# Patient Record
Sex: FEMALE | Race: WHITE | Employment: UNEMPLOYED | URBAN - METROPOLITAN AREA
[De-identification: names, ages, dates, MRNs, and addresses within clinical notes are randomized per-mention and may not be internally consistent; named-entity substitution may affect disease eponyms.]

---

## 2017-04-10 PROBLEM — M17.12 ARTHRITIS OF KNEE, LEFT: Status: ACTIVE | Noted: 2017-04-10

## 2017-06-06 PROBLEM — G89.4 CHRONIC PAIN SYNDROME: Status: ACTIVE | Noted: 2017-06-06

## 2018-01-18 PROBLEM — M47.812 CERVICAL SPONDYLOSIS WITHOUT MYELOPATHY: Status: ACTIVE | Noted: 2018-01-18

## 2018-06-08 ENCOUNTER — HOSPITAL ENCOUNTER (OUTPATIENT)
Dept: MRI IMAGING | Age: 58
Discharge: HOME | End: 2018-06-10
Payer: COMMERCIAL

## 2018-06-08 DIAGNOSIS — M54.17 LUMBOSACRAL RADICULOPATHY: ICD-10-CM

## 2018-06-08 DIAGNOSIS — M48.062 LUMBAR STENOSIS WITH NEUROGENIC CLAUDICATION: ICD-10-CM

## 2018-06-08 PROCEDURE — 72158 MRI LUMBAR SPINE W/O & W/DYE: CPT

## 2018-06-08 PROCEDURE — A9579 GAD-BASE MR CONTRAST NOS,1ML: HCPCS | Performed by: RADIOLOGY

## 2018-06-08 PROCEDURE — 6360000004 HC RX CONTRAST MEDICATION: Performed by: RADIOLOGY

## 2018-06-08 RX ORDER — SODIUM CHLORIDE 0.9 % (FLUSH) 0.9 %
10 SYRINGE (ML) INJECTION 2 TIMES DAILY
Status: DISCONTINUED | OUTPATIENT
Start: 2018-06-08 | End: 2018-06-11 | Stop reason: HOSPADM

## 2018-06-08 RX ADMIN — GADOTERIDOL 15 ML: 279.3 INJECTION, SOLUTION INTRAVENOUS at 10:10

## 2018-09-04 PROBLEM — M96.1 POST LAMINECTOMY SYNDROME: Status: ACTIVE | Noted: 2018-09-04

## 2018-09-05 ENCOUNTER — HOSPITAL ENCOUNTER (OUTPATIENT)
Dept: SLEEP CENTER | Age: 58
Discharge: HOME | End: 2018-09-07
Payer: COMMERCIAL

## 2018-09-05 PROCEDURE — 95810 POLYSOM 6/> YRS 4/> PARAM: CPT

## 2018-09-06 ENCOUNTER — HOSPITAL ENCOUNTER (OUTPATIENT)
Dept: NEUROLOGY | Age: 58
Discharge: HOME | End: 2018-09-06
Payer: COMMERCIAL

## 2018-09-06 PROCEDURE — 95816 EEG AWAKE AND DROWSY: CPT

## 2018-10-18 ENCOUNTER — OFFICE VISIT (OUTPATIENT)
Dept: PULMONOLOGY | Age: 58
End: 2018-10-18
Payer: COMMERCIAL

## 2018-10-18 VITALS
TEMPERATURE: 98 F | HEART RATE: 76 BPM | OXYGEN SATURATION: 96 % | WEIGHT: 202 LBS | SYSTOLIC BLOOD PRESSURE: 118 MMHG | RESPIRATION RATE: 16 BRPM | DIASTOLIC BLOOD PRESSURE: 70 MMHG | HEIGHT: 65 IN | BODY MASS INDEX: 33.66 KG/M2

## 2018-10-18 DIAGNOSIS — G47.30 SLEEP APNEA, UNSPECIFIED TYPE: Primary | ICD-10-CM

## 2018-10-18 DIAGNOSIS — E66.9 OBESITY (BMI 30-39.9): ICD-10-CM

## 2018-10-18 PROCEDURE — 99203 OFFICE O/P NEW LOW 30 MIN: CPT | Performed by: INTERNAL MEDICINE

## 2018-10-18 ASSESSMENT — ENCOUNTER SYMPTOMS
WHEEZING: 0
SINUS PRESSURE: 0
COUGH: 0
EYE DISCHARGE: 0
SORE THROAT: 0
VOICE CHANGE: 0
ABDOMINAL PAIN: 0
NAUSEA: 0
DIARRHEA: 0
TROUBLE SWALLOWING: 0
SHORTNESS OF BREATH: 0
EYE ITCHING: 0
CHEST TIGHTNESS: 0
RHINORRHEA: 0
VOMITING: 0

## 2019-10-24 RX ORDER — SUMATRIPTAN 100 MG/1
100 TABLET, FILM COATED ORAL
Qty: 27 TABLET | Refills: 0 | Status: SHIPPED | OUTPATIENT
Start: 2019-10-24 | End: 2022-09-27

## 2020-04-01 ENCOUNTER — VIRTUAL VISIT (OUTPATIENT)
Dept: NEUROLOGY | Age: 60
End: 2020-04-01

## 2020-04-01 PROBLEM — M50.10 CERVICAL DISC DISORDER WITH RADICULOPATHY, UNSPECIFIED CERVICAL REGION: Status: ACTIVE | Noted: 2017-12-30

## 2020-04-01 PROBLEM — G43.719 INTRACTABLE CHRONIC MIGRAINE WITHOUT AURA AND WITHOUT STATUS MIGRAINOSUS: Status: ACTIVE | Noted: 2020-04-01

## 2020-04-01 PROBLEM — F39 MOOD DISORDER (HCC): Status: ACTIVE | Noted: 2018-07-03

## 2020-04-01 PROBLEM — F51.4 SLEEP TERROR DISORDER: Status: ACTIVE | Noted: 2020-04-01

## 2020-04-01 PROBLEM — S46.911A STRAIN OF UNSPECIFIED MUSCLE, FASCIA AND TENDON AT SHOULDER AND UPPER ARM LEVEL, RIGHT ARM, INITIAL ENCOUNTER: Status: ACTIVE | Noted: 2017-12-30

## 2020-04-01 PROBLEM — G43.001 MIGRAINE WITHOUT AURA AND WITH STATUS MIGRAINOSUS, NOT INTRACTABLE: Status: ACTIVE | Noted: 2020-04-01

## 2020-04-01 PROBLEM — K21.9 GERD (GASTROESOPHAGEAL REFLUX DISEASE): Status: ACTIVE | Noted: 2020-04-01

## 2020-04-01 PROBLEM — R44.3 HALLUCINATIONS, UNSPECIFIED: Status: ACTIVE | Noted: 2018-07-03

## 2020-04-01 PROCEDURE — 99214 OFFICE O/P EST MOD 30 MIN: CPT | Performed by: PSYCHIATRY & NEUROLOGY

## 2020-04-01 RX ORDER — ESCITALOPRAM OXALATE 20 MG/1
20 TABLET ORAL DAILY
COMMUNITY
Start: 2020-03-09 | End: 2022-10-06

## 2020-04-01 RX ORDER — DIVALPROEX SODIUM 500 MG/1
500 TABLET, DELAYED RELEASE ORAL DAILY
COMMUNITY
End: 2020-04-01 | Stop reason: SDUPTHER

## 2020-04-01 RX ORDER — TRAZODONE HYDROCHLORIDE 100 MG/1
300 TABLET ORAL NIGHTLY
COMMUNITY
Start: 2020-03-09

## 2020-04-01 RX ORDER — PANTOPRAZOLE SODIUM 40 MG/1
TABLET, DELAYED RELEASE ORAL 2 TIMES DAILY
COMMUNITY
Start: 2017-08-07

## 2020-04-01 RX ORDER — DIVALPROEX SODIUM 250 MG/1
250 TABLET, DELAYED RELEASE ORAL DAILY
Qty: 90 TABLET | Refills: 2 | Status: CANCELLED | OUTPATIENT
Start: 2020-04-01

## 2020-04-01 RX ORDER — DIVALPROEX SODIUM 250 MG/1
TABLET, DELAYED RELEASE ORAL
COMMUNITY
End: 2021-06-23

## 2020-04-01 RX ORDER — ERENUMAB-AOOE 140 MG/ML
140 INJECTION, SOLUTION SUBCUTANEOUS
Qty: 1 ML | Refills: 6 | Status: SHIPPED | OUTPATIENT
Start: 2020-04-01 | End: 2020-04-02 | Stop reason: SDUPTHER

## 2020-04-01 RX ORDER — TRIAMTERENE AND HYDROCHLOROTHIAZIDE 37.5; 25 MG/1; MG/1
1 CAPSULE ORAL EVERY MORNING
COMMUNITY
End: 2022-10-06

## 2020-04-01 RX ORDER — DIVALPROEX SODIUM 500 MG/1
1000 TABLET, DELAYED RELEASE ORAL NIGHTLY
Qty: 60 TABLET | Refills: 3 | Status: SHIPPED | OUTPATIENT
Start: 2020-04-01 | End: 2020-04-29 | Stop reason: SDUPTHER

## 2020-04-01 RX ORDER — LOPERAMIDE HYDROCHLORIDE 2 MG/1
TABLET, FILM COATED ORAL
Status: ON HOLD | COMMUNITY
Start: 2020-03-31 | End: 2021-11-12

## 2020-04-01 RX ORDER — CLONAZEPAM 1 MG/1
TABLET ORAL
COMMUNITY
Start: 2020-03-11 | End: 2022-05-23

## 2020-04-01 RX ORDER — ALBUTEROL SULFATE 90 UG/1
AEROSOL, METERED RESPIRATORY (INHALATION)
Status: ON HOLD | COMMUNITY
Start: 2020-03-31 | End: 2021-11-12

## 2020-04-01 RX ORDER — CARVEDILOL 12.5 MG/1
TABLET ORAL
Status: ON HOLD | COMMUNITY
Start: 2020-01-14 | End: 2021-11-12

## 2020-04-01 RX ORDER — ARIPIPRAZOLE 5 MG/1
5 TABLET ORAL DAILY
Status: ON HOLD | COMMUNITY
Start: 2020-02-10 | End: 2021-11-12

## 2020-04-01 RX ORDER — SUMATRIPTAN 100 MG/1
100 TABLET, FILM COATED ORAL
COMMUNITY
End: 2020-11-10

## 2020-04-01 ASSESSMENT — ENCOUNTER SYMPTOMS
CHOKING: 0
PHOTOPHOBIA: 0
VOMITING: 0
SHORTNESS OF BREATH: 0
NAUSEA: 0
TROUBLE SWALLOWING: 0
BACK PAIN: 0

## 2020-04-01 NOTE — PROGRESS NOTES
she is on 750 mg of Depakote we have not seen her since June 2019 and she is not any laboratory tests and she has not any side effects denies any tremors weight gain or encephalopathy. She is now having some minor relapses again and we have therefore recommended that we increase her Depakote 2000 mg at night. Her main concerns are migraine headache she is 8 migraine headache days a month and she just not respond to Depakote or Imitrex. She reports she may have tried Topamax in the past.    I recommended that we consider her for Aimovig CGRP blocker as this would be the best option for her in terms of prevention. Recommended that she obtain a liver function test and a blood count as she has been on Depakote for a few years. We will see her as we open our offices. Plan:      Orders Placed This Encounter   Procedures    CBC Auto Differential     Standing Status:   Future     Standing Expiration Date:   4/1/2021    Hepatic Function Panel     Standing Status:   Future     Standing Expiration Date:   4/1/2021     Orders Placed This Encounter   Medications    divalproex (DEPAKOTE) 500 MG DR tablet     Sig: Take 2 tablets by mouth nightly     Dispense:  60 tablet     Refill:  3    Erenumab-aooe (AIMOVIG) 140 MG/ML SOAJ     Sig: Inject 140 mg into the skin every 30 days     Dispense:  1 mL     Refill:  6       No follow-ups on file.       Monserrat Singleton MD

## 2020-04-02 RX ORDER — ERENUMAB-AOOE 140 MG/ML
140 INJECTION, SOLUTION SUBCUTANEOUS
Qty: 1 PEN | Refills: 11 | Status: SHIPPED | OUTPATIENT
Start: 2020-04-02 | End: 2020-05-02

## 2020-04-29 RX ORDER — DIVALPROEX SODIUM 500 MG/1
1000 TABLET, DELAYED RELEASE ORAL NIGHTLY
Qty: 180 TABLET | Refills: 3 | Status: SHIPPED | OUTPATIENT
Start: 2020-04-29 | End: 2021-06-23

## 2020-08-17 PROBLEM — R11.2 NAUSEA, VOMITING AND DIARRHEA: Status: ACTIVE | Noted: 2020-08-17

## 2020-08-17 PROBLEM — R31.9 HEMATURIA: Status: ACTIVE | Noted: 2020-08-17

## 2020-08-17 PROBLEM — Z96.659 HISTORY OF TOTAL KNEE ARTHROPLASTY: Status: ACTIVE | Noted: 2020-08-17

## 2020-08-17 PROBLEM — R55 SYNCOPE: Status: ACTIVE | Noted: 2020-08-17

## 2020-08-17 PROBLEM — G47.20 DISRUPTIONS OF 24 HOUR SLEEP-WAKE CYCLE: Status: ACTIVE | Noted: 2020-08-17

## 2020-08-17 PROBLEM — I10 BENIGN ESSENTIAL HYPERTENSION: Status: ACTIVE | Noted: 2020-08-17

## 2020-08-17 PROBLEM — R19.7 NAUSEA, VOMITING AND DIARRHEA: Status: ACTIVE | Noted: 2020-08-17

## 2020-08-17 PROBLEM — R50.9 FEVER: Status: ACTIVE | Noted: 2020-08-17

## 2020-08-17 PROBLEM — L98.9 SKIN LESION: Status: ACTIVE | Noted: 2020-08-17

## 2020-08-17 PROBLEM — Z87.898 HISTORY OF DISEASE: Status: ACTIVE | Noted: 2020-08-17

## 2020-11-10 RX ORDER — SUMATRIPTAN 100 MG/1
TABLET, FILM COATED ORAL
Qty: 27 TABLET | Refills: 8 | Status: ON HOLD | OUTPATIENT
Start: 2020-11-10 | End: 2021-11-12

## 2020-12-16 PROBLEM — I63.9 CEREBROVASCULAR ACCIDENT (HCC): Status: ACTIVE | Noted: 2020-12-16

## 2020-12-16 PROBLEM — D64.9 ANEMIA: Status: ACTIVE | Noted: 2020-12-16

## 2020-12-16 PROBLEM — Z86.73 HISTORY OF CEREBROVASCULAR ACCIDENT: Status: ACTIVE | Noted: 2020-12-16

## 2020-12-16 PROBLEM — G81.94 LEFT HEMIPARESIS (HCC): Status: ACTIVE | Noted: 2020-12-16

## 2020-12-16 PROBLEM — R79.89 ABNORMAL LIVER FUNCTION TESTS: Status: ACTIVE | Noted: 2020-12-16

## 2020-12-16 PROBLEM — R20.0 NUMBNESS OF UPPER LIMB: Status: ACTIVE | Noted: 2020-12-16

## 2020-12-16 PROBLEM — G43.019 REFRACTORY MIGRAINE WITHOUT AURA: Status: ACTIVE | Noted: 2020-12-16

## 2020-12-16 PROBLEM — B37.2 CANDIDIASIS OF SKIN: Status: ACTIVE | Noted: 2020-12-16

## 2021-03-25 RX ORDER — ERENUMAB-AOOE 140 MG/ML
140 INJECTION, SOLUTION SUBCUTANEOUS
Qty: 1 PEN | Refills: 5 | Status: SHIPPED | OUTPATIENT
Start: 2021-03-25

## 2021-03-25 RX ORDER — ERENUMAB-AOOE 140 MG/ML
INJECTION, SOLUTION SUBCUTANEOUS
COMMUNITY
Start: 2021-03-01 | End: 2021-03-25 | Stop reason: SDUPTHER

## 2021-03-25 NOTE — TELEPHONE ENCOUNTER
Pharmacy is requesting medication refill.  Please approve or deny this request.    Rx requested:  Requested Prescriptions     Pending Prescriptions Disp Refills    AIMOVIG 140 MG/ML SOAJ 1 pen 5     Sig: Inject 140 mg as directed every 30 days         Last Office Visit:   4/1/2020      Next Visit Date:  Future Appointments   Date Time Provider Ranjana Sims   3/29/2021 10:30 AM Rosalba Zamora APRN - CNP [facility]

## 2021-06-23 ENCOUNTER — OFFICE VISIT (OUTPATIENT)
Dept: PAIN MANAGEMENT | Age: 61
End: 2021-06-23
Payer: COMMERCIAL

## 2021-06-23 VITALS
DIASTOLIC BLOOD PRESSURE: 64 MMHG | HEART RATE: 84 BPM | HEIGHT: 65 IN | BODY MASS INDEX: 33.66 KG/M2 | WEIGHT: 202 LBS | TEMPERATURE: 97.3 F | SYSTOLIC BLOOD PRESSURE: 134 MMHG

## 2021-06-23 DIAGNOSIS — M47.817 LUMBOSACRAL SPONDYLOSIS WITHOUT MYELOPATHY: ICD-10-CM

## 2021-06-23 DIAGNOSIS — M96.1 POST LAMINECTOMY SYNDROME: ICD-10-CM

## 2021-06-23 DIAGNOSIS — M47.812 CERVICAL SPONDYLOSIS WITHOUT MYELOPATHY: ICD-10-CM

## 2021-06-23 DIAGNOSIS — G89.4 CHRONIC PAIN SYNDROME: ICD-10-CM

## 2021-06-23 DIAGNOSIS — M25.511 PAIN IN JOINT OF RIGHT SHOULDER: ICD-10-CM

## 2021-06-23 DIAGNOSIS — M48.062 SPINAL STENOSIS OF LUMBAR REGION WITH NEUROGENIC CLAUDICATION: ICD-10-CM

## 2021-06-23 PROBLEM — E78.5 HYPERLIPIDEMIA, UNSPECIFIED: Status: ACTIVE | Noted: 2020-12-17

## 2021-06-23 PROBLEM — I69.354 HEMIPLGA FOLLOWING CEREBRAL INFRC AFFECTING LEFT NONDOM SIDE (HCC): Status: ACTIVE | Noted: 2021-02-12

## 2021-06-23 PROBLEM — F32.9 MAJOR DEPRESSIVE DISORDER, SINGLE EPISODE, UNSPECIFIED: Status: ACTIVE | Noted: 2018-07-03

## 2021-06-23 PROBLEM — G43.909 MIGRAINE, UNSPECIFIED, NOT INTRACTABLE, WITHOUT STATUS MIGRAINOSUS: Status: ACTIVE | Noted: 2020-07-14

## 2021-06-23 PROBLEM — M47.816 SPONDYLOSIS WITHOUT MYELOPATHY OR RADICULOPATHY, LUMBAR REGION: Status: ACTIVE | Noted: 2020-10-08

## 2021-06-23 PROBLEM — Z90.49 ACQUIRED ABSENCE OF OTHER SPECIFIED PARTS OF DIGESTIVE TRACT: Status: ACTIVE | Noted: 2020-07-14

## 2021-06-23 PROBLEM — G56.02 CARPAL TUNNEL SYNDROME, LEFT UPPER LIMB: Status: ACTIVE | Noted: 2020-10-21

## 2021-06-23 PROBLEM — R00.1 BRADYCARDIA, UNSPECIFIED: Status: ACTIVE | Noted: 2020-07-14

## 2021-06-23 PROBLEM — I24.9 ACUTE ISCHEMIC HEART DISEASE, UNSPECIFIED (HCC): Status: ACTIVE | Noted: 2021-02-10

## 2021-06-23 PROBLEM — I73.9 PERIPHERAL VASCULAR DISEASE, UNSPECIFIED (HCC): Status: ACTIVE | Noted: 2020-12-17

## 2021-06-23 PROBLEM — D50.0 IRON DEFICIENCY ANEMIA SECONDARY TO BLOOD LOSS (CHRONIC): Status: ACTIVE | Noted: 2021-02-12

## 2021-06-23 PROBLEM — I16.0 HYPERTENSIVE URGENCY: Status: ACTIVE | Noted: 2021-02-12

## 2021-06-23 PROCEDURE — 99213 OFFICE O/P EST LOW 20 MIN: CPT | Performed by: NURSE PRACTITIONER

## 2021-06-23 RX ORDER — AMLODIPINE BESYLATE 10 MG/1
TABLET ORAL
COMMUNITY
Start: 2021-03-01 | End: 2022-10-06

## 2021-06-23 RX ORDER — ASPIRIN 81 MG/1
TABLET, COATED ORAL
COMMUNITY
Start: 2021-04-23

## 2021-06-23 RX ORDER — RISPERIDONE 0.5 MG/1
TABLET, FILM COATED ORAL
Status: ON HOLD | COMMUNITY
Start: 2021-04-29 | End: 2021-11-12

## 2021-06-23 RX ORDER — SUCRALFATE ORAL 1 G/10ML
SUSPENSION ORAL
COMMUNITY
Start: 2021-06-17 | End: 2022-09-27

## 2021-06-23 RX ORDER — OXYCODONE HYDROCHLORIDE AND ACETAMINOPHEN 5; 325 MG/1; MG/1
1 TABLET ORAL
Qty: 100 TABLET | Refills: 0 | Status: SHIPPED | OUTPATIENT
Start: 2021-06-30 | End: 2021-07-29 | Stop reason: SDUPTHER

## 2021-06-23 ASSESSMENT — ENCOUNTER SYMPTOMS
GASTROINTESTINAL NEGATIVE: 1
EYES NEGATIVE: 1
BACK PAIN: 1
SHORTNESS OF BREATH: 0
TROUBLE SWALLOWING: 0
DIARRHEA: 0
CONSTIPATION: 0
COUGH: 0
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
## 2021-06-24 ENCOUNTER — TELEPHONE (OUTPATIENT)
Dept: PAIN MANAGEMENT | Age: 61
End: 2021-06-24

## 2021-07-27 ENCOUNTER — TELEPHONE (OUTPATIENT)
Dept: PAIN MANAGEMENT | Age: 61
End: 2021-07-27

## 2021-07-29 ENCOUNTER — PROCEDURE VISIT (OUTPATIENT)
Dept: PAIN MANAGEMENT | Age: 61
End: 2021-07-29
Payer: COMMERCIAL

## 2021-07-29 DIAGNOSIS — M47.817 LUMBOSACRAL SPONDYLOSIS WITHOUT MYELOPATHY: Primary | ICD-10-CM

## 2021-07-29 DIAGNOSIS — M96.1 POST LAMINECTOMY SYNDROME: ICD-10-CM

## 2021-07-29 DIAGNOSIS — G89.4 CHRONIC PAIN SYNDROME: ICD-10-CM

## 2021-07-29 DIAGNOSIS — M48.062 SPINAL STENOSIS OF LUMBAR REGION WITH NEUROGENIC CLAUDICATION: ICD-10-CM

## 2021-07-29 DIAGNOSIS — M47.812 CERVICAL SPONDYLOSIS WITHOUT MYELOPATHY: ICD-10-CM

## 2021-07-29 DIAGNOSIS — M25.511 PAIN IN JOINT OF RIGHT SHOULDER: ICD-10-CM

## 2021-07-29 DIAGNOSIS — M51.36 DDD (DEGENERATIVE DISC DISEASE), LUMBAR: ICD-10-CM

## 2021-07-29 PROCEDURE — 64494 INJ PARAVERT F JNT L/S 2 LEV: CPT | Performed by: ANESTHESIOLOGY

## 2021-07-29 PROCEDURE — 64493 INJ PARAVERT F JNT L/S 1 LEV: CPT | Performed by: ANESTHESIOLOGY

## 2021-07-29 RX ORDER — OXYCODONE HYDROCHLORIDE AND ACETAMINOPHEN 5; 325 MG/1; MG/1
1 TABLET ORAL
Qty: 100 TABLET | Refills: 0 | Status: SHIPPED | OUTPATIENT
Start: 2021-07-30 | End: 2021-08-27 | Stop reason: SDUPTHER

## 2021-07-29 NOTE — PROGRESS NOTES
[facility]) Physicians  Neurosurgery and Pain [facility]  [address] [facility] [address]: [phone]  F: [phone]             Provider: Maynor Julien MD          Patient Name: Xochitl Dixon : 1960        Date: 2021         PROCEDURE: Bilateral L2, L3, L5 medial branch blocks, diagnostic. Description of Procedure: The procedure and potential complications were explained to the patient and a voluntary informed, signed consent was obtained. The patient was placed prone on the x-ray table, under fluoroscopic guidance a 25-gauge, 3-1/2 inch long curved spinal needle was inserted aiming at the medial branches located at the junction of the superior articular process and transverse process of respective vertebrae except at the L5 level instead of the transverse process the sacral ala was used. After negative aspiration 0.8 mL of 1% Xylocaine was injected at each level. Fifteen minutes later the patient's pain level was assessed. There was marked pain relief of 80%. Summary and Recommendations: The patient will be back for MBB.

## 2021-07-30 ENCOUNTER — TELEPHONE (OUTPATIENT)
Dept: PAIN MANAGEMENT | Age: 61
End: 2021-07-30

## 2021-07-30 NOTE — TELEPHONE ENCOUNTER
ORDER PLACED:    Date: 7/29/21  Description: Meghann GONZALEZ  Order Number: [id number]  Ordering Provider: Bassem Mcdonald  Performing Provider: Bassem Mcdonald  CPT Codes: 52138,89778  ICD10 Codes: T79.173

## 2021-07-30 NOTE — TELEPHONE ENCOUNTER
HEMANTH L2,3,5 MBB    NO AUTH REQUIRED    OK to schedule procedure approved as above. Please note sides/levels approved and date range.    (If applicable, sides/levels approved may differ from those ordered)    TO BE SCHEDULED WITH

## 2021-08-02 ENCOUNTER — TELEPHONE (OUTPATIENT)
Dept: PAIN MANAGEMENT | Age: 61
End: 2021-08-02

## 2021-08-02 NOTE — TELEPHONE ENCOUNTER
BENEFITS:    Insurance: [employer]IVIAN   Phone: [phone]  Contact Name: Jeffrey Garcia  Effective Date: 4/1/2020     Plan year: MERISSA  Deductible: $400      Deductible Met: $400  Allowed/benefits paid at: 90% AFTER DED  OOP: $800, $800 MET  Freq Limits: NONE  Prior Auth Requirement: NONE    Notes:     Call Reference #: [id number]    Time of call: 205PM

## 2021-08-04 ENCOUNTER — PROCEDURE VISIT (OUTPATIENT)
Dept: PAIN MANAGEMENT | Age: 61
End: 2021-08-04
Payer: COMMERCIAL

## 2021-08-04 DIAGNOSIS — M47.817 LUMBOSACRAL SPONDYLOSIS WITHOUT MYELOPATHY: Primary | ICD-10-CM

## 2021-08-04 DIAGNOSIS — M51.36 DDD (DEGENERATIVE DISC DISEASE), LUMBAR: ICD-10-CM

## 2021-08-04 DIAGNOSIS — M96.1 POST LAMINECTOMY SYNDROME: ICD-10-CM

## 2021-08-04 PROCEDURE — 64494 INJ PARAVERT F JNT L/S 2 LEV: CPT | Performed by: ANESTHESIOLOGY

## 2021-08-04 PROCEDURE — 64493 INJ PARAVERT F JNT L/S 1 LEV: CPT | Performed by: ANESTHESIOLOGY

## 2021-08-04 RX ORDER — LIDOCAINE HYDROCHLORIDE 10 MG/ML
5 INJECTION, SOLUTION EPIDURAL; INFILTRATION; INTRACAUDAL; PERINEURAL ONCE
Status: COMPLETED | OUTPATIENT
Start: 2021-08-04 | End: 2021-08-04

## 2021-08-04 RX ADMIN — LIDOCAINE HYDROCHLORIDE 5 ML: 10 INJECTION, SOLUTION EPIDURAL; INFILTRATION; INTRACAUDAL; PERINEURAL at 09:42

## 2021-08-04 NOTE — PROGRESS NOTES
[facility]) Physicians  Neurosurgery and Pain [address], [address]: [phone]  F: [phone]             Provider: Sammy Israel MD          Patient Name: Ronnie Caceres : 1960        Date: 2021         PROCEDURE:                        [facility]) Physicians  Neurosurgery and Pain Management [address] [place] [address]: [phone]  F: [phone]             Provider: Sammy Israel MD          Patient Name: Ronnie Caceres : 1960        Date: 2021         PROCEDURE: Bilateral L2, L3, L5 medial branch blocks, diagnostic. Description of Procedure: The procedure and potential complications were explained to the patient and a voluntary informed, signed consent was obtained. The patient was placed prone on the x-ray table, under fluoroscopic guidance a 25-gauge, 3-1/2 inch long curved spinal needle was inserted aiming at the medial branches located at the junction of the superior articular process and transverse process of respective vertebrae except at the L5 level instead of the transverse process the sacral ala was used. After negative aspiration 0.8 mL of 1% Xylocaine was injected at each level. Fifteen minutes later the patient's pain level was assessed. There was marked pain relief of 80%. Summary and Recommendations: The patient will be back for RFA.

## 2021-08-05 ENCOUNTER — TELEPHONE (OUTPATIENT)
Dept: PAIN MANAGEMENT | Age: 61
End: 2021-08-05

## 2021-08-05 NOTE — TELEPHONE ENCOUNTER
BENEFITS:    Insurance: [employer] COLES  Phone: [phone]  Contact Name: Abdirahman Perez Date: 04/01/2020     Plan year: MERISSA  Deductible: $400      Deductible Met: $400  Allowed/benefits paid at: 90% AFTER DED  OOP: $800, IS MET  Freq Limits: NONE  Prior Auth Requirement: [place] FAX: [phone]    Notes: PHONE: [phone]    Call Reference #: [id number]    Time of call: 930AM

## 2021-08-05 NOTE — TELEPHONE ENCOUNTER
ORDER PLACED:    Date: 08/04/21  Description: Melissa Zaragoza RFA  Order Number: [id number]  Ordering Provider: [facility]  Performing Provider: [facility]  CPT Codes: 68881, 92553  ICD10 Codes: C44.141

## 2021-08-12 ENCOUNTER — OFFICE VISIT (OUTPATIENT)
Dept: PAIN MANAGEMENT | Age: 61
End: 2021-08-12
Payer: COMMERCIAL

## 2021-08-12 DIAGNOSIS — M51.36 DDD (DEGENERATIVE DISC DISEASE), LUMBAR: ICD-10-CM

## 2021-08-12 DIAGNOSIS — M96.1 POST LAMINECTOMY SYNDROME: ICD-10-CM

## 2021-08-12 DIAGNOSIS — M47.817 LUMBOSACRAL SPONDYLOSIS WITHOUT MYELOPATHY: Primary | ICD-10-CM

## 2021-08-12 PROCEDURE — 64636 DESTROY L/S FACET JNT ADDL: CPT | Performed by: ANESTHESIOLOGY

## 2021-08-12 PROCEDURE — 64635 DESTROY LUMB/SAC FACET JNT: CPT | Performed by: ANESTHESIOLOGY

## 2021-08-12 RX ORDER — LIDOCAINE HYDROCHLORIDE 10 MG/ML
15 INJECTION, SOLUTION EPIDURAL; INFILTRATION; INTRACAUDAL; PERINEURAL ONCE
Status: COMPLETED | OUTPATIENT
Start: 2021-08-12 | End: 2021-08-12

## 2021-08-12 RX ORDER — BETAMETHASONE SODIUM PHOSPHATE AND BETAMETHASONE ACETATE 3; 3 MG/ML; MG/ML
6 INJECTION, SUSPENSION INTRA-ARTICULAR; INTRALESIONAL; INTRAMUSCULAR; SOFT TISSUE ONCE
Status: COMPLETED | OUTPATIENT
Start: 2021-08-12 | End: 2021-08-12

## 2021-08-12 RX ADMIN — Medication 2 MEQ: at 08:56

## 2021-08-12 RX ADMIN — BETAMETHASONE SODIUM PHOSPHATE AND BETAMETHASONE ACETATE 6 MG: 3; 3 INJECTION, SUSPENSION INTRA-ARTICULAR; INTRALESIONAL; INTRAMUSCULAR; SOFT TISSUE at 08:55

## 2021-08-12 RX ADMIN — LIDOCAINE HYDROCHLORIDE 15 ML: 10 INJECTION, SOLUTION EPIDURAL; INFILTRATION; INTRACAUDAL; PERINEURAL at 08:55

## 2021-08-12 NOTE — PROGRESS NOTES
[facility]) Physicians  Neurosurgery and Pain [facility]  [address] [facility], [place] [address]: [phone]  F: [phone]             Provider: Terry Dickson MD          Patient Name: Carolina London : 1960        Date: 2021         PROCEDURE:  Bilateral  L2, L3, L5 medial branch ablation by radiofrequency. Description of Procedure: The procedure and potential complications were explained to the patient and a voluntary informed, signed consent was obtained. The patient was placed prone on the x-ray table and the mid back was prepped with Betadine solution. Under fluoroscopic guidance the skin was infiltrated with 1% Xylocaine a 20-gauge, 3.5 inch long curved cannula with a 10 mm active curved tip was inserted to place the cannula tip on the junction of the superior articular process and transverse process where the medial branches are located. After sensory and motor tests were done 2 mL of 1% Xylocaine was injected. Ablation was carried out at 80 degrees Celsius for 60 seconds and it was repeated one more time by repositioning the cannula tip.  1 mg of Celestone was injected at each level before the removal of the cannula. The patient tolerated the procedure very well. Summary and Recommendations: The patient will follow up in the office.

## 2021-08-27 ENCOUNTER — OFFICE VISIT (OUTPATIENT)
Dept: PAIN MANAGEMENT | Age: 61
End: 2021-08-27
Payer: COMMERCIAL

## 2021-08-27 ENCOUNTER — TELEPHONE (OUTPATIENT)
Dept: PAIN MANAGEMENT | Age: 61
End: 2021-08-27

## 2021-08-27 VITALS
TEMPERATURE: 96.1 F | HEIGHT: 65 IN | DIASTOLIC BLOOD PRESSURE: 107 MMHG | BODY MASS INDEX: 34.99 KG/M2 | HEART RATE: 80 BPM | SYSTOLIC BLOOD PRESSURE: 172 MMHG | WEIGHT: 210 LBS

## 2021-08-27 DIAGNOSIS — M48.062 SPINAL STENOSIS OF LUMBAR REGION WITH NEUROGENIC CLAUDICATION: ICD-10-CM

## 2021-08-27 DIAGNOSIS — M25.511 PAIN IN JOINT OF RIGHT SHOULDER: ICD-10-CM

## 2021-08-27 DIAGNOSIS — M96.1 POST LAMINECTOMY SYNDROME: ICD-10-CM

## 2021-08-27 DIAGNOSIS — M47.812 CERVICAL SPONDYLOSIS WITHOUT MYELOPATHY: ICD-10-CM

## 2021-08-27 DIAGNOSIS — M47.817 LUMBOSACRAL SPONDYLOSIS WITHOUT MYELOPATHY: ICD-10-CM

## 2021-08-27 DIAGNOSIS — G89.4 CHRONIC PAIN SYNDROME: ICD-10-CM

## 2021-08-27 DIAGNOSIS — M51.36 DDD (DEGENERATIVE DISC DISEASE), LUMBAR: ICD-10-CM

## 2021-08-27 PROCEDURE — 99213 OFFICE O/P EST LOW 20 MIN: CPT | Performed by: ANESTHESIOLOGY

## 2021-08-27 RX ORDER — OXYCODONE HYDROCHLORIDE AND ACETAMINOPHEN 5; 325 MG/1; MG/1
1 TABLET ORAL 3 TIMES DAILY PRN
Qty: 90 TABLET | Refills: 0 | Status: SHIPPED | OUTPATIENT
Start: 2021-08-29 | End: 2021-09-24 | Stop reason: SDUPTHER

## 2021-08-27 ASSESSMENT — ENCOUNTER SYMPTOMS
DIARRHEA: 0
CONSTIPATION: 0
BACK PAIN: 1
SHORTNESS OF BREATH: 0
NAUSEA: 0

## 2021-08-27 NOTE — TELEPHONE ENCOUNTER
I called and left msg for pt to advise per Dr. Barry Sultana ordered Right Shoulder Injection under US. I was calling to get pt scheduled.

## 2021-08-27 NOTE — PROGRESS NOTES
Patient:  Lesa Russell  YOB: 1960  Date: 8/27/2021      Subjective:     Lesa Russell is a 64 y.o. female who presents today with:    Chief Complaint   Patient presents with    Back Pain    Shoulder Pain       HPI: The patient presents to the clinic with a report she has been feeling much better with greater than 50% of the pain relief since she underwent lower lumbar facet denervation by radiofrequency on 8/12. She now wants to get some relief of pain from the right shoulder. It has been quite sometime since she got the last injection by Dr. Farooq Landry. On examination there is limited range with the tenderness in the subacromial compartment. Allergies:  Nsaids and Ibuprofen  Past Medical History:   Diagnosis Date    Anemia     Chicken pox     Chronic back pain     H/O bladder infections     Headache(784.0)     Hypertension     Measles     Mumps     Stroke ([address] 07/2020     Past Surgical History:   Procedure Laterality Date    APPENDECTOMY      GALLBLADDER SURGERY      HYSTERECTOMY      KNEE SURGERY Right     ROTATOR CUFF REPAIR      TONSILLECTOMY       Social History     Socioeconomic History    Marital status:      Spouse name: Not on file    Number of children: Not on file    Years of education: Not on file    Highest education level: Not on file   Occupational History    Not on file   Tobacco Use    Smoking status: Never Smoker    Smokeless tobacco: Never Used   Substance and Sexual Activity    Alcohol use: Yes    Drug use: No    Sexual activity: Not on file   Other Topics Concern    Not on file   Social History Narrative    Not on file     Social Determinants of Health     Financial Resource Strain:     Difficulty of Paying Living Expenses:    Food Insecurity:     Worried About Running Out of Food in the Last Year:     [address] in the Last Year:    Transportation Needs:     Lack of Transportation (Medical):      Lack of Transportation (Non-Medical):    Physical Activity:     Days of Exercise per Week:     Minutes of Exercise per Session:    Stress:     Feeling of Stress :    Social Connections:     Frequency of Communication with Friends and Family:     Frequency of Social Gatherings with Friends and Family:     Attends Confucianism Services:     Active Member of Clubs or Organizations:     Attends Club or Organization Meetings:     Marital Status:    Intimate Partner Violence:     Fear of Current or Ex-Partner:     Emotionally Abused:     Physically Abused:     Sexually Abused:      Family History   Problem Relation Age of Onset    Stroke Mother     Cancer Father        Current Outpatient Medications on File Prior to Visit   Medication Sig Dispense Refill    amLODIPine (NORVASC) 10 MG tablet Take by mouth      ASPIRIN LOW DOSE 81 MG EC tablet TAKE 1 TABLET BY MOUTH ONCE DAILY      risperiDONE (RISPERDAL) 0.5 MG tablet       sucralfate (CARAFATE) 1 GM/10ML suspension Take by mouth      gabapentin (NEURONTIN) 300 MG capsule Take 1 capsule by mouth 4 times daily for 90 days.  360 capsule 3    AIMOVIG 140 MG/ML SOAJ Inject 140 mg as directed every 30 days 1 pen 5    vilazodone HCl (VILAZODONE HCL) 10 MG TABS Take 10 mg by mouth daily (Patient not taking: Reported on 6/23/2021)      atorvastatin (LIPITOR) 10 MG tablet Take 10 mg by mouth daily      Handicap Placard MISC by Does not apply route For 5 years 1 each 0    SUMAtriptan (IMITREX) 100 MG tablet TAKE 1 TABLET ONCE AS NEEDED FOR MIGRAINE (Patient not taking: Reported on 6/23/2021) 27 tablet 8    naloxone 4 MG/0.1ML LIQD nasal spray 1 spray by Nasal route as needed for Opioid Reversal (Patient not taking: Reported on 6/23/2021) 1 each 0    albuterol sulfate  (90 Base) MCG/ACT inhaler       ARIPiprazole (ABILIFY) 5 MG tablet Take 5 mg by mouth daily (Patient not taking: Reported on 6/23/2021)      carvedilol (COREG) 12.5 MG tablet Take by mouth      clonazePAM (KLONOPIN) 1 MG tablet TAKE 1 TABLET BY MOUTH DAILY AS NEEDED FOR ANXIETY FOR 30 DAYS      escitalopram (LEXAPRO) 20 MG tablet       SM ANTI-DIARRHEAL 2 MG tablet  (Patient not taking: Reported on 6/23/2021)      pantoprazole (PROTONIX) 40 MG tablet Take by mouth      traZODone (DESYREL) 100 MG tablet       triamterene-hydroCHLOROthiazide (DYAZIDE) 37.5-25 MG per capsule Take 1 capsule by mouth every morning (Patient not taking: Reported on 6/23/2021)      SUMAtriptan (IMITREX) 100 MG tablet Take 1 tablet by mouth once as needed for Migraine 27 tablet 0    meloxicam (MOBIC) 15 MG tablet TAKE 1 TABLET DAILY AS NEEDED FOR PAIN (Patient not taking: Reported on 6/23/2021) 30 tablet 3     Current Facility-Administered Medications on File Prior to Visit   Medication Dose Route Frequency Provider Last Rate Last Admin    betamethasone acetate-betamethasone sodium phosphate (CELESTONE) injection 3 mg  3 mg Intra-articular Once Morris MD Guillermo           She has not tried physical therapy. Recent Procedures:  Lumbar RFA on8/12 provided Greater than 50 % pain relief for Present days    Review of Systems   Constitutional: Negative for fever. HENT: Negative for hearing loss. Respiratory: Negative for shortness of breath. Gastrointestinal: Negative for constipation, diarrhea and nausea. Genitourinary: Negative for difficulty urinating. Musculoskeletal: Positive for back pain. Negative for neck pain. Skin: Negative for rash. Neurological: Negative for headaches. Hematological: Does not bruise/bleed easily. Psychiatric/Behavioral: Negative for sleep disturbance.        Objective:     Vitals:  BP (!) 172/107   Pulse 80   Temp 96.1 °F (35.6 °C)   Ht 5' 5\" (1.651 m)   Wt 210 lb (95.3 kg)   LMP  (LMP Unknown)   BMI 34.95 kg/m² Pain Score:   7    PHYSICAL EXAM:    Patient alert and oriented times three, recent and remote memory intact, mood and affect normal, judgement and insight normal. Strength functional for ambulation. Balance and coordinational functional. Visualized skin intact. No visualized cyanosis, pulses intact. Cranial nerves 2-12 grossly intact. No obvious upper motor neuron signs seen. Sensation intact to light touch. Improve the range of motion of lumbar spine less tenderness. Radiculopathy:  Positive    Studies Review:  Reviewed None. Assessment:           Diagnosis Orders   1. Lumbosacral spondylosis without myelopathy  oxyCODONE-acetaminophen (PERCOCET) 5-325 MG per tablet   2. DDD (degenerative disc disease), lumbar  oxyCODONE-acetaminophen (PERCOCET) 5-325 MG per tablet   3. Spinal stenosis of lumbar region with neurogenic claudication  oxyCODONE-acetaminophen (PERCOCET) 5-325 MG per tablet   4. Cervical spondylosis without myelopathy  oxyCODONE-acetaminophen (PERCOCET) 5-325 MG per tablet   5. Pain in joint of right shoulder  oxyCODONE-acetaminophen (PERCOCET) 5-325 MG per tablet   6. Post laminectomy syndrome  oxyCODONE-acetaminophen (PERCOCET) 5-325 MG per tablet   7. Chronic pain syndrome  oxyCODONE-acetaminophen (PERCOCET) 5-325 MG per tablet         Plan:     No orders of the defined types were placed in this encounter. Orders Placed This Encounter   Medications    oxyCODONE-acetaminophen (PERCOCET) 5-325 MG per tablet     Sig: Take 1 tablet by mouth 3 times daily as needed for Pain for up to 30 days. #90 tabs for 30 days. Will continue to taper down. Take lowest dose possible to manage pain     Dispense:  90 tablet     Refill:  0     Reduce doses taken as pain becomes manageable       We will schedule for the right shoulder injection under ultrasound guidance. She is given 90 tablets of Percocet 5/325 after much discussion to taper down on narcotics. She was taking 100 tablets last month. With improve the functionality pain level we are going to taper down her pain meds. OARRS reviewed and is consistent with our record.   The patient reports that the pain meds improve the quality of ADL. Follow up:  Return in about 4 weeks (around 9/24/2021) for follow up.     Shannon Soto MD

## 2021-09-01 ENCOUNTER — OFFICE VISIT (OUTPATIENT)
Dept: PAIN MANAGEMENT | Age: 61
End: 2021-09-01
Payer: COMMERCIAL

## 2021-09-01 DIAGNOSIS — M25.511 PAIN IN JOINT OF RIGHT SHOULDER: Primary | ICD-10-CM

## 2021-09-01 PROCEDURE — 20611 DRAIN/INJ JOINT/BURSA W/US: CPT | Performed by: ANESTHESIOLOGY

## 2021-09-01 RX ORDER — BETAMETHASONE SODIUM PHOSPHATE AND BETAMETHASONE ACETATE 3; 3 MG/ML; MG/ML
5 INJECTION, SUSPENSION INTRA-ARTICULAR; INTRALESIONAL; INTRAMUSCULAR; SOFT TISSUE ONCE
Status: COMPLETED | OUTPATIENT
Start: 2021-09-01 | End: 2021-09-01

## 2021-09-01 RX ORDER — LIDOCAINE HYDROCHLORIDE 10 MG/ML
2 INJECTION, SOLUTION EPIDURAL; INFILTRATION; INTRACAUDAL; PERINEURAL ONCE
Status: COMPLETED | OUTPATIENT
Start: 2021-09-01 | End: 2021-09-01

## 2021-09-01 RX ADMIN — BETAMETHASONE SODIUM PHOSPHATE AND BETAMETHASONE ACETATE 5 MG: 3; 3 INJECTION, SUSPENSION INTRA-ARTICULAR; INTRALESIONAL; INTRAMUSCULAR; SOFT TISSUE at 08:10

## 2021-09-01 RX ADMIN — LIDOCAINE HYDROCHLORIDE 2 ML: 10 INJECTION, SOLUTION EPIDURAL; INFILTRATION; INTRACAUDAL; PERINEURAL at 08:11

## 2021-09-01 NOTE — PROGRESS NOTES
[employer].  Spine Surgery  [facility]           Provider: Deep Elder MD          Patient Name: Reyes Scudder : 1960        Date: 2021         PROCEDURE:  Right shoulder injection under ultrasound guidance. Description of Procedure: Risks, benefits and alternatives were described to the patient. After written and verbal consent were obtained, the patient was placed in the seated position with the arm in the modified Crass position. The skin was then prepped and cleansed with Betadine x3 in a sterile fashion. The ultrasound probe was then used to locate the region of the subacromial bursa. The proposed injection site in the lateral shoulder was cleansed and prepped again with an alcohol swab x3. Then a 27-gauge 1-1/2 inch needle was inserted and directed towards the region of the subacromial bursa. After negative aspiration for blood or fluid, a mixture of 1 mL of Celestone (6 mg) and 2 mL of 1% lidocaine was injected without resistance or difficulty. The needle was then removed. Deep Elder MD                     [address], [facility] [address]  Phone [phone]/[email or web address]

## 2021-09-14 ENCOUNTER — TELEPHONE (OUTPATIENT)
Dept: PAIN MANAGEMENT | Age: 61
End: 2021-09-14

## 2021-09-14 NOTE — TELEPHONE ENCOUNTER
Patient had a follow up apt scheduled with Dr. Monique Bocanegra on Tuesday, 09/28/21 that was canceled. Dr. Monique Bocanegra will be out of the office. I left a voicemail asking patient to return our call to reschedule.

## 2021-09-24 ENCOUNTER — OFFICE VISIT (OUTPATIENT)
Dept: PAIN MANAGEMENT | Age: 61
End: 2021-09-24
Payer: COMMERCIAL

## 2021-09-24 VITALS
DIASTOLIC BLOOD PRESSURE: 72 MMHG | WEIGHT: 210 LBS | TEMPERATURE: 97.1 F | BODY MASS INDEX: 34.99 KG/M2 | HEIGHT: 65 IN | SYSTOLIC BLOOD PRESSURE: 124 MMHG

## 2021-09-24 DIAGNOSIS — M48.062 SPINAL STENOSIS OF LUMBAR REGION WITH NEUROGENIC CLAUDICATION: ICD-10-CM

## 2021-09-24 DIAGNOSIS — M47.812 CERVICAL SPONDYLOSIS WITHOUT MYELOPATHY: ICD-10-CM

## 2021-09-24 DIAGNOSIS — G89.4 CHRONIC PAIN SYNDROME: ICD-10-CM

## 2021-09-24 DIAGNOSIS — M54.16 LUMBAR RADICULOPATHY: ICD-10-CM

## 2021-09-24 DIAGNOSIS — M47.817 LUMBOSACRAL SPONDYLOSIS WITHOUT MYELOPATHY: Primary | ICD-10-CM

## 2021-09-24 DIAGNOSIS — M25.511 PAIN IN JOINT OF RIGHT SHOULDER: ICD-10-CM

## 2021-09-24 DIAGNOSIS — M51.36 DDD (DEGENERATIVE DISC DISEASE), LUMBAR: ICD-10-CM

## 2021-09-24 DIAGNOSIS — M96.1 POST LAMINECTOMY SYNDROME: ICD-10-CM

## 2021-09-24 PROCEDURE — 99213 OFFICE O/P EST LOW 20 MIN: CPT | Performed by: ANESTHESIOLOGY

## 2021-09-24 RX ORDER — OXYCODONE HYDROCHLORIDE AND ACETAMINOPHEN 5; 325 MG/1; MG/1
1 TABLET ORAL 3 TIMES DAILY PRN
Qty: 75 TABLET | Refills: 0 | Status: SHIPPED | OUTPATIENT
Start: 2021-09-26 | End: 2021-10-26 | Stop reason: SDUPTHER

## 2021-09-24 ASSESSMENT — ENCOUNTER SYMPTOMS
CONSTIPATION: 0
SHORTNESS OF BREATH: 0
DIARRHEA: 0
BACK PAIN: 1
NAUSEA: 0

## 2021-09-24 NOTE — PROGRESS NOTES
Patient:  Caty Min  YOB: 1960  Date: 9/24/2021      Subjective:     Caty Min is a 64 y.o. female who presents today with:    Chief Complaint   Patient presents with    Back Pain       HPI: The patient presents to the clinic for a follow-up with a chief complaint of chronic low back pain. She reports that right shoulder feels great after she had an injection 3 weeks ago under ultrasound guidance. The patient has been on pain meds for a long time for postlaminectomy syndrome at that she had a fusion. We have been able to gradually taper down the Percocet. She will be given 75 tablets for the next 30 days. OARRS reviewed and is consistent with our record. The patient had lower lumbar facet denervation by radiofrequency in August and she is feeling better with her low back pain. Allergies:  Nsaids and Ibuprofen  Past Medical History:   Diagnosis Date    Anemia     Chicken pox     Chronic back pain     H/O bladder infections     Headache(784.0)     Hypertension     Measles     Mumps     Stroke ([address] 07/2020     Past Surgical History:   Procedure Laterality Date    APPENDECTOMY      GALLBLADDER SURGERY      HYSTERECTOMY      KNEE SURGERY Right     ROTATOR CUFF REPAIR      TONSILLECTOMY       Social History     Socioeconomic History    Marital status:      Spouse name: Not on file    Number of children: Not on file    Years of education: Not on file    Highest education level: Not on file   Occupational History    Not on file   Tobacco Use    Smoking status: Never Smoker    Smokeless tobacco: Never Used   Substance and Sexual Activity    Alcohol use:  Yes    Drug use: No    Sexual activity: Not on file   Other Topics Concern    Not on file   Social History Narrative    Not on file     Social Determinants of Health     Financial Resource Strain:     Difficulty of Paying Living Expenses:    Food Insecurity:     Worried About Running Out of Food in the Last Year:    [address] in the Last Year:    Transportation Needs:     Lack of Transportation (Medical):  Lack of Transportation (Non-Medical):    Physical Activity:     Days of Exercise per Week:     Minutes of Exercise per Session:    Stress:     Feeling of Stress :    Social Connections:     Frequency of Communication with Friends and Family:     Frequency of Social Gatherings with Friends and Family:     Attends Tenriism Services:     Active Member of Clubs or Organizations:     Attends Club or Organization Meetings:     Marital Status:    Intimate Partner Violence:     Fear of Current or Ex-Partner:     Emotionally Abused:     Physically Abused:     Sexually Abused:      Family History   Problem Relation Age of Onset    Stroke Mother     Cancer Father        Current Outpatient Medications on File Prior to Visit   Medication Sig Dispense Refill    amLODIPine (NORVASC) 10 MG tablet Take by mouth      ASPIRIN LOW DOSE 81 MG EC tablet TAKE 1 TABLET BY MOUTH ONCE DAILY      risperiDONE (RISPERDAL) 0.5 MG tablet       sucralfate (CARAFATE) 1 GM/10ML suspension Take by mouth      gabapentin (NEURONTIN) 300 MG capsule Take 1 capsule by mouth 4 times daily for 90 days.  360 capsule 3    AIMOVIG 140 MG/ML SOAJ Inject 140 mg as directed every 30 days 1 pen 5    vilazodone HCl (VILAZODONE HCL) 10 MG TABS Take 10 mg by mouth daily (Patient not taking: Reported on 6/23/2021)      atorvastatin (LIPITOR) 10 MG tablet Take 10 mg by mouth daily      Handicap Placard MISC by Does not apply route For 5 years 1 each 0    SUMAtriptan (IMITREX) 100 MG tablet TAKE 1 TABLET ONCE AS NEEDED FOR MIGRAINE (Patient not taking: Reported on 6/23/2021) 27 tablet 8    naloxone 4 MG/0.1ML LIQD nasal spray 1 spray by Nasal route as needed for Opioid Reversal (Patient not taking: Reported on 6/23/2021) 1 each 0    albuterol sulfate  (90 Base) MCG/ACT inhaler       ARIPiprazole (ABILIFY) 5 MG 9    PHYSICAL EXAM:    Patient alert and oriented times three, recent and remote memory intact, mood and affect normal, judgement and insight normal. Strength functional for ambulation. Balance and coordinational functional. Visualized skin intact. No visualized cyanosis, pulses intact. Cranial nerves 2-12 grossly intact. No obvious upper motor neuron signs seen. Sensation intact to light touch. Pain and functionality of the right shoulder and low back pain    Radiculopathy:  Positive    Studies Review:  Reviewed None. Assessment:     Periodic Controlled Substance Monitoring: Obtaining appropriate analgesic effect of treatment., No signs of potential drug abuse or diversion identified. Becky Tolbert MD)     Diagnosis Orders   1. Lumbosacral spondylosis without myelopathy  oxyCODONE-acetaminophen (PERCOCET) 5-325 MG per tablet   2. DDD (degenerative disc disease), lumbar  oxyCODONE-acetaminophen (PERCOCET) 5-325 MG per tablet   3. Spinal stenosis of lumbar region with neurogenic claudication  oxyCODONE-acetaminophen (PERCOCET) 5-325 MG per tablet   4. Post laminectomy syndrome  oxyCODONE-acetaminophen (PERCOCET) 5-325 MG per tablet   5. Lumbar radiculopathy  oxyCODONE-acetaminophen (PERCOCET) 5-325 MG per tablet   6. Cervical spondylosis without myelopathy  oxyCODONE-acetaminophen (PERCOCET) 5-325 MG per tablet   7. Pain in joint of right shoulder  oxyCODONE-acetaminophen (PERCOCET) 5-325 MG per tablet   8. Chronic pain syndrome  oxyCODONE-acetaminophen (PERCOCET) 5-325 MG per tablet         Plan:     No orders of the defined types were placed in this encounter. Orders Placed This Encounter   Medications    oxyCODONE-acetaminophen (PERCOCET) 5-325 MG per tablet     Sig: Take 1 tablet by mouth 3 times daily as needed for Pain for up to 30 days. #75 tabs for 30 days. Will continue to taper down.  Take lowest dose possible to manage pain     Dispense:  75 tablet     Refill:  0     Reduce doses taken

## 2021-10-26 ENCOUNTER — OFFICE VISIT (OUTPATIENT)
Dept: PAIN MANAGEMENT | Age: 61
End: 2021-10-26
Payer: COMMERCIAL

## 2021-10-26 VITALS
BODY MASS INDEX: 34.99 KG/M2 | HEIGHT: 65 IN | DIASTOLIC BLOOD PRESSURE: 64 MMHG | WEIGHT: 210 LBS | TEMPERATURE: 95.7 F | SYSTOLIC BLOOD PRESSURE: 126 MMHG

## 2021-10-26 DIAGNOSIS — M47.817 LUMBOSACRAL SPONDYLOSIS WITHOUT MYELOPATHY: ICD-10-CM

## 2021-10-26 DIAGNOSIS — M96.1 POST LAMINECTOMY SYNDROME: ICD-10-CM

## 2021-10-26 DIAGNOSIS — M25.511 PAIN IN JOINT OF RIGHT SHOULDER: ICD-10-CM

## 2021-10-26 DIAGNOSIS — M54.16 LUMBAR RADICULOPATHY: ICD-10-CM

## 2021-10-26 DIAGNOSIS — M48.062 SPINAL STENOSIS OF LUMBAR REGION WITH NEUROGENIC CLAUDICATION: ICD-10-CM

## 2021-10-26 DIAGNOSIS — M51.36 DDD (DEGENERATIVE DISC DISEASE), LUMBAR: ICD-10-CM

## 2021-10-26 DIAGNOSIS — G89.4 CHRONIC PAIN SYNDROME: ICD-10-CM

## 2021-10-26 DIAGNOSIS — M47.812 CERVICAL SPONDYLOSIS WITHOUT MYELOPATHY: ICD-10-CM

## 2021-10-26 PROCEDURE — 99214 OFFICE O/P EST MOD 30 MIN: CPT | Performed by: NURSE PRACTITIONER

## 2021-10-26 RX ORDER — OXYCODONE HYDROCHLORIDE AND ACETAMINOPHEN 5; 325 MG/1; MG/1
1 TABLET ORAL
Qty: 75 TABLET | Refills: 0 | Status: SHIPPED | OUTPATIENT
Start: 2021-10-26 | End: 2021-11-24 | Stop reason: SDUPTHER

## 2021-10-26 ASSESSMENT — ENCOUNTER SYMPTOMS
ABDOMINAL PAIN: 0
SHORTNESS OF BREATH: 0
SORE THROAT: 0
BACK PAIN: 1

## 2021-10-26 NOTE — PROGRESS NOTES
Hanh Marin  (1960)    10/26/2021    Subjective:     Hanh Marin is 64 y.o. female who complains today of:    Chief Complaint   Patient presents with    Back Pain     lumbar    Neck Pain     cervical         Allergies:  Nsaids and Ibuprofen    Past Medical History:   Diagnosis Date    Anemia     Chicken pox     Chronic back pain     H/O bladder infections     Headache(784.0)     Hypertension     Measles     Mumps     Stroke ([address] 07/2020     Past Surgical History:   Procedure Laterality Date    APPENDECTOMY      GALLBLADDER SURGERY      HYSTERECTOMY      KNEE SURGERY Right     ROTATOR CUFF REPAIR      TONSILLECTOMY       Family History   Problem Relation Age of Onset    Stroke Mother     Cancer Father      Social History     Socioeconomic History    Marital status:      Spouse name: Not on file    Number of children: Not on file    Years of education: Not on file    Highest education level: Not on file   Occupational History    Not on file   Tobacco Use    Smoking status: Never Smoker    Smokeless tobacco: Never Used   Substance and Sexual Activity    Alcohol use: Yes    Drug use: No    Sexual activity: Not on file   Other Topics Concern    Not on file   Social History Narrative    Not on file     Social Determinants of Health     Financial Resource Strain:     Difficulty of Paying Living Expenses:    Food Insecurity:     Worried About Running Out of Food in the Last Year:     [address] in the Last Year:    Transportation Needs:     Lack of Transportation (Medical):      Lack of Transportation (Non-Medical):    Physical Activity:     Days of Exercise per Week:     Minutes of Exercise per Session:    Stress:     Feeling of Stress :    Social Connections:     Frequency of Communication with Friends and Family:     Frequency of Social Gatherings with Friends and Family:     Attends Luther[employer] Services:     Active Member of Clubs or Organizations:     Attends Club or Organization Meetings:     Marital Status:    Intimate Partner Violence:     Fear of Current or Ex-Partner:     Emotionally Abused:     Physically Abused:     Sexually Abused:        Current Outpatient Medications on File Prior to Visit   Medication Sig Dispense Refill    amLODIPine (NORVASC) 10 MG tablet Take by mouth      ASPIRIN LOW DOSE 81 MG EC tablet TAKE 1 TABLET BY MOUTH ONCE DAILY      risperiDONE (RISPERDAL) 0.5 MG tablet       sucralfate (CARAFATE) 1 GM/10ML suspension Take by mouth      gabapentin (NEURONTIN) 300 MG capsule Take 1 capsule by mouth 4 times daily for 90 days.  360 capsule 3    AIMOVIG 140 MG/ML SOAJ Inject 140 mg as directed every 30 days 1 pen 5    vilazodone HCl (VILAZODONE HCL) 10 MG TABS Take 10 mg by mouth daily (Patient not taking: Reported on 6/23/2021)      atorvastatin (LIPITOR) 10 MG tablet Take 10 mg by mouth daily      Handicap Placard MISC by Does not apply route For 5 years 1 each 0    SUMAtriptan (IMITREX) 100 MG tablet TAKE 1 TABLET ONCE AS NEEDED FOR MIGRAINE (Patient not taking: Reported on 6/23/2021) 27 tablet 8    naloxone 4 MG/0.1ML LIQD nasal spray 1 spray by Nasal route as needed for Opioid Reversal (Patient not taking: Reported on 6/23/2021) 1 each 0    albuterol sulfate  (90 Base) MCG/ACT inhaler       ARIPiprazole (ABILIFY) 5 MG tablet Take 5 mg by mouth daily (Patient not taking: Reported on 6/23/2021)      carvedilol (COREG) 12.5 MG tablet Take by mouth      clonazePAM (KLONOPIN) 1 MG tablet TAKE 1 TABLET BY MOUTH DAILY AS NEEDED FOR ANXIETY FOR 30 DAYS      escitalopram (LEXAPRO) 20 MG tablet       SM ANTI-DIARRHEAL 2 MG tablet  (Patient not taking: Reported on 6/23/2021)      pantoprazole (PROTONIX) 40 MG tablet Take by mouth      traZODone (DESYREL) 100 MG tablet       triamterene-hydroCHLOROthiazide (DYAZIDE) 37.5-25 MG per capsule Take 1 capsule by mouth every morning (Patient not taking: Positive for back pain. Neurological: Negative for speech difficulty and headaches. Hematological: Negative for adenopathy. Psychiatric/Behavioral: Negative for agitation. All other systems reviewed and are negative. Objective:     Vitals:  /64   Temp 95.7 °F (35.4 °C) (Infrared)   Ht 5' 5\" (1.651 m)   Wt 210 lb (95.3 kg)   LMP  (LMP Unknown)   BMI 34.95 kg/m² Pain Score:   8      Physical Exam  Vitals and nursing note reviewed. Pt is alert and oriented x 3. Recent and remote memory is intact. Mood, judgement and affect are normal.  No signs of distress or SOB noted. Visualized skin intact. Sensation intact to light touch. Decreased ROM with flexion, extension and rotation of cervical spine. Muscle tightness noted. Positive bilateral cervical facet joint provacative with palpation. Negative Spurling's maneuver. Negative Lorrie's sign. Strong grasp B/L. Strength is functional in UE bilaterally. Pulses are intact. Decreased ROM with flexion and extension of low back. Tender with palpation to bilateral lumbar spine with positive provacative maneuvers noted. Negative SLR. Strength, balance, and coordination are diminished but functional for ambulation. Gait antalgic. Assessment:      Diagnosis Orders   1. Lumbosacral spondylosis without myelopathy  oxyCODONE-acetaminophen (PERCOCET) 5-325 MG per tablet   2. DDD (degenerative disc disease), lumbar  oxyCODONE-acetaminophen (PERCOCET) 5-325 MG per tablet   3. Spinal stenosis of lumbar region with neurogenic claudication  oxyCODONE-acetaminophen (PERCOCET) 5-325 MG per tablet   4. Post laminectomy syndrome  oxyCODONE-acetaminophen (PERCOCET) 5-325 MG per tablet   5. Lumbar radiculopathy  oxyCODONE-acetaminophen (PERCOCET) 5-325 MG per tablet   6.  Cervical spondylosis without myelopathy  [facility] DSTR NROLYTC AGNT PARVERTEB FCT SNGL CRVCL/THORA    [facility] D[facility] NROLYTC AGNT PARVERTEB FCT ADDL CRVCL/THORA    [facility] D[facility] NROLYTC

## 2021-10-27 ENCOUNTER — TELEPHONE (OUTPATIENT)
Dept: PAIN MANAGEMENT | Age: 61
End: 2021-10-27

## 2021-10-27 NOTE — TELEPHONE ENCOUNTER
ORDER PLACED:    Date: 10/26/21  Description: BILAT C3,4,5,6 RFA   Order Number: [id number]  Ordering Provider: JUVE  Performing Provider: [facility]  CPT Codes: 48866, 14774, 29375  ICD10 Codes: H72.209

## 2021-11-01 NOTE — TELEPHONE ENCOUNTER
RECEIVED FAX PARTIAL APPROVAL FROM [employer] FOR 2 LEVELS INSTEAD OF 3 LEVELS. STAFF MESSAGE SENT TO DR. GARCIA TO SEE HOW HE WOULD LIKE TO PROCEED.

## 2021-11-02 NOTE — TELEPHONE ENCOUNTER
PER GLEN AT [facility] [phone] THE DATE RANGE FOR REF # [id number] IS 10/29/21-11/26/21.     CALL REF # [id number]

## 2021-11-05 ENCOUNTER — TELEPHONE (OUTPATIENT)
Dept: PAIN MANAGEMENT | Age: 61
End: 2021-11-05

## 2021-11-05 ENCOUNTER — OFFICE VISIT (OUTPATIENT)
Dept: FAMILY MEDICINE CLINIC | Age: 61
End: 2021-11-05
Payer: COMMERCIAL

## 2021-11-05 VITALS
TEMPERATURE: 97.4 F | WEIGHT: 218 LBS | DIASTOLIC BLOOD PRESSURE: 80 MMHG | OXYGEN SATURATION: 95 % | HEIGHT: 65 IN | RESPIRATION RATE: 12 BRPM | BODY MASS INDEX: 36.32 KG/M2 | SYSTOLIC BLOOD PRESSURE: 124 MMHG | HEART RATE: 78 BPM

## 2021-11-05 DIAGNOSIS — Z01.818 PREOP EXAMINATION: Primary | ICD-10-CM

## 2021-11-05 DIAGNOSIS — Z01.818 PREOP EXAMINATION: ICD-10-CM

## 2021-11-05 LAB
BASOPHILS ABSOLUTE: 0.1 K/UL (ref 0–0.2)
BASOPHILS RELATIVE PERCENT: 0.9 %
EOSINOPHILS ABSOLUTE: 0.1 K/UL (ref 0–0.7)
EOSINOPHILS RELATIVE PERCENT: 1.8 %
HCT VFR BLD CALC: 41.8 % (ref 37–47)
HEMOGLOBIN: 13.9 G/DL (ref 12–16)
LYMPHOCYTES ABSOLUTE: 1.6 K/UL (ref 1–4.8)
LYMPHOCYTES RELATIVE PERCENT: 26.6 %
MCH RBC QN AUTO: 31.7 PG (ref 27–31.3)
MCHC RBC AUTO-ENTMCNC: 33.3 % (ref 33–37)
MCV RBC AUTO: 95.2 FL (ref 82–100)
MONOCYTES ABSOLUTE: 0.4 K/UL (ref 0.2–0.8)
MONOCYTES RELATIVE PERCENT: 6.3 %
NEUTROPHILS ABSOLUTE: 4 K/UL (ref 1.4–6.5)
NEUTROPHILS RELATIVE PERCENT: 64.4 %
PDW BLD-RTO: 14.1 % (ref 11.5–14.5)
PLATELET # BLD: 255 K/UL (ref 130–400)
RBC # BLD: 4.39 M/UL (ref 4.2–5.4)
WBC # BLD: 6.2 K/UL (ref 4.8–10.8)

## 2021-11-05 PROCEDURE — 99213 OFFICE O/P EST LOW 20 MIN: CPT | Performed by: NURSE PRACTITIONER

## 2021-11-05 PROCEDURE — 93000 ELECTROCARDIOGRAM COMPLETE: CPT | Performed by: NURSE PRACTITIONER

## 2021-11-05 SDOH — ECONOMIC STABILITY: FOOD INSECURITY: WITHIN THE PAST 12 MONTHS, THE FOOD YOU BOUGHT JUST DIDN'T LAST AND YOU DIDN'T HAVE MONEY TO GET MORE.: NEVER TRUE

## 2021-11-05 SDOH — ECONOMIC STABILITY: TRANSPORTATION INSECURITY
IN THE PAST 12 MONTHS, HAS THE LACK OF TRANSPORTATION KEPT YOU FROM MEDICAL APPOINTMENTS OR FROM GETTING MEDICATIONS?: NO

## 2021-11-05 SDOH — ECONOMIC STABILITY: TRANSPORTATION INSECURITY
IN THE PAST 12 MONTHS, HAS LACK OF TRANSPORTATION KEPT YOU FROM MEETINGS, WORK, OR FROM GETTING THINGS NEEDED FOR DAILY LIVING?: NO

## 2021-11-05 SDOH — ECONOMIC STABILITY: FOOD INSECURITY: WITHIN THE PAST 12 MONTHS, YOU WORRIED THAT YOUR FOOD WOULD RUN OUT BEFORE YOU GOT MONEY TO BUY MORE.: NEVER TRUE

## 2021-11-05 ASSESSMENT — SOCIAL DETERMINANTS OF HEALTH (SDOH): HOW HARD IS IT FOR YOU TO PAY FOR THE VERY BASICS LIKE FOOD, HOUSING, MEDICAL CARE, AND HEATING?: NOT HARD AT ALL

## 2021-11-05 ASSESSMENT — PATIENT HEALTH QUESTIONNAIRE - PHQ9
1. LITTLE INTEREST OR PLEASURE IN DOING THINGS: NOT AT ALL
2. FEELING DOWN, DEPRESSED OR HOPELESS: NOT AT ALL
SUM OF ALL RESPONSES TO PHQ9 QUESTIONS 1 & 2: 0
DEPRESSION UNABLE TO ASSESS: YES

## 2021-11-05 ASSESSMENT — LIFESTYLE VARIABLES
HOW MANY STANDARD DRINKS CONTAINING ALCOHOL DO YOU HAVE ON A TYPICAL DAY: 1 OR 2
HOW OFTEN DO YOU HAVE A DRINK CONTAINING ALCOHOL: MONTHLY OR LESS

## 2021-11-05 NOTE — PROGRESS NOTES
Preoperative Consultation      Gabriella Manrique  YOB: 1960    Date of Service:  11/5/2021    Vitals:    11/05/21 1023   BP: 124/80   Site: Right Upper Arm   Position: Sitting   Cuff Size: Large Adult   Pulse: 78   Resp: 12   Temp: 97.4 °F (36.3 °C)   TempSrc: Temporal   SpO2: 95%   Weight: 218 lb (98.9 kg)   Height: 5' 5\" (1.651 m)      Wt Readings from Last 2 Encounters:   11/05/21 218 lb (98.9 kg)   10/26/21 210 lb (95.3 kg)     BP Readings from Last 3 Encounters:   11/05/21 124/80   10/26/21 126/64   09/24/21 124/72        Chief Complaint   Patient presents with    Pre-op Exam     pt presents today for a pre op exam today . no questions or concerns . Allergies   Allergen Reactions    Nsaids      Other reaction(s): bleed, Free Text    Ibuprofen Nausea Only     No outpatient medications have been marked as taking for the 11/5/21 encounter (Office Visit) with SARAH Plummer CNP. This patient presents to the office today for a preoperative consultation at the request of surgeon, Dr. Devin Willard, who plans on performing Excision of soft tissue mass of the right ankle on November 11 at [facility] The current problem began 4 months ago, and symptoms have been worsening with time. Conservative therapy: N/A. Patient denies any recent illness. Denies any fever, chills, muscle aches. Denies any dental issues. Denies any urinary issues. She is not a smoker. Says she has not taken any blood thinner or NSAID. Says she takes ASA daily, last dose is today. She is aware to stop the medication 7 days prior to surgery. She is not smoker. Says she does not drink.        Planned anesthesia: General   Known anesthesia problems: None   Bleeding risk: No recent or remote history of abnormal bleeding  Personal or FH of DVT/PE: No  Personal HX, Sister with HX DVT  Patient objection to receiving blood products: No    Patient Active Problem List   Diagnosis    Spondylosis of lumbosacral region without myelopathy or radiculopathy    SI (sacroiliac) joint dysfunction    Arthropathy of shoulder region    Spinal stenosis of lumbar region with neurogenic claudication    Acquired spondylolisthesis of lumbosacral region    Arthritis of knee, left    Chronic pain syndrome    Cervical spondylosis without myelopathy    Post laminectomy syndrome    Cervical disc disorder with radiculopathy, unspecified cervical region    Cervical disc disorder with radiculopathy, unspecified cervical region    Hallucinations, unspecified    Mood disorder ([address]    Strain of unspecified muscle, fascia and tendon at shoulder and upper arm level, right arm, initial encounter    Cystocele    Frequency    GERD (gastroesophageal reflux disease)    Incomplete bladder emptying    Ovarian cyst    Recurrent UTI    Urge incontinence of urine    Urinary incontinence    Sleep terror disorder    Migraine without aura and with status migrainosus, not intractable    Intractable chronic migraine without aura and without status migrainosus    History of disease    Benign essential hypertension    Disruptions of 24 hour sleep-wake cycle    Fever    Hematuria    History of total knee arthroplasty    Nausea, vomiting and diarrhea    Lesion of skin of face    Syncope    Abnormal liver function tests    Anemia    Candidiasis of skin    Cerebrovascular accident ([address]    History of cerebrovascular accident    Left hemiparesis ([address]    Numbness of upper limb    Refractory migraine without aura    Acquired absence of other specified parts of digestive tract    Acute ischemic heart disease, unspecified (HCC)    Bradycardia, unspecified    Carpal tunnel syndrome, left upper limb    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 (HCC)    Hyperlipidemia, unspecified    Hypertensive urgency    Iron deficiency anemia secondary to blood loss (chronic)    Major depressive disorder, single episode, unspecified    Migraine, unspecified, not intractable, without status migrainosus    Peripheral vascular disease, unspecified ([facility]    Spondylosis without myelopathy or radiculopathy, lumbar region       Past Medical History:   Diagnosis Date    Anemia     Chicken pox     Chronic back pain     H/O bladder infections     Headache(784.0)     Hypertension     Measles     Mumps     Stroke ([facility] 07/2020     Past Surgical History:   Procedure Laterality Date    APPENDECTOMY      GALLBLADDER SURGERY      HYSTERECTOMY      KNEE SURGERY Right     ROTATOR CUFF REPAIR      TONSILLECTOMY       Family History   Problem Relation Age of Onset    Stroke Mother     Cancer Father      Social History     Socioeconomic History    Marital status:      Spouse name: Not on file    Number of children: Not on file    Years of education: Not on file    Highest education level: Not on file   Occupational History    Not on file   Tobacco Use    Smoking status: Never Smoker    Smokeless tobacco: Never Used   Substance and Sexual Activity    Alcohol use: Yes    Drug use: No    Sexual activity: Not on file   Other Topics Concern    Not on file   Social History Narrative    Not on file     Social Determinants of Health     Financial Resource Strain:     Difficulty of Paying Living Expenses:    Food Insecurity:     Worried About Running Out of Food in the Last Year:     [address] in the Last Year:    Transportation Needs:     Lack of Transportation (Medical):      Lack of Transportation (Non-Medical):    Physical Activity:     Days of Exercise per Week:     Minutes of Exercise per Session:    Stress:     Feeling of Stress :    Social Connections:     Frequency of Communication with Friends and Family:     Frequency of Social Gatherings with Friends and Family:     Attends Restoration Services:     Active Member of Clubs or Organizations:     Attends Club or Organization Meetings:     Marital Status:    Intimate Partner Violence:     Fear of Current or Ex-Partner:     Emotionally Abused:     Physically Abused:     Sexually Abused:        Review of Systems  A comprehensive review of systems was negative except for what was noted in the HPI. Physical Exam   Constitutional: She is oriented to person, place, and time. She appears well-developed and well-nourished. No distress. HENT:   Head: Normocephalic and atraumatic. Mouth/Throat: Uvula is midline, oropharynx is clear and moist and mucous membranes are normal.   Eyes: Conjunctivae and EOM are normal. Pupils are equal, round, and reactive to light. Neck: Trachea normal and normal range of motion. Neck supple. No JVD present. Carotid bruit is not present. No mass and no thyromegaly present. Cardiovascular: Normal rate, regular rhythm, normal heart sounds and intact distal pulses. Exam reveals no gallop and no friction rub. No murmur heard. Pulmonary/Chest: Effort normal and breath sounds normal. No respiratory distress. She has no wheezes. She has no rales. Abdominal: Soft. Normal aorta and bowel sounds are normal. She exhibits no distension and no mass. There is no hepatosplenomegaly. No tenderness. Musculoskeletal: She exhibits no edema and no tenderness. Neurological: She is alert and oriented to person, place, and time. She has normal strength. No cranial nerve deficit or sensory deficit. Coordination and gait normal.   Skin: Skin is warm and dry. No rash noted. No erythema. Psychiatric: She has a normal mood and affect. Her behavior is normal.     EKG Interpretation:  normal EKG, normal sinus rhythm, unchanged from previous tracings. - nonspecific T wave abnormality. Lab Review CBC, Covid, and EKG testing. Assessment:       64 y.o. patient with planned surgery as above.     Known risk factors for perioperative complications: Hypertension  Current medications which may produce withdrawal symptoms if withheld perioperatively: none, patient is currently taking Norco and Klonopin- both can cause withdraw but she denies this. Plan:     1. Preoperative workup as follows: ECG, hemoglobin, hematocrit  2. Change in medication regimen before surgery: Take Coreg on morning of surgery with sip of water, and hold all other medications until after surgery  3. Prophylaxis for cardiac events with perioperative beta-blockers: Currently taking  carvedilol  ACC/AHA indications for pre-operative beta-blocker use:    · Vascular surgery with history of postitive stress test  · Intermediate or high risk surgery with history of CAD   · Intermediate or high risk surgery with multiple clinical predictors of CAD- 2 of the following: history of compensated or prior heart failure, history of cerebrovascular disease, DM, or renal insufficiency    Routine administration of higher-dose, long-acting metoprolol in beta-blockernaïve patients on the day of surgery, and in the absence of dose titration is associated with an overall increase in mortality. Beta-blockers should be started days to weeks prior to surgery and titrated to pulse < 70.  4. Deep vein thrombosis prophylaxis: regimen to be chosen by surgical team  5.  No contraindications to planned surgery

## 2021-11-05 NOTE — PATIENT INSTRUCTIONS
Patient Education        Learning About How to Prepare for Surgery  How can you prepare before surgery? You can do some things that will help you safely prepare for surgery. · Understand exactly what surgery is planned. You should know the risks, benefits, and other options. · Tell your doctors ALL the medicines, vitamins, supplements, and herbal remedies you take. Some of these can increase the risk of bleeding. Or they may interact with anesthesia. · Follow your doctor's instructions about which medicines to take or stop before your surgery. ? You may need to stop taking some medicines a week or more before surgery. ? If you take aspirin or some other blood thinner, be sure to talk to your doctor. · Follow any other instructions your doctor gave you. · If you have an advance directive, let your doctor know, and bring a copy to the hospital.   It may include a living will and a durable power of  for health care. It lets your doctor and loved ones know your health care wishes. If you don't have one, you may want to prepare one. How can you prepare on the day of surgery? Here are some tips about what to do at home before you leave for your surgery. · If your doctor told you to take your medicines on the day of surgery, take them with only a sip of water. · Follow the instructions about when to stop eating and drinking. If you don't, your surgery may be canceled. · Follow your doctor's instructions about when to bathe or shower before your surgery. · Do not shave the surgical site yourself. · Take off all jewelry and piercings. · Take out contact lenses, if you wear them. · Have a picture ID ready to take with you. Your ID will be checked before your surgery. · Know when to call your doctor. Call your doctor if you:  ? Become ill before surgery. ? Need to reschedule. ? Have changed your mind about having the surgery. What happens before surgery?   Here are some things you can expect to happen before your surgery. · Your picture ID will be checked. · The area of your body that needs surgery is often marked to make sure there are no errors. · You will be kept comfortable and safe by your anesthesia provider. The anesthesia may make you sleep. Or it may just numb the area being worked on. What happens when you are ready to go home? Be sure you have someone drive you home. Anesthesia and pain medicine make it unsafe for you to drive. You will get instructions about recovering from your surgery. This is called a discharge plan. It will cover things like diet, wound care, follow-up care, driving, and getting back to your normal routine. Follow-up care is a key part of your treatment and safety. Be sure to make and go to all appointments, and call your doctor if you are having problems. It's also a good idea to know your test results and keep a list of the medicines you take. Where can you learn more? Go to [email or web address]. org and sign in to your [employer] account. Enter Q270 in the [employer] box to learn more about \"Learning About How to Prepare for Surgery. \"     If you do not have an account, please click on the \"Sign Up Now\" link. Current as of: February 11, 2021               Content Version: 13.0  © 4444-9645 Healthwise, Incorporated. Care instructions adapted under license by [facility] ([facility]). If you have questions about a medical condition or this instruction, always ask your healthcare professional. [place] [address] any warranty or liability for your use of this information.

## 2021-11-05 NOTE — TELEPHONE ENCOUNTER
BENEFITS: ODILON C3,4,5 RFA    Insurance: [id number] [facility]  Phone: [phone]  Contact Name: Iesha Shipley  Effective Date: 4.1.2020     Plan year: YES-CALENDAR  Deductible: N/A      Deductible Met: N/A  Allowed/benefits paid at: 90%  OOP: 800 MET $800.00  Freq Limits: 67667 & 64634-BASED ON MEDICAL NECESSITY  Prior Auth Requirement: YES THROUGH [place]--AUTH COMPLETED BY TIEN    Notes: NO PRE-EX CLAUSE    Call Reference #: [id number]    Time of call: 8:50AM

## 2021-11-07 LAB
SARS-COV-2: NOT DETECTED
SOURCE: NORMAL

## 2021-11-10 ENCOUNTER — OFFICE VISIT (OUTPATIENT)
Dept: PAIN MANAGEMENT | Age: 61
End: 2021-11-10
Payer: COMMERCIAL

## 2021-11-10 DIAGNOSIS — M47.812 CERVICAL SPONDYLOSIS WITHOUT MYELOPATHY: Primary | ICD-10-CM

## 2021-11-10 PROCEDURE — 64633 DESTROY CERV/THOR FACET JNT: CPT | Performed by: ANESTHESIOLOGY

## 2021-11-10 PROCEDURE — 64634 DESTROY C/TH FACET JNT ADDL: CPT | Performed by: ANESTHESIOLOGY

## 2021-11-10 RX ORDER — LIDOCAINE HYDROCHLORIDE 10 MG/ML
15 INJECTION, SOLUTION EPIDURAL; INFILTRATION; INTRACAUDAL; PERINEURAL ONCE
Status: COMPLETED | OUTPATIENT
Start: 2021-11-10 | End: 2021-11-10

## 2021-11-10 RX ORDER — BETAMETHASONE SODIUM PHOSPHATE AND BETAMETHASONE ACETATE 3; 3 MG/ML; MG/ML
4 INJECTION, SUSPENSION INTRA-ARTICULAR; INTRALESIONAL; INTRAMUSCULAR; SOFT TISSUE ONCE
Status: COMPLETED | OUTPATIENT
Start: 2021-11-10 | End: 2021-11-10

## 2021-11-10 RX ADMIN — BETAMETHASONE SODIUM PHOSPHATE AND BETAMETHASONE ACETATE 4 MG: 3; 3 INJECTION, SUSPENSION INTRA-ARTICULAR; INTRALESIONAL; INTRAMUSCULAR; SOFT TISSUE at 10:26

## 2021-11-10 RX ADMIN — LIDOCAINE HYDROCHLORIDE 15 ML: 10 INJECTION, SOLUTION EPIDURAL; INFILTRATION; INTRACAUDAL; PERINEURAL at 10:28

## 2021-11-11 ENCOUNTER — ANESTHESIA EVENT (OUTPATIENT)
Dept: OPERATING ROOM | Age: 61
End: 2021-11-11
Payer: COMMERCIAL

## 2021-11-11 ENCOUNTER — TELEPHONE (OUTPATIENT)
Dept: PAIN MANAGEMENT | Age: 61
End: 2021-11-11

## 2021-11-11 NOTE — TELEPHONE ENCOUNTER
ORDER PLACED:    Date: 11/10/21  Description: LEFT C3,4,5 RFA  Order Number: [id number]  Ordering Provider: Nicole Bashir  Performing Provider: Jonathan Canas  CPT Codes: 13575,95204  ICD10 Codes: C78.034

## 2021-11-11 NOTE — ANESTHESIA PRE PROCEDURE
Department of Anesthesiology  Preprocedure Note       Name:  Ladarius Pollard   Age:  64 y.o.  :  1960                                          MRN:  [id number]         Date:  2021      Surgeon: Gabi Hooks):  Leti Humphreys MD    Procedure: Procedure(s):  SOFT TISSUE MASS EXCISION RIGHT ANKLE ([facility] )    Medications prior to admission:   Prior to Admission medications    Medication Sig Start Date End Date Taking? Authorizing Provider   oxyCODONE-acetaminophen (PERCOCET) 5-325 MG per tablet Take 1 tablet by mouth every 8-12 hours as needed for Pain for up to 30 days. #75 tabs for 30 days. Will continue to taper down. Take lowest dose possible to manage pain 10/26/21 11/25/21 Yes SARAH Guzman - CNP   amLODIPine (NORVASC) 10 MG tablet Take by mouth 3/1/21  Yes Historical Provider, MD   sucralfate (CARAFATE) 1 GM/10ML suspension Take by mouth 21  Yes Historical Provider, MD   gabapentin (NEURONTIN) 300 MG capsule Take 1 capsule by mouth 4 times daily for 90 days.  3/29/21 11/12/21 Yes Anuj Reyes MD   vilazodone HCl (VILAZODONE HCL) 10 MG TABS Take 10 mg by mouth daily    Yes Historical Provider, MD   atorvastatin (LIPITOR) 10 MG tablet Take 10 mg by mouth daily   Yes Historical Provider, MD   clonazePAM (KLONOPIN) 1 MG tablet TAKE 1 TABLET BY MOUTH DAILY AS NEEDED FOR ANXIETY FOR 30 DAYS 3/11/20  Yes Historical Provider, MD   escitalopram (LEXAPRO) 20 MG tablet  3/9/20  Yes Historical Provider, MD   pantoprazole (PROTONIX) 40 MG tablet Take by mouth 17  Yes Historical Provider, MD   traZODone (DESYREL) 100 MG tablet  3/9/20  Yes Historical Provider, MD   triamterene-hydroCHLOROthiazide (DYAZIDE) 37.5-25 MG per capsule Take 1 capsule by mouth every morning    Yes Historical Provider, MD   ASPIRIN LOW DOSE 81 MG EC tablet TAKE 1 TABLET BY MOUTH ONCE DAILY 21   Historical Provider, MD   AIMOVIG 140 MG/ML SOAJ Inject 140 mg as directed every 30 days 3/25/21   Brian R Gauri Pham MD   [facility]ic[facility] by Does not apply route For 5 years 12/4/20   Phoebe Alexandre MD   naloxone 4 MG/0.1ML LIQD nasal spray 1 spray by Nasal route as needed for Opioid Reversal  Patient not taking: Reported on 6/23/2021 10/3/20   Inna Chen MD   SUMAtriptan (IMITREX) 100 MG tablet Take 1 tablet by mouth once as needed for Migraine 10/24/19 10/24/19  Rodriguez Aiken MD       Current medications:    Current Facility-Administered Medications   Medication Dose Route Frequency Provider Last Rate Last Admin    lactated ringers infusion   IntraVENous Continuous Marlon Angel  mL/hr at 11/12/21 7568 New Bag at 11/12/21 8916       Allergies:     Allergies   Allergen Reactions    Nsaids      Other reaction(s): bleed, Free Text    Ibuprofen Nausea Only       Problem List:    Patient Active Problem List   Diagnosis Code    Spondylosis of lumbosacral region without myelopathy or radiculopathy M47.817    SI (sacroiliac) joint dysfunction M53.3    Arthropathy of shoulder region M19.019    Spinal stenosis of lumbar region with neurogenic claudication M48.062    Acquired spondylolisthesis of lumbosacral region M43.17    Arthritis of knee, left M17.12    Chronic pain syndrome G89.4    Cervical spondylosis without myelopathy M47.812    Post laminectomy syndrome M96.1    Cervical disc disorder with radiculopathy, unspecified cervical region M50.10    Cervical disc disorder with radiculopathy, unspecified cervical region M50.10    Hallucinations, unspecified R44.3    Mood disorder (HCC) F39    Strain of unspecified muscle, fascia and tendon at shoulder and upper arm level, right arm, initial encounter S46.911A    Cystocele [id number]    Frequency [id number]    GERD (gastroesophageal reflux disease) K21.9    Incomplete bladder emptying R33.9    Ovarian cyst N83.209    Recurrent UTI N39.0    Urge incontinence of urine N39.41    Urinary incontinence R32    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    Migraine without aura and with status migrainosus, not intractable G43.001    Intractable chronic migraine without aura and without status migrainosus G43.719    History of disease Z87.898    Benign essential hypertension I10    Disruptions of 24 hour sleep-wake cycle G47.20    Fever R50.9    Hematuria R31.9    History of total knee arthroplasty Z96.659    Nausea, vomiting and diarrhea R11.2, R19.7    Lesion of skin of face L98.9    Syncope R55    Abnormal liver function tests R94.5    Anemia D64.9    Candidiasis of skin B37.2    Cerebrovascular accident ([address] I63.9    History of cerebrovascular accident Z86.73    Left hemiparesis ([facility] G81.94    Numbness of upper limb R20.0    Refractory migraine without aura G43.019    Acquired absence of other specified parts of digestive tract Z90.49    Acute ischemic heart disease, unspecified ([facility]) I24.9    Bradycardia, unspecified R00.1    Carpal tunnel syndrome, left upper limb G56.02    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 ([facility]) I69.354    Hyperlipidemia, unspecified E78.5    Hypertensive urgency I16.0    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    Major depressive disorder, single episode, unspecified F32.9    Migraine, unspecified, not intractable, without status migrainosus G43.909    Peripheral vascular disease, unspecified ([facility] I73.9    Spondylosis without myelopathy or radiculopathy, lumbar region M47.816       Past Medical History:        Diagnosis Date    Anemia     Chicken pox     Chronic back pain     H/O bladder infections     Headache(784.0)     Hypertension     Measles     Mumps     Stroke ([address] 07/2020       Past Surgical History:        Procedure Laterality Date    APPENDECTOMY      BACK SURGERY  2014    lumbar back fusion    GALLBLADDER SURGERY      HYSTERECTOMY      JOINT REPLACEMENT Right 2020    JOINT REPLACEMENT Left 2018    KNEE SURGERY Right     ROTATOR CUFF REPAIR  TONSILLECTOMY         Social History:    Social History     Tobacco Use    Smoking status: Never Smoker    Smokeless tobacco: Never Used   Substance Use Topics    Alcohol use: Yes                                Counseling given: Not Answered      Vital Signs (Current):   Vitals:    11/12/21 0600   BP: (!) 147/79   Pulse: 58   Resp: 14   Temp: 98 °F (36.7 °C)   TempSrc: Temporal   SpO2: 98%   Weight: 218 lb (98.9 kg)   Height: 5' 5\" (1.651 m)                                              BP Readings from Last 3 Encounters:   11/12/21 (!) 147/79   11/05/21 124/80   10/26/21 126/64       NPO Status: Time of last liquid consumption: 1700                        Time of last solid consumption: 1700                        Date of last liquid consumption: 11/11/21                        Date of last solid food consumption: 11/11/21    BMI:   Wt Readings from Last 3 Encounters:   11/12/21 218 lb (98.9 kg)   11/05/21 218 lb (98.9 kg)   10/26/21 210 lb (95.3 kg)     Body mass index is 36.28 kg/m². CBC:   Lab Results   Component Value Date    WBC 6.2 11/05/2021    RBC 4.39 11/05/2021    HGB 13.9 11/05/2021    HCT 41.8 11/05/2021    MCV 95.2 11/05/2021    RDW 14.1 11/05/2021     11/05/2021       CMP:   Lab Results   Component Value Date     12/17/2020    K 3.6 12/17/2020     12/17/2020    CO2 27 07/01/2013    BUN 17 07/01/2013    CREATININE 0.71 12/17/2020    GFRAA >60 12/17/2020    LABGLOM >60 12/17/2020    GLUCOSE 116 12/17/2020    PROT 6.2 12/09/2020    CALCIUM 8.9 12/17/2020    BILITOT 0.4 12/09/2020    ALKPHOS 50 12/09/2020    AST 15 12/09/2020    ALT 16 12/09/2020       POC Tests: No results for input(s): POCGLU, POCNA, POCK, POCCL, POCBUN, POCHEMO, POCHCT in the last 72 hours.     Coags:   Lab Results   Component Value Date    PROTIME 12.3 12/09/2020    INR 1.1 12/09/2020    APTT 33 12/09/2020       HCG (If Applicable): No results found for: PREGTESTUR, PREGSERUM, HCG, HCGQUANT     ABGs: No results found for: PHART, PO2ART, SCO5NFP, JLI2OWO, BEART, O2SJJRNF     Type & Screen (If Applicable):  No results found for: LABABO, LABRH    Drug/Infectious Status (If Applicable):  No results found for: HIV, HEPCAB    COVID-19 Screening (If Applicable):   Lab Results   Component Value Date    COVID19 Not Detected 11/05/2021    COVID19 NOT DETECTED 12/14/2020           Anesthesia Evaluation  Patient summary reviewed and Nursing notes reviewed no history of anesthetic complications:   Airway: Mallampati: II  TM distance: >3 FB   Neck ROM: full  Mouth opening: > = 3 FB Dental: normal exam         Pulmonary:Negative Pulmonary ROS and normal exam                               Cardiovascular:  Exercise tolerance: good (>4 METS),   (+) hypertension:, hyperlipidemia      ECG reviewed               Beta Blocker:  Dose within 24 Hrs      ROS comment: Sinus  Rhythm   -  Nonspecific T-abnormality. Neuro/Psych:   (+) CVA:, neuromuscular disease:, headaches:, psychiatric history:            GI/Hepatic/Renal:   (+) GERD:,           Endo/Other: Negative Endo/Other ROS             Pt had PAT visit. Abdominal:             Vascular: negative vascular ROS. Other Findings:             Anesthesia Plan      general     ASA 3     (LMA)  Induction: intravenous. MIPS: Postoperative opioids intended and Prophylactic antiemetics administered. Anesthetic plan and risks discussed with patient. Plan discussed with CRNA.     Attending anesthesiologist reviewed and agrees with Iron Marie DO   11/12/2021

## 2021-11-12 ENCOUNTER — HOSPITAL ENCOUNTER (OUTPATIENT)
Age: 61
Setting detail: OUTPATIENT SURGERY
Discharge: HOME | End: 2021-11-12
Attending: ORTHOPAEDIC SURGERY | Admitting: ORTHOPAEDIC SURGERY
Payer: COMMERCIAL

## 2021-11-12 ENCOUNTER — APPOINTMENT (OUTPATIENT)
Dept: GENERAL RADIOLOGY | Age: 61
End: 2021-11-12
Attending: ORTHOPAEDIC SURGERY
Payer: COMMERCIAL

## 2021-11-12 ENCOUNTER — ANESTHESIA (OUTPATIENT)
Dept: OPERATING ROOM | Age: 61
End: 2021-11-12
Payer: COMMERCIAL

## 2021-11-12 VITALS
HEART RATE: 70 BPM | OXYGEN SATURATION: 96 % | SYSTOLIC BLOOD PRESSURE: 161 MMHG | HEIGHT: 65 IN | DIASTOLIC BLOOD PRESSURE: 70 MMHG | WEIGHT: 218 LBS | TEMPERATURE: 97.2 F | BODY MASS INDEX: 36.32 KG/M2 | RESPIRATION RATE: 16 BRPM

## 2021-11-12 VITALS — SYSTOLIC BLOOD PRESSURE: 102 MMHG | OXYGEN SATURATION: 95 % | DIASTOLIC BLOOD PRESSURE: 62 MMHG

## 2021-11-12 LAB
ANION GAP SERPL CALCULATED.3IONS-SCNC: 11 MEQ/L (ref 9–15)
BUN BLDV-MCNC: 15 MG/DL (ref 8–23)
CALCIUM SERPL-MCNC: 9.5 MG/DL (ref 8.5–9.9)
CHLORIDE BLD-SCNC: 103 MEQ/L (ref 95–107)
CO2: 26 MEQ/L (ref 20–31)
CREAT SERPL-MCNC: 0.74 MG/DL (ref 0.5–0.9)
GFR AFRICAN AMERICAN: >60
GFR NON-AFRICAN AMERICAN: >60
GLUCOSE BLD-MCNC: 100 MG/DL (ref 70–99)
POTASSIUM SERPL-SCNC: 3.8 MEQ/L (ref 3.4–4.9)
SODIUM BLD-SCNC: 140 MEQ/L (ref 135–144)

## 2021-11-12 PROCEDURE — 7100000001 HC PACU RECOVERY - ADDTL 15 MIN: Performed by: ORTHOPAEDIC SURGERY

## 2021-11-12 PROCEDURE — 6370000000 HC RX 637 (ALT 250 FOR IP): Performed by: STUDENT IN AN ORGANIZED HEALTH CARE EDUCATION/TRAINING PROGRAM

## 2021-11-12 PROCEDURE — 2580000003 HC RX 258: Performed by: ORTHOPAEDIC SURGERY

## 2021-11-12 PROCEDURE — 7100000010 HC PHASE II RECOVERY - FIRST 15 MIN: Performed by: ORTHOPAEDIC SURGERY

## 2021-11-12 PROCEDURE — 3700000001 HC ADD 15 MINUTES (ANESTHESIA): Performed by: ORTHOPAEDIC SURGERY

## 2021-11-12 PROCEDURE — 6360000002 HC RX W HCPCS: Performed by: STUDENT IN AN ORGANIZED HEALTH CARE EDUCATION/TRAINING PROGRAM

## 2021-11-12 PROCEDURE — 2500000003 HC RX 250 WO HCPCS: Performed by: ORTHOPAEDIC SURGERY

## 2021-11-12 PROCEDURE — 7100000011 HC PHASE II RECOVERY - ADDTL 15 MIN: Performed by: ORTHOPAEDIC SURGERY

## 2021-11-12 PROCEDURE — 80048 BASIC METABOLIC PNL TOTAL CA: CPT

## 2021-11-12 PROCEDURE — 2709999900 HC NON-CHARGEABLE SUPPLY: Performed by: ORTHOPAEDIC SURGERY

## 2021-11-12 PROCEDURE — 88304 TISSUE EXAM BY PATHOLOGIST: CPT

## 2021-11-12 PROCEDURE — 2580000003 HC RX 258: Performed by: STUDENT IN AN ORGANIZED HEALTH CARE EDUCATION/TRAINING PROGRAM

## 2021-11-12 PROCEDURE — 71045 X-RAY EXAM CHEST 1 VIEW: CPT

## 2021-11-12 PROCEDURE — 3700000000 HC ANESTHESIA ATTENDED CARE: Performed by: ORTHOPAEDIC SURGERY

## 2021-11-12 PROCEDURE — 3600000013 HC SURGERY LEVEL 3 ADDTL 15MIN: Performed by: ORTHOPAEDIC SURGERY

## 2021-11-12 PROCEDURE — 7100000000 HC PACU RECOVERY - FIRST 15 MIN: Performed by: ORTHOPAEDIC SURGERY

## 2021-11-12 PROCEDURE — 6360000002 HC RX W HCPCS: Performed by: ORTHOPAEDIC SURGERY

## 2021-11-12 PROCEDURE — 3600000003 HC SURGERY LEVEL 3 BASE: Performed by: ORTHOPAEDIC SURGERY

## 2021-11-12 RX ORDER — FENTANYL CITRATE 50 UG/ML
INJECTION, SOLUTION INTRAMUSCULAR; INTRAVENOUS PRN
Status: DISCONTINUED | OUTPATIENT
Start: 2021-11-12 | End: 2021-11-12 | Stop reason: SDUPTHER

## 2021-11-12 RX ORDER — MEPERIDINE HYDROCHLORIDE 25 MG/ML
12.5 INJECTION INTRAMUSCULAR; INTRAVENOUS; SUBCUTANEOUS EVERY 5 MIN PRN
Status: DISCONTINUED | OUTPATIENT
Start: 2021-11-12 | End: 2021-11-12 | Stop reason: HOSPADM

## 2021-11-12 RX ORDER — LIDOCAINE HYDROCHLORIDE 10 MG/ML
2 INJECTION, SOLUTION EPIDURAL; INFILTRATION; INTRACAUDAL; PERINEURAL ONCE
Status: COMPLETED | OUTPATIENT
Start: 2021-11-12 | End: 2021-11-12

## 2021-11-12 RX ORDER — ONDANSETRON 2 MG/ML
4 INJECTION INTRAMUSCULAR; INTRAVENOUS EVERY 6 HOURS PRN
Status: CANCELLED | OUTPATIENT
Start: 2021-11-12

## 2021-11-12 RX ORDER — MAGNESIUM HYDROXIDE 1200 MG/15ML
LIQUID ORAL CONTINUOUS PRN
Status: COMPLETED | OUTPATIENT
Start: 2021-11-12 | End: 2021-11-12

## 2021-11-12 RX ORDER — ONDANSETRON 2 MG/ML
INJECTION INTRAMUSCULAR; INTRAVENOUS PRN
Status: DISCONTINUED | OUTPATIENT
Start: 2021-11-12 | End: 2021-11-12 | Stop reason: SDUPTHER

## 2021-11-12 RX ORDER — MIDAZOLAM HYDROCHLORIDE 1 MG/ML
INJECTION INTRAMUSCULAR; INTRAVENOUS PRN
Status: DISCONTINUED | OUTPATIENT
Start: 2021-11-12 | End: 2021-11-12 | Stop reason: SDUPTHER

## 2021-11-12 RX ORDER — HYDROCODONE BITARTRATE AND ACETAMINOPHEN 5; 325 MG/1; MG/1
1 TABLET ORAL PRN
Status: COMPLETED | OUTPATIENT
Start: 2021-11-12 | End: 2021-11-12

## 2021-11-12 RX ORDER — SODIUM CHLORIDE, SODIUM LACTATE, POTASSIUM CHLORIDE, CALCIUM CHLORIDE 600; 310; 30; 20 MG/100ML; MG/100ML; MG/100ML; MG/100ML
INJECTION, SOLUTION INTRAVENOUS CONTINUOUS PRN
Status: DISCONTINUED | OUTPATIENT
Start: 2021-11-12 | End: 2021-11-12 | Stop reason: SDUPTHER

## 2021-11-12 RX ORDER — SODIUM CHLORIDE 9 MG/ML
25 INJECTION, SOLUTION INTRAVENOUS PRN
Status: DISCONTINUED | OUTPATIENT
Start: 2021-11-12 | End: 2021-11-12 | Stop reason: HOSPADM

## 2021-11-12 RX ORDER — METOCLOPRAMIDE HYDROCHLORIDE 5 MG/ML
10 INJECTION INTRAMUSCULAR; INTRAVENOUS
Status: DISCONTINUED | OUTPATIENT
Start: 2021-11-12 | End: 2021-11-12 | Stop reason: HOSPADM

## 2021-11-12 RX ORDER — ONDANSETRON 2 MG/ML
4 INJECTION INTRAMUSCULAR; INTRAVENOUS
Status: DISCONTINUED | OUTPATIENT
Start: 2021-11-12 | End: 2021-11-12 | Stop reason: HOSPADM

## 2021-11-12 RX ORDER — FENTANYL CITRATE 50 UG/ML
50 INJECTION, SOLUTION INTRAMUSCULAR; INTRAVENOUS EVERY 10 MIN PRN
Status: DISCONTINUED | OUTPATIENT
Start: 2021-11-12 | End: 2021-11-12 | Stop reason: HOSPADM

## 2021-11-12 RX ORDER — PROPOFOL 10 MG/ML
INJECTION, EMULSION INTRAVENOUS PRN
Status: DISCONTINUED | OUTPATIENT
Start: 2021-11-12 | End: 2021-11-12 | Stop reason: SDUPTHER

## 2021-11-12 RX ORDER — SODIUM CHLORIDE 0.9 % (FLUSH) 0.9 %
10 SYRINGE (ML) INJECTION EVERY 12 HOURS SCHEDULED
Status: CANCELLED | OUTPATIENT
Start: 2021-11-12

## 2021-11-12 RX ORDER — SODIUM CHLORIDE 9 MG/ML
25 INJECTION, SOLUTION INTRAVENOUS PRN
Status: CANCELLED | OUTPATIENT
Start: 2021-11-12

## 2021-11-12 RX ORDER — SODIUM CHLORIDE, SODIUM LACTATE, POTASSIUM CHLORIDE, CALCIUM CHLORIDE 600; 310; 30; 20 MG/100ML; MG/100ML; MG/100ML; MG/100ML
INJECTION, SOLUTION INTRAVENOUS CONTINUOUS
Status: DISCONTINUED | OUTPATIENT
Start: 2021-11-12 | End: 2021-11-12 | Stop reason: HOSPADM

## 2021-11-12 RX ORDER — HYDROCODONE BITARTRATE AND ACETAMINOPHEN 5; 325 MG/1; MG/1
2 TABLET ORAL PRN
Status: COMPLETED | OUTPATIENT
Start: 2021-11-12 | End: 2021-11-12

## 2021-11-12 RX ORDER — OXYCODONE HYDROCHLORIDE 5 MG/1
5 TABLET ORAL EVERY 4 HOURS PRN
Status: CANCELLED | OUTPATIENT
Start: 2021-11-12

## 2021-11-12 RX ORDER — DEXAMETHASONE SODIUM PHOSPHATE 10 MG/ML
INJECTION INTRAMUSCULAR; INTRAVENOUS PRN
Status: DISCONTINUED | OUTPATIENT
Start: 2021-11-12 | End: 2021-11-12 | Stop reason: SDUPTHER

## 2021-11-12 RX ORDER — LIDOCAINE HYDROCHLORIDE AND EPINEPHRINE 10; 10 MG/ML; UG/ML
INJECTION, SOLUTION INFILTRATION; PERINEURAL PRN
Status: DISCONTINUED | OUTPATIENT
Start: 2021-11-12 | End: 2021-11-12 | Stop reason: ALTCHOICE

## 2021-11-12 RX ORDER — LIDOCAINE HYDROCHLORIDE 10 MG/ML
1 INJECTION, SOLUTION EPIDURAL; INFILTRATION; INTRACAUDAL; PERINEURAL
Status: DISCONTINUED | OUTPATIENT
Start: 2021-11-12 | End: 2021-11-12 | Stop reason: HOSPADM

## 2021-11-12 RX ORDER — ONDANSETRON 4 MG/1
4 TABLET, ORALLY DISINTEGRATING ORAL EVERY 8 HOURS PRN
Status: CANCELLED | OUTPATIENT
Start: 2021-11-12

## 2021-11-12 RX ORDER — BUPIVACAINE HYDROCHLORIDE 5 MG/ML
INJECTION, SOLUTION EPIDURAL; INTRACAUDAL PRN
Status: DISCONTINUED | OUTPATIENT
Start: 2021-11-12 | End: 2021-11-12 | Stop reason: ALTCHOICE

## 2021-11-12 RX ORDER — DIPHENHYDRAMINE HYDROCHLORIDE 50 MG/ML
12.5 INJECTION INTRAMUSCULAR; INTRAVENOUS
Status: DISCONTINUED | OUTPATIENT
Start: 2021-11-12 | End: 2021-11-12 | Stop reason: HOSPADM

## 2021-11-12 RX ORDER — SODIUM CHLORIDE 0.9 % (FLUSH) 0.9 %
10 SYRINGE (ML) INJECTION PRN
Status: DISCONTINUED | OUTPATIENT
Start: 2021-11-12 | End: 2021-11-12 | Stop reason: HOSPADM

## 2021-11-12 RX ORDER — SODIUM CHLORIDE 0.9 % (FLUSH) 0.9 %
10 SYRINGE (ML) INJECTION PRN
Status: CANCELLED | OUTPATIENT
Start: 2021-11-12

## 2021-11-12 RX ORDER — SODIUM CHLORIDE 0.9 % (FLUSH) 0.9 %
10 SYRINGE (ML) INJECTION EVERY 12 HOURS SCHEDULED
Status: DISCONTINUED | OUTPATIENT
Start: 2021-11-12 | End: 2021-11-12 | Stop reason: HOSPADM

## 2021-11-12 RX ADMIN — ONDANSETRON 4 MG: 2 INJECTION INTRAMUSCULAR; INTRAVENOUS at 07:57

## 2021-11-12 RX ADMIN — FENTANYL CITRATE 50 MCG: 50 INJECTION, SOLUTION INTRAMUSCULAR; INTRAVENOUS at 07:53

## 2021-11-12 RX ADMIN — PROPOFOL 200 MG: 10 INJECTION, EMULSION INTRAVENOUS at 07:30

## 2021-11-12 RX ADMIN — CEFAZOLIN 2000 MG: 10 INJECTION, POWDER, FOR SOLUTION INTRAVENOUS at 07:28

## 2021-11-12 RX ADMIN — SODIUM CHLORIDE, POTASSIUM CHLORIDE, SODIUM LACTATE AND CALCIUM CHLORIDE: 600; 310; 30; 20 INJECTION, SOLUTION INTRAVENOUS at 06:32

## 2021-11-12 RX ADMIN — HYDROCODONE BITARTRATE AND ACETAMINOPHEN 2 TABLET: 5; 325 TABLET ORAL at 09:38

## 2021-11-12 RX ADMIN — LIDOCAINE HYDROCHLORIDE 0.1 ML: 10 INJECTION, SOLUTION EPIDURAL; INFILTRATION; INTRACAUDAL; PERINEURAL at 06:32

## 2021-11-12 RX ADMIN — MIDAZOLAM HYDROCHLORIDE 2 MG: 2 INJECTION, SOLUTION INTRAMUSCULAR; INTRAVENOUS at 07:26

## 2021-11-12 RX ADMIN — DEXAMETHASONE SODIUM PHOSPHATE 10 MG: 10 INJECTION INTRAMUSCULAR; INTRAVENOUS at 07:58

## 2021-11-12 RX ADMIN — SODIUM CHLORIDE, POTASSIUM CHLORIDE, SODIUM LACTATE AND CALCIUM CHLORIDE: 600; 310; 30; 20 INJECTION, SOLUTION INTRAVENOUS at 07:27

## 2021-11-12 RX ADMIN — FENTANYL CITRATE 50 MCG: 50 INJECTION, SOLUTION INTRAMUSCULAR; INTRAVENOUS at 07:35

## 2021-11-12 RX ADMIN — FENTANYL CITRATE 50 MCG: 0.05 INJECTION, SOLUTION INTRAMUSCULAR; INTRAVENOUS at 08:39

## 2021-11-12 ASSESSMENT — PAIN SCALES - GENERAL
PAINLEVEL_OUTOF10: 3
PAINLEVEL_OUTOF10: 4
PAINLEVEL_OUTOF10: 3
PAINLEVEL_OUTOF10: 4

## 2021-11-12 ASSESSMENT — PAIN DESCRIPTION - ORIENTATION: ORIENTATION: RIGHT

## 2021-11-12 ASSESSMENT — PULMONARY FUNCTION TESTS
PIF_VALUE: 11
PIF_VALUE: 11
PIF_VALUE: 10
PIF_VALUE: 11
PIF_VALUE: 4
PIF_VALUE: 11
PIF_VALUE: 3
PIF_VALUE: 11
PIF_VALUE: 11
PIF_VALUE: 3
PIF_VALUE: 10
PIF_VALUE: 11
PIF_VALUE: 2
PIF_VALUE: 11
PIF_VALUE: 11
PIF_VALUE: 10
PIF_VALUE: 11
PIF_VALUE: 11
PIF_VALUE: 10
PIF_VALUE: 11
PIF_VALUE: 4
PIF_VALUE: 11
PIF_VALUE: 3
PIF_VALUE: 10
PIF_VALUE: 10
PIF_VALUE: 11
PIF_VALUE: 0
PIF_VALUE: 11
PIF_VALUE: 11
PIF_VALUE: 0
PIF_VALUE: 11
PIF_VALUE: 10
PIF_VALUE: 11
PIF_VALUE: 10
PIF_VALUE: 11
PIF_VALUE: 10
PIF_VALUE: 10
PIF_VALUE: 3
PIF_VALUE: 11
PIF_VALUE: 1
PIF_VALUE: 11

## 2021-11-12 ASSESSMENT — PAIN DESCRIPTION - LOCATION
LOCATION: ANKLE

## 2021-11-12 ASSESSMENT — PAIN DESCRIPTION - DESCRIPTORS
DESCRIPTORS: SORE
DESCRIPTORS: ACHING

## 2021-11-12 ASSESSMENT — PAIN DESCRIPTION - FREQUENCY: FREQUENCY: CONTINUOUS

## 2021-11-12 ASSESSMENT — PAIN DESCRIPTION - PAIN TYPE
TYPE: SURGICAL PAIN
TYPE: SURGICAL PAIN

## 2021-11-12 NOTE — ANESTHESIA POSTPROCEDURE EVALUATION
Department of Anesthesiology  Postprocedure Note    Patient: Mortimer Aase  MRN: [id number]  YOB: 1960  Date of evaluation: 11/12/2021  Time:  8:27 AM     Procedure Summary     Date: 11/12/21 Room / Location: [address] [place] [address]    Anesthesia Start: 2072 Anesthesia Stop:     Procedure: SOFT TISSUE MASS EXCISION RIGHT ANKLE (Right ) Diagnosis: (PALPABLE MASS OF SOFT TISSUE OF ANKLE, RIGHT ANKLE PAIN)    Surgeons: Marlon Angel MD Responsible Provider: Carrol Serrano DO    Anesthesia Type: general ASA Status: 3          Anesthesia Type: General    Anabel Phase I:   10    Anabel Phase II:      Last vitals: Reviewed and per EMR flowsheets.        Anesthesia Post Evaluation    Patient location during evaluation: bedside  Patient participation: complete - patient participated  Level of consciousness: awake and awake and alert  Pain score: 0  Airway patency: patent  Nausea & Vomiting: no nausea and no vomiting  Complications: no  Cardiovascular status: blood pressure returned to baseline and hemodynamically stable  Respiratory status: acceptable  Hydration status: euvolemic

## 2021-11-12 NOTE — OP NOTE
Operative Note      Patient: Una Knox  YOB: 1960  MRN: [id number]    Date of Procedure: 11/12/2021    Pre-Op Diagnosis: PALPABLE MASS OF SOFT TISSUE OF ANKLE, RIGHT ANKLE PAIN    Post-Op Diagnosis: Right ankle lipoma vs adipose mass - 4.5 cm x 4.5 cm x 2.5 cm       Procedure(s):  SOFT TISSUE MASS EXCISION RIGHT ANKLE    Surgeon(s):  Melisa Morrissey MD    Assistant:   First Assistant: Ariana Aponte    Anesthesia: General    Estimated Blood Loss (mL): Minimal    Antibiotics: Ancef 2g IV preop    Complications: None    Specimens:   ID Type Source Tests Collected by Time Destination   A : Lipoma for MDM2 Tissue Ankle SURGICAL PATHOLOGY Melisa Morrissey MD 11/12/2021 0755        Implants:  * No implants in log *      Drains: * No LDAs found *    Findings: large round adipose mass vs lipoma overlaying peroneal tendons at distal aspect of lateral malleolus proximally and lateral foot extending to base of 5th MT distally. Proximal aspect adherent to peroneal sheath that was partially excised. Brief History; Pt is a 65 yo female well-known to my practice with a lateral ankle adipose mass confirmed by MRI that produced painful mass effect when wearing shoes as the edge of shoe would impinge upon mass. Recommended observation vs excision and patient elected to proceed with surgical excision. Major risks, benefits, and convalescence of procedure were explained in detail. Risks discussed include but are not limited to bleeding, nerve damage, vessel damage, infection, recurrent of mass, need for additional surgery, change mass is liposarcoma and more morbid surgery needed, neuroma, continued pain, numbness, tingling, weakness, stiffness, hematoma, wound healing issues, DVT, PE, anesthetic complications, cardiopulmonary complications, other unforeseen complications, even death. Patient expressed understanding and elected to proceed.      Site marked and consent obtained in preop holding area and all questions answered. Detailed Description of Procedure: The patient was brought into the operating room for lipoma/adipose mass excision right ankle after consent was obtained. Routine intravenous lines were begun. Local anesthetic with 0.5% Marcaine and 1% lidocaine with epinephrine 1:1 ratio was placed capturing superficial peronal and sural nerves proximal to mass and also placed along planned incision. The patient was placed under general anesthesia. A thigh tourniquet was applied but not used. Routine prep with chloraprep and sterile draping was performed with patient supine and bony prominences well padded. A lateral incision was then made anteriorly over the ankle using 15 bladed just through skin, beginning just lateral to distal fibula and this was then carried distally, towards the 5th metatarsal base, ~4 cm in length. Subcutaneous plane was developed with Littler's and any neurovascular structures including dorsal branches of superficial peroneal nerve were preserved. Bovie cautery was utilized to coagulate bleeders feeding mass. Again, care was taken to avoid any injury to the sural nerve and branches of the superficial peroneal nerve. The mass was completely  from overlaying skin and subcutaneous tissues and bluntly lifted of underlying fascia and retinaculum. A small portion of the peroneal tendon sheath was excised proximally as mass was somewhat adherent in this region. There was no resultant instability. Hemostasis achieved with bovie. The wounds were irrigated with saline. The subcutaneous tissues were closed with 3-0 Vicryl suture and the skin with subcuticular 4-0 monocril. Dermabond, steris, Aquacel Ag, webril, ACE, short CAM boot applied. The patient was extubated in the operating room and transferred to the postanesthesia recovery room where the capillary refill was intact to the right lower extremity.     Disposition:     The patient will be WBAT RLE in boot with strict elevation. ASA 81 mg PO daily for DVT prophylaxis and gentle ankle and foot exercises a few times daily.      Electronically signed by Saundra Sandy MD on 11/12/2021 at 8:31 AM

## 2021-11-12 NOTE — PROGRESS NOTES
Right ankle dressing clean and dry beneath boot, toes pink warm and mobile with \"good sensation\" per patient. Pt states pain \"is easing some. \" Discharge instructions reviewed, verbalized understanding. Assisted up to athroom with WBAT gait staedy with assist, Voided without difficulty. Able to dress self.

## 2021-11-12 NOTE — PROGRESS NOTES
Patient received from surgery, Dr. Viral Delacruz at bedside speaking with patient. Patient alert and awake. Complaints of pain 4/10 right ankle. Pain med given, pain decrease to 3.5/10. Report given to [place]. Per Dr. Viral Delacruz no prescription to be given, patient to continue taking pain medications at home already prescribed on.

## 2021-11-17 NOTE — TELEPHONE ENCOUNTER
INS DENIAL--LEFT CERVICAL RFA    DENIAL LETTER DATED 11.16.2021 REF# [id number]      DENIAL REASON: Your records show that you have had at least two procedures in the last twelve months. Medial studies show tht this provedure is not as safe when done frequently, For this reason, the procedure cannot be approved. LOOKING INTO APPEAL OPTIONS. Meena Chris Nolascox Chris APPEAL 45 CALENDAR DAYS FROM DOL.

## 2021-11-22 NOTE — TELEPHONE ENCOUNTER
APPEALING DENIAL    CALLED PATIENT @ [phone] IN REGARDS TO SIGNING CONSENT FORM TO APPEAL DENIAL. LEFT VOICE MESSAGE TO RETURN MY CALL TO SIGN CONSENT FORM. ONCE SIGNED WE CAN FAX APPEAL .[phone]      WAITING FOR CALL BACK. .. PATIENT CALLED BACK SHE WILL BE STOPPING BY  TO SIGN CONSENT FORM.

## 2021-11-24 DIAGNOSIS — M48.062 SPINAL STENOSIS OF LUMBAR REGION WITH NEUROGENIC CLAUDICATION: ICD-10-CM

## 2021-11-24 DIAGNOSIS — M47.817 LUMBOSACRAL SPONDYLOSIS WITHOUT MYELOPATHY: ICD-10-CM

## 2021-11-24 DIAGNOSIS — M54.16 LUMBAR RADICULOPATHY: ICD-10-CM

## 2021-11-24 DIAGNOSIS — M47.812 CERVICAL SPONDYLOSIS WITHOUT MYELOPATHY: ICD-10-CM

## 2021-11-24 DIAGNOSIS — M51.36 DDD (DEGENERATIVE DISC DISEASE), LUMBAR: ICD-10-CM

## 2021-11-24 DIAGNOSIS — M25.511 PAIN IN JOINT OF RIGHT SHOULDER: ICD-10-CM

## 2021-11-24 DIAGNOSIS — M96.1 POST LAMINECTOMY SYNDROME: ICD-10-CM

## 2021-11-24 DIAGNOSIS — G89.4 CHRONIC PAIN SYNDROME: ICD-10-CM

## 2021-11-24 RX ORDER — OXYCODONE HYDROCHLORIDE AND ACETAMINOPHEN 5; 325 MG/1; MG/1
1 TABLET ORAL
Qty: 75 TABLET | Refills: 0 | Status: SHIPPED | OUTPATIENT
Start: 2021-11-24 | End: 2021-12-17 | Stop reason: SDUPTHER

## 2021-11-24 NOTE — TELEPHONE ENCOUNTER
Requested Prescriptions     Pending Prescriptions Disp Refills    oxyCODONE-acetaminophen (PERCOCET) 5-325 MG per tablet 75 tablet 0     Sig: Take 1 tablet by mouth every 8-12 hours as needed for Pain for up to 30 days. #75 tabs for 30 days. Will continue to taper down. Take lowest dose possible to manage pain       Patient last seen on:  10/26/2021 W/RS                                        11/10/2021 INJECT  Date of last surgery:    Date of last refill:  10/26/2021  Pain level:    Patient complaining of: Future appts: 11/30/2021 W/RS    PT. IS REQUESTING REFILL ON PERCOCET. STATES SHE WILL NOT HAVE MEDICATION TO GET HER THROUGH HER NEXT SCHEDULED APPT. WILL DR. GARCIA AUTHORIZE REFILL, ENOUGH UNTIL HER SCHEDULED APPT. , 11/30/2021?

## 2021-11-30 ENCOUNTER — OFFICE VISIT (OUTPATIENT)
Dept: PAIN MANAGEMENT | Age: 61
End: 2021-11-30
Payer: COMMERCIAL

## 2021-11-30 VITALS
HEIGHT: 65 IN | WEIGHT: 219 LBS | DIASTOLIC BLOOD PRESSURE: 66 MMHG | BODY MASS INDEX: 36.49 KG/M2 | TEMPERATURE: 97.1 F | SYSTOLIC BLOOD PRESSURE: 128 MMHG

## 2021-11-30 DIAGNOSIS — M25.511 PAIN IN JOINT OF RIGHT SHOULDER: ICD-10-CM

## 2021-11-30 DIAGNOSIS — M47.812 CERVICAL SPONDYLOSIS WITHOUT MYELOPATHY: ICD-10-CM

## 2021-11-30 DIAGNOSIS — M96.1 POST LAMINECTOMY SYNDROME: ICD-10-CM

## 2021-11-30 DIAGNOSIS — G89.4 CHRONIC PAIN SYNDROME: ICD-10-CM

## 2021-11-30 DIAGNOSIS — M47.817 LUMBOSACRAL SPONDYLOSIS WITHOUT MYELOPATHY: Primary | ICD-10-CM

## 2021-11-30 PROCEDURE — 99213 OFFICE O/P EST LOW 20 MIN: CPT | Performed by: NURSE PRACTITIONER

## 2021-11-30 RX ORDER — OXYCODONE HYDROCHLORIDE AND ACETAMINOPHEN 5; 325 MG/1; MG/1
1 TABLET ORAL
Qty: 75 TABLET | Refills: 0 | Status: CANCELLED | OUTPATIENT
Start: 2021-11-30 | End: 2021-12-30

## 2021-11-30 ASSESSMENT — ENCOUNTER SYMPTOMS
ABDOMINAL PAIN: 0
SHORTNESS OF BREATH: 0
BACK PAIN: 1
SORE THROAT: 0

## 2021-11-30 NOTE — PROGRESS NOTES
Redd Reyes  (1960)    11/30/2021    Subjective:     Redd Reyes is 64 y.o. female who complains today of:    Chief Complaint   Patient presents with    Back Pain     Lumbar    Neck Pain     Cervical         Allergies:  Nsaids and Ibuprofen    Past Medical History:   Diagnosis Date    Anemia     Chicken pox     Chronic back pain     H/O bladder infections     Headache(784.0)     Hypertension     Measles     Mumps     Stroke ([address] 07/2020     Past Surgical History:   Procedure Laterality Date    APPENDECTOMY      BACK SURGERY  2014    lumbar back fusion    GALLBLADDER SURGERY      HYSTERECTOMY      JOINT REPLACEMENT Right 2020    JOINT REPLACEMENT Left 2018    KNEE SURGERY Right     LEG BIOPSY EXCISION Right 11/12/2021    SOFT TISSUE MASS EXCISION RIGHT ANKLE performed by Linda Griggs MD at [address] [place] [address]      TONSILLECTOMY       Family History   Problem Relation Age of Onset    Stroke Mother     Cancer Father      Social History     Socioeconomic History    Marital status:      Spouse name: Not on file    Number of children: Not on file    Years of education: Not on file    Highest education level: Not on file   Occupational History    Not on file   Tobacco Use    Smoking status: Never Smoker    Smokeless tobacco: Never Used   Substance and Sexual Activity    Alcohol use: Yes    Drug use: No    Sexual activity: Not on file   Other Topics Concern    Not on file   Social History Narrative    Not on file     Social Determinants of Health     Financial Resource Strain: Low Risk     Difficulty of Paying Living Expenses: Not hard at all   Food Insecurity: No Food Insecurity    Worried About Running Out of Food in the Last Year: Never true    Justyn of Food in the Last Year: Never true   Transportation Needs: No Transportation Needs    Lack of Transportation (Medical): No    Lack of Transportation (Non-Medical):  No   Physical Activity:  Days of Exercise per Week: Not on file    Minutes of Exercise per Session: Not on file   Stress:     Feeling of Stress : Not on file   Social Connections:     Frequency of Communication with Friends and Family: Not on file    Frequency of Social Gatherings with Friends and Family: Not on file    Attends Latter day Services: Not on file    Active Member of [address] [place] [address] [employer] or Organizations: Not on file    Attends Club or Organization Meetings: Not on file    Marital Status: Not on file   Intimate Partner Violence:     Fear of Current or Ex-Partner: Not on file    Emotionally Abused: Not on file    Physically Abused: Not on file    Sexually Abused: Not on file   Housing Stability:     Unable to Pay for Housing in the Last Year: Not on file    Number of [place] in the Last Year: Not on file    Unstable Housing in the Last Year: Not on file       Current Outpatient Medications on File Prior to Visit   Medication Sig Dispense Refill    oxyCODONE-acetaminophen (PERCOCET) 5-325 MG per tablet Take 1 tablet by mouth every 8-12 hours as needed for Pain for up to 30 days. #75 tabs for 30 days. Will continue to taper down. Take lowest dose possible to manage pain 75 tablet 0    amLODIPine (NORVASC) 10 MG tablet Take by mouth      ASPIRIN LOW DOSE 81 MG EC tablet TAKE 1 TABLET BY MOUTH ONCE DAILY      sucralfate (CARAFATE) 1 GM/10ML suspension Take by mouth      gabapentin (NEURONTIN) 300 MG capsule Take 1 capsule by mouth 4 times daily for 90 days.  360 capsule 3    AIMOVIG 140 MG/ML SOAJ Inject 140 mg as directed every 30 days 1 pen 5    vilazodone HCl (VILAZODONE HCL) 10 MG TABS Take 10 mg by mouth daily       atorvastatin (LIPITOR) 10 MG tablet Take 10 mg by mouth daily      Handicap Placard MISC by Does not apply route For 5 years 1 each 0    naloxone 4 MG/0.1ML LIQD nasal spray 1 spray by Nasal route as needed for Opioid Reversal (Patient not taking: Reported on 6/23/2021) 1 each 0    clonazePAM (KLONOPIN) 1 MG tablet TAKE 1 TABLET BY MOUTH DAILY AS NEEDED FOR ANXIETY FOR 30 DAYS      escitalopram (LEXAPRO) 20 MG tablet       pantoprazole (PROTONIX) 40 MG tablet Take by mouth      traZODone (DESYREL) 100 MG tablet       triamterene-hydroCHLOROthiazide (DYAZIDE) 37.5-25 MG per capsule Take 1 capsule by mouth every morning       SUMAtriptan (IMITREX) 100 MG tablet Take 1 tablet by mouth once as needed for Migraine 27 tablet 0     Current Facility-Administered Medications on File Prior to Visit   Medication Dose Route Frequency Provider Last Rate Last Admin    betamethasone acetate-betamethasone sodium phosphate (CELESTONE) injection 3 mg  3 mg Intra-artICUlar Once Aileen Lopez MD               Pt presents today for a f/u of chronic neck and low back pain. Last month she had a fatty tumor removed from the right ankle by Dr. Kevin Miranda about a month ago which has been painful. Right foot in walking boot. She was only able to get right cervical RFA due to insurance issues with the left side. Right side has improved, less HA. Pt feels pain level and functioning improves with prescribed medications and is able to perform ADLs. Pt feels that rainy weather aggravates the pain. Pt denies radiating numbness and tingling. She had EGD which shows a recurrent gastric ulcer and is back on Prilosec. MRI of lumbar spine report from 10/8/2020 showing intact L4-5 fusion and multi-level degenerative changes and worsening foraminal stenosis at L5-S1 and central canal stenosis at L2-3, L3-4 per report- done at [address] [place] [address]  She says she has LE EMG done there as well. History of CVA with residual left hand weakness. Sees neurology at [address] [place] [address]  She had fusion L4-5 with Dr Melissa Joshi 12/2016,  left and bilateral L2-3 and L3-4 microdecompressions 12/16/20, R TKR 02/2021, left TKR 2018, R RC repair 7 years ago.  She has hx of bleeding ulcer.       10/11/21 Right C3, C4 and C5 RFA  9/9/2021 right shoulder injection  8/12/2021 bilateral L235 RFA  4/7/2021 left C3-4-5 6 RFA  2/10/2021 right C3-4-5 6 RFA  11/11/2020 bilateral S1 SNR B      Review of Systems   Constitutional: Negative for fever. HENT: Negative for congestion and sore throat. Respiratory: Negative for shortness of breath. Gastrointestinal: Negative for abdominal pain. Genitourinary: Negative for difficulty urinating. Musculoskeletal: Positive for back pain and neck pain. Neurological: Negative for speech difficulty and headaches. Hematological: Negative for adenopathy. Psychiatric/Behavioral: Negative for agitation. All other systems reviewed and are negative. Objective:     Vitals:  /66   Temp 97.1 °F (36.2 °C) (Infrared)   Ht 5' 5\" (1.651 m)   Wt 219 lb (99.3 kg)   LMP  (LMP Unknown)   BMI 36.44 kg/m² Pain Score:   8      Physical Exam  Vitals and nursing note reviewed. Pt is alert and oriented x 3. Recent and remote memory is intact. Mood, judgement and affect are normal.  No signs of distress or SOB noted. Visualized skin intact. Sensation intact to light touch. Decreased ROM with flexion, extension and rotation of cervical spine. Muscle tightness noted. Positive bilateral cervical facet joint provacative with palpation. Negative Spurling's maneuver. Negative Lorrie's sign. Strong grasp B/L. Strength is functional in UE bilaterally. Pulses are intact. Decreased ROM with flexion and extension of low back. Tender with palpation to bilateral lumbar spine with positive provacative maneuvers noted. Negative SLR. Strength, balance, and coordination are diminished but functional for ambulation. Gait antalgic. Assessment:      Diagnosis Orders   1. Lumbosacral spondylosis without myelopathy     2. Post laminectomy syndrome     3. Cervical spondylosis without myelopathy     4. Pain in joint of right shoulder     5.  Chronic pain syndrome         Plan:     Periodic Controlled Substance Monitoring: Possible medication side effects, risk of tolerance/dependence & alternative treatments discussed., No signs of potential drug abuse or diversion identified. , Assessed functional status., Obtaining appropriate analgesic effect of treatment. (Sidney Alberto, SARAH - CNP)    No orders of the defined types were placed in this encounter. No orders of the defined types were placed in this encounter. Discussed options with the patient today. Continue Percocet, gabapentin and tizanidine. Has refill on her 90 day supply of Gabapentin and Tizanidine from mail away. Percocet was filled for the month on 11/24. Continue to follow with Dr. Lamin Fleming for right ankle. All questions were answered. Pt verbalized understanding and agrees with above plan. Utox reviewed and appropriate from 6/23/21. Narcan sent 4/26/21.        Will continue medications for chronic pain as they help pt function with ADL and improve quality of life.  Discussed possible risks of opiate medication with pt, including but not limited to, constipation, nausea or vomiting, sedation, urinary retention, dependence and possible addiction. Pt agrees to use medication as directed. Pt advised to not use opiates while driving or operating heavy equipment, or in situations where pt may harm him/herself or others.  Pt is aware that while on narcotics, pt needs to be seen monthly to reassess pain and need for continued medication. NDP reviewed. [place]ARRS was reviewed. This NP saw pt under direct supervision of Dr. Carlos March. Follow up:  Return in about 4 weeks (around 12/28/2021) for review meds and reassess pain.     Lashae Lucas, APRN - CNP

## 2021-12-13 NOTE — TELEPHONE ENCOUNTER
APPEAL DENIED FOR LT CERVICAL RFA        APPEAL LETTER DATED  12.8.2021      APPEAL DENIED REASON: Policy criteria is not metfor Radiofrequency Ablation (PM-1002), section B3. Per the policy criteria, planned procedures do not exceed 2 per region within a rolling 12 months. The patient has had cervical RFA on the following dates: 2/1/2021 (right), 4/7/2021 (left), ND 11/10/2021 (RIGHT). This is the request for the left side . TAKING TO [facility] FOR FURTHER OPTIONS?

## 2021-12-17 ENCOUNTER — VIRTUAL VISIT (OUTPATIENT)
Dept: PAIN MANAGEMENT | Age: 61
End: 2021-12-17
Payer: COMMERCIAL

## 2021-12-17 DIAGNOSIS — M25.511 PAIN IN JOINT OF RIGHT SHOULDER: ICD-10-CM

## 2021-12-17 DIAGNOSIS — M47.817 LUMBOSACRAL SPONDYLOSIS WITHOUT MYELOPATHY: ICD-10-CM

## 2021-12-17 DIAGNOSIS — M96.1 POST LAMINECTOMY SYNDROME: ICD-10-CM

## 2021-12-17 DIAGNOSIS — M54.16 LUMBAR RADICULOPATHY: ICD-10-CM

## 2021-12-17 DIAGNOSIS — G89.4 CHRONIC PAIN SYNDROME: ICD-10-CM

## 2021-12-17 DIAGNOSIS — M51.36 DDD (DEGENERATIVE DISC DISEASE), LUMBAR: ICD-10-CM

## 2021-12-17 DIAGNOSIS — M47.812 CERVICAL SPONDYLOSIS WITHOUT MYELOPATHY: ICD-10-CM

## 2021-12-17 DIAGNOSIS — M48.062 SPINAL STENOSIS OF LUMBAR REGION WITH NEUROGENIC CLAUDICATION: ICD-10-CM

## 2021-12-17 PROBLEM — K27.9 PEPTIC ULC, SITE UNSP, UNSP AS AC OR CHR, W/O HEMOR OR PERF: Status: ACTIVE | Noted: 2021-02-12

## 2021-12-17 PROBLEM — F33.2 SEVERE EPISODE OF RECURRENT MAJOR DEPRESSIVE DISORDER, WITHOUT PSYCHOTIC FEATURES (HCC): Status: ACTIVE | Noted: 2021-12-17

## 2021-12-17 PROBLEM — Z96.652 PRESENCE OF LEFT ARTIFICIAL KNEE JOINT: Status: ACTIVE | Noted: 2020-12-17

## 2021-12-17 PROCEDURE — 99442 PR PHYS/QHP TELEPHONE EVALUATION 11-20 MIN: CPT | Performed by: NURSE PRACTITIONER

## 2021-12-17 RX ORDER — OXYCODONE HYDROCHLORIDE AND ACETAMINOPHEN 5; 325 MG/1; MG/1
1 TABLET ORAL
Qty: 75 TABLET | Refills: 0 | Status: SHIPPED | OUTPATIENT
Start: 2021-12-24 | End: 2022-01-20 | Stop reason: SDUPTHER

## 2021-12-17 ASSESSMENT — ENCOUNTER SYMPTOMS
CONSTIPATION: 0
EYES NEGATIVE: 1
COUGH: 0
GASTROINTESTINAL NEGATIVE: 1
BACK PAIN: 1
SHORTNESS OF BREATH: 0
TROUBLE SWALLOWING: 0
DIARRHEA: 0

## 2021-12-17 NOTE — PROGRESS NOTES
Ladarius Pollard  (1960)    12/17/2021    TELEHEALTH EVALUATION -- Audio Only (During LWDHX-21 public health emergency)    Due to [place] 19 outbreak, patient's office visit was converted to a virtual visit. Patient was contacted and agreed to proceed with a audio only telehealth service. Patient understands that this encounter is a billable visit and that insurance coverage and co-pays are up to their individual insurance plans. At the beginning of this telehealth encounter, I verified with the patient their name and date of birth. Verbal consent for telehealth encounter was obtained. Subjective:       Chief Complaint   Patient presents with    Back Pain       Ladarius Pollard is 64 y.o. female who complains today of: Allergies:  Nsaids and Ibuprofen    History:  Past Medical History:   Diagnosis Date    Anemia     Chicken pox     Chronic back pain     H/O bladder infections     Headache(784.0)     Hypertension     Measles     Mumps     Stroke ([facility] [address] 07/2020     Past Surgical History:   Procedure Laterality Date    APPENDECTOMY      BACK SURGERY  2014    lumbar back fusion    GALLBLADDER SURGERY      HYSTERECTOMY      JOINT REPLACEMENT Right 2020    JOINT REPLACEMENT Left 2018    KNEE SURGERY Right     LEG BIOPSY EXCISION Right 11/12/2021    SOFT TISSUE MASS EXCISION RIGHT ANKLE performed by Leti Humphreys MD at [address] [place] [address]      TONSILLECTOMY       Family History   Problem Relation Age of Onset    Stroke Mother     Cancer Father      Social History     Socioeconomic History    Marital status:      Spouse name: Not on file    Number of children: Not on file    Years of education: Not on file    Highest education level: Not on file   Occupational History    Not on file   Tobacco Use    Smoking status: Never Smoker    Smokeless tobacco: Never Used   Substance and Sexual Activity    Alcohol use:  Yes    Drug use: No    Sexual activity: Not on capsule Take 1 capsule by mouth 4 times daily for 90 days. 360 capsule 3    AIMOVIG 140 MG/ML SOAJ Inject 140 mg as directed every 30 days 1 pen 5    vilazodone HCl (VILAZODONE HCL) 10 MG TABS Take 10 mg by mouth daily       atorvastatin (LIPITOR) 10 MG tablet Take 10 mg by mouth daily      Handicap Placard MISC by Does not apply route For 5 years 1 each 0    naloxone 4 MG/0.1ML LIQD nasal spray 1 spray by Nasal route as needed for Opioid Reversal (Patient not taking: Reported on 6/23/2021) 1 each 0    clonazePAM (KLONOPIN) 1 MG tablet TAKE 1 TABLET BY MOUTH DAILY AS NEEDED FOR ANXIETY FOR 30 DAYS      escitalopram (LEXAPRO) 20 MG tablet       pantoprazole (PROTONIX) 40 MG tablet Take by mouth      traZODone (DESYREL) 100 MG tablet       triamterene-hydroCHLOROthiazide (DYAZIDE) 37.5-25 MG per capsule Take 1 capsule by mouth every morning       SUMAtriptan (IMITREX) 100 MG tablet Take 1 tablet by mouth once as needed for Migraine 27 tablet 0     Current Facility-Administered Medications on File Prior to Visit   Medication Dose Route Frequency Provider Last Rate Last Admin    betamethasone acetate-betamethasone sodium phosphate (CELESTONE) injection 3 mg  3 mg Intra-artICUlar Once Gordon Aguilera MD             Pt's f/u done today with a telehealth visit for her pain d/t Covid 19 pandemic. Pt last saw Hermenia Osgood, NP for chronic neck and low back pain. Had Rt C3,4,5 RF ablation with significant relief > 50%. Says she needs Lt side done but says insurance not wanting to cover this. She had a fatty tumor removed from the right ankle by Dr. Nannette Peralta and says pain is better. \"helped a lot\". She had EGD which shows a recurrent gastric ulcer and is back on Prilosec. MRI of lumbar spine report from 10/8/2020 showing intact L4-5 fusion and multi-level degenerative changes and worsening foraminal stenosis at L5-S1 and central canal stenosis at L2-3, L3-4 per report- done at [address] [place], [place] [address]   She says she has LE EMG done there as well. History of CVA with residual left hand weakness. Sees neurology at [place] [address]. She had fusion L4-5 with Dr Mitchell Primer 12/2016,  left and bilateral L2-3 and L3-4 microdecompressions 12/16/20, R TKR 02/2021, left TKR 2018, R RC repair 7 years ago. She has hx of bleeding ulcer. Most recent past procedures:    10/11/21 Right C3, C4 and C5 RFA  9/9/2021 right shoulder injection  8/12/2021 bilateral L235 RFA  - offered significant relief >50%  4/7/2021 left C3-4-5 6 RFA  2/10/2021 right C3-4-5 6 RFA  11/11/2020 bilateral S1 SNRB    Takes Percocet 5mg 2-3 per day PRN pain (#75 tabs fro 30 days) and gabapentin  300mg but says she uses 2-3 a day, and zanaflex 4mg TID. Pt feels pain level with her medication is 6/10, and 8/10 without medication. Pt feels that stress, weather change, getting up/down makes the pain worse, and medication, rest makes the pain better. Pt feels her medication helps   her function and improve her quality of life, specifically says allows to do ADL. Pt denies radiating numbness and tingling. Denies recent falls, injuries or trauma. Pt denies new weakness. Pt reports PT has been done in the past.           Review of Systems   Constitutional: Negative. Negative for fatigue. HENT: Negative. Negative for trouble swallowing. Eyes: Negative. Respiratory: Negative for cough and shortness of breath. Cardiovascular: Negative for chest pain. Gastrointestinal: Negative. Negative for constipation and diarrhea. Endocrine: Negative. Genitourinary: Negative. Musculoskeletal: Positive for back pain and neck pain. Skin: Negative. Neurological: Negative for dizziness, weakness and headaches. Hematological: Negative. Psychiatric/Behavioral: Negative.         Objective:     Vitals:  LMP  (LMP Unknown)      PHYSICAL EXAMINATION:    [x] Alert  [x] Oriented to person/place/time  [x] No apparent distress      [x] Mood and affect normal sent in April 2021    Will continue medications for chronic pain that has been previously directed as they do help pt function with ADL and improve quality of life. Pt is aware goal is to use the least amount of medication possible to allow pt to function and help with quality of life as discussed in detail. Ideal to keep MME below 50 which it is. Have discussed proper disposal of narcotic medication. Advised to have medications in safe place and locked up/in lock box. Discussed possible risks of opiate medication with pt, including, but not limited to possible constipation, nausea or vomiting, sedation, urinary retention, dependence and possible addiction. Pt agrees to use medication as prescribed/as directed. Pt advised to not use opiates while driving or operating heavy equipment, or in situations where pt may harm him/herself or others. Pt is aware that while on narcotics, pt needs to be seen to reassess pain and reassess need for continued medication at that time. NDP reviewed. OARRS was reviewed. This NP saw pt under direct supervision of Dr. Vincent Avila. Follow up:  Return in about 5 weeks (around 1/21/2022) for review meds and reassess pain. Rosalba Field, APRN - CNP      >50% of 12 minute appointment was spent with discussion, addressing questions and concerns, including prognosis, treatment options, patient education, and recommendations. [id number]}    Pursuant to the emergency declaration under the [facility] waiver authority and the [employer] General Act, this Virtual  Visit was conducted, with patient's consent, to reduce the patient's risk of exposure to COVID-19 and provide continuity of care for an established patient. Services were provided through an audio discussion to substitute for in-person clinic visit.

## 2021-12-20 NOTE — TELEPHONE ENCOUNTER
PATIENTS INSURANCE DOES NOT ALLOW MORE THAN TWO RFA SESSIONS IN A ROLLING 12 MONTH PERIOD AND OUR APPEAL HAS BEEN DENIED    PATIENT IS NOT ELIGABLE FOR ANOTHER RFA SESSION UNTIL 4/8/2022    PLEASE INFORM PROVIDER.

## 2022-01-20 ENCOUNTER — OFFICE VISIT (OUTPATIENT)
Dept: PAIN MANAGEMENT | Age: 62
End: 2022-01-20
Payer: COMMERCIAL

## 2022-01-20 VITALS — WEIGHT: 215 LBS | HEIGHT: 65 IN | BODY MASS INDEX: 35.82 KG/M2 | TEMPERATURE: 96.9 F

## 2022-01-20 DIAGNOSIS — M25.511 PAIN IN JOINT OF RIGHT SHOULDER: ICD-10-CM

## 2022-01-20 DIAGNOSIS — M96.1 POST LAMINECTOMY SYNDROME: ICD-10-CM

## 2022-01-20 DIAGNOSIS — M47.812 CERVICAL SPONDYLOSIS WITHOUT MYELOPATHY: ICD-10-CM

## 2022-01-20 DIAGNOSIS — M51.36 DDD (DEGENERATIVE DISC DISEASE), LUMBAR: ICD-10-CM

## 2022-01-20 DIAGNOSIS — M48.062 SPINAL STENOSIS OF LUMBAR REGION WITH NEUROGENIC CLAUDICATION: ICD-10-CM

## 2022-01-20 DIAGNOSIS — M54.16 LUMBAR RADICULOPATHY: ICD-10-CM

## 2022-01-20 DIAGNOSIS — G89.4 CHRONIC PAIN SYNDROME: ICD-10-CM

## 2022-01-20 DIAGNOSIS — M47.817 LUMBOSACRAL SPONDYLOSIS WITHOUT MYELOPATHY: ICD-10-CM

## 2022-01-20 PROCEDURE — 99214 OFFICE O/P EST MOD 30 MIN: CPT | Performed by: NURSE PRACTITIONER

## 2022-01-20 RX ORDER — OXYCODONE HYDROCHLORIDE AND ACETAMINOPHEN 5; 325 MG/1; MG/1
1 TABLET ORAL
Qty: 75 TABLET | Refills: 0 | Status: SHIPPED | OUTPATIENT
Start: 2022-01-20 | End: 2022-02-18 | Stop reason: SDUPTHER

## 2022-01-20 ASSESSMENT — ENCOUNTER SYMPTOMS
ABDOMINAL PAIN: 0
SORE THROAT: 0
SHORTNESS OF BREATH: 0
BACK PAIN: 1

## 2022-01-20 NOTE — PROGRESS NOTES
Week: Not on file    Minutes of Exercise per Session: Not on file   Stress:     Feeling of Stress : Not on file   Social Connections:     Frequency of Communication with Friends and Family: Not on file    Frequency of Social Gatherings with Friends and Family: Not on file    Attends Mandaeism Services: Not on file    Active Member of [address] [place] [address] or Organizations: Not on file    Attends Club or Organization Meetings: Not on file    Marital Status: Not on file   Intimate Partner Violence:     Fear of Current or Ex-Partner: Not on file    Emotionally Abused: Not on file    Physically Abused: Not on file    Sexually Abused: Not on file   Housing Stability:     Unable to Pay for Housing in the Last Year: Not on file    Number of [place] in the Last Year: Not on file    Unstable Housing in the Last Year: Not on file       Current Outpatient Medications on File Prior to Visit   Medication Sig Dispense Refill    amLODIPine (NORVASC) 10 MG tablet Take by mouth      ASPIRIN LOW DOSE 81 MG EC tablet TAKE 1 TABLET BY MOUTH ONCE DAILY      sucralfate (CARAFATE) 1 GM/10ML suspension Take by mouth      gabapentin (NEURONTIN) 300 MG capsule Take 1 capsule by mouth 4 times daily for 90 days.  360 capsule 3    AIMOVIG 140 MG/ML SOAJ Inject 140 mg as directed every 30 days 1 pen 5    vilazodone HCl (VILAZODONE HCL) 10 MG TABS Take 10 mg by mouth daily       atorvastatin (LIPITOR) 10 MG tablet Take 10 mg by mouth daily      Handicap Placard MISC by Does not apply route For 5 years 1 each 0    naloxone 4 MG/0.1ML LIQD nasal spray 1 spray by Nasal route as needed for Opioid Reversal (Patient not taking: Reported on 6/23/2021) 1 each 0    clonazePAM (KLONOPIN) 1 MG tablet TAKE 1 TABLET BY MOUTH DAILY AS NEEDED FOR ANXIETY FOR 30 DAYS      escitalopram (LEXAPRO) 20 MG tablet       pantoprazole (PROTONIX) 40 MG tablet Take by mouth      traZODone (DESYREL) 100 MG tablet       triamterene-hydroCHLOROthiazide (DYAZIDE) 37.5-25 MG per capsule Take 1 capsule by mouth every morning       SUMAtriptan (IMITREX) 100 MG tablet Take 1 tablet by mouth once as needed for Migraine 27 tablet 0     Current Facility-Administered Medications on File Prior to Visit   Medication Dose Route Frequency Provider Last Rate Last Admin    betamethasone acetate-betamethasone sodium phosphate (CELESTONE) injection 3 mg  3 mg Intra-artICUlar Once Melvin Hansen MD               Pt presents today for a f/u of chronic neck and low back pain. She has been using more pain medication. had a fatty tumor removed from the right ankle by Dr. Yaquelin Silva October 2021, doing very well. She was only able to get right cervical RFA due to insurance issues with the left side. Right side has improved, less HA. Pt feels pain level and functioning improves with prescribed medications and is able to perform ADLs. Pt feels that rainy weather aggravates the pain. Pt denies radiating numbness and tingling. She had EGD which shows a recurrent gastric ulcer and is back on Prilosec. MRI of lumbar spine report from 10/8/2020 showing intact L4-5 fusion and multi-level degenerative changes and worsening foraminal stenosis at L5-S1 and central canal stenosis at L2-3, L3-4 per report- done at [address] [place] [address]  She says she has LE EMG done there as well. History of CVA with residual left hand weakness. Sees neurology at [address] [place] [address]  She had fusion L4-5 with Dr Guido Velasquez 12/2016,  left and bilateral L2-3 and L3-4 microdecompressions 12/16/20, R TKR 02/2021, left TKR 2018, R RC repair 7 years ago.       10/11/21 Right C3, C4 and C5 RFA  9/9/2021 right shoulder injection  8/12/2021 BL L235 RFA  4/7/2021 left C3-4-5 6 RFA  2/10/2021 right C3-4-5 6 RFA  11/11/2020 bilateral S1 SNRB           Review of Systems   Constitutional: Negative for fever. HENT: Negative for congestion and sore throat. Respiratory: Negative for shortness of breath. Gastrointestinal: Negative for abdominal pain. Genitourinary: Negative for difficulty urinating. Musculoskeletal: Positive for back pain. Neurological: Negative for speech difficulty and headaches. Hematological: Negative for adenopathy. Psychiatric/Behavioral: Negative for agitation. All other systems reviewed and are negative. Objective:     Vitals:  Temp 96.9 °F (36.1 °C)   Ht 5' 5\" (1.651 m)   Wt 215 lb (97.5 kg)   LMP  (LMP Unknown)   BMI 35.78 kg/m² Pain Score:   8      Physical Exam  Vitals and nursing note reviewed. Pt is alert and oriented x 3.  Recent and remote memory is intact.  Mood, judgement and affect are normal.  No signs of distress or SOB noted.  Visualized skin intact.  Sensation intact to light touch. Decreased ROM with flexion, extension and rotation of cervical spine. Muscle tightness noted. Positive bilateral cervical facet joint provacative with palpation. Negative Spurling's maneuver.  Negative Lorrie's sign. Strong grasp B/L. Strength is functional in UE bilaterally.  Pulses are intact. Decreased ROM with flexion and extension of low back.  Tender with palpation to bilateral lumbar spine with positive provacative maneuvers noted.  Negative SLR.      Strength, balance, and coordination are diminished but functional for ambulation. Gait antalgic. Assessment:      Diagnosis Orders   1. Lumbosacral spondylosis without myelopathy  DE INJ DX/THER AGNT PARAVERT FACET JOINT, LUMBAR/SAC, 1ST LEVEL    DE INJ DX/THER AGNT PARAVERT FACET JOINT, LUMBAR/SAC, 2ND LEVEL    DE INJ DX/THER AGNT PARAVERT FACET JOINT, LUMBAR/SAC, ADD LEVEL    oxyCODONE-acetaminophen (PERCOCET) 5-325 MG per tablet   2. DDD (degenerative disc disease), lumbar  oxyCODONE-acetaminophen (PERCOCET) 5-325 MG per tablet   3. Spinal stenosis of lumbar region with neurogenic claudication  oxyCODONE-acetaminophen (PERCOCET) 5-325 MG per tablet   4.  Post laminectomy syndrome oxyCODONE-acetaminophen (PERCOCET) 5-325 MG per tablet   5. Lumbar radiculopathy  oxyCODONE-acetaminophen (PERCOCET) 5-325 MG per tablet   6. Cervical spondylosis without myelopathy  oxyCODONE-acetaminophen (PERCOCET) 5-325 MG per tablet   7. Pain in joint of right shoulder  oxyCODONE-acetaminophen (PERCOCET) 5-325 MG per tablet   8. Chronic pain syndrome  oxyCODONE-acetaminophen (PERCOCET) 5-325 MG per tablet       Plan:     Periodic Controlled Substance Monitoring: Possible medication side effects, risk of tolerance/dependence & alternative treatments discussed. ,No signs of potential drug abuse or diversion identified. ,Assessed functional status. ,Obtaining appropriate analgesic effect of treatment. (Aster Combs, APRN - CNP)    Orders Placed This Encounter   Medications    oxyCODONE-acetaminophen (PERCOCET) 5-325 MG per tablet     Sig: Take 1 tablet by mouth every 8-12 hours as needed for Pain for up to 30 days. #75 tabs for 30 days. Take lowest dose possible to manage pain. Ok to fill today for flare of pain. Dispense:  75 tablet     Refill:  0     Reduce doses taken as pain becomes manageable       Orders Placed This Encounter   Procedures    NJ INJ DX/THER AGNT PARAVERT FACET JOINT, LUMBAR/SAC, 1ST LEVEL     Standing Status:   Future     Standing Expiration Date:   4/20/2022    NJ INJ DX/THER AGNT PARAVERT FACET JOINT, LUMBAR/SAC, 2ND LEVEL     Standing Status:   Future     Standing Expiration Date:   4/20/2022    NJ INJ DX/THER AGNT PARAVERT FACET JOINT, LUMBAR/SAC, ADD LEVEL     BL L235 RFA with CC aroun 2/12. BL RFA was last done 8/12/21. Standing Status:   Future     Standing Expiration Date:   4/20/2022     Discussed options with the patient today.  Continue Percocet, gabapentin and tizanidine.  Has refill on her 90 day supply of Gabapentin and Tizanidine from mail away. Will repeat BL lumbar RFA which previously gave 50% pain relief for 5 months.  Alprazolam from another provider. All questions were answered.  Pt verbalized understanding and agrees with above plan. Utox reviewed and appropriate from 6/23/21. Narcan sent 4/26/21. We will go ahead and order  bilateral L3, L4, L5 RF ablation  as pt has had >80% pain relief with diagnostic MBB's and improvement with past RF ablations. she has failed conservative treatment in the past. Anatomic model of pathology was shown. Risks and benefits of the procedure were discussed. All questions were answered and patient understands and agrees with the plan.       Will continue medications for chronic pain as they help pt function with ADL and improve quality of life.  Discussed possible risks of opiate medication with pt, including but not limited to, constipation, nausea or vomiting, sedation, urinary retention, dependence and possible addiction. Pt agrees to use medication as directed. Pt advised to not use opiates while driving or operating heavy equipment, or in situations where pt may harm him/herself or others.  Pt is aware that while on narcotics, pt needs to be seen monthly to reassess pain and need for continued medication.  NDP reviewed. Gil Cardona was reviewed.  This NP saw pt under direct supervision of Dr. Tessie Solorio    Follow up:  Return in about 4 weeks (around 2/17/2022) for review meds and reassess pain.     David Araujo, SARAH - CNP

## 2022-01-21 ENCOUNTER — TELEPHONE (OUTPATIENT)
Dept: PAIN MANAGEMENT | Age: 62
End: 2022-01-21

## 2022-01-21 NOTE — TELEPHONE ENCOUNTER
ORDER PLACED:    Date: 1/20/22  Description: BILAT L2,3,5 RFA AROUND 2/12/22  Order Number: [id number]  Ordering Provider: [facility]  Performing Provider:   CPT Codes: 04219,82693  ICD10 Codes: X91.202    SUBMIT ONE SIDE AT A TIME

## 2022-02-18 DIAGNOSIS — M51.36 DDD (DEGENERATIVE DISC DISEASE), LUMBAR: ICD-10-CM

## 2022-02-18 DIAGNOSIS — G89.4 CHRONIC PAIN SYNDROME: ICD-10-CM

## 2022-02-18 DIAGNOSIS — M54.16 LUMBAR RADICULOPATHY: ICD-10-CM

## 2022-02-18 DIAGNOSIS — M48.062 SPINAL STENOSIS OF LUMBAR REGION WITH NEUROGENIC CLAUDICATION: ICD-10-CM

## 2022-02-18 DIAGNOSIS — M25.511 PAIN IN JOINT OF RIGHT SHOULDER: ICD-10-CM

## 2022-02-18 DIAGNOSIS — M47.812 CERVICAL SPONDYLOSIS WITHOUT MYELOPATHY: ICD-10-CM

## 2022-02-18 DIAGNOSIS — M96.1 POST LAMINECTOMY SYNDROME: ICD-10-CM

## 2022-02-18 DIAGNOSIS — M47.817 LUMBOSACRAL SPONDYLOSIS WITHOUT MYELOPATHY: ICD-10-CM

## 2022-02-18 RX ORDER — OXYCODONE HYDROCHLORIDE AND ACETAMINOPHEN 5; 325 MG/1; MG/1
1 TABLET ORAL
Qty: 10 TABLET | Refills: 0 | Status: SHIPPED | OUTPATIENT
Start: 2022-02-19 | End: 2022-02-22 | Stop reason: SDUPTHER

## 2022-02-18 NOTE — TELEPHONE ENCOUNTER
Requested Prescriptions     Pending Prescriptions Disp Refills    oxyCODONE-acetaminophen (PERCOCET) 5-325 MG per tablet 9 tablet 0     Sig: Take 1 tablet by mouth every 8-12 hours as needed for Pain for up to 3 days. #75 tabs for 30 days. Take lowest dose possible to manage pain. Ok to fill today for flare of pain.        Patient last seen on:  1/20/22  Date of last surgery:  n/a  Date of last refill:  1/20/22  Pain level:  n/a  Patient complaining of:  Pt asking for refill until next appt   Future appts: 2/22/22

## 2022-02-19 NOTE — TELEPHONE ENCOUNTER
-Continue Percocet 5/325 mg TID prn (#75/month), 2/19-2/23, 4 days, #10  -Keep follow up on 2/22/22    Controlled Substance Monitoring:    Acute and Chronic Pain Monitoring:   RX Monitoring 2/18/2022   Attestation -   Periodic Controlled Substance Monitoring Assessed functional status.    Chronic Pain > 80 MEDD -

## 2022-02-22 ENCOUNTER — TELEMEDICINE (OUTPATIENT)
Dept: PAIN MANAGEMENT | Age: 62
End: 2022-02-22
Payer: COMMERCIAL

## 2022-02-22 DIAGNOSIS — M47.817 LUMBOSACRAL SPONDYLOSIS WITHOUT MYELOPATHY: Primary | ICD-10-CM

## 2022-02-22 DIAGNOSIS — M47.812 CERVICAL SPONDYLOSIS WITHOUT MYELOPATHY: ICD-10-CM

## 2022-02-22 DIAGNOSIS — M54.16 LUMBAR RADICULOPATHY: ICD-10-CM

## 2022-02-22 DIAGNOSIS — M48.062 SPINAL STENOSIS OF LUMBAR REGION WITH NEUROGENIC CLAUDICATION: ICD-10-CM

## 2022-02-22 DIAGNOSIS — G89.4 CHRONIC PAIN SYNDROME: ICD-10-CM

## 2022-02-22 DIAGNOSIS — M96.1 POST LAMINECTOMY SYNDROME: ICD-10-CM

## 2022-02-22 DIAGNOSIS — M25.511 PAIN IN JOINT OF RIGHT SHOULDER: ICD-10-CM

## 2022-02-22 PROCEDURE — 99442 PR PHYS/QHP TELEPHONE EVALUATION 11-20 MIN: CPT | Performed by: NURSE PRACTITIONER

## 2022-02-22 RX ORDER — OXYCODONE HYDROCHLORIDE AND ACETAMINOPHEN 5; 325 MG/1; MG/1
1 TABLET ORAL
Qty: 75 TABLET | Refills: 0 | Status: SHIPPED | OUTPATIENT
Start: 2022-02-23 | End: 2022-03-22 | Stop reason: SDUPTHER

## 2022-02-22 ASSESSMENT — ENCOUNTER SYMPTOMS
ABDOMINAL PAIN: 0
SORE THROAT: 0
BACK PAIN: 1
SHORTNESS OF BREATH: 0

## 2022-02-22 NOTE — PROGRESS NOTES
Rafat Youngblood  (1960)    2/22/2022    TELEHEALTH EVALUATION -- Audio Only (During [facility] public health emergency)    Due to [place] 19 outbreak, patient's office visit was converted to a virtual visit. Patient was contacted and agreed to proceed with a audio only telehealth service. Patient understands that this encounter is a billable visit and that insurance coverage and co-pays are up to their individual insurance plans. At the beginning of this telehealth encounter, I verified with the patient their name and date of birth. Verbal consent for telehealth encounter was obtained. Subjective:       Chief Complaint   Patient presents with    Neck Pain    Back Pain       Rafat Youngblood is 64 y.o. female who complains today of: Allergies:  Nsaids and Ibuprofen    History:  Past Medical History:   Diagnosis Date    Anemia     Chicken pox     Chronic back pain     H/O bladder infections     Headache(784.0)     Hypertension     Measles     Mumps     Stroke ([address] 07/2020     Past Surgical History:   Procedure Laterality Date    APPENDECTOMY      BACK SURGERY  2014    lumbar back fusion    GALLBLADDER SURGERY      HYSTERECTOMY      JOINT REPLACEMENT Right 2020    JOINT REPLACEMENT Left 2018    KNEE SURGERY Right     LEG BIOPSY EXCISION Right 11/12/2021    SOFT TISSUE MASS EXCISION RIGHT ANKLE performed by Juany Bernal MD at [address] [place] [address]      TONSILLECTOMY       Family History   Problem Relation Age of Onset    Stroke Mother     Cancer Father      Social History     Socioeconomic History    Marital status:      Spouse name: Not on file    Number of children: Not on file    Years of education: Not on file    Highest education level: Not on file   Occupational History    Not on file   Tobacco Use    Smoking status: Never Smoker    Smokeless tobacco: Never Used   Substance and Sexual Activity    Alcohol use:  Yes    Drug use: No    Sexual activity: Not on file   Other Topics Concern    Not on file   Social History Narrative    Not on file     Social Determinants of Health     Financial Resource Strain: Low Risk     Difficulty of Paying Living Expenses: Not hard at all   Food Insecurity: No Food Insecurity    Worried About Running Out of Food in the Last Year: Never true    [address] in the Last Year: Never true   Transportation Needs: No Transportation Needs    Lack of Transportation (Medical): No    Lack of Transportation (Non-Medical): No   Physical Activity:     Days of Exercise per Week: Not on file    Minutes of Exercise per Session: Not on file   Stress:     Feeling of Stress : Not on file   Social Connections:     Frequency of Communication with Friends and Family: Not on file    Frequency of Social Gatherings with Friends and Family: Not on file    Attends Synagogue Services: Not on file    Active Member of [address] [place] [address] [employer] or Organizations: Not on file    Attends Club or Organization Meetings: Not on file    Marital Status: Not on file   Intimate Partner Violence:     Fear of Current or Ex-Partner: Not on file    Emotionally Abused: Not on file    Physically Abused: Not on file    Sexually Abused: Not on file   Housing Stability:     Unable to Pay for Housing in the Last Year: Not on file    Number of [place] in the Last Year: Not on file    Unstable Housing in the Last Year: Not on file       Current Outpatient Medications on File Prior to Visit   Medication Sig Dispense Refill    amLODIPine (NORVASC) 10 MG tablet Take by mouth      ASPIRIN LOW DOSE 81 MG EC tablet TAKE 1 TABLET BY MOUTH ONCE DAILY      sucralfate (CARAFATE) 1 GM/10ML suspension Take by mouth      gabapentin (NEURONTIN) 300 MG capsule Take 1 capsule by mouth 4 times daily for 90 days.  360 capsule 3    AIMOVIG 140 MG/ML SOAJ Inject 140 mg as directed every 30 days 1 pen 5    vilazodone HCl (VILAZODONE HCL) 10 MG TABS Take 10 mg by mouth daily  atorvastatin (LIPITOR) 10 MG tablet Take 10 mg by mouth daily      Handicap Placard MISC by Does not apply route For 5 years 1 each 0    naloxone 4 MG/0.1ML LIQD nasal spray 1 spray by Nasal route as needed for Opioid Reversal (Patient not taking: Reported on 6/23/2021) 1 each 0    clonazePAM (KLONOPIN) 1 MG tablet TAKE 1 TABLET BY MOUTH DAILY AS NEEDED FOR ANXIETY FOR 30 DAYS      escitalopram (LEXAPRO) 20 MG tablet       pantoprazole (PROTONIX) 40 MG tablet Take by mouth      traZODone (DESYREL) 100 MG tablet       triamterene-hydroCHLOROthiazide (DYAZIDE) 37.5-25 MG per capsule Take 1 capsule by mouth every morning       SUMAtriptan (IMITREX) 100 MG tablet Take 1 tablet by mouth once as needed for Migraine 27 tablet 0     Current Facility-Administered Medications on File Prior to Visit   Medication Dose Route Frequency Provider Last Rate Last Admin    betamethasone acetate-betamethasone sodium phosphate (CELESTONE) injection 3 mg  3 mg Intra-artICUlar Once Lyle Mckeon MD             Pt presents today for a f/u of chronic neck and low back pain. She has been using more pain medication. RFA not yet approved.  had a fatty tumor removed from the right ankle by Dr. Caryle Hoar October 2021, doing very well. She was only able to get right cervical RFA due to insurance issues with the left side. Right side has improved, less HA. Pt feels pain level and functioning improves with prescribed medications and is able to perform ADLs. Pt feels that rainy weather aggravates the pain. Pt denies radiating numbness and tingling. She had EGD which shows a recurrent gastric ulcer and is back on Prilosec. MRI of lumbar spine report from 10/8/2020 showing intact L4-5 fusion and multi-level degenerative changes and worsening foraminal stenosis at L5-S1 and central canal stenosis at L2-3, L3-4 per report- done at [address] [place] [address] Dr. She says she has LE EMG done there as well.   History of CVA with residual left hand weakness. Sees neurology at [place] [address]. She had fusion L4-5 with Dr Maria A Frederick 12/2016,  left and bilateral L2-3 and L3-4 microdecompressions 12/16/20, R TKR 02/2021, left TKR 2018, R RC repair 7 years ago.       10/11/21 Right C3, C4 and C5 RFA  9/9/2021 right shoulder injection  8/12/2021 BL L235 RFA  4/7/2021 left C3-4-5 6 RFA  2/10/2021 right C3-4-5 6 RFA  11/11/2020 bilateral S1 SNRB               Review of Systems   Constitutional: Negative for fever. HENT: Negative for congestion and sore throat. Respiratory: Negative for shortness of breath. Gastrointestinal: Negative for abdominal pain. Genitourinary: Negative for difficulty urinating. Musculoskeletal: Positive for back pain. Neurological: Negative for speech difficulty and headaches. Hematological: Negative for adenopathy. Psychiatric/Behavioral: Negative for agitation. All other systems reviewed and are negative. Objective:     Vitals:  LMP  (LMP Unknown)      PHYSICAL EXAMINATION:    [x] Alert  [x] Oriented to person/place/time  [x] No apparent distress      [x] Mood and affect normal  [x] Recent and remote memory intact  [x] Judgement and insight normal     [] OTHER:      Due to this being a TeleHealth encounter, evaluation of the following organ systems is limited: Vitals/Constitutional/EENT/Resp/CV/GI//MS/Neuro/Skin/Heme-Lymph-Imm. Assessment:      Diagnosis Orders   1. Lumbosacral spondylosis without myelopathy  oxyCODONE-acetaminophen (PERCOCET) 5-325 MG per tablet   2. Spinal stenosis of lumbar region with neurogenic claudication  oxyCODONE-acetaminophen (PERCOCET) 5-325 MG per tablet   3. Post laminectomy syndrome  oxyCODONE-acetaminophen (PERCOCET) 5-325 MG per tablet   4. Lumbar radiculopathy  oxyCODONE-acetaminophen (PERCOCET) 5-325 MG per tablet   5. Cervical spondylosis without myelopathy  oxyCODONE-acetaminophen (PERCOCET) 5-325 MG per tablet   6.  Pain in joint of right shoulder oxyCODONE-acetaminophen (PERCOCET) 5-325 MG per tablet   7. Chronic pain syndrome  oxyCODONE-acetaminophen (PERCOCET) 5-325 MG per tablet       Plan:     Periodic Controlled Substance Monitoring: Possible medication side effects, risk of tolerance/dependence & alternative treatments discussed. ,No signs of potential drug abuse or diversion identified. ,Assessed functional status. ,Obtaining appropriate analgesic effect of treatment. (Pam Love, APRN - CNP)    Orders Placed This Encounter   Medications    oxyCODONE-acetaminophen (PERCOCET) 5-325 MG per tablet     Sig: Take 1 tablet by mouth every 8-12 hours as needed for Pain for up to 30 days. Dispense:  75 tablet     Refill:  0     Reduce doses taken as pain becomes manageable       No orders of the defined types were placed in this encounter. Discussed options with the patient today.  Continue Percocet, gabapentin and tizanidine.   RFA not yet approved. She will call next week if she doesn't hear anything. Alprazolam from another provider. All questions were answered.  Pt verbalized understanding and agrees with above plan. Utox reviewed and appropriate from 6/23/21. Narcan sent 4/26/21.        Will continue medications for chronic pain as they help pt function with ADL and improve quality of life.  Discussed possible risks of opiate medication with pt, including but not limited to, constipation, nausea or vomiting, sedation, urinary retention, dependence and possible addiction. Pt agrees to use medication as directed.  Pt advised to not use opiates while driving or operating heavy equipment, or in situations where pt may harm him/herself or others.  Pt is aware that while on narcotics, pt needs to be seen monthly to reassess pain and need for continued medication.  NDP reviewed. Gil Cardona was reviewed.  This NP saw pt under direct supervision of Dr. Tessie Solorio    Follow up:  Return in about 4 weeks (around 3/22/2022) for review meds and reassess pain.    Kelsey Montes, APRN - CNP      >50% of 12 minute appointment was spent with discussion, addressing questions and concerns, including prognosis, treatment options, patient education, and recommendations. [id number]}    Pursuant to the emergency declaration under the [address] [place] [address], [facility] waiver authority and the [employer] and [employer] Act, this Virtual  Visit was conducted, with patient's consent, to reduce the patient's risk of exposure to COVID-19 and provide continuity of care for an established patient. Services were provided through an audio discussion to substitute for in-person clinic visit.

## 2022-03-01 NOTE — TELEPHONE ENCOUNTER
INS DENIED ODILON LUMBAR RFA      INS DENIAL LETTER DATED 2.17.2022, RFE# [id number]        INS DENIAL REASON: Your records show that you have had at least two procedures in the last twelve months. Medical studies show that this procedure is not as safe when done frequently. For this reason, the procedure cannot be approved. LOOKING INTO APPEAL OPTIONS.

## 2022-03-01 NOTE — TELEPHONE ENCOUNTER
CALLED AND SPOKE WITH PATIENT IN REGARDS TO APPEAL AND CONSENT FORM, SHE STATES SHE WILL BE IN TO SIGN IN THE NEXT FEW DAYS. INSTRUCTED HER HER THAT FORM WILL BE AT THE  AND TO LET [facility] OR [place] KNOW SHE NEEDS TO SIGN CONSENT FORM.

## 2022-03-09 NOTE — TELEPHONE ENCOUNTER
PATIENT SIGNED CONSENT FORM TODAY. .. FAXED APPEAL .[phone] INCLUDED IS COVER SHEET, APPEAL LETTER, DENIAL LETTER, 7/29/21 & 8/4/21 DX MBB, 8/12/21 ODILON LUMBAR RFA,, 4/7/21 LT CERVICAL RFA & 11/10/21 RT CERVICAL RFA, SIGNED CONSENT FORM.       PER FAXED CONFIRMATION REPORT, FAX WAS SUCCESSFULLY RECEIVED @ 1:51PM ON 3/9/22 JOB #[id number]

## 2022-03-21 NOTE — TELEPHONE ENCOUNTER
AUTHORIZATION:    INSURANCE: [employer] [facility] #: [id number]    DATE RANGE:     TELEPHONE CALL ROUTED TO MA TO SCHEDULE.

## 2022-03-22 ENCOUNTER — OFFICE VISIT (OUTPATIENT)
Dept: PAIN MANAGEMENT | Age: 62
End: 2022-03-22
Payer: COMMERCIAL

## 2022-03-22 VITALS
TEMPERATURE: 96.7 F | SYSTOLIC BLOOD PRESSURE: 132 MMHG | WEIGHT: 210 LBS | DIASTOLIC BLOOD PRESSURE: 74 MMHG | BODY MASS INDEX: 34.99 KG/M2 | HEIGHT: 65 IN

## 2022-03-22 DIAGNOSIS — M25.511 PAIN IN JOINT OF RIGHT SHOULDER: ICD-10-CM

## 2022-03-22 DIAGNOSIS — M96.1 POST LAMINECTOMY SYNDROME: ICD-10-CM

## 2022-03-22 DIAGNOSIS — Z98.1 HISTORY OF LUMBAR FUSION: ICD-10-CM

## 2022-03-22 DIAGNOSIS — G89.4 CHRONIC PAIN SYNDROME: ICD-10-CM

## 2022-03-22 DIAGNOSIS — M47.817 LUMBOSACRAL SPONDYLOSIS WITHOUT MYELOPATHY: ICD-10-CM

## 2022-03-22 DIAGNOSIS — M48.062 SPINAL STENOSIS OF LUMBAR REGION WITH NEUROGENIC CLAUDICATION: ICD-10-CM

## 2022-03-22 DIAGNOSIS — M47.812 CERVICAL SPONDYLOSIS WITHOUT MYELOPATHY: ICD-10-CM

## 2022-03-22 DIAGNOSIS — M54.16 LUMBAR RADICULOPATHY: ICD-10-CM

## 2022-03-22 PROCEDURE — 99214 OFFICE O/P EST MOD 30 MIN: CPT | Performed by: NURSE PRACTITIONER

## 2022-03-22 RX ORDER — OXYCODONE HYDROCHLORIDE AND ACETAMINOPHEN 5; 325 MG/1; MG/1
1 TABLET ORAL EVERY 8 HOURS PRN
Qty: 90 TABLET | Refills: 0 | Status: SHIPPED | OUTPATIENT
Start: 2022-03-25 | End: 2022-04-26 | Stop reason: SDUPTHER

## 2022-03-22 RX ORDER — GABAPENTIN 400 MG/1
400 CAPSULE ORAL 4 TIMES DAILY
Qty: 120 CAPSULE | Refills: 0 | Status: SHIPPED | OUTPATIENT
Start: 2022-03-22 | End: 2022-04-26 | Stop reason: SDUPTHER

## 2022-03-22 RX ORDER — TIZANIDINE 4 MG/1
4 TABLET ORAL 3 TIMES DAILY PRN
Qty: 270 TABLET | Refills: 0 | Status: SHIPPED | OUTPATIENT
Start: 2022-03-22 | End: 2022-06-23 | Stop reason: SDUPTHER

## 2022-03-22 ASSESSMENT — ENCOUNTER SYMPTOMS
TROUBLE SWALLOWING: 0
CONSTIPATION: 0
BACK PAIN: 1
COUGH: 0
EYES NEGATIVE: 1
SHORTNESS OF BREATH: 0
GASTROINTESTINAL NEGATIVE: 1
DIARRHEA: 0

## 2022-03-22 NOTE — TELEPHONE ENCOUNTER
HEMANTH L2,3,5 RFA    AUTH APPROVED FROM 2/14/22-8/13/22    OK to schedule procedure approved as above. Please note sides/levels approved and date range.    (If applicable, sides/levels approved may differ from those ordered)    TO BE SCHEDULED WITH

## 2022-03-22 NOTE — TELEPHONE ENCOUNTER
AUTHORIZATION: HEMANTH  L2,3,5 RFA    INSURANCE: VIVIAN HORN [place] VIA: FAX    AUTH #: [id number]    DATE RANGE: 2/14/22-8/13/22    TELEPHONE CALL ROUTED TO MA TO SCHEDULE.     LEFT SIDE WILL NEED TO BE SUBMITTED AFTER RIGHT SIDE IS DONE

## 2022-03-22 NOTE — TELEPHONE ENCOUNTER
CALLED AND SPOKE WITH DAMIAN AT [address] [place] [address] THAT [address] AND [id number] ARE NOW APPROVED FROM 2/15/22-8/13/22  AUTH # [id number]    CALL REF # [id number]
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
## 2022-03-30 ENCOUNTER — HOSPITAL ENCOUNTER (OUTPATIENT)
Dept: MRI IMAGING | Age: 62
Discharge: HOME | End: 2022-04-01
Payer: COMMERCIAL

## 2022-03-30 DIAGNOSIS — M96.1 POST LAMINECTOMY SYNDROME: ICD-10-CM

## 2022-03-30 DIAGNOSIS — M48.062 SPINAL STENOSIS OF LUMBAR REGION WITH NEUROGENIC CLAUDICATION: ICD-10-CM

## 2022-03-30 DIAGNOSIS — M54.16 LUMBAR RADICULOPATHY: ICD-10-CM

## 2022-03-30 DIAGNOSIS — M47.817 LUMBOSACRAL SPONDYLOSIS WITHOUT MYELOPATHY: ICD-10-CM

## 2022-03-30 LAB
GFR AFRICAN AMERICAN: >60
GFR NON-AFRICAN AMERICAN: >60
PERFORMED ON: NORMAL
POC CREATININE: 0.9 MG/DL (ref 0.6–1.2)
POC SAMPLE TYPE: NORMAL

## 2022-03-30 PROCEDURE — A9579 GAD-BASE MR CONTRAST NOS,1ML: HCPCS | Performed by: NURSE PRACTITIONER

## 2022-03-30 PROCEDURE — 72158 MRI LUMBAR SPINE W/O & W/DYE: CPT

## 2022-03-30 PROCEDURE — 6360000004 HC RX CONTRAST MEDICATION: Performed by: NURSE PRACTITIONER

## 2022-03-30 RX ADMIN — GADOTERIDOL 15 ML: 279.3 INJECTION, SOLUTION INTRAVENOUS at 09:30

## 2022-04-06 ENCOUNTER — TELEPHONE (OUTPATIENT)
Dept: PAIN MANAGEMENT | Age: 62
End: 2022-04-06

## 2022-04-06 NOTE — TELEPHONE ENCOUNTER
BENEFITS: ODILON L2,3,5 RFA    Insurance: VIVIAN  Phone: [phone]  Contact Name: Zee Hugo  Effective Date: 4.1.2020     Plan year: YES-CALENDAR  Deductible: 350.00      Deductible Met: 350.00  Allowed/benefits paid at: 90% AFTER DEDUCTIBLE  OOP: 700.00 MET $427.32  Freq Limits: 17427 & 64636--BASED ON MEDICAL NECESSITY  Prior Auth Requirement: NO AUTH REQUIRED    Notes: NO PRE-EX CLAUSE    Call Reference #: [id number]    Time of call: 8:45AM

## 2022-04-12 ENCOUNTER — OFFICE VISIT (OUTPATIENT)
Dept: PAIN MANAGEMENT | Age: 62
End: 2022-04-12
Payer: COMMERCIAL

## 2022-04-12 DIAGNOSIS — M47.817 LUMBOSACRAL SPONDYLOSIS WITHOUT MYELOPATHY: ICD-10-CM

## 2022-04-12 PROCEDURE — 64635 DESTROY LUMB/SAC FACET JNT: CPT | Performed by: PAIN MEDICINE

## 2022-04-12 PROCEDURE — 64636 DESTROY L/S FACET JNT ADDL: CPT | Performed by: PAIN MEDICINE

## 2022-04-12 RX ORDER — BETAMETHASONE SODIUM PHOSPHATE AND BETAMETHASONE ACETATE 3; 3 MG/ML; MG/ML
6 INJECTION, SUSPENSION INTRA-ARTICULAR; INTRALESIONAL; INTRAMUSCULAR; SOFT TISSUE ONCE
Status: COMPLETED | OUTPATIENT
Start: 2022-04-12 | End: 2022-04-12

## 2022-04-12 RX ORDER — LIDOCAINE HYDROCHLORIDE 10 MG/ML
10 INJECTION, SOLUTION EPIDURAL; INFILTRATION; INTRACAUDAL; PERINEURAL ONCE
Status: COMPLETED | OUTPATIENT
Start: 2022-04-12 | End: 2022-04-12

## 2022-04-12 RX ADMIN — LIDOCAINE HYDROCHLORIDE 10 MG: 10 INJECTION, SOLUTION EPIDURAL; INFILTRATION; INTRACAUDAL; PERINEURAL at 10:44

## 2022-04-12 RX ADMIN — BETAMETHASONE SODIUM PHOSPHATE AND BETAMETHASONE ACETATE 6 MG: 3; 3 INJECTION, SUSPENSION INTRA-ARTICULAR; INTRALESIONAL; INTRAMUSCULAR; SOFT TISSUE at 10:44

## 2022-04-12 NOTE — PROGRESS NOTES
[facility]) Physicians  Neurosurgery and Pain [address] [facility] [place], [place] [address]: [phone]  F: [phone]      Lumbar Radio Frequency Ablation     Provider: Marta Soler DO          Patient Name: Rafael Washington : 1960        Date: 2022      Rafael Washington is here today for interventional pain management. Standard ASIPP guidelines were followed and sterile technique used. Area was cleaned with Betadine x3. Informed consent was obtained. Fluoroscopic guidance was used for this procedure. Multiple views of fluoroscopy were used during procedure to assist with needle placement. Appropriate sized RF 10mm active tip needle was used and advance to appropriate anatomic location. There was appropriate multifidus contraction noted with motor stimulation at 2 Hz between 0.5-1.5 volts. No limb or gluteal contraction was noted taking it up to 3.5 volts. Prior to lesioning at 80 degrees Celsius for 90 seconds, approximately 0.75mg/1mg of Celestone and ½ cc of 1% preservative free Lidocaine was injected. Impedance was between 200-500 ohms during the procedure. Patient tolerated the procedure well, no obvious complications occurred during the procedure. Patient was appropriately monitored and discharged home in stable condition with their usual motor strength. Post Op instructions were given to patient.           [x] Bilateral [] T11 [] L1 [] S1     [] T12 [x] L2 [] S2    [] Right  [x] L3 [] S3      [] L4 [] S4    [] Left  [x] L5                              Marta oSler DO

## 2022-04-26 ENCOUNTER — TELEMEDICINE (OUTPATIENT)
Dept: PAIN MANAGEMENT | Age: 62
End: 2022-04-26
Payer: COMMERCIAL

## 2022-04-26 DIAGNOSIS — M47.817 LUMBOSACRAL SPONDYLOSIS WITHOUT MYELOPATHY: ICD-10-CM

## 2022-04-26 DIAGNOSIS — M47.812 CERVICAL SPONDYLOSIS WITHOUT MYELOPATHY: ICD-10-CM

## 2022-04-26 DIAGNOSIS — M25.511 PAIN IN JOINT OF RIGHT SHOULDER: ICD-10-CM

## 2022-04-26 DIAGNOSIS — G89.4 CHRONIC PAIN SYNDROME: ICD-10-CM

## 2022-04-26 DIAGNOSIS — M48.062 SPINAL STENOSIS OF LUMBAR REGION WITH NEUROGENIC CLAUDICATION: ICD-10-CM

## 2022-04-26 DIAGNOSIS — M96.1 POST LAMINECTOMY SYNDROME: ICD-10-CM

## 2022-04-26 DIAGNOSIS — M54.16 LUMBAR RADICULOPATHY: ICD-10-CM

## 2022-04-26 PROCEDURE — 99442 PR PHYS/QHP TELEPHONE EVALUATION 11-20 MIN: CPT | Performed by: NURSE PRACTITIONER

## 2022-04-26 RX ORDER — OXYCODONE HYDROCHLORIDE AND ACETAMINOPHEN 5; 325 MG/1; MG/1
1 TABLET ORAL EVERY 8 HOURS PRN
Qty: 90 TABLET | Refills: 0 | Status: SHIPPED | OUTPATIENT
Start: 2022-04-26 | End: 2022-05-23 | Stop reason: SDUPTHER

## 2022-04-26 RX ORDER — GABAPENTIN 400 MG/1
400 CAPSULE ORAL 4 TIMES DAILY
Qty: 360 CAPSULE | Refills: 0 | Status: SHIPPED | OUTPATIENT
Start: 2022-04-26 | End: 2022-08-17 | Stop reason: SDUPTHER

## 2022-04-26 ASSESSMENT — ENCOUNTER SYMPTOMS
SHORTNESS OF BREATH: 0
ABDOMINAL PAIN: 0
SORE THROAT: 0
BACK PAIN: 1

## 2022-04-26 NOTE — PROGRESS NOTES
Edson Armstrong  (1960)    4/26/2022    TELEHEALTH EVALUATION -- Audio/Visual (During [id number] public health emergency)    Due to COVID 19 outbreak, patient's office visit was converted to a virtual visit. Patient was contacted and agreed to proceed with a virtual visit via audio-visual platform. Patient understands that this encounter is a billable visit and that insurance coverage and co-pays are up to their individual insurance plans. At the beginning of this telehealth encounter, I verified with the patient their name and date of birth. Verbal consent for telehealth encounter was obtained. Subjective:       Chief Complaint   Patient presents with    Back Pain       Edson Armstrong is 64 y.o. female who complains today of: Allergies:  Nsaids and Ibuprofen    History:  Past Medical History:   Diagnosis Date    Anemia     Chicken pox     Chronic back pain     H/O bladder infections     Headache(784.0)     Hypertension     Measles     Mumps     Stroke ([facility] [address] 07/2020     Past Surgical History:   Procedure Laterality Date    APPENDECTOMY      BACK SURGERY  2014    lumbar back fusion    GALLBLADDER SURGERY      HYSTERECTOMY      JOINT REPLACEMENT Right 2020    JOINT REPLACEMENT Left 2018    KNEE SURGERY Right     LEG BIOPSY EXCISION Right 11/12/2021    SOFT TISSUE MASS EXCISION RIGHT ANKLE performed by [facility]cock [facility]      TONSILLECTOMY       Family History   Problem Relation Age of Onset    Stroke Mother     Cancer Father      Social History     Socioeconomic History    Marital status:      Spouse name: Not on file    Number of children: Not on file    Years of education: Not on file    Highest education level: Not on file   Occupational History    Not on file   Tobacco Use    Smoking status: Never Smoker    Smokeless tobacco: Never Used   Substance and Sexual Activity    Alcohol use:  Yes    Drug use: No    Sexual activity: Not on file   Other Topics Concern    Not on file   Social History Narrative    Not on file     Social Determinants of Health     Financial Resource Strain: Low Risk     Difficulty of Paying Living Expenses: Not hard at all   Food Insecurity: No Food Insecurity    Worried About Running Out of Food in the Last Year: Never true    [address] in the Last Year: Never true   Transportation Needs: No Transportation Needs    Lack of Transportation (Medical): No    Lack of Transportation (Non-Medical):  No   Physical Activity:     Days of Exercise per Week: Not on file    Minutes of Exercise per Session: Not on file   Stress:     Feeling of Stress : Not on file   Social Connections:     Frequency of Communication with Friends and Family: Not on file    Frequency of Social Gatherings with Friends and Family: Not on file    Attends [employer] Services: Not on file    Active Member of [address] [place] [address] [employer] or Organizations: Not on file    Attends Club or Organization Meetings: Not on file    Marital Status: Not on file   Intimate Partner Violence:     Fear of Current or Ex-Partner: Not on file    Emotionally Abused: Not on file    Physically Abused: Not on file    Sexually Abused: Not on file   Housing Stability:     Unable to Pay for Housing in the Last Year: Not on file    [facility] of [facility] in the Last Year: Not on file    Unstable Housing in the Last Year: Not on file       Current Outpatient Medications on File Prior to Visit   Medication Sig Dispense Refill    tiZANidine (ZANAFLEX) 4 MG tablet Take 1 tablet by mouth 3 times daily as needed (pain spasms) 270 tablet 0    amLODIPine (NORVASC) 10 MG tablet Take by mouth      ASPIRIN LOW DOSE 81 MG EC tablet TAKE 1 TABLET BY MOUTH ONCE DAILY      sucralfate (CARAFATE) 1 GM/10ML suspension Take by mouth      AIMOVIG 140 MG/ML SOAJ Inject 140 mg as directed every 30 days 1 pen 5    vilazodone HCl (VILAZODONE HCL) 10 MG TABS Take 10 mg by mouth daily       atorvastatin (LIPITOR) 10 MG tablet Take 10 mg by mouth daily      Handicap Placard MISC by Does not apply route For 5 years 1 each 0    naloxone 4 MG/0.1ML LIQD nasal spray 1 spray by Nasal route as needed for Opioid Reversal (Patient not taking: Reported on 6/23/2021) 1 each 0    clonazePAM (KLONOPIN) 1 MG tablet TAKE 1 TABLET BY MOUTH DAILY AS NEEDED FOR ANXIETY FOR 30 DAYS      escitalopram (LEXAPRO) 20 MG tablet       pantoprazole (PROTONIX) 40 MG tablet Take by mouth      traZODone (DESYREL) 100 MG tablet       triamterene-hydroCHLOROthiazide (DYAZIDE) 37.5-25 MG per capsule Take 1 capsule by mouth every morning       SUMAtriptan (IMITREX) 100 MG tablet Take 1 tablet by mouth once as needed for Migraine 27 tablet 0     Current Facility-Administered Medications on File Prior to Visit   Medication Dose Route Frequency Provider Last Rate Last Admin    betamethasone acetate-betamethasone sodium phosphate (CELESTONE) injection 3 mg  3 mg Intra-artICUlar Once Shade Morrissey MD             Pt presents today for a f/u of chronic neck and low back pain. Telehealth due to covid exposure. MRI reviewed and discussed with patient showing worsened L2-3 moderate/severe stenosis; fusion is stable. She feels the RFA did not help as much as in the past.  had a fatty tumor removed from the right ankle by Dr. Christopher Blanco October 2021, doing very well. She was only able to get right cervical RFA due to insurance issues with the left side. Right side has improved, less HA. Pt feels pain level and functioning improves with prescribed medications and is able to perform ADLs. Pt feels that rainy weather aggravates the pain. Pt denies radiating numbness and tingling. She had EGD which shows a recurrent gastric ulcer and is back on Prilosec.  MRI of lumbar spine report from 10/8/2020 showing intact L4-5 fusion and multi-level degenerative changes and worsening foraminal stenosis at L5-S1 and central canal stenosis at L2-3, L3-4 per report- done at [address] [place] [address]  She says she has LE EMG done there as well. History of CVA with residual left hand weakness. Sees neurology at [address] [place] [address]  She had fusion L4-5 with Dr Agustina Jones 12/2016,  left and bilateral L2-3 and L3-4 microdecompressions 12/16/20, R TKR 02/2021, left TKR 2018, R RC repair 7 years ago.      4/12/2022 bilateral L2-3 5 RFA   10/11/21 Right C3, C4 and C5 RFA  9/9/2021 right shoulder injection  8/12/2021 BL L235 RFA  4/7/2021 left C3-4-5 6 RFA  2/10/2021 right C3-4-5 6 RFA  11/11/2020 bilateral S1 SNRB               Review of Systems   Constitutional: Negative for fever. HENT: Negative for congestion and sore throat. Respiratory: Negative for shortness of breath. Gastrointestinal: Negative for abdominal pain. Genitourinary: Negative for difficulty urinating. Musculoskeletal: Positive for back pain and neck pain. Neurological: Negative for speech difficulty and headaches. Hematological: Negative for adenopathy. Psychiatric/Behavioral: Negative for agitation. All other systems reviewed and are negative. Objective:     Vitals:  LMP  (LMP Unknown)      PHYSICAL EXAMINATION:    [x] Alert  [x] Oriented to person/place/time  [x] No apparent distress      [x] Mood and affect normal  [x] Recent and remote memory intact  [x] Judgement and insight normal     [x] Breathing appears normal  [x] No rash, lesions or ulcers on visible skin    [] Cranial Nerves II-XII grossly intact    [] Motor grossly intact in visible upper extremities    [] Motor grossly intact in visible lower extremities    [] Normal gait and station   [] No digital cyanosis    [] OTHER:      Due to this being a TeleHealth encounter, evaluation of the following organ systems is limited: Vitals/Constitutional/EENT/Resp/CV/GI//MS/Neuro/Skin/Heme-Lymph-Imm. Assessment:      Diagnosis Orders   1.  Lumbosacral spondylosis without myelopathy  gabapentin (NEURONTIN) 400 MG capsule oxyCODONE-acetaminophen (PERCOCET) 5-325 MG per tablet   2. Spinal stenosis of lumbar region with neurogenic claudication  CA NJX AA&/STRD TFRML EPI LUMBAR/SACRAL 1 LEVEL    gabapentin (NEURONTIN) 400 MG capsule    oxyCODONE-acetaminophen (PERCOCET) 5-325 MG per tablet   3. Post laminectomy syndrome  gabapentin (NEURONTIN) 400 MG capsule    oxyCODONE-acetaminophen (PERCOCET) 5-325 MG per tablet   4. Lumbar radiculopathy  CA NJX AA&/STRD TFRML EPI LUMBAR/SACRAL 1 LEVEL    gabapentin (NEURONTIN) 400 MG capsule    oxyCODONE-acetaminophen (PERCOCET) 5-325 MG per tablet   5. Cervical spondylosis without myelopathy  gabapentin (NEURONTIN) 400 MG capsule    oxyCODONE-acetaminophen (PERCOCET) 5-325 MG per tablet   6. Pain in joint of right shoulder  gabapentin (NEURONTIN) 400 MG capsule    oxyCODONE-acetaminophen (PERCOCET) 5-325 MG per tablet   7. Chronic pain syndrome  gabapentin (NEURONTIN) 400 MG capsule    oxyCODONE-acetaminophen (PERCOCET) 5-325 MG per tablet       Plan:          Orders Placed This Encounter   Medications    gabapentin (NEURONTIN) 400 MG capsule     Sig: Take 1 capsule by mouth 4 times daily for 90 days. Dispense:  360 capsule     Refill:  0    oxyCODONE-acetaminophen (PERCOCET) 5-325 MG per tablet     Sig: Take 1 tablet by mouth every 8 hours as needed for Pain for up to 30 days. Dispense:  90 tablet     Refill:  0     Reduce doses taken as pain becomes manageable       Orders Placed This Encounter   Procedures    CA NJX AA&/STRD TFRML EPI LUMBAR/SACRAL 1 LEVEL     BL L2-3 EDWARD with SK. Standing Status:   Future     Standing Expiration Date:   7/25/2022     Discussed options with the patient today.  Continue Percocet, gabapentin and tizanidine. We will send gabapentin for 90-day supply. We will get bilateral L2-3 EDWARD for lumbar radicular pain.   Declines surgical consultation at this time.   Alprazolam from another provider. All questions were answered.  Pt verbalized understanding and agrees with above plan. Utox reviewed and appropriate from 6/23/21. Narcan sent 4/26/21.        Will continue medications for chronic pain as they help pt function with ADL and improve quality of life.  Discussed possible risks of opiate medication with pt, including but not limited to, constipation, nausea or vomiting, sedation, urinary retention, dependence and possible addiction. Pt agrees to use medication as directed. Pt advised to not use opiates while driving or operating heavy equipment, or in situations where pt may harm him/herself or others.  Pt is aware that while on narcotics, pt needs to be seen monthly to reassess pain and need for continued medication.  NDP reviewed. Ronnie Alcantar was reviewed.  This NP saw pt under direct supervision of Dr. Kvng Kendrcik    Follow up:  No follow-ups on file. SARAH Browne - CNP      >50% of 15 minute appointment was spent with discussion, addressing questions and concerns, including prognosis, treatment options, patient education, and recommendations. [id number]    Pursuant to the emergency declaration under the [facility], [facility] waiver authority and the [employer] Act, this Virtual  Visit was conducted, with patient's consent, to reduce the patient's risk of exposure to COVID-19 and provide continuity of care for an established patient. Services were provided through a video synchronous discussion virtually to substitute for in-person clinic visit.

## 2022-04-27 ENCOUNTER — TELEPHONE (OUTPATIENT)
Dept: PAIN MANAGEMENT | Age: 62
End: 2022-04-27

## 2022-04-27 NOTE — TELEPHONE ENCOUNTER
BENEFITS:    Insurance: [employer] [address] [place] [address]  Phone: [phone]  Contact Name: merrill Perez Date: 4/1/2020     Plan year: sumit  Deductible: $350      Deductible Met: yes  Allowed/benefits paid at: 80  OOP: $700.00, $679.12 met  Freq Limits: none  Prior Auth Requirement: none    Notes:     Call Reference #: [id number]    Time of call: 416pm

## 2022-04-27 NOTE — TELEPHONE ENCOUNTER
ORDER PLACED:    Date: 4/26/22  Description: BILAT L2-3 EDWARD  Order Number: [id number]  Ordering Provider: BHARTIIT  Performing Provider: [facility]  CPT Codes: 77328  ICD10 Codes: M54.16, N09.476

## 2022-04-27 NOTE — TELEPHONE ENCOUNTER
ODILONAT L2-3 EDWARD    NO AUTH REQUIRED    OK to schedule procedure approved as above. Please note sides/levels approved and date range.    (If applicable, sides/levels approved may differ from those ordered)    TO BE SCHEDULED WITH DR. Cathy Bah

## 2022-05-02 ENCOUNTER — PROCEDURE VISIT (OUTPATIENT)
Dept: PAIN MANAGEMENT | Age: 62
End: 2022-05-02
Payer: COMMERCIAL

## 2022-05-02 DIAGNOSIS — M54.16 LUMBAR RADICULOPATHY: ICD-10-CM

## 2022-05-02 DIAGNOSIS — M48.062 SPINAL STENOSIS OF LUMBAR REGION WITH NEUROGENIC CLAUDICATION: ICD-10-CM

## 2022-05-02 PROCEDURE — 64483 NJX AA&/STRD TFRM EPI L/S 1: CPT | Performed by: PAIN MEDICINE

## 2022-05-02 RX ORDER — SODIUM CHLORIDE 9 MG/ML
10 INJECTION INTRAVENOUS ONCE
Status: COMPLETED | OUTPATIENT
Start: 2022-05-02 | End: 2022-05-02

## 2022-05-02 RX ORDER — DEXAMETHASONE SODIUM PHOSPHATE 10 MG/ML
10 INJECTION, SOLUTION INTRAMUSCULAR; INTRAVENOUS ONCE
Status: COMPLETED | OUTPATIENT
Start: 2022-05-02 | End: 2022-05-02

## 2022-05-02 RX ADMIN — SODIUM CHLORIDE 10 ML: 9 INJECTION INTRAVENOUS at 08:28

## 2022-05-02 RX ADMIN — DEXAMETHASONE SODIUM PHOSPHATE 10 MG: 10 INJECTION, SOLUTION INTRAMUSCULAR; INTRAVENOUS at 08:29

## 2022-05-02 NOTE — PROGRESS NOTES
[facility].  Spine Surgery  Advanced Pain Management      Patient Name: Catalina Robledo : 1960  Date: 2022   Physician: Ita Javier, [address]  is here today for interventional pain management. Preoperatively, the patient presents with radicular pain in the affected area as per history and exam. Patient has failed NSAIDs, PT, and conservative treatment. Patient has significant psychological and functional impairment due to this condition. Standard ASI guidelines were followed and sterile technique used. Area was cleaned with Betadine x3. Informed consent was obtained. Fluoroscopic guidance was used for this procedure. TRANSFORAMINAL EPIDURAL  There was limited spread of contrast in the anterior epidural space and around the exiting nerve root. The 6 oclock position of the pedicle was identified. Multiple views of fluoroscopy including lateral were used to confirm accurate needle placement of depth. Live fluoroscopy was used when injecting contrast to ensure no subdural or vascular spread. In total, approximately 5 mg of Dexamethasone and 1.0 ml of 0.9cc of normal saline was injected slowly without difficulty. Patient tolerated the procedure well, no obvious complications occurred during the procedure. Patient was appropriately monitored and discharged home in stable condition with their usual motor strength. Post Op instructions were given to patient. Patient will resume blood thinners after procedure if they stopped them before procedure. Relevant imaging was reviewed with patient. [x] Bilateral [] T12-L1 [] L3-4        [] L1-2 [] L4-5       [] Right [x] L2-3 [] L5-S1                [] Left                  Ita Javier DO      [address] [place] [address]  Phone [phone]/[email or web address]

## 2022-05-23 ENCOUNTER — OFFICE VISIT (OUTPATIENT)
Dept: PAIN MANAGEMENT | Age: 62
End: 2022-05-23
Payer: COMMERCIAL

## 2022-05-23 VITALS
DIASTOLIC BLOOD PRESSURE: 76 MMHG | WEIGHT: 214 LBS | HEIGHT: 65 IN | TEMPERATURE: 97.6 F | BODY MASS INDEX: 35.65 KG/M2 | SYSTOLIC BLOOD PRESSURE: 128 MMHG

## 2022-05-23 DIAGNOSIS — M48.062 SPINAL STENOSIS OF LUMBAR REGION WITH NEUROGENIC CLAUDICATION: ICD-10-CM

## 2022-05-23 DIAGNOSIS — M96.1 POST LAMINECTOMY SYNDROME: ICD-10-CM

## 2022-05-23 DIAGNOSIS — M54.16 LUMBAR RADICULOPATHY: ICD-10-CM

## 2022-05-23 DIAGNOSIS — M47.812 CERVICAL SPONDYLOSIS WITHOUT MYELOPATHY: ICD-10-CM

## 2022-05-23 DIAGNOSIS — G89.4 CHRONIC PAIN SYNDROME: ICD-10-CM

## 2022-05-23 DIAGNOSIS — M47.817 LUMBOSACRAL SPONDYLOSIS WITHOUT MYELOPATHY: ICD-10-CM

## 2022-05-23 DIAGNOSIS — M25.511 PAIN IN JOINT OF RIGHT SHOULDER: ICD-10-CM

## 2022-05-23 PROCEDURE — 99214 OFFICE O/P EST MOD 30 MIN: CPT | Performed by: NURSE PRACTITIONER

## 2022-05-23 RX ORDER — ALPRAZOLAM 1 MG/1
TABLET ORAL
COMMUNITY
Start: 2022-05-17

## 2022-05-23 RX ORDER — OXYCODONE HYDROCHLORIDE AND ACETAMINOPHEN 5; 325 MG/1; MG/1
1 TABLET ORAL EVERY 8 HOURS PRN
Qty: 90 TABLET | Refills: 0 | Status: SHIPPED | OUTPATIENT
Start: 2022-05-26 | End: 2022-06-23 | Stop reason: SDUPTHER

## 2022-05-23 ASSESSMENT — ENCOUNTER SYMPTOMS
CONSTIPATION: 0
GASTROINTESTINAL NEGATIVE: 1
DIARRHEA: 0
COUGH: 0
BACK PAIN: 1
TROUBLE SWALLOWING: 0
SHORTNESS OF BREATH: 0
EYES NEGATIVE: 1
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
## 2022-06-09 DIAGNOSIS — M96.1 POST LAMINECTOMY SYNDROME: ICD-10-CM

## 2022-06-09 DIAGNOSIS — G89.4 CHRONIC PAIN SYNDROME: ICD-10-CM

## 2022-06-09 DIAGNOSIS — M48.062 SPINAL STENOSIS OF LUMBAR REGION WITH NEUROGENIC CLAUDICATION: ICD-10-CM

## 2022-06-09 DIAGNOSIS — Z98.1 HISTORY OF LUMBAR FUSION: ICD-10-CM

## 2022-06-09 DIAGNOSIS — M25.511 PAIN IN JOINT OF RIGHT SHOULDER: ICD-10-CM

## 2022-06-09 DIAGNOSIS — M47.817 LUMBOSACRAL SPONDYLOSIS WITHOUT MYELOPATHY: ICD-10-CM

## 2022-06-09 DIAGNOSIS — M47.812 CERVICAL SPONDYLOSIS WITHOUT MYELOPATHY: ICD-10-CM

## 2022-06-09 RX ORDER — TIZANIDINE 4 MG/1
TABLET ORAL
Qty: 270 TABLET | Refills: 3 | OUTPATIENT
Start: 2022-06-09

## 2022-06-23 ENCOUNTER — OFFICE VISIT (OUTPATIENT)
Dept: PAIN MANAGEMENT | Age: 62
End: 2022-06-23
Payer: COMMERCIAL

## 2022-06-23 VITALS — WEIGHT: 211 LBS | BODY MASS INDEX: 35.16 KG/M2 | RESPIRATION RATE: 16 BRPM | TEMPERATURE: 97.8 F | HEIGHT: 65 IN

## 2022-06-23 DIAGNOSIS — M25.511 PAIN IN JOINT OF RIGHT SHOULDER: ICD-10-CM

## 2022-06-23 DIAGNOSIS — M54.16 LUMBAR RADICULOPATHY: ICD-10-CM

## 2022-06-23 DIAGNOSIS — M48.062 SPINAL STENOSIS OF LUMBAR REGION WITH NEUROGENIC CLAUDICATION: ICD-10-CM

## 2022-06-23 DIAGNOSIS — G89.4 CHRONIC PAIN SYNDROME: ICD-10-CM

## 2022-06-23 DIAGNOSIS — M47.817 LUMBOSACRAL SPONDYLOSIS WITHOUT MYELOPATHY: ICD-10-CM

## 2022-06-23 DIAGNOSIS — M96.1 POST LAMINECTOMY SYNDROME: ICD-10-CM

## 2022-06-23 DIAGNOSIS — Z98.1 HISTORY OF LUMBAR FUSION: ICD-10-CM

## 2022-06-23 DIAGNOSIS — M47.812 CERVICAL SPONDYLOSIS WITHOUT MYELOPATHY: ICD-10-CM

## 2022-06-23 LAB
6-ACETYLMORPHINE, UR: NORMAL
AMPHETAMINE SCREEN, URINE: NORMAL
BARBITURATE SCREEN, URINE: NORMAL
BENZODIAZEPINE SCREEN, URINE: NORMAL
CANNABINOID SCREEN URINE: NORMAL
COCAINE METABOLITE, URINE: NORMAL
CREATININE URINE: NORMAL
EDDP, URINE: NORMAL
ETHANOL URINE: NORMAL
MDMA URINE: NORMAL
METHADONE SCREEN, URINE: NORMAL
METHAMPHETAMINE, URINE: NORMAL
OPIATES, URINE: NORMAL
OXYCODONE: NORMAL
PCP: NORMAL
PH, URINE: NORMAL
PHENCYCLIDINE, URINE: NORMAL
PROPOXYPHENE, URINE: NORMAL
TRICYCLIC ANTIDEPRESSANTS, UR: NORMAL

## 2022-06-23 PROCEDURE — 99213 OFFICE O/P EST LOW 20 MIN: CPT | Performed by: NURSE PRACTITIONER

## 2022-06-23 RX ORDER — OXYCODONE HYDROCHLORIDE AND ACETAMINOPHEN 5; 325 MG/1; MG/1
1 TABLET ORAL EVERY 8 HOURS PRN
Qty: 90 TABLET | Refills: 0 | Status: SHIPPED | OUTPATIENT
Start: 2022-06-25 | End: 2022-07-22 | Stop reason: SDUPTHER

## 2022-06-23 RX ORDER — TIZANIDINE 4 MG/1
4 TABLET ORAL 3 TIMES DAILY PRN
Qty: 270 TABLET | Refills: 0 | Status: SHIPPED | OUTPATIENT
Start: 2022-06-23 | End: 2022-07-22 | Stop reason: SDUPTHER

## 2022-06-23 ASSESSMENT — ENCOUNTER SYMPTOMS
DIARRHEA: 0
GASTROINTESTINAL NEGATIVE: 1
SHORTNESS OF BREATH: 0
COUGH: 0
EYES NEGATIVE: 1
BACK PAIN: 1
CONSTIPATION: 0
TROUBLE SWALLOWING: 0
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
## 2022-07-17 DIAGNOSIS — M96.1 POST LAMINECTOMY SYNDROME: ICD-10-CM

## 2022-07-17 DIAGNOSIS — M47.812 CERVICAL SPONDYLOSIS WITHOUT MYELOPATHY: ICD-10-CM

## 2022-07-17 DIAGNOSIS — M47.817 LUMBOSACRAL SPONDYLOSIS WITHOUT MYELOPATHY: ICD-10-CM

## 2022-07-17 DIAGNOSIS — M54.16 LUMBAR RADICULOPATHY: ICD-10-CM

## 2022-07-17 DIAGNOSIS — G89.4 CHRONIC PAIN SYNDROME: ICD-10-CM

## 2022-07-17 DIAGNOSIS — M25.511 PAIN IN JOINT OF RIGHT SHOULDER: ICD-10-CM

## 2022-07-17 DIAGNOSIS — M48.062 SPINAL STENOSIS OF LUMBAR REGION WITH NEUROGENIC CLAUDICATION: ICD-10-CM

## 2022-07-18 RX ORDER — GABAPENTIN 400 MG/1
CAPSULE ORAL
Qty: 360 CAPSULE | Refills: 3 | OUTPATIENT
Start: 2022-07-18

## 2022-07-22 DIAGNOSIS — M54.16 LUMBAR RADICULOPATHY: ICD-10-CM

## 2022-07-22 DIAGNOSIS — M25.511 PAIN IN JOINT OF RIGHT SHOULDER: ICD-10-CM

## 2022-07-22 DIAGNOSIS — Z98.1 HISTORY OF LUMBAR FUSION: ICD-10-CM

## 2022-07-22 DIAGNOSIS — M47.812 CERVICAL SPONDYLOSIS WITHOUT MYELOPATHY: ICD-10-CM

## 2022-07-22 DIAGNOSIS — M47.817 LUMBOSACRAL SPONDYLOSIS WITHOUT MYELOPATHY: ICD-10-CM

## 2022-07-22 DIAGNOSIS — G89.4 CHRONIC PAIN SYNDROME: ICD-10-CM

## 2022-07-22 DIAGNOSIS — M96.1 POST LAMINECTOMY SYNDROME: ICD-10-CM

## 2022-07-22 DIAGNOSIS — M48.062 SPINAL STENOSIS OF LUMBAR REGION WITH NEUROGENIC CLAUDICATION: ICD-10-CM

## 2022-07-22 RX ORDER — TIZANIDINE 4 MG/1
4 TABLET ORAL 3 TIMES DAILY PRN
Qty: 270 TABLET | Refills: 0 | Status: SHIPPED | OUTPATIENT
Start: 2022-07-25 | End: 2022-09-27

## 2022-07-22 RX ORDER — OXYCODONE HYDROCHLORIDE AND ACETAMINOPHEN 5; 325 MG/1; MG/1
1 TABLET ORAL EVERY 8 HOURS PRN
Qty: 90 TABLET | Refills: 0 | Status: SHIPPED | OUTPATIENT
Start: 2022-07-25 | End: 2022-07-25 | Stop reason: SDUPTHER

## 2022-07-22 NOTE — TELEPHONE ENCOUNTER
Requested Prescriptions     Pending Prescriptions Disp Refills    oxyCODONE-acetaminophen (PERCOCET) 5-325 MG per tablet 90 tablet 0     Sig: Take 1 tablet by mouth every 8 hours as needed for Pain for up to 30 days.     tiZANidine (ZANAFLEX) 4 MG tablet 270 tablet 0     Sig: Take 1 tablet by mouth 3 times daily as needed (pain spasms)       Patient last seen on:  6/23/22  Date of last surgery:  n/a  Date of last refill:  6/25/22  Pain level:  n/a  Patient complaining of:  pt requesting refill   Future appts: 8/17/22

## 2022-07-25 DIAGNOSIS — M54.16 LUMBAR RADICULOPATHY: ICD-10-CM

## 2022-07-25 DIAGNOSIS — M47.817 LUMBOSACRAL SPONDYLOSIS WITHOUT MYELOPATHY: ICD-10-CM

## 2022-07-25 DIAGNOSIS — M48.062 SPINAL STENOSIS OF LUMBAR REGION WITH NEUROGENIC CLAUDICATION: ICD-10-CM

## 2022-07-25 DIAGNOSIS — G89.4 CHRONIC PAIN SYNDROME: ICD-10-CM

## 2022-07-25 DIAGNOSIS — M96.1 POST LAMINECTOMY SYNDROME: ICD-10-CM

## 2022-07-25 DIAGNOSIS — M25.511 PAIN IN JOINT OF RIGHT SHOULDER: ICD-10-CM

## 2022-07-25 DIAGNOSIS — M47.812 CERVICAL SPONDYLOSIS WITHOUT MYELOPATHY: ICD-10-CM

## 2022-07-25 RX ORDER — OXYCODONE HYDROCHLORIDE AND ACETAMINOPHEN 5; 325 MG/1; MG/1
1 TABLET ORAL EVERY 8 HOURS PRN
Qty: 90 TABLET | Refills: 0 | Status: SHIPPED | OUTPATIENT
Start: 2022-07-25 | End: 2022-08-17 | Stop reason: SDUPTHER

## 2022-07-25 NOTE — TELEPHONE ENCOUNTER
RX FOR PERCOCET SHOULD HAVE BEEN SENT TO LOCAL PHARMACY-[facility]. WILL KEISHA RESEND PERCOCET TO THIS PHARMACY?

## 2022-07-26 RX ORDER — OXYCODONE HYDROCHLORIDE AND ACETAMINOPHEN 5; 325 MG/1; MG/1
1 TABLET ORAL EVERY 6 HOURS PRN
Qty: 28 TABLET | Refills: 0 | Status: SHIPPED | OUTPATIENT
Start: 2022-07-26 | End: 2022-08-02

## 2022-07-26 NOTE — TELEPHONE ENCOUNTER
PATIENT CAME TO PAIN  STATING THE PHARMACY TOLD HER THAT THE RX FOR PERCOCET WAS SENT TO [facility]. OUR RECORDS INDICATE THAT A NEW RX WAS SENT TO [facility] IN [place]. PHARMACY IS REQUESTING TO SPEAK TO A MEDICAL ASSISTANT OR KEISHA REGARDING THIS ISSUE. THEY STATED THEY CAN NOT FILL IT UNTIL THEY SPEAK TO SOMEONE.

## 2022-07-26 NOTE — TELEPHONE ENCOUNTER
SPOKE TO [employer] THEY ARE UNABLE TO FILL THE SCRIPT [employer] HAS ALREADY SUBMITTED THE SCRIPT [employer] STATED YOU CAN SEND A FEW DAYS WORTH UNTIL IT IS MAILED BUT THE SCRIPT WOULD HAVE TO SAY A FEWS DAYS UNTIL MAIL ORDER ARRIVES

## 2022-08-17 ENCOUNTER — OFFICE VISIT (OUTPATIENT)
Dept: PAIN MANAGEMENT | Age: 62
End: 2022-08-17
Payer: COMMERCIAL

## 2022-08-17 VITALS
WEIGHT: 201 LBS | SYSTOLIC BLOOD PRESSURE: 128 MMHG | TEMPERATURE: 97.2 F | HEIGHT: 65 IN | DIASTOLIC BLOOD PRESSURE: 74 MMHG | BODY MASS INDEX: 33.49 KG/M2

## 2022-08-17 DIAGNOSIS — M47.817 LUMBOSACRAL SPONDYLOSIS WITHOUT MYELOPATHY: ICD-10-CM

## 2022-08-17 DIAGNOSIS — M54.16 LUMBAR RADICULOPATHY: ICD-10-CM

## 2022-08-17 DIAGNOSIS — G89.4 CHRONIC PAIN SYNDROME: ICD-10-CM

## 2022-08-17 DIAGNOSIS — M47.812 CERVICAL SPONDYLOSIS WITHOUT MYELOPATHY: ICD-10-CM

## 2022-08-17 DIAGNOSIS — M48.062 SPINAL STENOSIS OF LUMBAR REGION WITH NEUROGENIC CLAUDICATION: ICD-10-CM

## 2022-08-17 DIAGNOSIS — M25.511 PAIN IN JOINT OF RIGHT SHOULDER: ICD-10-CM

## 2022-08-17 DIAGNOSIS — M96.1 POST LAMINECTOMY SYNDROME: ICD-10-CM

## 2022-08-17 PROCEDURE — 99214 OFFICE O/P EST MOD 30 MIN: CPT | Performed by: NURSE PRACTITIONER

## 2022-08-17 RX ORDER — GABAPENTIN 400 MG/1
400 CAPSULE ORAL 4 TIMES DAILY
Qty: 360 CAPSULE | Refills: 0 | Status: SHIPPED | OUTPATIENT
Start: 2022-08-17 | End: 2022-10-26 | Stop reason: SDUPTHER

## 2022-08-17 RX ORDER — OXYCODONE HYDROCHLORIDE AND ACETAMINOPHEN 5; 325 MG/1; MG/1
1 TABLET ORAL
Qty: 80 TABLET | Refills: 0 | Status: SHIPPED | OUTPATIENT
Start: 2022-08-28 | End: 2022-09-27 | Stop reason: SDUPTHER

## 2022-08-17 ASSESSMENT — ENCOUNTER SYMPTOMS
COUGH: 0
CONSTIPATION: 0
BACK PAIN: 1
DIARRHEA: 0
SHORTNESS OF BREATH: 0
EYES NEGATIVE: 1
GASTROINTESTINAL NEGATIVE: 1
TROUBLE SWALLOWING: 0
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
## 2022-08-25 ENCOUNTER — INITIAL CONSULT (OUTPATIENT)
Dept: NEUROSURGERY | Age: 62
End: 2022-08-25
Payer: COMMERCIAL

## 2022-08-25 VITALS
WEIGHT: 198 LBS | DIASTOLIC BLOOD PRESSURE: 80 MMHG | HEIGHT: 65 IN | TEMPERATURE: 96.9 F | SYSTOLIC BLOOD PRESSURE: 128 MMHG | BODY MASS INDEX: 32.99 KG/M2

## 2022-08-25 DIAGNOSIS — M43.17 ACQUIRED SPONDYLOLISTHESIS OF LUMBOSACRAL REGION: ICD-10-CM

## 2022-08-25 DIAGNOSIS — F17.200 SMOKER: Primary | ICD-10-CM

## 2022-08-25 DIAGNOSIS — M96.1 POSTLAMINECTOMY SYNDROME OF LUMBAR REGION: ICD-10-CM

## 2022-08-25 DIAGNOSIS — M48.062 LUMBAR STENOSIS WITH NEUROGENIC CLAUDICATION: ICD-10-CM

## 2022-08-25 PROCEDURE — 99213 OFFICE O/P EST LOW 20 MIN: CPT | Performed by: NEUROLOGICAL SURGERY

## 2022-08-25 ASSESSMENT — ENCOUNTER SYMPTOMS
BACK PAIN: 1
EYE PAIN: 0
NAUSEA: 0
SHORTNESS OF BREATH: 0

## 2022-08-25 NOTE — PROGRESS NOTES
Patient Name: Lily Crigler : 1960        Date: 2022      Type of Appt: Consult     Reason for appt: Review of MRI of lumbar spine    Referred by: SARAH Sharp - CNP    Last seen by Dr. Brooklyn Garcia 3-18-21    Studies done: 3-30-22 L/S MRI @ [facility]     Surgeries: 2016 right and L4-5 and bilateral microdissection, decompression, discectomy, interbody posterolateral fusion, pedicle screws by Dr. Brooklyn Garcia    2020 left and bilateral L2-3 L3-4 microdecompressions by Dr. Brooklyn Garcia     Conservative Tx tried: Pain Management injections with Dr. Kline Estrin, Percocet, Gabapentin       Smoking: No    REVIEW OF SYSTEMS:    Headaches, Sleep Disturbance, Neck Pain, Back Pain                         [facility]  Neurosurgery and Pain Management [facility]it[facility], [address] [place] [address] 82: [phone]  F: [phone]          Patient:  Lily Crigler  YOB: 1960  Date: 2022    The patient is a 58 y.o. female who presents today for evaluation of the following problems:     Chief Complaint   Patient presents with    Back Pain        Referred by No ref.  provider found    @BIE@    PAST MEDICAL, FAMILY AND SOCIAL HISTORY:  Past Medical History:   Diagnosis Date    Anemia     Chicken pox     Chronic back pain     H/O bladder infections     Headache(784.0)     Hypertension     Measles     Mumps     Stroke ([address] 2020     Past Surgical History:   Procedure Laterality Date    APPENDECTOMY      BACK SURGERY  2014    lumbar back fusion    GALLBLADDER SURGERY      HYSTERECTOMY (CERVIX STATUS UNKNOWN)      JOINT REPLACEMENT Right 2020    JOINT REPLACEMENT Left 2018    KNEE SURGERY Right     LEG BIOPSY EXCISION Right 2021    SOFT TISSUE MASS EXCISION RIGHT ANKLE performed by Mita Benson MD at [address]       Family History   Problem Relation Age of Onset    Stroke Mother     Cancer Father      Current Outpatient Medications on File Prior to Visit   Medication Sig Dispense Refill    [START ON 8/28/2022] oxyCODONE-acetaminophen (PERCOCET) 5-325 MG per tablet Take 1 tablet by mouth every 8-12 hours as needed for Pain for up to 30 days. #80 tabs for 30 days 80 tablet 0    gabapentin (NEURONTIN) 400 MG capsule Take 1 capsule by mouth 4 times daily for 90 days.  360 capsule 0    tiZANidine (ZANAFLEX) 4 MG tablet Take 1 tablet by mouth 3 times daily as needed (pain spasms) 270 tablet 0    ALPRAZolam (XANAX) 1 MG tablet TAKE 1 TABLET BY MOUTH DAILY AS NEEDED      amLODIPine (NORVASC) 10 MG tablet Take by mouth      ASPIRIN LOW DOSE 81 MG EC tablet TAKE 1 TABLET BY MOUTH ONCE DAILY      sucralfate (CARAFATE) 1 GM/10ML suspension Take by mouth      AIMOVIG 140 MG/ML SOAJ Inject 140 mg as directed every 30 days 1 pen 5    vilazodone HCl (VILAZODONE HCL) 10 MG TABS Take 10 mg by mouth daily       atorvastatin (LIPITOR) 10 MG tablet Take 10 mg by mouth daily      Handicap Placard MISC by Does not apply route For 5 years 1 each 0    naloxone 4 MG/0.1ML LIQD nasal spray 1 spray by Nasal route as needed for Opioid Reversal (Patient not taking: Reported on 6/23/2021) 1 each 0    escitalopram (LEXAPRO) 20 MG tablet       pantoprazole (PROTONIX) 40 MG tablet Take by mouth      traZODone (DESYREL) 100 MG tablet       triamterene-hydroCHLOROthiazide (DYAZIDE) 37.5-25 MG per capsule Take 1 capsule by mouth every morning       SUMAtriptan (IMITREX) 100 MG tablet Take 1 tablet by mouth once as needed for Migraine 27 tablet 0     Current Facility-Administered Medications on File Prior to Visit   Medication Dose Route Frequency Provider Last Rate Last Admin    betamethasone acetate-betamethasone sodium phosphate (CELESTONE) injection 3 mg  3 mg Intra-artICUlar Once Dawit Barnett MD         Social History     Tobacco Use    Smoking status: Never    Smokeless tobacco: Never   Substance Use Topics    Alcohol use: Yes    Drug use: No       ALLERGIES  Allergies Tattoo right leg   Neurological:      General: No focal deficit present. Sensory: Sensory deficit present. Motor: Weakness present. Gait: Gait abnormal.      Comments: Decreased sensation right calf area   Psychiatric:         Mood and Affect: Mood normal.        I have reviewed all laboratory studies, reports, data, and pertinent images. 3/30/2022 MRI lumbar spine  MRI LUMBAR SPINE W WO CONTRAST : 3/30/2022       CLINICAL HISTORY: M47.817 Lumbosacral spondylosis without myelopathy ICD10. COMPARISON: 6/8/2018. TECHNIQUE: Multiplanar MR imaging of the lumbar spine was performed before and after intravenous administration of approximately 15 mL of ProHance gadolinium contrast.           FINDINGS:        The spine is visualized from the T10-11 through the S2 levels on the sagittal sequences. Since the prior study, has been interval L2-3 through L3-4 left laminotomy. Mildly increasing retrolisthesis L2 over L3 and moderate L2-3 diffuse disc bulging with mild hypertrophic facet and ligamentum flavum hypertrophy results in moderate to marked central spinal stenosis. Mild retrolisthesis of L1 over L2 with mild to moderate diffuse disc bulging and mild hypertrophic facet and ligamentum flavum changes results in mild central spinal stenosis. Mild diffuse disc bulging and moderate hypertrophic facet and ligamentum flavum changes at L3-4 appear unchanged, without central spinal stenosis. More neural foraminal narrowing       Postoperative changes from previous L4 through L5 posterior fusion/laminectomy with intervertebral disc implants appear unchanged. There are no significant disc herniations, or other significant changes identified. Impression       INCREASING DEGENERATIVE CHANGES AND RETROLISTHESIS L2-3, WHICH RESULTS IN MODERATE TO MARKED CENTRAL SPINAL STENOSIS. MILD CENTRAL SPINAL STENOSIS AT L1-2.        POSTOPERATIVE AND OTHER DEGENERATIVE CHANGES, AS NOTED.             8/26/2022 reviewed with Dr. Valeri Garcia radiologist MRI study 3/30/2022 which does confirm right L2 foraminal stenosis secondary to bony overgrowth and protruded disc. Addendum report pending    HISTORY OF PRESENT ILLNESS:  Several months of increasing back and right lower extremity pain. The pain is mainly in the right low back into the right buttock to the thigh not too much pain below the knee numbness in the right calf. She can no longer walk up straight she is limping to the right side. Left side is okay. 2 years ago a mini stroke that affected her left side which has recovered. Reviewing her studies she has severe foraminal stenosis on the right side for the exit of the L2 nerve at L2-3 with some moderately severe central canal stenosis. This correlates with her severe back and right sided pain syndrome. Using the MRI images above which were printed out for her and her  answered all her questions. Plan right L2-3 laminectomy microdissection facetectomy discectomy interbody posterior lateral pedicle screw fusion infuse  Discussed the use of infuse which is a product from [employer] ([employer]) to enhance bone healing in the disc space. The surgical/medical procedure, indications and risks have been discussed. There is a low chance of having any type of risk, but risks are still present including serious risks as pain, bleeding, infection, death, paralysis, sensory loss, bladder bowel and sexual dysfunction, chance of recurrence and so forth. Surgery is not a guarantee of normalcy. We cannot undo any permanent damage already done. We cannot change the course of any of the other medical diseases. I have discussed alternative procedures, risks and benefits. I have answered all questions. There is understanding and agreement to proceed.    Needs order for infuse    Jack Daily MD

## 2022-09-27 ENCOUNTER — OFFICE VISIT (OUTPATIENT)
Dept: NEUROSURGERY | Age: 62
End: 2022-09-27
Payer: COMMERCIAL

## 2022-09-27 VITALS
DIASTOLIC BLOOD PRESSURE: 80 MMHG | BODY MASS INDEX: 32.99 KG/M2 | TEMPERATURE: 97.1 F | HEIGHT: 65 IN | SYSTOLIC BLOOD PRESSURE: 138 MMHG | WEIGHT: 198 LBS

## 2022-09-27 DIAGNOSIS — M48.062 SPINAL STENOSIS OF LUMBAR REGION WITH NEUROGENIC CLAUDICATION: ICD-10-CM

## 2022-09-27 DIAGNOSIS — G89.4 CHRONIC PAIN SYNDROME: ICD-10-CM

## 2022-09-27 DIAGNOSIS — M47.817 LUMBOSACRAL SPONDYLOSIS WITHOUT MYELOPATHY: ICD-10-CM

## 2022-09-27 DIAGNOSIS — M96.1 POST LAMINECTOMY SYNDROME: ICD-10-CM

## 2022-09-27 DIAGNOSIS — M25.511 PAIN IN JOINT OF RIGHT SHOULDER: ICD-10-CM

## 2022-09-27 DIAGNOSIS — M54.16 LUMBAR RADICULOPATHY: ICD-10-CM

## 2022-09-27 DIAGNOSIS — M47.812 CERVICAL SPONDYLOSIS WITHOUT MYELOPATHY: ICD-10-CM

## 2022-09-27 PROCEDURE — 99213 OFFICE O/P EST LOW 20 MIN: CPT | Performed by: NURSE PRACTITIONER

## 2022-09-27 RX ORDER — OXYCODONE HYDROCHLORIDE AND ACETAMINOPHEN 5; 325 MG/1; MG/1
1 TABLET ORAL
Qty: 80 TABLET | Refills: 0 | Status: SHIPPED | OUTPATIENT
Start: 2022-09-28 | End: 2022-10-28 | Stop reason: SDUPTHER

## 2022-09-27 RX ORDER — OXYCODONE HYDROCHLORIDE AND ACETAMINOPHEN 5; 325 MG/1; MG/1
1 TABLET ORAL
Qty: 80 TABLET | Refills: 0 | Status: SHIPPED | OUTPATIENT
Start: 2022-09-30 | End: 2022-09-27

## 2022-09-27 ASSESSMENT — ENCOUNTER SYMPTOMS
DIARRHEA: 0
RESPIRATORY NEGATIVE: 1
BACK PAIN: 1
CONSTIPATION: 0

## 2022-09-27 NOTE — PROGRESS NOTES
file   Stress: Not on file   Social Connections: Not on file   Intimate Partner Violence: Not on file   Housing Stability: Not on file       Current Outpatient Medications on File Prior to Visit   Medication Sig Dispense Refill    gabapentin (NEURONTIN) 400 MG capsule Take 1 capsule by mouth 4 times daily for 90 days. 360 capsule 0    ALPRAZolam (XANAX) 1 MG tablet TAKE 1 TABLET BY MOUTH DAILY AS NEEDED      amLODIPine (NORVASC) 10 MG tablet Take by mouth      ASPIRIN LOW DOSE 81 MG EC tablet TAKE 1 TABLET BY MOUTH ONCE DAILY      AIMOVIG 140 MG/ML SOAJ Inject 140 mg as directed every 30 days 1 pen 5    atorvastatin (LIPITOR) 10 MG tablet Take 10 mg by mouth daily      Handicap Placard MISC by Does not apply route For 5 years 1 each 0    naloxone 4 MG/0.1ML LIQD nasal spray 1 spray by Nasal route as needed for Opioid Reversal 1 each 0    escitalopram (LEXAPRO) 20 MG tablet       pantoprazole (PROTONIX) 40 MG tablet Take by mouth      traZODone (DESYREL) 100 MG tablet       triamterene-hydroCHLOROthiazide (DYAZIDE) 37.5-25 MG per capsule Take 1 capsule by mouth every morning       vilazodone HCl (VIIBRYD) 10 MG TABS Take 10 mg by mouth daily        Current Facility-Administered Medications on File Prior to Visit   Medication Dose Route Frequency Provider Last Rate Last Admin    betamethasone acetate-betamethasone sodium phosphate (CELESTONE) injection 3 mg  3 mg Intra-artICUlar Once Olga Pérez MD             41-year-old female following up today for chronic neck and low back pain. She is scheduled for surgery on 10/14/2022 fusion at the L2-3 with Dr. Sierra Mauricio. Her Percocet was slightly reduced last month due to [facility] found in her urine and hydrocodone which was her 's medication she said she accidentally took. She is taking Percocet 5 mg every 8-12 hours #80 for 30 days. 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  Her neck is bothering her and getting headaches, middle of neck going into traps. Takes Percocet 5mg TID PRN pain (#80)  and gabapentin  400mg Q6hrs (helped leg cramps), and zanaflex 4mg TID     Pt feels pain level with her medication is 6/10, and 8-9/10 without medication. Pt feels that bending, lifting, being on feet makes the pain worse, and medication, ice packs, heating pads, and stretching makes the pain better. Pt feels her medication helps   her function and improve her quality of life, specifically says allows to stay active. Pt admits to Rt LE radiating numbness and tingling. Denies recent falls, injuries or trauma    History of fusion L4-5 with Dr. Kev Tavares in 2016, microdecompression in 2020, right total knee February 2021 left total knee in 2018 and right rotator cuff repair 2015. Back Pain  Pertinent negatives include no headaches, numbness or weakness. Neck Pain   Pertinent negatives include no headaches, numbness or weakness. Shoulder Pain   Pertinent negatives include no numbness. Review of Systems   Constitutional: Negative. Negative for activity change and unexpected weight change. HENT: Negative. Negative for hearing loss. Respiratory: Negative. Cardiovascular:  Negative for leg swelling. Gastrointestinal:  Negative for constipation and diarrhea. Genitourinary: Negative. Musculoskeletal:  Positive for back pain and neck pain. Negative for gait problem and joint swelling. Skin:  Negative for rash. Neurological:  Negative for dizziness, weakness, numbness and headaches. Psychiatric/Behavioral: Negative. Negative for sleep disturbance. Objective:     Vitals:  /80 (Site: Left Upper Arm)   Temp 97.1 °F (36.2 °C) (Temporal)   Ht 5' 5\" (1.651 m)   Wt 198 lb (89.8 kg)   LMP  (LMP Unknown)   BMI 32.95 kg/m² Pain Score:   8    Physical Exam  Vitals reviewed. Constitutional:       Appearance: She is well-developed. HENT:      Head: Normocephalic.       Nose: Nose normal. Pulmonary:      Effort: Pulmonary effort is normal.   Musculoskeletal:      Cervical back: Neck supple. Spasms and tenderness present. Pain with movement present. Decreased range of motion. Lumbar back: Tenderness present. Decreased range of motion. Positive right straight leg raise test. Negative left straight leg raise test.   Lymphadenopathy:      Cervical: No cervical adenopathy. Skin:     General: Skin is warm and dry. Findings: No erythema or rash. Neurological:      Mental Status: She is alert and oriented to person, place, and time. Cranial Nerves: No cranial nerve deficit. Deep Tendon Reflexes: Reflexes are normal and symmetric. Reflexes normal.   Psychiatric:         Mood and Affect: Mood normal.         Behavior: Behavior normal.         Thought Content: Thought content normal.         Judgment: Judgment normal.          Assessment:        Diagnosis Orders   1. Lumbosacral spondylosis without myelopathy  oxyCODONE-acetaminophen (PERCOCET) 5-325 MG per tablet    DISCONTINUED: oxyCODONE-acetaminophen (PERCOCET) 5-325 MG per tablet      2. Spinal stenosis of lumbar region with neurogenic claudication  oxyCODONE-acetaminophen (PERCOCET) 5-325 MG per tablet    DISCONTINUED: oxyCODONE-acetaminophen (PERCOCET) 5-325 MG per tablet      3. Post laminectomy syndrome  oxyCODONE-acetaminophen (PERCOCET) 5-325 MG per tablet    DISCONTINUED: oxyCODONE-acetaminophen (PERCOCET) 5-325 MG per tablet      4. Lumbar radiculopathy  oxyCODONE-acetaminophen (PERCOCET) 5-325 MG per tablet    DISCONTINUED: oxyCODONE-acetaminophen (PERCOCET) 5-325 MG per tablet      5. Cervical spondylosis without myelopathy  oxyCODONE-acetaminophen (PERCOCET) 5-325 MG per tablet    DISCONTINUED: oxyCODONE-acetaminophen (PERCOCET) 5-325 MG per tablet      6.  Pain in joint of right shoulder  oxyCODONE-acetaminophen (PERCOCET) 5-325 MG per tablet    DISCONTINUED: oxyCODONE-acetaminophen (PERCOCET) 5-325 MG per tablet 7. Chronic pain syndrome  oxyCODONE-acetaminophen (PERCOCET) 5-325 MG per tablet    DISCONTINUED: oxyCODONE-acetaminophen (PERCOCET) 5-325 MG per tablet          Plan:     Periodic Controlled Substance Monitoring: Possible medication side effects, risk of tolerance/dependence & alternative treatments discussed., No signs of potential drug abuse or diversion identified. , Assessed functional status. Ezella Sicard, APRN - CNP)    Orders Placed This Encounter   Medications    DISCONTD: oxyCODONE-acetaminophen (PERCOCET) 5-325 MG per tablet     Sig: Take 1 tablet by mouth every 8-12 hours as needed for Pain for up to 30 days. #80 tabs for 30 days     Dispense:  80 tablet     Refill:  0     Reduce doses taken as pain becomes manageable    oxyCODONE-acetaminophen (PERCOCET) 5-325 MG per tablet     Sig: Take 1 tablet by mouth every 8-12 hours as needed for Pain for up to 30 days. #80 tabs for 30 days     Dispense:  80 tablet     Refill:  0     Reduce doses taken as pain becomes manageable       No orders of the defined types were placed in this encounter. Fill Percocet 5 mg every 8-12 hours as needed pain, #80, fill date 9/28/2022 which is 2 days early due to having only 2 pills left today.  -90-day refill of gabapentin and tizanidine was sent last month  -She will be having lumbar fusion with Dr. Virginia Murray 10/14/2022.  -Could use cervical radiofrequency ablation but will wait till after surgery    Discussed options with the patient today. Anatomic model pathology was shown and reviewed with pt. All questions were answered. Relevant imaging reviewed, NDP reviewed and pain generators reviewed. Pt verbalized understanding and agrees with above plan. Will continue medications as they do help pt function with ADL and improve quality of life. Pt understands potential side effects from opioids such as constipation, dry mouth, dizziness, opioid use disorder, and overdose.  Pt has failed non opioid therapy and decision was made to prescribe opioids to help with daily function and improve quality of life. Discussed the principles of opioid tolerance as well as the concerns regarding opioid diversion, misuse, and abuse. Discussed the risks of respiratory depression and death while on pain medications, especially when combined with other sedative substances. Discussed the elevated risks of excessive sedation while on pain medications. Advised him against driving or operating heavy machinery or performing any activities where he may harm himself or others while on pain medications. Particular caution was emphasized especially during dose adjustments and medication changes. [place] was reviewed. UTOX from June 2022 - positive for Xanax and Percocet, also positive for norhydrocodone, THC, gabapentin; ORT score = 8  Daily MME 22.5  Narcan prescribed 4/2021 and understands the proper use in the event of an overdose. This NP saw pt under direct supervision of Dr Radha Lechuga. Controlled Substances Monitoring: Periodic Controlled Substance Monitoring: Possible medication side effects, risk of tolerance/dependence & alternative treatments discussed., No signs of potential drug abuse or diversion identified. , Assessed functional status. SARAH Price - CNP)       Follow up:  Return in about 4 weeks (around 10/25/2022) for review meds and reassess pain.     SARAH May - CNP

## 2022-10-06 ENCOUNTER — HOSPITAL ENCOUNTER (OUTPATIENT)
Dept: PREADMISSION TESTING | Age: 62
Discharge: HOME | End: 2022-10-10
Attending: NEUROLOGICAL SURGERY | Admitting: NEUROLOGICAL SURGERY
Payer: COMMERCIAL

## 2022-10-06 VITALS
OXYGEN SATURATION: 98 % | BODY MASS INDEX: 32.72 KG/M2 | SYSTOLIC BLOOD PRESSURE: 105 MMHG | HEART RATE: 50 BPM | HEIGHT: 65 IN | RESPIRATION RATE: 16 BRPM | DIASTOLIC BLOOD PRESSURE: 66 MMHG | WEIGHT: 196.4 LBS | TEMPERATURE: 97.8 F

## 2022-10-06 LAB
ABO/RH: NORMAL
ANION GAP SERPL CALCULATED.3IONS-SCNC: 13 MEQ/L (ref 9–15)
ANTIBODY SCREEN: NORMAL
APTT: 29.2 SEC (ref 24.4–36.8)
BUN BLDV-MCNC: 12 MG/DL (ref 8–23)
CALCIUM SERPL-MCNC: 9.6 MG/DL (ref 8.5–9.9)
CHLORIDE BLD-SCNC: 106 MEQ/L (ref 95–107)
CO2: 25 MEQ/L (ref 20–31)
CREAT SERPL-MCNC: 0.77 MG/DL (ref 0.5–0.9)
GFR AFRICAN AMERICAN: >60
GFR NON-AFRICAN AMERICAN: >60
GLUCOSE BLD-MCNC: 117 MG/DL (ref 70–99)
HCT VFR BLD CALC: 38.4 % (ref 37–47)
HEMOGLOBIN: 13.4 G/DL (ref 12–16)
INR BLD: 1
MCH RBC QN AUTO: 32.8 PG (ref 27–31.3)
MCHC RBC AUTO-ENTMCNC: 34.9 % (ref 33–37)
MCV RBC AUTO: 94 FL (ref 82–100)
PDW BLD-RTO: 13.5 % (ref 11.5–14.5)
PLATELET # BLD: 262 K/UL (ref 130–400)
POTASSIUM SERPL-SCNC: 3.5 MEQ/L (ref 3.4–4.9)
PROTHROMBIN TIME: 13.3 SEC (ref 12.3–14.9)
RBC # BLD: 4.09 M/UL (ref 4.2–5.4)
SODIUM BLD-SCNC: 144 MEQ/L (ref 135–144)
WBC # BLD: 5.5 K/UL (ref 4.8–10.8)

## 2022-10-06 PROCEDURE — 93005 ELECTROCARDIOGRAM TRACING: CPT

## 2022-10-06 PROCEDURE — 86905 BLOOD TYPING RBC ANTIGENS: CPT

## 2022-10-06 PROCEDURE — 85027 COMPLETE CBC AUTOMATED: CPT

## 2022-10-06 PROCEDURE — 86850 RBC ANTIBODY SCREEN: CPT

## 2022-10-06 PROCEDURE — 85730 THROMBOPLASTIN TIME PARTIAL: CPT

## 2022-10-06 PROCEDURE — 80048 BASIC METABOLIC PNL TOTAL CA: CPT

## 2022-10-06 PROCEDURE — 86870 RBC ANTIBODY IDENTIFICATION: CPT

## 2022-10-06 PROCEDURE — 85610 PROTHROMBIN TIME: CPT

## 2022-10-06 PROCEDURE — 86900 BLOOD TYPING SEROLOGIC ABO: CPT

## 2022-10-06 PROCEDURE — 86901 BLOOD TYPING SEROLOGIC RH(D): CPT

## 2022-10-06 RX ORDER — SODIUM CHLORIDE, SODIUM LACTATE, POTASSIUM CHLORIDE, CALCIUM CHLORIDE 600; 310; 30; 20 MG/100ML; MG/100ML; MG/100ML; MG/100ML
INJECTION, SOLUTION INTRAVENOUS CONTINUOUS
Status: CANCELLED | OUTPATIENT
Start: 2022-10-14

## 2022-10-06 RX ORDER — SODIUM CHLORIDE 0.9 % (FLUSH) 0.9 %
5-40 SYRINGE (ML) INJECTION EVERY 12 HOURS SCHEDULED
Status: CANCELLED | OUTPATIENT
Start: 2022-10-06

## 2022-10-06 RX ORDER — SODIUM CHLORIDE, SODIUM LACTATE, POTASSIUM CHLORIDE, CALCIUM CHLORIDE 600; 310; 30; 20 MG/100ML; MG/100ML; MG/100ML; MG/100ML
INJECTION, SOLUTION INTRAVENOUS CONTINUOUS
Status: CANCELLED | OUTPATIENT
Start: 2022-10-06

## 2022-10-06 RX ORDER — LIDOCAINE HYDROCHLORIDE 10 MG/ML
1 INJECTION, SOLUTION EPIDURAL; INFILTRATION; INTRACAUDAL; PERINEURAL
Status: CANCELLED | OUTPATIENT
Start: 2022-10-06 | End: 2022-10-07

## 2022-10-06 RX ORDER — SODIUM CHLORIDE 0.9 % (FLUSH) 0.9 %
5-40 SYRINGE (ML) INJECTION PRN
Status: CANCELLED | OUTPATIENT
Start: 2022-10-06

## 2022-10-06 RX ORDER — LOSARTAN POTASSIUM AND HYDROCHLOROTHIAZIDE 12.5; 5 MG/1; MG/1
1 TABLET ORAL DAILY
COMMUNITY

## 2022-10-06 RX ORDER — SODIUM CHLORIDE 9 MG/ML
INJECTION, SOLUTION INTRAVENOUS PRN
Status: CANCELLED | OUTPATIENT
Start: 2022-10-06

## 2022-10-06 RX ORDER — ZIPRASIDONE HYDROCHLORIDE 80 MG/1
80 CAPSULE ORAL 2 TIMES DAILY WITH MEALS
COMMUNITY

## 2022-10-06 ASSESSMENT — ENCOUNTER SYMPTOMS
RHINORRHEA: 0
ALLERGIC/IMMUNOLOGIC NEGATIVE: 1
WHEEZING: 0
DIARRHEA: 0
SORE THROAT: 0
ABDOMINAL DISTENTION: 0
SINUS PRESSURE: 0
SINUS PAIN: 0
COUGH: 0
TROUBLE SWALLOWING: 0
ABDOMINAL PAIN: 0
BLOOD IN STOOL: 0
PHOTOPHOBIA: 0
EYE REDNESS: 0
CHOKING: 0
CONSTIPATION: 0
EYE PAIN: 0
EYE DISCHARGE: 0
NAUSEA: 0
CHEST TIGHTNESS: 0
EYE ITCHING: 0
SHORTNESS OF BREATH: 0
VOMITING: 0
BACK PAIN: 1

## 2022-10-06 NOTE — H&P
Preoperative Consultation      Name: Carley Doty  YOB: 1960  Date of Service: 10/6/2022      CHIEF COMPLAINT:  right L2-3 stenosis, spondylolisthesis     HISTORY OF PRESENT ILLNESS:      The patient is a 58 y.o. female with significant past medical history of  right L2-3 stenosis, spondylolisthesis who presents for a preoperative consultation at the request of surgeon, Dr. Brook Hutson, who plans on performing right bilateral L2-3 laminectomies microdissection foraminotomies interbody posterolateral pedicle screw fusion IGS [facility] on 10/14/22 at [place]. Pt reports she has had middle back pain for 15-20 years. She reports it has worsened over the past few years. She reports the pain radiates to the right side of her back and down to her right buttocks and into her right thigh. She reports numbness below her right knee to her right foot that \"has been there for years\". She denies any other numbness or tingling. She denies any urinary or bowel symptoms. She reports neck pain and neck stiffness. Pt rates her pain at 6/10 and describes it as \"constant squeezing\". She reports she sees pain management and currently takes percocet for pain. She takes gabapentin. She reports she also uses heat and ice. Pt reports she has tried physical therapy and injections in the past. She has hx of prior back surgeries. She reports her back pain affects her ADLs and she cant bend or turn without significant pain. She reports the pain affects her walking and she limps on the right side. She doesn't use any assistive devices. She denies any falls. Pt had L/S [facility] @ [facility] on 3/30/22. Pt has history of HTN, HPL, and had a stroke 7/2020 which affected her left side but that she reports she has recovered. Pt is a non smoker. Planned Anesthesia: General  Known Anesthesia Problems: None  Bleeding Risk: Pt has hx of requiring blood transfusions (s/p hemophage after birth of child and during gastric ulcer cauterization. Reports hx of blood transfusions dt anemia. Pt stopped aspirin for OR per Dr. Naila Palacio dose 9/28/22. Patient Objection to Receiving Blood Products: No  Personal of FH of DVT/PE: No    Medical/Cardiac Clearance Needed: No    Past Medical History:        Diagnosis Date    Anemia     Arthritis     Chicken pox     Chronic back pain     H/O bladder infections     Headache(784.0)     History of blood transfusion     post delivery of first child, again after gastric ulcer cauterization, again for anemia    Hyperlipidemia     meds for about 1 year    Hypertension     meds for about 2 years    Measles     Mumps     Stroke ([facility] [address] 07/2020     Past Surgical History:    Past Surgical History:   Procedure Laterality Date    APPENDECTOMY      BACK SURGERY  2014    lumbar back fusion    CHOLECYSTECTOMY      2017    COLONOSCOPY      ENDOSCOPY, COLON, DIAGNOSTIC      GALLBLADDER SURGERY      HYSTERECTOMY (CERVIX STATUS UNKNOWN)      [address] Right 2020    JOINT REPLACEMENT Left 2018    KNEE SURGERY Right     LEG BIOPSY EXCISION Right 11/12/2021    SOFT TISSUE MASS EXCISION RIGHT ANKLE performed by Tonya Mchugh MD at [address] [place] [address]         Allergies:    Nsaids and Ibuprofen    Medications Prior to Admission:    Current Outpatient Medications   Medication Sig Dispense Refill    ziprasidone (GEODON) 80 MG capsule Take 80 mg by mouth 2 times daily (with meals)      losartan-hydroCHLOROthiazide (HYZAAR) 50-12.5 MG per tablet Take 1 tablet by mouth daily      oxyCODONE-acetaminophen (PERCOCET) 5-325 MG per tablet Take 1 tablet by mouth every 8-12 hours as needed for Pain for up to 30 days. #80 tabs for 30 days 80 tablet 0    gabapentin (NEURONTIN) 400 MG capsule Take 1 capsule by mouth 4 times daily for 90 days.  360 capsule 0    ALPRAZolam (XANAX) 1 MG tablet TAKE 1 TABLET BY MOUTH DAILY AS NEEDED      ASPIRIN LOW DOSE 81 MG EC tablet TAKE 1 TABLET BY MOUTH ONCE DAILY      AIMOVIG 140 MG/ML SOAJ Inject 140 mg as directed every 30 days 1 pen 5    vilazodone HCl (VIIBRYD) 10 MG TABS Take 40 mg by mouth daily      atorvastatin (LIPITOR) 10 MG tablet Take 10 mg by mouth daily      Handicap Placard MISC by Does not apply route For 5 years 1 each 0    naloxone 4 MG/0.1ML LIQD nasal spray 1 spray by Nasal route as needed for Opioid Reversal 1 each 0    pantoprazole (PROTONIX) 40 MG tablet Take by mouth in the morning and at bedtime      traZODone (DESYREL) 100 MG tablet Take 300 mg by mouth nightly       No current facility-administered medications for this encounter. Social History:   Social History     Socioeconomic History    Marital status:      Spouse name: Not on file    Number of children: Not on file    Years of education: Not on file    Highest education level: Not on file   Occupational History    Not on file   Tobacco Use    Smoking status: Never    Smokeless tobacco: Never   Vaping Use    Vaping Use: Never used   Substance and Sexual Activity    Alcohol use: Yes     Comment: once a week-2 beers    Drug use: No    Sexual activity: Not on file   Other Topics Concern    Not on file   Social History Narrative    Not on file     Social Determinants of Health     Financial Resource Strain: Low Risk     Difficulty of Paying Living Expenses: Not hard at all   Food Insecurity: No Food Insecurity    Worried About Running Out of Food in the Last Year: Never true    [address] in the Last Year: Never true   Transportation Needs: No Transportation Needs    Lack of Transportation (Medical): No    Lack of Transportation (Non-Medical):  No   Physical Activity: Not on file   Stress: Not on file   Social Connections: Not on file   Intimate Partner Violence: Not on file   Housing Stability: Not on file       Family History:       Problem Relation Age of Onset    Stroke Mother     Cancer Father     Diabetes Sister     High Blood Pressure Sister     High Blood Pressure Brother     Diabetes Brother     No Known Problems Daughter        Review of Systems   Constitutional:  Negative for appetite change, chills, diaphoresis, fatigue, fever and unexpected weight change. HENT:  Negative for congestion, ear discharge, ear pain, hearing loss, mouth sores, nosebleeds, postnasal drip, rhinorrhea, sinus pressure, sinus pain, sneezing, sore throat, tinnitus and trouble swallowing. Eyes:  Negative for photophobia, pain, discharge, redness, itching and visual disturbance. Prescription glasses   Respiratory:  Negative for cough, choking, chest tightness, shortness of breath and wheezing. Cardiovascular:  Negative for chest pain, palpitations and leg swelling. Gastrointestinal:  Negative for abdominal distention, abdominal pain, blood in stool, constipation, diarrhea, nausea and vomiting. Endocrine: Negative for cold intolerance and heat intolerance. Genitourinary:  Negative for decreased urine volume, difficulty urinating, dysuria, frequency, hematuria and urgency. Musculoskeletal:  Positive for arthralgias, back pain, gait problem, neck pain and neck stiffness. Negative for myalgias. Pt reports she has had middle back pain for 15-20 years. She reports it has worsened over the past few years. She reports the pain radiates to the right side of her back and down to her right buttocks and into her right thigh. She reports neck pain and neck stiffness. Pt rates her pain at 6/10 and describes it as \"constant squeezing\". She reports she sees pain management and currently takes percocet for pain. She takes gabapentin. She reports she also uses heat and ice. Pt reports she has tried physical therapy and injections in the past. She reports her back pain affects her ADLs and she cant bend or turn without significant pain. She reports the pain affects her walking and she limps on the right side. She doesn't use any assistive devices. She denies any falls. Skin:  Negative for rash and wound. Allergic/Immunologic: Negative. Neurological:  Positive for numbness (right lower leg contant, right upper leg intermittent) and headaches (hx migrains-on injectible med). Negative for dizziness, tremors, seizures, weakness and light-headedness. Hx CVA 7/2020    She reports numbness below her right knee to her right foot that \"has been there for years\". She denies any other numbness or tingling. Hematological: Negative. Psychiatric/Behavioral:          Hx depression and anxiety      Vitals:  /66   Pulse 50   Temp 97.8 °F (36.6 °C) (Temporal)   Resp 16   Ht 5' 5\" (1.651 m)   Wt 196 lb 6.4 oz (89.1 kg)   LMP  (LMP Unknown)   SpO2 98%   BMI 32.68 kg/m²       Physical Exam  Constitutional:       General: She is not in acute distress. Appearance: Normal appearance. She is not ill-appearing, toxic-appearing or diaphoretic. HENT:      Head: Normocephalic. Right Ear: Tympanic membrane, ear canal and external ear normal. There is no impacted cerumen. Left Ear: Tympanic membrane, ear canal and external ear normal. There is no impacted cerumen. Nose: Nose normal. No congestion or rhinorrhea. Mouth/Throat:      Mouth: Mucous membranes are moist.      Pharynx: Oropharynx is clear. No oropharyngeal exudate or posterior oropharyngeal erythema. Eyes:      General: No scleral icterus. Right eye: No discharge. Left eye: No discharge. Extraocular Movements: Extraocular movements intact. Conjunctiva/sclera: Conjunctivae normal.      Pupils: Pupils are equal, round, and reactive to light. Neck:      Vascular: No carotid bruit. Cardiovascular:      Rate and Rhythm: Normal rate and regular rhythm. Pulses: Normal pulses. Heart sounds: Normal heart sounds. No murmur heard. Pulmonary:      Effort: Pulmonary effort is normal. No respiratory distress. Breath sounds: Normal breath sounds. No wheezing.    Abdominal:      General: Bowel sounds are normal. There is no distension. Palpations: Abdomen is soft. Tenderness: There is no abdominal tenderness. There is no guarding. Genitourinary:     Comments: Deferred  Musculoskeletal:      Cervical back: Normal range of motion. No rigidity. Right lower leg: Edema (non pitting) present. Left lower leg: Edema (non pitting) present. Lymphadenopathy:      Cervical: No cervical adenopathy. Skin:     General: Skin is warm and dry. Capillary Refill: Capillary refill takes less than 2 seconds. Coloration: Skin is not jaundiced. Findings: No bruising, erythema or rash. Neurological:      General: No focal deficit present. Mental Status: She is alert and oriented to person, place, and time. Motor: Weakness present. Gait: Gait abnormal (limps on the right). Psychiatric:         Mood and Affect: Mood normal.         Behavior: Behavior normal.         Thought Content:  Thought content normal.         Judgment: Judgment normal.       Assessment:  58 y.o. patient with   Patient Active Problem List   Diagnosis    Lumbosacral spondylosis without myelopathy    SI (sacroiliac) joint dysfunction    Arthropathy of shoulder region    Lumbar stenosis with neurogenic claudication    Spondylolisthesis, lumbosacral region Right L2-3    Arthritis of knee, left    Chronic pain syndrome    Cervical spondylosis without myelopathy    Postlaminectomy syndrome of lumbar region    Cervical disc disorder with radiculopathy, unspecified cervical region    Cervical disc disorder with radiculopathy, unspecified cervical region    Hallucinations, unspecified    Mood disorder (HCC)    Strain of unspecified muscle, fascia and tendon at shoulder and upper arm level, right arm, initial encounter    Cystocele    Frequency    GERD (gastroesophageal reflux disease)    Incomplete bladder emptying    Ovarian cyst    Recurrent UTI    Urge incontinence of urine    Urinary incontinence    Sleep terror disorder    Migraine without aura and with status migrainosus, not intractable    Intractable chronic migraine without aura and without status migrainosus    History of disease    Benign essential hypertension    Disruptions of 24 hour sleep-wake cycle    Fever    Hematuria    History of total knee arthroplasty    Nausea, vomiting and diarrhea    Lesion of skin of face    Syncope    Abnormal liver function tests    Anemia    Candidiasis of skin    Cerebrovascular accident 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)    Hyperlipidemia, unspecified    Hypertensive urgency    Iron deficiency anemia secondary to blood loss (chronic)    Major depressive disorder, single episode, unspecified    Migraine, unspecified, not intractable, without status migrainosus    Peripheral vascular disease, unspecified (HCC)    Spondylosis without myelopathy or radiculopathy, lumbar region    Peptic ulc, site unsp, unsp as ac or chr, w/o hemor or perf    Presence of left artificial knee joint    Severe episode of recurrent major depressive disorder, without psychotic features ([facility] [address]    Smoker      with planned surgery as above.     Plan:  Preoperative workup as follows: BMP, CBC, PTT, PT, INR, T&S, EKG  2.   Scheduled for: right bilateral L2-3 laminectomies microdissection foraminotomies interbody posterolateral pedicle screw fusion IGS Ifuse on 10/14/22    SARAH Anthony - CNP  10/6/2022  2:52 PM

## 2022-10-07 LAB
ANTIBODY IDENTIFICATION: NORMAL
EKG ATRIAL RATE: 52 BPM
EKG P AXIS: 42 DEGREES
EKG P-R INTERVAL: 170 MS
EKG Q-T INTERVAL: 522 MS
EKG QRS DURATION: 88 MS
EKG QTC CALCULATION (BAZETT): 485 MS
EKG R AXIS: 22 DEGREES
EKG T AXIS: 23 DEGREES
EKG VENTRICULAR RATE: 52 BPM

## 2022-10-07 PROCEDURE — 93010 ELECTROCARDIOGRAM REPORT: CPT | Performed by: INTERNAL MEDICINE

## 2022-10-17 LAB
BLOOD BANK DISPENSE STATUS: NORMAL
BLOOD BANK DISPENSE STATUS: NORMAL
BLOOD BANK PRODUCT CODE: NORMAL
BLOOD BANK PRODUCT CODE: NORMAL
BPU ID: NORMAL
BPU ID: NORMAL
DESCRIPTION BLOOD BANK: NORMAL
DESCRIPTION BLOOD BANK: NORMAL

## 2022-10-20 ENCOUNTER — ANESTHESIA EVENT (OUTPATIENT)
Dept: OPERATING ROOM | Age: 62
DRG: 460 | End: 2022-10-20
Payer: COMMERCIAL

## 2022-10-21 ENCOUNTER — ANESTHESIA (OUTPATIENT)
Dept: OPERATING ROOM | Age: 62
DRG: 460 | End: 2022-10-21
Payer: COMMERCIAL

## 2022-10-21 ENCOUNTER — APPOINTMENT (OUTPATIENT)
Dept: GENERAL RADIOLOGY | Age: 62
DRG: 460 | End: 2022-10-21
Attending: NEUROLOGICAL SURGERY
Payer: COMMERCIAL

## 2022-10-21 ENCOUNTER — HOSPITAL ENCOUNTER (INPATIENT)
Age: 62
LOS: 3 days | Discharge: HOME | DRG: 460 | End: 2022-10-24
Attending: NEUROLOGICAL SURGERY | Admitting: NEUROLOGICAL SURGERY
Payer: COMMERCIAL

## 2022-10-21 DIAGNOSIS — M48.062 SPINAL STENOSIS OF LUMBAR REGION WITH NEUROGENIC CLAUDICATION: Primary | ICD-10-CM

## 2022-10-21 DIAGNOSIS — G89.18 ACUTE POSTOPERATIVE PAIN: Primary | ICD-10-CM

## 2022-10-21 LAB
ABO/RH: NORMAL
ANTIBODY IDENTIFICATION: NORMAL
ANTIBODY SCREEN: NORMAL

## 2022-10-21 PROCEDURE — 2580000003 HC RX 258: Performed by: NEUROLOGICAL SURGERY

## 2022-10-21 PROCEDURE — 22633 ARTHRD CMBN 1NTRSPC LUMBAR: CPT | Performed by: NEUROLOGICAL SURGERY

## 2022-10-21 PROCEDURE — 7100000000 HC PACU RECOVERY - FIRST 15 MIN: Performed by: NEUROLOGICAL SURGERY

## 2022-10-21 PROCEDURE — 2500000003 HC RX 250 WO HCPCS: Performed by: NEUROLOGICAL SURGERY

## 2022-10-21 PROCEDURE — A4217 STERILE WATER/SALINE, 500 ML: HCPCS | Performed by: NEUROLOGICAL SURGERY

## 2022-10-21 PROCEDURE — 6370000000 HC RX 637 (ALT 250 FOR IP): Performed by: NEUROLOGICAL SURGERY

## 2022-10-21 PROCEDURE — 22853 INSJ BIOMECHANICAL DEVICE: CPT | Performed by: NEUROLOGICAL SURGERY

## 2022-10-21 PROCEDURE — 63052 LAM FACETC/FRMT ARTHRD LUM 1: CPT | Performed by: NEUROLOGICAL SURGERY

## 2022-10-21 PROCEDURE — 86850 RBC ANTIBODY SCREEN: CPT

## 2022-10-21 PROCEDURE — C1713 ANCHOR/SCREW BN/BN,TIS/BN: HCPCS | Performed by: NEUROLOGICAL SURGERY

## 2022-10-21 PROCEDURE — 86900 BLOOD TYPING SEROLOGIC ABO: CPT

## 2022-10-21 PROCEDURE — 3600000004 HC SURGERY LEVEL 4 BASE: Performed by: NEUROLOGICAL SURGERY

## 2022-10-21 PROCEDURE — C1889 IMPLANT/INSERT DEVICE, NOC: HCPCS | Performed by: NEUROLOGICAL SURGERY

## 2022-10-21 PROCEDURE — 0SB20ZZ EXCISION OF LUMBAR VERTEBRAL DISC, OPEN APPROACH: ICD-10-PCS | Performed by: NEUROLOGICAL SURGERY

## 2022-10-21 PROCEDURE — 6360000002 HC RX W HCPCS: Performed by: NEUROLOGICAL SURGERY

## 2022-10-21 PROCEDURE — 86901 BLOOD TYPING SEROLOGIC RH(D): CPT

## 2022-10-21 PROCEDURE — 3E0U0GB INTRODUCTION OF RECOMBINANT BONE MORPHOGENETIC PROTEIN INTO JOINTS, OPEN APPROACH: ICD-10-PCS | Performed by: NEUROLOGICAL SURGERY

## 2022-10-21 PROCEDURE — 2580000003 HC RX 258

## 2022-10-21 PROCEDURE — 6360000002 HC RX W HCPCS: Performed by: NURSE ANESTHETIST, CERTIFIED REGISTERED

## 2022-10-21 PROCEDURE — 2720000010 HC SURG SUPPLY STERILE: Performed by: NEUROLOGICAL SURGERY

## 2022-10-21 PROCEDURE — 7100000001 HC PACU RECOVERY - ADDTL 15 MIN: Performed by: NEUROLOGICAL SURGERY

## 2022-10-21 PROCEDURE — 3209999900 FLUORO FOR SURGICAL PROCEDURES

## 2022-10-21 PROCEDURE — 20936 SP BONE AGRFT LOCAL ADD-ON: CPT | Performed by: NEUROLOGICAL SURGERY

## 2022-10-21 PROCEDURE — 3600000014 HC SURGERY LEVEL 4 ADDTL 15MIN: Performed by: NEUROLOGICAL SURGERY

## 2022-10-21 PROCEDURE — 6370000000 HC RX 637 (ALT 250 FOR IP): Performed by: NURSE PRACTITIONER

## 2022-10-21 PROCEDURE — 3700000001 HC ADD 15 MINUTES (ANESTHESIA): Performed by: NEUROLOGICAL SURGERY

## 2022-10-21 PROCEDURE — 3700000000 HC ANESTHESIA ATTENDED CARE: Performed by: NEUROLOGICAL SURGERY

## 2022-10-21 PROCEDURE — 2709999900 HC NON-CHARGEABLE SUPPLY: Performed by: NEUROLOGICAL SURGERY

## 2022-10-21 PROCEDURE — 0SG00AJ FUSION OF LUMBAR VERTEBRAL JOINT WITH INTERBODY FUSION DEVICE, POSTERIOR APPROACH, ANTERIOR COLUMN, OPEN APPROACH: ICD-10-PCS | Performed by: NEUROLOGICAL SURGERY

## 2022-10-21 PROCEDURE — 86870 RBC ANTIBODY IDENTIFICATION: CPT

## 2022-10-21 PROCEDURE — 1210000000 HC MED SURG R&B

## 2022-10-21 PROCEDURE — 86922 COMPATIBILITY TEST ANTIGLOB: CPT

## 2022-10-21 PROCEDURE — 2500000003 HC RX 250 WO HCPCS: Performed by: NURSE ANESTHETIST, CERTIFIED REGISTERED

## 2022-10-21 PROCEDURE — 01NB0ZZ RELEASE LUMBAR NERVE, OPEN APPROACH: ICD-10-PCS | Performed by: NEUROLOGICAL SURGERY

## 2022-10-21 PROCEDURE — 22840 INSERT SPINE FIXATION DEVICE: CPT | Performed by: NEUROLOGICAL SURGERY

## 2022-10-21 DEVICE — GRAFT BNE SUB 5ML MTRX CELLULAR OSTEOCEL +: Type: IMPLANTABLE DEVICE | Site: SPINE LUMBAR | Status: FUNCTIONAL

## 2022-10-21 DEVICE — BONE GRAFT KIT 7510100 INFUSE X SMALL
Type: IMPLANTABLE DEVICE | Site: SPINE LUMBAR | Status: FUNCTIONAL
Brand: INFUSE® BONE GRAFT

## 2022-10-21 DEVICE — IMPLANTABLE DEVICE: Type: IMPLANTABLE DEVICE | Site: SPINE LUMBAR | Status: FUNCTIONAL

## 2022-10-21 DEVICE — SCREW SPNL L50MM DIA6.5MM POST THORACOLUMBOSACRAL MOD SHANK: Type: IMPLANTABLE DEVICE | Site: SPINE LUMBAR | Status: FUNCTIONAL

## 2022-10-21 DEVICE — SCREW SPNL DIA5.5MM OPN TULIP LOK RELINE: Type: IMPLANTABLE DEVICE | Site: SPINE LUMBAR | Status: FUNCTIONAL

## 2022-10-21 RX ORDER — ONDANSETRON 2 MG/ML
INJECTION INTRAMUSCULAR; INTRAVENOUS PRN
Status: DISCONTINUED | OUTPATIENT
Start: 2022-10-21 | End: 2022-10-21 | Stop reason: SDUPTHER

## 2022-10-21 RX ORDER — LIDOCAINE HYDROCHLORIDE 20 MG/ML
INJECTION, SOLUTION INTRAVENOUS PRN
Status: DISCONTINUED | OUTPATIENT
Start: 2022-10-21 | End: 2022-10-21 | Stop reason: SDUPTHER

## 2022-10-21 RX ORDER — POLYETHYLENE GLYCOL 3350 17 G/17G
17 POWDER, FOR SOLUTION ORAL DAILY PRN
Status: DISCONTINUED | OUTPATIENT
Start: 2022-10-21 | End: 2022-10-24 | Stop reason: HOSPADM

## 2022-10-21 RX ORDER — OXYCODONE HYDROCHLORIDE 5 MG/1
10 TABLET ORAL PRN
Status: DISCONTINUED | OUTPATIENT
Start: 2022-10-21 | End: 2022-10-21

## 2022-10-21 RX ORDER — DIPHENHYDRAMINE HYDROCHLORIDE 50 MG/ML
INJECTION INTRAMUSCULAR; INTRAVENOUS PRN
Status: DISCONTINUED | OUTPATIENT
Start: 2022-10-21 | End: 2022-10-21 | Stop reason: SDUPTHER

## 2022-10-21 RX ORDER — LOSARTAN POTASSIUM AND HYDROCHLOROTHIAZIDE 12.5; 5 MG/1; MG/1
1 TABLET ORAL DAILY
Status: DISCONTINUED | OUTPATIENT
Start: 2022-10-21 | End: 2022-10-24 | Stop reason: HOSPADM

## 2022-10-21 RX ORDER — SODIUM CHLORIDE 0.9 % (FLUSH) 0.9 %
5-40 SYRINGE (ML) INJECTION EVERY 12 HOURS SCHEDULED
Status: DISCONTINUED | OUTPATIENT
Start: 2022-10-21 | End: 2022-10-24 | Stop reason: HOSPADM

## 2022-10-21 RX ORDER — SODIUM CHLORIDE 0.9 % (FLUSH) 0.9 %
5-40 SYRINGE (ML) INJECTION PRN
Status: DISCONTINUED | OUTPATIENT
Start: 2022-10-21 | End: 2022-10-21

## 2022-10-21 RX ORDER — SODIUM CHLORIDE 0.9 % (FLUSH) 0.9 %
5-40 SYRINGE (ML) INJECTION PRN
Status: DISCONTINUED | OUTPATIENT
Start: 2022-10-21 | End: 2022-10-21 | Stop reason: HOSPADM

## 2022-10-21 RX ORDER — MEPERIDINE HYDROCHLORIDE 25 MG/ML
12.5 INJECTION INTRAMUSCULAR; INTRAVENOUS; SUBCUTANEOUS EVERY 5 MIN PRN
Status: DISCONTINUED | OUTPATIENT
Start: 2022-10-21 | End: 2022-10-21

## 2022-10-21 RX ORDER — SODIUM CHLORIDE 9 MG/ML
INJECTION, SOLUTION INTRAVENOUS PRN
Status: DISCONTINUED | OUTPATIENT
Start: 2022-10-21 | End: 2022-10-21 | Stop reason: HOSPADM

## 2022-10-21 RX ORDER — SODIUM CHLORIDE, SODIUM LACTATE, POTASSIUM CHLORIDE, CALCIUM CHLORIDE 600; 310; 30; 20 MG/100ML; MG/100ML; MG/100ML; MG/100ML
INJECTION, SOLUTION INTRAVENOUS CONTINUOUS
Status: DISCONTINUED | OUTPATIENT
Start: 2022-10-21 | End: 2022-10-21

## 2022-10-21 RX ORDER — OXYCODONE HYDROCHLORIDE 5 MG/1
5 TABLET ORAL EVERY 4 HOURS PRN
Status: DISCONTINUED | OUTPATIENT
Start: 2022-10-21 | End: 2022-10-24 | Stop reason: HOSPADM

## 2022-10-21 RX ORDER — DEXAMETHASONE SODIUM PHOSPHATE 10 MG/ML
INJECTION INTRAMUSCULAR; INTRAVENOUS PRN
Status: DISCONTINUED | OUTPATIENT
Start: 2022-10-21 | End: 2022-10-21 | Stop reason: SDUPTHER

## 2022-10-21 RX ORDER — FENTANYL CITRATE 50 UG/ML
50 INJECTION, SOLUTION INTRAMUSCULAR; INTRAVENOUS EVERY 5 MIN PRN
Status: DISCONTINUED | OUTPATIENT
Start: 2022-10-21 | End: 2022-10-21

## 2022-10-21 RX ORDER — HYDRALAZINE HYDROCHLORIDE 20 MG/ML
10 INJECTION INTRAMUSCULAR; INTRAVENOUS
Status: DISCONTINUED | OUTPATIENT
Start: 2022-10-21 | End: 2022-10-21

## 2022-10-21 RX ORDER — MIDAZOLAM HYDROCHLORIDE 1 MG/ML
INJECTION INTRAMUSCULAR; INTRAVENOUS PRN
Status: DISCONTINUED | OUTPATIENT
Start: 2022-10-21 | End: 2022-10-21 | Stop reason: SDUPTHER

## 2022-10-21 RX ORDER — ONDANSETRON 2 MG/ML
4 INJECTION INTRAMUSCULAR; INTRAVENOUS
Status: DISCONTINUED | OUTPATIENT
Start: 2022-10-21 | End: 2022-10-21

## 2022-10-21 RX ORDER — LIDOCAINE HYDROCHLORIDE 10 MG/ML
1 INJECTION, SOLUTION EPIDURAL; INFILTRATION; INTRACAUDAL; PERINEURAL
Status: DISCONTINUED | OUTPATIENT
Start: 2022-10-21 | End: 2022-10-21 | Stop reason: HOSPADM

## 2022-10-21 RX ORDER — MAGNESIUM HYDROXIDE 1200 MG/15ML
LIQUID ORAL CONTINUOUS PRN
Status: DISCONTINUED | OUTPATIENT
Start: 2022-10-21 | End: 2022-10-21 | Stop reason: HOSPADM

## 2022-10-21 RX ORDER — OXYCODONE HYDROCHLORIDE 5 MG/1
5 TABLET ORAL EVERY 6 HOURS PRN
Status: DISCONTINUED | OUTPATIENT
Start: 2022-10-21 | End: 2022-10-24 | Stop reason: HOSPADM

## 2022-10-21 RX ORDER — PROCHLORPERAZINE EDISYLATE 5 MG/ML
5 INJECTION INTRAMUSCULAR; INTRAVENOUS
Status: DISCONTINUED | OUTPATIENT
Start: 2022-10-21 | End: 2022-10-21

## 2022-10-21 RX ORDER — OXYCODONE HYDROCHLORIDE AND ACETAMINOPHEN 5; 325 MG/1; MG/1
1 TABLET ORAL EVERY 6 HOURS PRN
Qty: 56 TABLET | Refills: 0 | Status: SHIPPED | OUTPATIENT
Start: 2022-10-21 | End: 2022-10-24 | Stop reason: HOSPADM

## 2022-10-21 RX ORDER — SODIUM CHLORIDE, SODIUM LACTATE, POTASSIUM CHLORIDE, CALCIUM CHLORIDE 600; 310; 30; 20 MG/100ML; MG/100ML; MG/100ML; MG/100ML
INJECTION, SOLUTION INTRAVENOUS CONTINUOUS
Status: DISCONTINUED | OUTPATIENT
Start: 2022-10-21 | End: 2022-10-21 | Stop reason: HOSPADM

## 2022-10-21 RX ORDER — VILAZODONE HYDROCHLORIDE 40 MG/1
40 TABLET ORAL DAILY
Status: DISCONTINUED | OUTPATIENT
Start: 2022-10-21 | End: 2022-10-24 | Stop reason: HOSPADM

## 2022-10-21 RX ORDER — PANTOPRAZOLE SODIUM 40 MG/1
40 TABLET, DELAYED RELEASE ORAL
Status: DISCONTINUED | OUTPATIENT
Start: 2022-10-22 | End: 2022-10-24 | Stop reason: HOSPADM

## 2022-10-21 RX ORDER — OXYCODONE HYDROCHLORIDE 5 MG/1
5 TABLET ORAL PRN
Status: DISCONTINUED | OUTPATIENT
Start: 2022-10-21 | End: 2022-10-21

## 2022-10-21 RX ORDER — PROPOFOL 10 MG/ML
INJECTION, EMULSION INTRAVENOUS PRN
Status: DISCONTINUED | OUTPATIENT
Start: 2022-10-21 | End: 2022-10-21 | Stop reason: SDUPTHER

## 2022-10-21 RX ORDER — LABETALOL HYDROCHLORIDE 5 MG/ML
10 INJECTION, SOLUTION INTRAVENOUS
Status: DISCONTINUED | OUTPATIENT
Start: 2022-10-21 | End: 2022-10-21

## 2022-10-21 RX ORDER — FENTANYL CITRATE 50 UG/ML
25 INJECTION, SOLUTION INTRAMUSCULAR; INTRAVENOUS EVERY 5 MIN PRN
Status: DISCONTINUED | OUTPATIENT
Start: 2022-10-21 | End: 2022-10-21

## 2022-10-21 RX ORDER — ONDANSETRON 2 MG/ML
4 INJECTION INTRAMUSCULAR; INTRAVENOUS EVERY 6 HOURS PRN
Status: DISCONTINUED | OUTPATIENT
Start: 2022-10-21 | End: 2022-10-24 | Stop reason: HOSPADM

## 2022-10-21 RX ORDER — SODIUM CHLORIDE 9 MG/ML
INJECTION, SOLUTION INTRAVENOUS PRN
Status: DISCONTINUED | OUTPATIENT
Start: 2022-10-21 | End: 2022-10-24 | Stop reason: HOSPADM

## 2022-10-21 RX ORDER — ROCURONIUM BROMIDE 10 MG/ML
INJECTION, SOLUTION INTRAVENOUS PRN
Status: DISCONTINUED | OUTPATIENT
Start: 2022-10-21 | End: 2022-10-21 | Stop reason: SDUPTHER

## 2022-10-21 RX ORDER — ZIPRASIDONE HYDROCHLORIDE 20 MG/1
80 CAPSULE ORAL 2 TIMES DAILY WITH MEALS
Status: DISCONTINUED | OUTPATIENT
Start: 2022-10-21 | End: 2022-10-24 | Stop reason: HOSPADM

## 2022-10-21 RX ORDER — ACETAMINOPHEN 325 MG/1
650 TABLET ORAL EVERY 6 HOURS
Status: DISCONTINUED | OUTPATIENT
Start: 2022-10-21 | End: 2022-10-24 | Stop reason: HOSPADM

## 2022-10-21 RX ORDER — SODIUM CHLORIDE 9 MG/ML
25 INJECTION, SOLUTION INTRAVENOUS PRN
Status: DISCONTINUED | OUTPATIENT
Start: 2022-10-21 | End: 2022-10-21

## 2022-10-21 RX ORDER — SODIUM CHLORIDE 9 MG/ML
INJECTION, SOLUTION INTRAVENOUS CONTINUOUS
Status: DISCONTINUED | OUTPATIENT
Start: 2022-10-21 | End: 2022-10-24

## 2022-10-21 RX ORDER — SODIUM CHLORIDE 0.9 % (FLUSH) 0.9 %
5-40 SYRINGE (ML) INJECTION EVERY 12 HOURS SCHEDULED
Status: DISCONTINUED | OUTPATIENT
Start: 2022-10-21 | End: 2022-10-21

## 2022-10-21 RX ORDER — FENTANYL CITRATE 50 UG/ML
INJECTION, SOLUTION INTRAMUSCULAR; INTRAVENOUS PRN
Status: DISCONTINUED | OUTPATIENT
Start: 2022-10-21 | End: 2022-10-21 | Stop reason: SDUPTHER

## 2022-10-21 RX ORDER — ATORVASTATIN CALCIUM 10 MG/1
10 TABLET, FILM COATED ORAL NIGHTLY
Status: DISCONTINUED | OUTPATIENT
Start: 2022-10-21 | End: 2022-10-24 | Stop reason: HOSPADM

## 2022-10-21 RX ORDER — DIPHENHYDRAMINE HYDROCHLORIDE 50 MG/ML
12.5 INJECTION INTRAMUSCULAR; INTRAVENOUS
Status: DISCONTINUED | OUTPATIENT
Start: 2022-10-21 | End: 2022-10-21

## 2022-10-21 RX ORDER — SODIUM CHLORIDE 0.9 % (FLUSH) 0.9 %
5-40 SYRINGE (ML) INJECTION EVERY 12 HOURS SCHEDULED
Status: DISCONTINUED | OUTPATIENT
Start: 2022-10-21 | End: 2022-10-21 | Stop reason: HOSPADM

## 2022-10-21 RX ORDER — TRAZODONE HYDROCHLORIDE 100 MG/1
100 TABLET ORAL NIGHTLY
Status: DISCONTINUED | OUTPATIENT
Start: 2022-10-21 | End: 2022-10-24 | Stop reason: HOSPADM

## 2022-10-21 RX ORDER — SODIUM CHLORIDE 0.9 % (FLUSH) 0.9 %
5-40 SYRINGE (ML) INJECTION PRN
Status: DISCONTINUED | OUTPATIENT
Start: 2022-10-21 | End: 2022-10-24 | Stop reason: HOSPADM

## 2022-10-21 RX ADMIN — HYDROMORPHONE HYDROCHLORIDE 0.5 MG: 1 INJECTION, SOLUTION INTRAMUSCULAR; INTRAVENOUS; SUBCUTANEOUS at 20:32

## 2022-10-21 RX ADMIN — ROCURONIUM BROMIDE 60 MG: 10 INJECTION, SOLUTION INTRAVENOUS at 07:35

## 2022-10-21 RX ADMIN — FENTANYL CITRATE 100 MCG: 50 INJECTION, SOLUTION INTRAMUSCULAR; INTRAVENOUS at 07:34

## 2022-10-21 RX ADMIN — SODIUM CHLORIDE, POTASSIUM CHLORIDE, SODIUM LACTATE AND CALCIUM CHLORIDE: 600; 310; 30; 20 INJECTION, SOLUTION INTRAVENOUS at 06:34

## 2022-10-21 RX ADMIN — SODIUM CHLORIDE, POTASSIUM CHLORIDE, SODIUM LACTATE AND CALCIUM CHLORIDE: 600; 310; 30; 20 INJECTION, SOLUTION INTRAVENOUS at 11:20

## 2022-10-21 RX ADMIN — HYDROMORPHONE HYDROCHLORIDE 0.5 MG: 1 INJECTION, SOLUTION INTRAMUSCULAR; INTRAVENOUS; SUBCUTANEOUS at 17:16

## 2022-10-21 RX ADMIN — ROCURONIUM BROMIDE 20 MG: 10 INJECTION, SOLUTION INTRAVENOUS at 09:09

## 2022-10-21 RX ADMIN — ATORVASTATIN CALCIUM 10 MG: 10 TABLET, FILM COATED ORAL at 20:31

## 2022-10-21 RX ADMIN — ROCURONIUM BROMIDE 20 MG: 10 INJECTION, SOLUTION INTRAVENOUS at 10:51

## 2022-10-21 RX ADMIN — CEFAZOLIN 2000 MG: 10 INJECTION, POWDER, FOR SOLUTION INTRAVENOUS at 07:47

## 2022-10-21 RX ADMIN — CEFAZOLIN 2000 MG: 10 INJECTION, POWDER, FOR SOLUTION INTRAVENOUS at 17:18

## 2022-10-21 RX ADMIN — FENTANYL CITRATE 50 MCG: 50 INJECTION, SOLUTION INTRAMUSCULAR; INTRAVENOUS at 11:25

## 2022-10-21 RX ADMIN — DIPHENHYDRAMINE HYDROCHLORIDE 12.5 MG: 50 INJECTION INTRAMUSCULAR; INTRAVENOUS at 08:05

## 2022-10-21 RX ADMIN — DEXAMETHASONE SODIUM PHOSPHATE 10 MG: 10 INJECTION INTRAMUSCULAR; INTRAVENOUS at 08:04

## 2022-10-21 RX ADMIN — OXYCODONE 5 MG: 5 TABLET ORAL at 22:23

## 2022-10-21 RX ADMIN — ROCURONIUM BROMIDE 20 MG: 10 INJECTION, SOLUTION INTRAVENOUS at 08:27

## 2022-10-21 RX ADMIN — LIDOCAINE HYDROCHLORIDE 75 MG: 20 INJECTION, SOLUTION INTRAVENOUS at 07:35

## 2022-10-21 RX ADMIN — HYDROMORPHONE HYDROCHLORIDE 0.5 MG: 1 INJECTION, SOLUTION INTRAMUSCULAR; INTRAVENOUS; SUBCUTANEOUS at 13:41

## 2022-10-21 RX ADMIN — ROCURONIUM BROMIDE 20 MG: 10 INJECTION, SOLUTION INTRAVENOUS at 09:55

## 2022-10-21 RX ADMIN — ONDANSETRON 4 MG: 2 INJECTION INTRAMUSCULAR; INTRAVENOUS at 08:04

## 2022-10-21 RX ADMIN — BISACODYL 5 MG: 5 TABLET, COATED ORAL at 13:43

## 2022-10-21 RX ADMIN — ACETAMINOPHEN 650 MG: 325 TABLET ORAL at 13:42

## 2022-10-21 RX ADMIN — ACETAMINOPHEN 650 MG: 325 TABLET ORAL at 20:31

## 2022-10-21 RX ADMIN — PROPOFOL 150 MG: 10 INJECTION, EMULSION INTRAVENOUS at 07:35

## 2022-10-21 RX ADMIN — TRAZODONE HYDROCHLORIDE 100 MG: 100 TABLET ORAL at 20:31

## 2022-10-21 RX ADMIN — SODIUM CHLORIDE: 9 INJECTION, SOLUTION INTRAVENOUS at 13:42

## 2022-10-21 RX ADMIN — MIDAZOLAM HYDROCHLORIDE 2 MG: 1 INJECTION, SOLUTION INTRAMUSCULAR; INTRAVENOUS at 07:30

## 2022-10-21 RX ADMIN — FENTANYL CITRATE 50 MCG: 50 INJECTION, SOLUTION INTRAMUSCULAR; INTRAVENOUS at 11:45

## 2022-10-21 ASSESSMENT — ENCOUNTER SYMPTOMS
PHOTOPHOBIA: 0
BACK PAIN: 1
SHORTNESS OF BREATH: 0
VOMITING: 0
EYES NEGATIVE: 1
NAUSEA: 0
ABDOMINAL PAIN: 0
ALLERGIC/IMMUNOLOGIC NEGATIVE: 1
WHEEZING: 0
SORE THROAT: 0
RHINORRHEA: 0

## 2022-10-21 ASSESSMENT — PAIN DESCRIPTION - DIRECTION: RADIATING_TOWARDS: RLE

## 2022-10-21 ASSESSMENT — PAIN DESCRIPTION - LOCATION
LOCATION: BACK

## 2022-10-21 ASSESSMENT — PAIN DESCRIPTION - PAIN TYPE: TYPE: CHRONIC PAIN

## 2022-10-21 ASSESSMENT — PAIN SCALES - GENERAL
PAINLEVEL_OUTOF10: 7
PAINLEVEL_OUTOF10: 8
PAINLEVEL_OUTOF10: 8

## 2022-10-21 ASSESSMENT — PAIN - FUNCTIONAL ASSESSMENT: PAIN_FUNCTIONAL_ASSESSMENT: PREVENTS OR INTERFERES WITH MANY ACTIVE NOT PASSIVE ACTIVITIES

## 2022-10-21 ASSESSMENT — PAIN DESCRIPTION - DESCRIPTORS: DESCRIPTORS: BURNING;SHARP

## 2022-10-21 NOTE — BRIEF OP NOTE
Brief Postoperative Note      Patient: Kelby Abbott  YOB: 1960  MRN: [id number]    Date of Procedure: 10/21/2022    Pre-Op Diagnosis: RIGHT L2-3 STENOSIS, SPONDYLOLISTHESIS    Post-Op Diagnosis: Same       Procedure(s):  RIGHT BILATERAL L2-3 LAMINECTOMIES MICRODISSECTION FORAMINOTOMIES INTERBODY POSTEROLATERAL PEDICLE SCREW FUSION WITH IGS    Surgeon(s):  Bony Daugherty MD    Assistant:  First Assistant: Cesar Thacker    Anesthesia: General    Estimated Blood Loss (mL): less than 50     Complications: None    Specimens:   * No specimens in log *    Implants:  Implant Name Type Inv. Item Serial No.  Lot No. LRB No. Used Action   GRAFT BNE SUB 5ML MTRX CELLULAR OSTEOCEL + - [id number]  GRAFT BNE SUB 5ML MTRX CELLULAR OSTEOCEL + [id number] [employer]WD  N/A 1 Implanted   KIT BNE GRFT XSM 1.4CC RHBMP-2 ABSRB CLLGN SPNG INFUSE - [id number]  KIT BNE GRFT XSM 1.4CC RHBMP-2 ABSRB CLLGN SPNG INFUSE  [employer] SPINALGRAFT TECH-WD [id number] N/A 1 Implanted   SCREW SPNL L50MM DIA6. 5MM POST THORACOLUMBOSACRAL MOD SHANK - [id number]  SCREW SPNL L50MM DIA6. 5MM POST THORACOLUMBOSACRAL MOD SHANK  [employer]WD  N/A 2 Implanted   SCREW RELINE MAS MOD REDUCTION EXTENSION - [id number] Spine SCREW RELINE MAS MOD REDUCTION EXTENSION  [employer]-PM  N/A 2 Implanted   SCREW SPNL DIA5. 5MM OPN [place] RELINE - [id number]  SCREW SPNL DIA5. 5MM OPN [place] RELINE  [employer]WD  N/A 2 Implanted   CAGE SPNL [id number] 15DEG TLX - [id number]  CAGE SPNL [id number] 15DEG TLX  [employer]WD  N/A 1 Implanted   IMPL AYLIN RELINE MAS TI LORDOTIC 5.5X50MM - [id number] Spine IMPL AYLIN RELINE MAS TI LORDOTIC 5.5X50MM  [employer]PM  N/A 1 Implanted         Drains:   Urinary Catheter 10/21/22 Mckeon-Temperature (Active)       Findings: Stenosis and acquired spondylolisthesis    Electronically signed by Yifan Mack MD on 10/21/2022 at 11:28 AM

## 2022-10-21 NOTE — PLAN OF CARE
Problem: Discharge Planning  Goal: Discharge to home or other facility with appropriate resources  Outcome: Progressing  Flowsheets  Taken 10/21/2022 1532  Discharge to home or other facility with appropriate resources: Identify barriers to discharge with patient and caregiver  Taken 10/21/2022 1531  Discharge to home or other facility with appropriate resources: Identify barriers to discharge with patient and caregiver     Problem: Pain  Goal: Verbalizes/displays adequate comfort level or baseline comfort level  Outcome: Progressing     Problem: Safety - Adult  Goal: Free from fall injury  Outcome: Progressing

## 2022-10-21 NOTE — ANESTHESIA POSTPROCEDURE EVALUATION
Department of Anesthesiology  Postprocedure Note    Patient: Sarah Beth Carrasco  MRN: [id number]  YOB: 1960  Date of evaluation: 10/21/2022      Procedure Summary     Date: 10/21/22 Room / Location: [address] [place] [address]    Anesthesia Start: 0732 Anesthesia Stop: 5188    Procedure: RIGHT BILATERAL L2-3 LAMINECTOMIES MICRODISSECTION FORAMINOTOMIES INTERBODY POSTEROLATERAL PEDICLE SCREW FUSION WITH IGS Diagnosis:       Spondylolisthesis of lumbosacral region      Neurogenic claudication due to lumbar spinal stenosis      (RIGHT L2-3 STENOSIS, SPONDYLOLISTHESIS)    Surgeons: Yumiko Goodwin MD Responsible Provider: Ajay Welsh MD    Anesthesia Type: general ASA Status: 2          Anesthesia Type: No value filed.     Anabel Phase I:      Anabel Phase II:        Anesthesia Post Evaluation    Patient location during evaluation: bedside  Patient participation: complete - patient participated  Level of consciousness: awake and awake and alert  Pain score: 0  Airway patency: patent  Nausea & Vomiting: no nausea and no vomiting  Complications: no  Cardiovascular status: blood pressure returned to baseline and hemodynamically stable  Respiratory status: acceptable  Hydration status: euvolemic

## 2022-10-21 NOTE — DISCHARGE INSTRUCTIONS
Every day change dressing frequently to keep wound clean and dry using 4X4 gauze pads and paper tape. May shower in 3 days. Do not soak or soap wound for one week. Discharge prescriptions e-prescribed to pharmacy. Order done  as outpatient. Rx Percocet 5/325 #56 discount [facility] [place]    Obtain Xrays AP lateral lumbar spine at [facility] few days prior to recheck. Order done as outpatient. Recheck one month. Questions call office [phone]. Follow up with Dr. Rudolph Ortiz in the next 7 days or sooner if needed. Please return to ER or call 911 if you develop any significant signs or symptoms. I may or may not have addressed all of your symptoms, medical issues,  Illnesses, or all of the abnormal blood work or imaging therefore please ask your PCP and other specialists to obtain [facility] record entirely to follow up on all of your symptoms, illnesses, abnormal labs, imaging and findings that I have and have not addressed during your hospitalization. Discharging you from the hospital does not mean that your medical care ends here and now. You may still need to have additional work up, investigation, monitoring, surveillance, and treatment to be handled from this point on by in the out patient setting by  out patient providers including your PCP, Specialists and other healthcare providers. For medication questions, contact your retail pharmacy and your PCP. Your medical team at [phone] appreciates the opportunity to work with you to get well!     Rosanna Joyce MD

## 2022-10-21 NOTE — INTERVAL H&P NOTE
Update History & Physical    The patient's History and Physical of  was reviewed with the patient and I examined the patient. There was no change. The surgical site was confirmed by the patient and me. Plan: The risks, benefits, expected outcome, and alternative to the recommended procedure have been discussed with the patient. Patient understands and wants to proceed with the procedure.      Electronically signed by Job March MD on 10/21/2022 at 7:04 AM

## 2022-10-21 NOTE — FLOWSHEET NOTE
10/21/22 @ [facility] Notified Dr. Josiah Haas of Hospitalist consult for [address] via Instant Information

## 2022-10-21 NOTE — OP NOTE
[address]                      [facility], [address] [facility]                                OPERATIVE REPORT    PATIENT NAME: Earline Villalobos                  :        1960  MED REC NO:   [id number]                            ROOM:       [id number]  ACCOUNT NO:   [de-identified]                           ADMIT DATE: 10/21/2022  PROVIDER:     Padmini Coburn MD    DATE OF PROCEDURE:  10/21/2022    PREOPERATIVE DIAGNOSES:  Right L2-3 acquired spondylolisthesis and  stenosis with neurogenic claudication. POSTOPERATIVE DIAGNOSES:  Right L2-3 acquired spondylolisthesis and  stenosis with neurogenic claudication. OPERATIONS PERFORMED:  Right bilateral L2-3 laminectomy, decompression,  facetectomy, foraminotomy, diskectomy, interbody posterolateral pedicle  screw fusion, image-guided system, Infuse. SURGEON:  Padmini Coburn MD    INDICATIONS:  Severe back and right lower extremity pain. DESCRIPTION OF PROCEDURE:  The patient was given general endotracheal  anesthesia in supine position. She was turned to prone position. Back  was prepped and draped. Time-out, patient identified. Needle was  placed. Lateral x-rays were obtained to confirm level. Needle was  withdrawn. A rad was made on the skin. I could palpate the spinous  process tip for T12. Skin was infiltrated with lidocaine. Small  incision was made over the tips of spinous process at T12 and T12  spinous process was exposed and the tracker was placed on T12. The  tracker was then registered with the computer using the image-guided  system. Using the registered Jamshidi, I was able to outline on the  skin the entry point. The entry point for the pedicle on the right at  both L2 and L3. Humza Sleeper were made. Skin was infiltrated with lidocaine  with epinephrine. Skin incision was made to the right side of spinous  processes L2-3 angling towards the pedicles.   The fascia was split and  blunt dissection was used to the facet joint complex of L2-3. Using the  navigated Jamshidi needle, I first was able to place the Jamshidi into  the right L2 pedicle and then to right L3 pedicle leaving in place at  both levels the guidewires. Once the guidewire was in place, I then  used the tap first at L2. The tap was removed and the shank was then  placed through the pedicle into the L2 vertebral body on the right side. The same was done at L3 on the right side placing the shank into the L3  vertebral body. I did double check with x-ray control through the  tablets and placed in perfect position. Once the shanks were in place,  the pedicle based retractors were then positioned. The retractors were  extended superiorly and inferiorly. Using the whale-tail  instrumentation, I was able to gently mobilize the muscle off the facet  joint complex of L2-3 up towards the lamina and midline. This exposed  the facet joint complex on the right at L2-3. The medial retractor was  then positioned. It was adjusted several times as needed. The  microscope was brought into field. With the use of microscope, I  exposed the facet joint complex of L2-3. The pars interarticularis for  L2 and the pedicle at L2. The transverse processes were exposed,  decorticated, allograft autogenous bone placed. A facetectomy was then  performed using a high-speed bone dissector harvesting the autograft  bone, which was used intradiscally and posterolaterally. The lateral  aspect of the L2-3 dural sac was exposed in the takeoff of the L3 nerve. The L2 nerve was severely compressed in the foramen by combination of  protruded disc and facet overgrowth all of which was removed  decompressing the L2 nerve in the foramen. With gentle medial  retraction on the dura, I entered into the disc space at L2-3. The disc  space was cleansed out of all the degenerated disc material with a  combination of punches and curettes.   The sequential distractors and  belkis were then used from 6-12 mm to prepare the endplates. Again,  the curettes, rasps, and punches were used to prepare the endplates and  remove the protruded disc centrally as well as laterally. Intradiscally, infuse was placed anteriorly as well as allograft  autogenous bone to fill the empty disc space. The cage was then placed  under x-ray control into perfect position. It was expanded and locked  into position. The bleeding was controlled with temporary use a  Surgiflo and copious irrigations. The retractor was removed from the  shanks. The towers that were then placed on the shanks. Using the  tower measuring the top bertram was placed. The setscrews were then applied  to the bertram to fix the bertram in perfect position under x-ray control. The  setscrews were torqued to specifications and the towers were removed. The construct was complete. X-ray, AP and lateral was obtained. The  fascia was then closed with 0 Vicryl suture. Subcutaneous tissues were  closed with 2-0 and 3-0 Vicryl suture. EBL less than 50 mL. No blood  transfused. Construct is titanium MRI compatible, NuV[employer] 6.5 x 50 shanks two of  them were used, numbers [id number]; towers two of them were used, numbers  [id number]; setscrews, two of them were used, numbers [id number]; cage 8 x  31, number [id number]; a 50-mm bertram, number [id number]. Osteocel allograft  cellular bone matrix, number [id number]. Titanium MRI compatible.         Deng Cartagena MD    D: 10/21/2022 11:36:40       T: 10/21/2022 11:41:09     [email or web address]  Job#: [id number]     Doc#: [id number]    CC:

## 2022-10-21 NOTE — CONSULTS
Consult Note    Reason for Consult: Postoperative medical management assistance and home meds    Requesting Physician:  Gia López MD    HISTORY OF PRESENT ILLNESS:    Patient is status post right bilateral L2-3 laminectomy. Patient is a pleasant, alert and oriented x3,  female, 66-year-old. Patient reports that her postoperative pain is tolerable currently. Patient has been able to tolerate p.o. intake without nausea or vomiting. Patient denies any chest pain, shortness of breath, or abdominal pain. Patient reports that she has a past medical history of HTN, depression with anxiety, OA, GERD, and HLD. Patient is under care of pain management for her chronic pain. Past Medical History:   Diagnosis Date    Anemia     Arthritis     Chicken pox     Chronic back pain     H/O bladder infections     Headache(784.0)     History of blood transfusion     post delivery of first child, again after gastric ulcer cauterization, again for anemia    Hyperlipidemia     meds for about 1 year    Hypertension     meds for about 2 years    Measles     Mumps     Stroke ([facility] [address] 07/2020       Past Surgical History:   Procedure Laterality Date    APPENDECTOMY      BACK SURGERY  2014    lumbar back fusion    CHOLECYSTECTOMY      2017    COLONOSCOPY      ENDOSCOPY, COLON, DIAGNOSTIC      GALLBLADDER SURGERY      HYSTERECTOMY (CERVIX STATUS UNKNOWN)      [address] Right 2020    JOINT REPLACEMENT Left 2018    KNEE SURGERY Right     LEG BIOPSY EXCISION Right 11/12/2021    SOFT TISSUE MASS EXCISION RIGHT ANKLE performed by Karire Ma MD at [address] [place] [address]         Prior to Admission medications    Medication Sig Start Date End Date Taking? Authorizing Provider   oxyCODONE-acetaminophen (PERCOCET) 5-325 MG per tablet Take 1 tablet by mouth every 6 hours as needed for Pain for up to 14 days. Intended supply: 14 days.  Take lowest dose possible to manage pain 10/21/22 11/4/22  Renny Butcher Rosalba Navarro MD   ziprasidone (GEODON) 80 MG capsule Take 80 mg by mouth 2 times daily (with meals)    Historical Provider, MD   losartan-hydroCHLOROthiazide (HYZAAR) 50-12.5 MG per tablet Take 1 tablet by mouth daily    Historical Provider, MD   oxyCODONE-acetaminophen (PERCOCET) 5-325 MG per tablet Take 1 tablet by mouth every 8-12 hours as needed for Pain for up to 30 days. #80 tabs for 30 days 9/28/22 10/28/22  SARAH Beach - CNP   gabapentin (NEURONTIN) 400 MG capsule Take 1 capsule by mouth 4 times daily for 90 days.  8/17/22 11/15/22  SARAH Church CNP   ALPRAZolam (XANAX) 1 MG tablet TAKE 1 TABLET BY MOUTH DAILY AS NEEDED 5/17/22   Historical Provider, MD   ASPIRIN LOW DOSE 81 MG EC tablet TAKE 1 TABLET BY MOUTH ONCE DAILY 4/23/21   Historical Provider, MD   AIMOVIG 140 MG/ML SOAJ Inject 140 mg as directed every 30 days 3/25/21   Sukhdev Flores MD   vilazodone HCl (VIIBRYD) 10 MG TABS Take 40 mg by mouth daily    Historical Provider, MD   atorvastatin (LIPITOR) 10 MG tablet Take 10 mg by mouth daily    Historical Provider, MD   Handicap Placard MISC by Does not apply route For 5 years 12/4/20   Julian Gill MD   naloxone 4 MG/0.1ML LIQD nasal spray 1 spray by Nasal route as needed for Opioid Reversal  Patient not taking: Reported on 10/21/2022 10/3/20   Marino Cortez MD   pantoprazole (PROTONIX) 40 MG tablet Take by mouth in the morning and at bedtime 8/7/17   Historical Provider, MD   traZODone (DESYREL) 100 MG tablet Take 300 mg by mouth nightly 3/9/20   Historical Provider, MD       Scheduled Meds:   sodium chloride flush  5-40 mL IntraVENous 2 times per day    ceFAZolin (ANCEF) IVPB  2,000 mg IntraVENous Q8H    acetaminophen  650 mg Oral Q6H    bisacodyl  5 mg Oral Daily     Continuous Infusions:   sodium chloride 100 mL/hr at 10/21/22 1342    sodium chloride       PRN Meds:sodium chloride flush, sodium chloride, oxyCODONE **OR** oxyCODONE, HYDROmorphone **OR** HYDROmorphone, ondansetron, pain, nausea and vomiting. Endocrine: Negative. Negative for polydipsia, polyphagia and polyuria. Genitourinary:  Negative for difficulty urinating, dysuria and pelvic pain. Musculoskeletal:  Positive for back pain. Skin: Negative. Negative for rash. Allergic/Immunologic: Negative. Neurological: Negative. Negative for dizziness, speech difficulty and weakness. Hematological: Negative. Psychiatric/Behavioral: Negative. Negative for behavioral problems and confusion. ROS as noted in HPI, 12 point ROS reviewed and otherwise negative. Physical Exam:  Vitals:    10/21/22 0600 10/21/22 1155 10/21/22 1230 10/21/22 1240   BP: 108/68   129/68   Pulse: 76      Resp: 16      Temp: 97.1 °F (36.2 °C) 98.7 °F (37.1 °C)     TempSrc: Temporal Temporal     SpO2: 96%  94%    Weight: 196 lb (88.9 kg)      Height: 5' 5\" (1.651 m)          Physical Exam  Vitals and nursing note reviewed. Constitutional:       General: She is not in acute distress. Appearance: Normal appearance. She is not ill-appearing. HENT:      Head: Normocephalic and atraumatic. Mouth/Throat:      Mouth: Mucous membranes are moist.   Eyes:      Pupils: Pupils are equal, round, and reactive to light. Cardiovascular:      Rate and Rhythm: Normal rate and regular rhythm. Pulses: Normal pulses. Heart sounds: Normal heart sounds. Pulmonary:      Effort: Pulmonary effort is normal. No respiratory distress. Breath sounds: Normal breath sounds. No wheezing, rhonchi or rales. Abdominal:      General: Abdomen is flat. Bowel sounds are normal.      Tenderness: There is no abdominal tenderness. Musculoskeletal:      Right lower leg: No edema. Skin:     General: Skin is warm and dry. Capillary Refill: Capillary refill takes less than 2 seconds. Neurological:      General: No focal deficit present. Mental Status: She is alert.    Psychiatric:         Mood and Affect: Mood normal.        Labs:  Recent Results (from the past 72 hour(s))   TYPE AND SCREEN    Collection Time: 10/21/22  7:14 AM   Result Value Ref Range    ABO/Rh O POS     Antibody Screen POS    PREPARE RBC (CROSSMATCH)    Collection Time: 10/21/22  7:14 AM   Result Value Ref Range    Product Code Blood Bank [id number]     Description Blood Bank Red Blood Cells, Leuko-reduced     Unit Number [id number]     Dispense Status Blood Bank selected     Product Code Blood Bank [id number]     Description Blood Bank Red Blood Cells, Leuko-reduced     Unit Number [id number]     Dispense Status Blood Bank selected     Product Code Blood Bank [id number]     Description Blood Bank Red Blood Cells, Apheresis, Leuko-reduced     Unit Number [id number]     Dispense Status Blood Bank selected    ANTIBODY IDENTIFICATION    Collection Time: 10/21/22  7:14 AM   Result Value Ref Range    Antibody ID POS, Anti-[place]        Data:    No results found. Assessment/Plan:    Principal Problem:  Spondylolisthesis, lumbosacral region Right L2-3: Status post surgery. Pain tolerable. No nausea vomiting. Being managed by neurosurgery. Active Problems:  HTN: PT on home meds to control. Will resume home meds, as tolerated. Depression with anxiety: PT on home meds to control. Will resume home meds, as tolerated. OA: PT on home meds to control. Will resume home meds, as tolerated. GERD: PT on home meds to control. Will resume home meds, as tolerated. HLD: PT on home meds to control. Will resume home meds, as tolerated.     Electronically signed by SARAH Sinha CNP on 10/21/22 at 5:22 PM ALLEY Montalvo MD- supervising physician

## 2022-10-21 NOTE — ANESTHESIA PRE PROCEDURE
Department of Anesthesiology  Preprocedure Note       Name:  Ursula Navarro   Age:  58 y.o.  :  1960                                          MRN:  [id number]         Date:  10/21/2022      Surgeon: Terrie Laboy):  Tiffanie Last MD    Procedure: Procedure(s):  RIGHT BILATERAL L2-3 LAMINECTOMIES MICRODISSECTION FORAMINOTOMIES INTERBODY POSTEROLATERAL PEDICLE SCREW FUSION-IGS-IFUSE  3 HOURS/ 2 C-ARMS/ NUVASIVE/ IGS/ INFUSE DYKES CATH/ ROOM #1/ NEW MICROSCOPE/ POWER RONGEUR. SCHEDULED 22-CASSIDY/NIMO    Medications prior to admission:   Prior to Admission medications    Medication Sig Start Date End Date Taking? Authorizing Provider   oxyCODONE-acetaminophen (PERCOCET) 5-325 MG per tablet Take 1 tablet by mouth every 6 hours as needed for Pain for up to 14 days. Intended supply: 14 days. Take lowest dose possible to manage pain 10/21/22 11/4/22  Tiffanie Last MD   ziprasidone (GEODON) 80 MG capsule Take 80 mg by mouth 2 times daily (with meals)    Historical Provider, MD   losartan-hydroCHLOROthiazide (HYZAAR) 50-12.5 MG per tablet Take 1 tablet by mouth daily    Historical Provider, MD   oxyCODONE-acetaminophen (PERCOCET) 5-325 MG per tablet Take 1 tablet by mouth every 8-12 hours as needed for Pain for up to 30 days. #80 tabs for 30 days 9/28/22 10/28/22  Gallito Cannon APRN - CNP   gabapentin (NEURONTIN) 400 MG capsule Take 1 capsule by mouth 4 times daily for 90 days.  8/17/22 11/15/22  Rosalba Zamora APRN - CNP   ALPRAZolam (XANAX) 1 MG tablet TAKE 1 TABLET BY MOUTH DAILY AS NEEDED 22   Historical Provider, MD   ASPIRIN LOW DOSE 81 MG EC tablet TAKE 1 TABLET BY MOUTH ONCE DAILY 21   Historical Provider, MD   AIMOVIG 140 MG/ML SOAJ Inject 140 mg as directed every 30 days 3/25/21   Cleo Wick MD   vilazodone HCl (VIIBRYD) 10 MG TABS Take 40 mg by mouth daily    Historical Provider, MD   atorvastatin (LIPITOR) 10 MG tablet Take 10 mg by mouth daily    Historical Provider, MD   Handicap Placard [address] [facility] by Does not apply route For 5 years 12/4/20   Frankey , MD   naloxone 4 MG/0.1ML LIQD nasal spray 1 spray by Nasal route as needed for Opioid Reversal  Patient not taking: Reported on 10/21/2022 10/3/20   Marleni Castaneda MD   pantoprazole (PROTONIX) 40 MG tablet Take by mouth in the morning and at bedtime 8/7/17   Historical Provider, MD   traZODone (DESYREL) 100 MG tablet Take 300 mg by mouth nightly 3/9/20   Historical Provider, MD       Current medications:    Current Facility-Administered Medications   Medication Dose Route Frequency Provider Last Rate Last Admin    lactated ringers infusion   IntraVENous Continuous Ronni Prader, MD        ceFAZolin (ANCEF) 2000 mg in dextrose 5 % 100 mL IVPB  2,000 mg IntraVENous Once Ronni Prader, MD        lidocaine PF 1 % injection 1 mL  1 mL IntraDERmal Once PRN Inetta Copier, APRN - CNP        lactated ringers infusion   IntraVENous Continuous Inetta Copier, APRN -  mL/hr at 10/21/22 0634 New Bag at 10/21/22 0634    sodium chloride flush 0.9 % injection 5-40 mL  5-40 mL IntraVENous 2 times per day Inetta Copier, APRN - CNP        sodium chloride flush 0.9 % injection 5-40 mL  5-40 mL IntraVENous PRN Inetta Copier, APRN - CNP        0.9 % sodium chloride infusion   IntraVENous PRN Inetta Copier, APRN - CNP           Allergies:     Allergies   Allergen Reactions    Nsaids      Other reaction(s): bleed, Free Text    Ibuprofen Nausea Only       Problem List:    Patient Active Problem List   Diagnosis Code    Lumbosacral spondylosis without myelopathy M47.817    SI (sacroiliac) joint dysfunction M53.3    Arthropathy of shoulder region M19.019    Lumbar stenosis with neurogenic claudication M48.062    Spondylolisthesis, lumbosacral region Right L2-3 M43.17    Arthritis of knee, left M17.12    Chronic pain syndrome G89.4    Cervical spondylosis without myelopathy M47.812    Postlaminectomy syndrome of lumbar region M96.1    Cervical disc disorder with radiculopathy, unspecified cervical region M50.10    Cervical disc disorder with radiculopathy, unspecified cervical region M50.10    Hallucinations, unspecified R44.3    Mood disorder ([facility]) F39    Strain of unspecified muscle, fascia and tendon at shoulder and upper arm level, right arm, initial encounter S46.911A    Cystocele [id number]    Frequency [id number]    GERD (gastroesophageal reflux disease) K21.9    Incomplete bladder emptying R33.9    Ovarian cyst N83.209    Recurrent UTI N39.0    Urge incontinence of urine N39.41    Urinary incontinence R32    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    Migraine without aura and with status migrainosus, not intractable G43.001    Intractable chronic migraine without aura and without status migrainosus G43.719    History of disease Z87.898    Benign essential hypertension I10    Disruptions of 24 hour sleep-wake cycle G47.20    Fever R50.9    Hematuria R31.9    History of total knee arthroplasty Z96.659    Nausea, vomiting and diarrhea R11.2, R19.7    Lesion of skin of face L98.9    Syncope R55    Abnormal liver function tests R79.89    Anemia D64.9    Candidiasis of skin B37.2    Cerebrovascular accident ([facility] [address] I63.9    History of cerebrovascular accident Z86.73    Left hemiparesis ([facility]) G81.94    Numbness of upper limb R20.0    Refractory migraine without aura G43.019    Acquired absence of other specified parts of digestive tract Z90.49    Acute ischemic heart disease, unspecified ([facility]) I24.9    Bradycardia, unspecified R00.1    Carpal tunnel syndrome, left upper limb G56.02    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 ([facility]) I69.354    Hyperlipidemia, unspecified E78.5    Hypertensive urgency I16.0    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    Major depressive disorder, single episode, unspecified F32.9    Migraine, unspecified, not intractable, without status migrainosus G43.909    Peripheral vascular disease, unspecified ([facility] [address] I73.9    Spondylosis without myelopathy or radiculopathy, lumbar region M47.816    Peptic ulc, site unsp, unsp as ac or chr, w/o hemor or perf K27.9    Presence of left artificial knee joint Z96.652    Severe episode of recurrent major depressive disorder, without psychotic features ([facility] [address] F33.2    Smoker F17.200       Past Medical History:        Diagnosis Date    Anemia     Arthritis     Chicken pox     Chronic back pain     H/O bladder infections     Headache(784.0)     History of blood transfusion     post delivery of first child, again after gastric ulcer cauterization, again for anemia    Hyperlipidemia     meds for about 1 year    Hypertension     meds for about 2 years    Measles     Mumps     Stroke ([facility] [address] 07/2020       Past Surgical History:        Procedure Laterality Date    APPENDECTOMY      BACK SURGERY  2014    lumbar back fusion    CHOLECYSTECTOMY      2017    COLONOSCOPY      ENDOSCOPY, COLON, DIAGNOSTIC      GALLBLADDER SURGERY      HYSTERECTOMY (CERVIX STATUS UNKNOWN)      1980    JOINT REPLACEMENT Right 2020    JOINT REPLACEMENT Left 2018    KNEE SURGERY Right     LEG BIOPSY EXCISION Right 11/12/2021    SOFT TISSUE MASS EXCISION RIGHT ANKLE [facility]ann[facility] Brown [facility] [address] [facility] [place] [address]         Social History:    Social History     Tobacco Use    Smoking status: Never    Smokeless tobacco: Never   Substance Use Topics    Alcohol use: Yes     Comment: once a week-2 beers                                Counseling given: Not Answered      Vital Signs (Current):   Vitals:    10/21/22 0600   BP: 108/68   Pulse: 76   Resp: 16   Temp: 97.1 °F (36.2 °C)   TempSrc: Temporal   SpO2: 96%   Weight: 196 lb (88.9 kg)   Height: 5' 5\" (1.651 m)                                              BP Readings from Last 3 Encounters:   10/21/22 108/68   10/06/22 105/66   09/27/22 138/80       NPO Status: Time of last liquid consumption: 1900                        Time of last solid consumption: 1900                        Date of last liquid consumption: 10/20/22                        Date of last solid food consumption: 10/20/22    BMI:   Wt Readings from Last 3 Encounters:   10/21/22 196 lb (88.9 kg)   10/06/22 196 lb 6.4 oz (89.1 kg)   09/27/22 198 lb (89.8 kg)     Body mass index is 32.62 kg/m². CBC:   Lab Results   Component Value Date/Time    WBC 5.5 10/06/2022 12:15 PM    RBC 4.09 10/06/2022 12:15 PM    HGB 13.4 10/06/2022 12:15 PM    HCT 38.4 10/06/2022 12:15 PM    MCV 94.0 10/06/2022 12:15 PM    RDW 13.5 10/06/2022 12:15 PM     10/06/2022 12:15 PM       CMP:   Lab Results   Component Value Date/Time     10/06/2022 12:21 PM    K 3.5 10/06/2022 12:21 PM     10/06/2022 12:21 PM    CO2 25 10/06/2022 12:21 PM    BUN 12 10/06/2022 12:21 PM    CREATININE 0.77 10/06/2022 12:21 PM    GFRAA >60.0 10/06/2022 12:21 PM    LABGLOM >60.0 10/06/2022 12:21 PM    GLUCOSE 117 10/06/2022 12:21 PM    GLUCOSE 116 12/17/2020 05:13 AM    PROT 6.2 12/09/2020 08:30 AM    CALCIUM 9.6 10/06/2022 12:21 PM    BILITOT 0.4 12/09/2020 08:30 AM    ALKPHOS 50 12/09/2020 08:30 AM    AST 15 12/09/2020 08:30 AM    ALT 16 12/09/2020 08:30 AM       POC Tests: No results for input(s): POCGLU, POCNA, POCK, POCCL, POCBUN, POCHEMO, POCHCT in the last 72 hours.     Coags:   Lab Results   Component Value Date/Time    PROTIME 13.3 10/06/2022 12:15 PM    INR 1.0 10/06/2022 12:15 PM    APTT 29.2 10/06/2022 12:15 PM       HCG (If Applicable): No results found for: PREGTESTUR, PREGSERUM, HCG, HCGQUANT     ABGs: No results found for: PHART, PO2ART, RUR7ORU, IIQ1FQF, BEART, K2RINCXH     Type & Screen (If Applicable):  No results found for: LABABO, LABRH    Drug/Infectious Status (If Applicable):  No results found for: HIV, HEPCAB    COVID-19 Screening (If Applicable):   Lab Results   Component Value Date/Time    COVID19 Not Detected 11/05/2021 08:49 AM COVID19 NOT DETECTED 12/14/2020 09:37 AM           Anesthesia Evaluation  Patient summary reviewed and Nursing notes reviewed no history of anesthetic complications:   Airway: Mallampati: II  TM distance: >3 FB   Neck ROM: full  Mouth opening: > = 3 FB   Dental: normal exam         Pulmonary:Negative Pulmonary ROS and normal exam                               Cardiovascular:Negative CV ROS  Exercise tolerance: good (>4 METS),   (+) hypertension:,       ECG reviewed               Beta Blocker:  Not on Beta Blocker         Neuro/Psych:   Negative Neuro/Psych ROS  (+) CVA:, neuromuscular disease:, headaches:, psychiatric history:depression/anxiety             GI/Hepatic/Renal: Neg GI/Hepatic/Renal ROS  (+) GERD:, PUD,           Endo/Other: Negative Endo/Other ROS             Pt had PAT visit. Abdominal:             Vascular: negative vascular ROS. Other Findings:           Anesthesia Plan      general     ASA 2     (ETT)  Induction: intravenous. MIPS: Postoperative opioids intended and Prophylactic antiemetics administered. Anesthetic plan and risks discussed with patient. Plan discussed with CRNA.     Attending anesthesiologist reviewed and agrees with Pre Eval content                Kayden Sherman MD   10/21/2022 No

## 2022-10-22 LAB
GLUCOSE BLD-MCNC: 104 MG/DL (ref 70–99)
PERFORMED ON: ABNORMAL

## 2022-10-22 PROCEDURE — 97166 OT EVAL MOD COMPLEX 45 MIN: CPT

## 2022-10-22 PROCEDURE — 6370000000 HC RX 637 (ALT 250 FOR IP): Performed by: NEUROLOGICAL SURGERY

## 2022-10-22 PROCEDURE — 97162 PT EVAL MOD COMPLEX 30 MIN: CPT

## 2022-10-22 PROCEDURE — 6360000002 HC RX W HCPCS: Performed by: NEUROLOGICAL SURGERY

## 2022-10-22 PROCEDURE — 2580000003 HC RX 258: Performed by: NEUROLOGICAL SURGERY

## 2022-10-22 PROCEDURE — 1210000000 HC MED SURG R&B

## 2022-10-22 PROCEDURE — 6370000000 HC RX 637 (ALT 250 FOR IP): Performed by: NURSE PRACTITIONER

## 2022-10-22 RX ORDER — FENTANYL 25 UG/H
1 PATCH TRANSDERMAL
Status: DISCONTINUED | OUTPATIENT
Start: 2022-10-22 | End: 2022-10-22

## 2022-10-22 RX ORDER — CYCLOBENZAPRINE HCL 10 MG
10 TABLET ORAL 3 TIMES DAILY PRN
Status: DISCONTINUED | OUTPATIENT
Start: 2022-10-22 | End: 2022-10-24 | Stop reason: HOSPADM

## 2022-10-22 RX ORDER — FENTANYL 12 UG/H
1 PATCH TRANSDERMAL
Status: DISCONTINUED | OUTPATIENT
Start: 2022-10-22 | End: 2022-10-24 | Stop reason: HOSPADM

## 2022-10-22 RX ADMIN — OXYCODONE 5 MG: 5 TABLET ORAL at 12:35

## 2022-10-22 RX ADMIN — OXYCODONE 5 MG: 5 TABLET ORAL at 07:35

## 2022-10-22 RX ADMIN — CEFAZOLIN 2000 MG: 10 INJECTION, POWDER, FOR SOLUTION INTRAVENOUS at 00:15

## 2022-10-22 RX ADMIN — ACETAMINOPHEN 650 MG: 325 TABLET ORAL at 01:16

## 2022-10-22 RX ADMIN — OXYCODONE 5 MG: 5 TABLET ORAL at 02:52

## 2022-10-22 RX ADMIN — ACETAMINOPHEN 650 MG: 325 TABLET ORAL at 12:35

## 2022-10-22 RX ADMIN — ACETAMINOPHEN 650 MG: 325 TABLET ORAL at 20:43

## 2022-10-22 RX ADMIN — TRAZODONE HYDROCHLORIDE 100 MG: 100 TABLET ORAL at 20:43

## 2022-10-22 RX ADMIN — ZIPRASIDONE HYDROCHLORIDE 80 MG: 20 CAPSULE ORAL at 15:48

## 2022-10-22 RX ADMIN — ATORVASTATIN CALCIUM 10 MG: 10 TABLET, FILM COATED ORAL at 20:43

## 2022-10-22 RX ADMIN — HYDROMORPHONE HYDROCHLORIDE 0.5 MG: 1 INJECTION, SOLUTION INTRAMUSCULAR; INTRAVENOUS; SUBCUTANEOUS at 15:43

## 2022-10-22 RX ADMIN — ACETAMINOPHEN 650 MG: 325 TABLET ORAL at 07:39

## 2022-10-22 RX ADMIN — HYDROMORPHONE HYDROCHLORIDE 0.5 MG: 1 INJECTION, SOLUTION INTRAMUSCULAR; INTRAVENOUS; SUBCUTANEOUS at 21:53

## 2022-10-22 RX ADMIN — OXYCODONE 5 MG: 5 TABLET ORAL at 20:44

## 2022-10-22 RX ADMIN — VILAZODONE HYDROCHLORIDE 40 MG: 40 TABLET ORAL at 09:24

## 2022-10-22 RX ADMIN — ZIPRASIDONE HYDROCHLORIDE 80 MG: 20 CAPSULE ORAL at 09:31

## 2022-10-22 RX ADMIN — PANTOPRAZOLE SODIUM 40 MG: 40 TABLET, DELAYED RELEASE ORAL at 07:39

## 2022-10-22 RX ADMIN — LOSARTAN POTASSIUM AND HYDROCHLOROTHIAZIDE 1 TABLET: 50; 12.5 TABLET, FILM COATED ORAL at 09:24

## 2022-10-22 RX ADMIN — CYCLOBENZAPRINE 10 MG: 10 TABLET, FILM COATED ORAL at 20:44

## 2022-10-22 RX ADMIN — BISACODYL 5 MG: 5 TABLET, COATED ORAL at 09:24

## 2022-10-22 RX ADMIN — CYCLOBENZAPRINE 10 MG: 10 TABLET, FILM COATED ORAL at 12:35

## 2022-10-22 RX ADMIN — Medication 10 ML: at 20:44

## 2022-10-22 RX ADMIN — HYDROMORPHONE HYDROCHLORIDE 0.5 MG: 1 INJECTION, SOLUTION INTRAMUSCULAR; INTRAVENOUS; SUBCUTANEOUS at 04:24

## 2022-10-22 RX ADMIN — HYDROMORPHONE HYDROCHLORIDE 0.5 MG: 1 INJECTION, SOLUTION INTRAMUSCULAR; INTRAVENOUS; SUBCUTANEOUS at 01:15

## 2022-10-22 ASSESSMENT — PAIN SCALES - GENERAL
PAINLEVEL_OUTOF10: 7
PAINLEVEL_OUTOF10: 2
PAINLEVEL_OUTOF10: 8
PAINLEVEL_OUTOF10: 8
PAINLEVEL_OUTOF10: 7
PAINLEVEL_OUTOF10: 7
PAINLEVEL_OUTOF10: 8
PAINLEVEL_OUTOF10: 8
PAINLEVEL_OUTOF10: 3
PAINLEVEL_OUTOF10: 8
PAINLEVEL_OUTOF10: 8

## 2022-10-22 ASSESSMENT — PAIN DESCRIPTION - LOCATION
LOCATION: BACK

## 2022-10-22 ASSESSMENT — PAIN DESCRIPTION - DESCRIPTORS
DESCRIPTORS: ACHING
DESCRIPTORS: ACHING;SPASM
DESCRIPTORS: ACHING;SPASM
DESCRIPTORS: ACHING

## 2022-10-22 ASSESSMENT — PAIN - FUNCTIONAL ASSESSMENT
PAIN_FUNCTIONAL_ASSESSMENT: ACTIVITIES ARE NOT PREVENTED
PAIN_FUNCTIONAL_ASSESSMENT: ACTIVITIES ARE NOT PREVENTED

## 2022-10-22 ASSESSMENT — PAIN DESCRIPTION - ORIENTATION
ORIENTATION: MID;LOWER
ORIENTATION: LOWER
ORIENTATION: MID;LOWER
ORIENTATION: MID

## 2022-10-22 ASSESSMENT — PAIN SCALES - WONG BAKER: WONGBAKER_NUMERICALRESPONSE: 2

## 2022-10-22 NOTE — PROGRESS NOTES
We will stop her day #1 right bilateral L2-3 laminectomy foraminotomy decompression fusion with instrumentation. She was on chronic opioids prior to surgery of Roxicodone. She was reducing before surgery but cannot completely stop. Current regimen is not controlling her pain postoperatively. We will add fentanyl patch and a muscle relaxant Flexeril for her pain control. Right lower extremity is improved with less numbness and some increase in strength. Wound is healing well. Plan DC Mckeon start mobilization as tolerated.

## 2022-10-22 NOTE — PROGRESS NOTES
Physical Therapy Med Surg Initial Assessment  [facility]       NAME: Beatris Godfrey  : 1960 (44 y.o.)  MRN: [id number]  CODE STATUS: Full Code    Date of Service: 10/22/2022    Patient Diagnosis(es): Spondylolisthesis of lumbosacral region [M43.17]  Neurogenic claudication due to lumbar spinal stenosis [M48.062]  Spondylolisthesis, lumbosacral region [M43.17]   No chief complaint on file.     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 ([address] 2021    Hypertensive urgency 2021    Iron deficiency anemia secondary to blood loss (chronic) 2021    Peptic ulc, site unsp, unsp as ac or chr, w/o hemor or perf 2021    Acute ischemic heart disease, unspecified ([address] 02/10/2021    Hyperlipidemia, unspecified 2020    Peripheral vascular disease, unspecified ([address] 2020    Presence of left artificial knee joint 2020    Abnormal liver function tests 2020    Anemia 2020    Candidiasis of skin 2020    Cerebrovascular accident ([address] 2020    History of cerebrovascular accident 2020    Left hemiparesis ([address] 2020    Numbness of upper limb 2020    Refractory migraine without aura 2020    Carpal tunnel syndrome, left upper limb 10/21/2020    Spondylosis without myelopathy or radiculopathy, lumbar region 10/08/2020    History of disease 2020    Benign essential hypertension 2020    Disruptions of 24 hour sleep-wake cycle 2020    Fever 2020    Hematuria 2020    History of total knee arthroplasty 2020    Nausea, vomiting and diarrhea 2020    Lesion of skin of face 2020    Syncope 2020    Acquired absence of other specified parts of digestive tract 07/14/2020    Bradycardia, unspecified 07/14/2020    Migraine, unspecified, not intractable, without status migrainosus 07/14/2020    GERD (gastroesophageal reflux disease) 04/01/2020    Sleep terror disorder 04/01/2020    Migraine without aura and with status migrainosus, not intractable 04/01/2020    Intractable chronic migraine without aura and without status migrainosus 04/01/2020    Postlaminectomy syndrome of lumbar region 09/04/2018    Hallucinations, unspecified 07/03/2018    Mood disorder ([facility] [address] 07/03/2018    Major depressive disorder, single episode, unspecified 07/03/2018    Cervical spondylosis without myelopathy 01/18/2018    Cervical disc disorder with radiculopathy, unspecified cervical region 12/30/2017    Cervical disc disorder with radiculopathy, unspecified cervical region 12/30/2017    Strain of unspecified muscle, fascia and tendon at shoulder and upper arm level, right arm, initial encounter 12/30/2017    Chronic pain syndrome 06/06/2017    Arthritis of knee, left 04/10/2017    Lumbar stenosis with neurogenic claudication 10/27/2016    Spondylolisthesis, lumbosacral region Right L2-3 10/27/2016    Lumbosacral spondylosis without myelopathy 05/13/2016    SI (sacroiliac) joint dysfunction 05/13/2016    Arthropathy of shoulder region 05/13/2016    Incomplete bladder emptying 09/29/2009    Cystocele 09/17/2009    Frequency 09/17/2009    Ovarian cyst 09/17/2009    Recurrent UTI 09/17/2009    Urge incontinence of urine 09/17/2009    Urinary incontinence 09/17/2009        Past Medical History:   Diagnosis Date    Anemia     Arthritis     Chicken pox     Chronic back pain     H/O bladder infections     Headache(784.0)     History of blood transfusion     post delivery of first child, again after gastric ulcer cauterization, again for anemia    Hyperlipidemia     meds for about 1 year    Hypertension     meds for about 2 years    Measles     Mumps     Stroke ([facility] [address] 07/2020     Past Surgical History: Procedure Laterality Date    APPENDECTOMY      BACK SURGERY  2014    lumbar back fusion    CHOLECYSTECTOMY      2017    COLONOSCOPY      ENDOSCOPY, COLON, DIAGNOSTIC      GALLBLADDER SURGERY      HYSTERECTOMY (CERVIX STATUS UNKNOWN)      1980    JOINT REPLACEMENT Right 2020    JOINT REPLACEMENT Left 2018    KNEE SURGERY Right     LEG BIOPSY EXCISION Right 11/12/2021    SOFT TISSUE MASS EXCISION RIGHT ANKLE performed by Jose Donaldson MD at [address]         Patient assessed for rehabilitation services?: Yes  Family / Caregiver Present: No    Restrictions:  Position Activity Restriction  Other position/activity restrictions: limit bending, twisitng, lifitng per clinical judgment d/t recent back surgery     SUBJECTIVE:   Pain   6/10 back- pt stated she just received pain meds prior to therapy arrival    Prior Level of Function:  Social/Functional History  Lives With: Spouse ( retired)  Type of Home: House  Home Layout: One level  Home Access: Stairs to enter without rails  Entrance Stairs - Number of Steps: 1  Bathroom Shower/Tub: Tub/Shower unit  Bathroom Toilet: Handicap height  Home Equipment: Cane, Fluent Home2 eGifter, [address], Reacher, Sock aid  Has the patient had two or more falls in the past year or any fall with injury in the past year?: No  ADL Assistance: [address] [place] [address] Avenue: Independent (shared with )  Homemaking Responsibilities: No ( completes)  Ambulation Assistance: Independent  Transfer Assistance: Independent  Active : Yes  Occupation: Retired  Type of Occupation: associate at [employer]:   Vision  Vision: Impaired  Vision Exceptions: Wears glasses for reading  Hearing: Within functional limits    Cognition:  Overall Orientation Status: Within Functional Limits  Follows Commands: Within Functional Limits  Overall Cognitive Status: WFL    Observation/Palpation  Posture: Good  Observation: pt on RA, surgical dressing intact, mild c/o dizziness in sitting which resolved with seated rest    ROM:  RLE AROM: WFL  LLE AROM : WFL    Strength:  Strength RLE  Comment: grossly 4/5  Strength LLE  Comment: grossly 4+/5  Strength Other  Other: core strength 2/5    Neuro:  Balance  Sitting - Static: Good  Sitting - Dynamic: Good  Standing - Static: Fair  Standing - Dynamic: Fair;-    Sensation: Impaired (R gastroc numbness)    Bed mobility  Supine to Sit: Supervision (HOB elevated)  Sit to Supine: Unable to assess (pt seated in chair)    Transfers  Sit to Stand: Supervision  Stand to Sit: Supervision  Comment: [employer] used    Ambulation  Surface: Level tile  Device: Rolling Walker  Assistance: Supervision  Quality of Gait: slo, guarded steps  Gait Deviations: Slow Tena;Decreased step length;Decreased step height  Distance: 30ft    Stairs/Curb  Stairs?: No    Activity Tolerance  Activity Tolerance: Patient limited by pain; Patient limited by fatigue;Patient limited by endurance    Patient Education  Education Given To: Patient  Education Provided: Role of Therapy;Plan of Care  Education Method: Verbal  Education Outcome: Continued education needed       ASSESSMENT:   Body Structures, Functions, Activity Limitations Requiring Skilled Therapeutic Intervention: Decreased functional mobility ; Decreased ROM; Decreased strength;Decreased endurance;Decreased sensation;Decreased balance; Increased pain;Decreased posture  Decision Making: Medium Complexity  History: high  Exam: med  Clinical Presentation: med    Therapy Prognosis: Good    DISCHARGE RECOMMENDATIONS:  Discharge Recommendations: Continue to assess pending progress, Patient would benefit from continued therapy after discharge    Assessment: Pt is 58 y.o. female s/p lumbar spine surgery. Pt exhibits increased pain with all movement and decreased strength, balance, and activity tolerance requiring use of AD for mobility compared to PLOF of no AD used.   Continued PT is required for safe d/c home with , who is retired and able to assist the pt. Requires PT Follow-Up: Yes      PLAN OF CARE:  Physcial Therapy Plan  General Plan:  (1-2 visits per day X 7 days a wk)  Current Treatment Recommendations: Strengthening, ROM, Balance training, Functional mobility training, Transfer training, Endurance training, Gait training, Stair training, Neuromuscular re-education, Manual Therapy - Soft Tissue Mobilization, Pain management, Home exercise program, Safety education & training, Patient/Caregiver education & training, Equipment evaluation, education, & procurement, Modalities, Positioning, Therapeutic activities    Safety Devices  Type of Devices: Call light within reach, Left in chair    Goals:  Short Term Goals  Short Term Goal 1: Pt will demonstrate HEP indep  Long Term Goals  Long Term Goal 1: Pt will demonstrate bed mobility indep using log roll technique  Long Term Goal 2: Pt will demonstrate transfers mod indep with safest AD  Long Term Goal 3: Pt will demonstrate amb >/= 50ft mod indep with safest AD  Long Term Goal 4: Pt will demonstrate stair negotiation 1 step with safest AD mod indep    AMPAC (6 CLICK) BASIC MOBILITY  AM-PAC Inpatient Mobility Raw Score : 18     Therapy Time:   Individual   Time In 1031   Time Out 1043   Minutes 12       Eval x 12 min    Elizabeth Shah PT, 10/22/22 at 11:50 AM         Definitions for assistance levels  Independent = pt does not require any physical supervision or assistance from another person for activity completion. Device may be needed.   Stand by assistance = pt requires verbal cues or instructions from another person, close to but not touching, to perform the activity  Minimal assistance= pt performs 75% or more of the activity; assistance is required to complete the activity  Moderate assistance= pt performs 50% of the activity; assistance is required to complete the activity  Maximal assistance = pt performs 25% of the activity; assistance is required to complete the activity  Dependent = pt requires total physical assistance to accomplish the task

## 2022-10-22 NOTE — CARE COORDINATION
[facility] [place] Case Management Initial Discharge Assessment    Met with Patient to discuss discharge plan. PCP: Kalyani Wagner DO                                Date of Last Visit: 4 MONTHS AGO    [facility] Patient: No        [facility] Notified: no    If no PCP, list provided? N/A    Discharge Planning    Living Arrangements: independently at home    Who do you live with? SPOUSE     Who helps you with your care:  self    If lives at home:     Do you have any barriers navigating in your home? yes - 1 STEP     Patient can perform ADL? Yes    Current Services (outpatient and in home) :  None    Dialysis: No    Is transportation available to get to your appointments? Yes    DME Equipment:  yes - WALKER AND CANE    Respiratory equipment: None    Respiratory provider:  no     [facility]:  yes [facility]     Consult with Medication Assistance Program?  No      Patient agreeable to [address]? Declined    Patient agreeable to SNF/Rehab? Declined    Other discharge needs identified? N/A    Does Patient Have a High-Risk for Readmission Diagnosis (CHF, PN, MI, COPD)? No    Initial Discharge Plan? MET W/PT TO ASSESS NEEDS AND DISCUSS DISCHARGE PLAN WHICH IS HOME W/SPOUSE, DECLINES NEEDS. PT HAS ALL NEEDED DME. CM TO FOLLOW CLINICAL COURSE. (Note: please see concurrent daily documentation for any updates after initial note).         Readmission Risk              Risk of Unplanned Readmission:  10         Electronically signed by Dao Pan RN on 10/22/2022 at 12:09 PM

## 2022-10-22 NOTE — PROGRESS NOTES
[facility] OCCUPATIONAL THERAPY EVALUATION - ACUTE     NAME: Lalit Kasper  : 1960 (58 y.o.)  MRN: [id number]  CODE STATUS: Full Code  Room: B327/L149-70    Date of Service: 10/22/2022    Patient Diagnosis(es): Spondylolisthesis of lumbosacral region [M43.17]  Neurogenic claudication due to lumbar spinal stenosis [M48.062]  Spondylolisthesis, lumbosacral region [M43.17]   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 ([address] 2021    Hypertensive urgency 2021    Iron deficiency anemia secondary to blood loss (chronic) 2021    Peptic ulc, site unsp, unsp as ac or chr, w/o hemor or perf 2021    Acute ischemic heart disease, unspecified ([address] 02/10/2021    Hyperlipidemia, unspecified 2020    Peripheral vascular disease, unspecified ([address] 2020    Presence of left artificial knee joint 2020    Abnormal liver function tests 2020    Anemia 2020    Candidiasis of skin 2020    Cerebrovascular accident ([address] 2020    History of cerebrovascular accident 2020    Left hemiparesis ([address] 2020    Numbness of upper limb 2020    Refractory migraine without aura 2020    Carpal tunnel syndrome, left upper limb 10/21/2020    Spondylosis without myelopathy or radiculopathy, lumbar region 10/08/2020    History of disease 2020    Benign essential hypertension 2020    Disruptions of 24 hour sleep-wake cycle 2020    Fever 2020    Hematuria 2020    History of total knee arthroplasty 2020    Nausea, vomiting and diarrhea 2020    Lesion of skin of face 2020    Syncope 2020    Acquired absence of other specified parts of digestive tract 2020    Bradycardia, unspecified 2020    Migraine, unspecified, not intractable, without status migrainosus 07/14/2020    GERD (gastroesophageal reflux disease) 04/01/2020    Sleep terror disorder 04/01/2020    Migraine without aura and with status migrainosus, not intractable 04/01/2020    Intractable chronic migraine without aura and without status migrainosus 04/01/2020    Postlaminectomy syndrome of lumbar region 09/04/2018    Hallucinations, unspecified 07/03/2018    Mood disorder ([facility] 07/03/2018    Major depressive disorder, single episode, unspecified 07/03/2018    Cervical spondylosis without myelopathy 01/18/2018    Cervical disc disorder with radiculopathy, unspecified cervical region 12/30/2017    Cervical disc disorder with radiculopathy, unspecified cervical region 12/30/2017    Strain of unspecified muscle, fascia and tendon at shoulder and upper arm level, right arm, initial encounter 12/30/2017    Chronic pain syndrome 06/06/2017    Arthritis of knee, left 04/10/2017    Lumbar stenosis with neurogenic claudication 10/27/2016    Spondylolisthesis, lumbosacral region Right L2-3 10/27/2016    Lumbosacral spondylosis without myelopathy 05/13/2016    SI (sacroiliac) joint dysfunction 05/13/2016    Arthropathy of shoulder region 05/13/2016    Incomplete bladder emptying 09/29/2009    Cystocele 09/17/2009    Frequency 09/17/2009    Ovarian cyst 09/17/2009    Recurrent UTI 09/17/2009    Urge incontinence of urine 09/17/2009    Urinary incontinence 09/17/2009        Past Medical History:   Diagnosis Date    Anemia     Arthritis     Chicken pox     Chronic back pain     H/O bladder infections     Headache(784.0)     History of blood transfusion     post delivery of first child, again after gastric ulcer cauterization, again for anemia    Hyperlipidemia     meds for about 1 year    Hypertension     meds for about 2 years    Measles     Mumps     Stroke ([facility] 07/2020     Past Surgical History:   Procedure Laterality Date    APPENDECTOMY      BACK SURGERY  2014 lumbar back fusion    CHOLECYSTECTOMY      2017    COLONOSCOPY      ENDOSCOPY, COLON, DIAGNOSTIC      GALLBLADDER SURGERY      HYSTERECTOMY (CERVIX STATUS UNKNOWN)      1980    JOINT REPLACEMENT Right 2020    JOINT REPLACEMENT Left 2018    KNEE SURGERY Right     LEG BIOPSY EXCISION Right 11/12/2021    SOFT TISSUE MASS EXCISION RIGHT ANKLE performed by Melly Jimenez MD at [address] [place] [address] Road          Restrictions            Other position/activity restrictions: limit bending, twisitng, lifitng per clinical judgment d/t recent back surgery    Safety Devices: Safety Devices  Type of Devices: Left in chair;Call light within reach     Patient's date of birth confirmed: Yes    General:  Chart Reviewed: Yes  Patient assessed for rehabilitation services?: Yes    Subjective          Pain at start of treatment: Yes: 5/10    Pain at end of treatment: Yes: 5/10    Location: lower back   Nursing notified: Yes      Prior Level of Function:  Social/Functional History  Lives With: Spouse ( retired)  Type of Home: [address] [place] [address],[address]: One level  Home Access: Stairs to enter without rails  Entrance Stairs - Number of Steps: 1  Bathroom Shower/Tub: Tub/Shower unit  Bathroom Toilet: Handicap height  Home Equipment: Cane, [address] [facility], [address], [address], [address] aid  Has the patient had two or more falls in the past year or any fall with injury in the past year?: No  ADL Assistance: Independent  Homemaking Assistance: Independent (shared with )  Homemaking Responsibilities: No ( completes)  Ambulation Assistance: Independent  Transfer Assistance: Independent  Active : Yes  Occupation: Retired  Type of Occupation: associate at [employer]:     Orientation Status:  Orientation  Overall Orientation Status: Within Functional Limits    Observation:  Observation/Palpation  Posture: Good  Observation: pt on RA, surgical dressing intact,    Cognition Status:  Cognition  Overall Cognitive Status: WFL    Perception Status:  Perception  Overall Perceptual Status: [facility]    Vision and Hearing Status:  Vision  Vision Exceptions: Wears glasses for reading  Hearing  Hearing: Within functional limits        GROSS ASSESSMENT AROM/PROM:  AROM: Generally decreased, functional (limited d/t pain from back surgery)       ROM:   LUE AROM (degrees)  LUE AROM : WFL  Left Hand AROM (degrees)  Left Hand AROM: WFL  RUE AROM (degrees)  RUE AROM : WFL  Right Hand AROM (degrees)  Right Hand AROM: WFL    UE STRENGTH:  Strength: Within functional limits    UE COORDINATION:  Coordination: Generally decreased, functional (mild tremor B hands, pt reported had prior to surgery)    UE TONE:  Tone: Normal    UE SENSATION:  Sensation: Intact    Hand Dominance:  Hand Dominance  Hand Dominance: Right    ADL Status:  ADL  Feeding: Independent  Grooming: Independent  UE Bathing: Supervision  LE Bathing: Supervision  UE Dressing: Independent  LE Dressing: Supervision  Toileting: Supervision  Additional Comments: pt able to don/doff socks seated EOB, other ADL's simulated  Toilet Transfers  Toilet Transfer: Unable to assess  Toilet Transfers Comments: anticipated supervision    Functional Mobility:    Transfers  Sit to stand: Supervision  Stand to sit: Supervision    Patient ambulated to bedside chair with [employer] at Supervision level. Bed Mobility  Bed mobility  Supine to Sit: Supervision (HOB elevated)  Sit to Supine: Unable to assess (pt seated in chair)    Seated and Standing Balance:  Balance  Sitting: Intact  Standing: Intact    Functional Endurance:  Activity Tolerance  Activity Tolerance Comments: fair+    D/C Recommendations:  OT D/C RECOMMENDATIONS  REQUIRES OT FOLLOW-UP: No    Equipment Recommendations:  OT Equipment Recommendations  Other: continue to assess    OT Education:   Patient Education  Education Given To: Patient  Education Provided: Role of Therapy; Equipment  Education Provided Comments: pt reported  has LB AE at home  Education Method: Verbal  Barriers to Learning: None    OT Follow Up:   OT D/C RECOMMENDATIONS  REQUIRES OT FOLLOW-UP: No       Assessment/Discharge Disposition:  Assessment: Pt observed supervised in room. No acute OT recommended at this time. Prognosis: Good  Discharge Recommendations: Continue to assess pending progress  Decision Making: Medium Complexity  History: multi comorb  Exam: Supervision  Assistance / Modification: Supervision    [facility] Self care Score   How much help for putting on and taking off regular lower body clothing?: A Little  How much help for Bathing?: A Little  How much help for Toileting?: A Little  How much help for putting on and taking off regular upper body clothing?: None  How much help for taking care of personal grooming?: None  How much help for eating meals?: None  AM-PAC Inpatient Daily Activity Raw Score: 21  AM-PAC Inpatient ADL T-Scale Score : 44.27  ADL Inpatient CMS 0-100% Score: 32.79    Therapy key for assistance levels -   Independent/Mod I = Pt. is able to perform task with no assistance but may require a device   Stand by assistance = Pt. does not perform task at an independent level but does not need physical assistance, requires verbal cues  Minimal, Moderate, Maximal Assistance = Pt. requires physical assistance (25%, 50%, 75% assist from helper) for task but is able to actively participate in task   Dependent = Pt. requires total assistance with task and is not able to actively participate with task completion     Plan:  Occupational Therapy Plan  Times Per Week: no acute OT recommended at this time    Goals: n/a    Patient Goal:    home   Discussed and agreed upon: Yes Comments:       Therapy Time:   Individual   Time In 1031   Time Out 1042   Minutes 11          Eval: 11 minutes     Electronically signed by:     RAQUEL Amor,   10/22/2022, 11:42 AM

## 2022-10-22 NOTE — PROGRESS NOTES
Hospitalist Daily Progress Note  Name: Sarah Beth Carrasco  Age: 58 y.o. Gender: female  CodeStatus: Full Code  Allergies: Nsaids  Ibuprofen    Chief Complaint:No chief complaint on file. Primary Care Provider: Ravindra Pitts DO    InpatientTreatment Team: Treatment Team: Attending Provider: Yumiko Goodwin MD; Surgeon: Yumiko Goodwin MD; Consulting Physician: Estelle Will MD; Utilization Reviewer: Jenise Zarate RN; : Rene York RN; Registered Nurse: Cyndra Prader, RN; Utilization Reviewer: Elma Rosenberg RN    Admission Date: 10/21/2022      Subjective: No chest pain, sob, nausea. Pain moderately well controlled. Physical Exam  Vitals and nursing note reviewed. Constitutional:       Appearance: Normal appearance. Cardiovascular:      Rate and Rhythm: Normal rate and regular rhythm. Pulmonary:      Effort: Pulmonary effort is normal.      Breath sounds: Normal breath sounds. Abdominal:      General: Bowel sounds are normal.      Palpations: Abdomen is soft. Musculoskeletal:         General: Normal range of motion. Skin:     General: Skin is warm and dry. Neurological:      Mental Status: She is alert and oriented to person, place, and time. Mental status is at baseline.        Medications:  Reviewed    Infusion Medications:    sodium chloride 100 mL/hr at 10/21/22 1342    sodium chloride       Scheduled Medications:    fentaNYL  1 patch TransDERmal Q72H    sodium chloride flush  5-40 mL IntraVENous 2 times per day    acetaminophen  650 mg Oral Q6H    bisacodyl  5 mg Oral Daily    atorvastatin  10 mg Oral Nightly    losartan-hydroCHLOROthiazide  1 tablet Oral Daily    pantoprazole  40 mg Oral QAM AC    traZODone  100 mg Oral Nightly    vilazodone HCl  40 mg Oral Daily    ziprasidone  80 mg Oral BID WC     PRN Meds: cyclobenzaprine, sodium chloride flush, sodium chloride, oxyCODONE **OR** oxyCODONE, HYDROmorphone **OR** HYDROmorphone, ondansetron, magnesium hydroxide, polyethylene glycol    Labs:   No results for input(s): WBC, HGB, HCT, PLT in the last 72 hours. No results for input(s): NA, K, CL, CO2, BUN, CREATININE, CALCIUM, PHOS in the last 72 hours. Invalid input(s): MAGNES  No results for input(s): AST, ALT, BILIDIR, BILITOT, ALKPHOS in the last 72 hours. No results for input(s): INR in the last 72 hours. No results for input(s): Trevor Ebbs in the last 72 hours. Urinalysis:   No results found for: Channie Daryl, BACTERIA, RBCUA, BLOODU, [address], [address]    Radiology:   Most recent    Chest CT      WITH CONTRAST:No results found for this or any previous visit. WITHOUT CONTRAST: No results found for this or any previous visit. CXR      2-view: No results found for this or any previous visit. Portable: Results for orders placed during the hospital encounter of 11/12/21    XR CHEST PORTABLE    Narrative  Exam: XR CHEST PORTABLE    History: Preoperative evaluation. Technique: AP portable view of the chest obtained. Comparison: None available    Findings: The cardiomediastinal silhouette is within normal limits. No pneumothorax, pleural effusion, or consolidation. Bones of the thorax appear intact. Impression  No radiographic evidence of acute intrathoracic process. Echo No results found for this or any previous visit. Assessment/Plan:    Active Hospital Problems    Diagnosis Date Noted    Postlaminectomy syndrome of lumbar region [M96.1] 09/04/2018    Lumbar stenosis with neurogenic claudication [M48.062] 10/27/2016    Spondylolisthesis, lumbosacral region Right L2-3 [M43.17] 10/27/2016    Lumbosacral spondylosis without myelopathy [M47.817] 05/13/2016     Spondylolisthesis, lumbosacral region Right L2-3: Status post surgery. Pain tolerable. No nausea vomiting. Being managed by neurosurgery. 10/22 - cont post op  care, pt/ot     Active Problems:  HTN: PT on home meds to control.   Will resume home meds, as tolerated. Depression with anxiety: PT on home meds to control. Will resume home meds, as tolerated. OA: PT on home meds to control. Will resume home meds, as tolerated. GERD: PT on home meds to control. Will resume home meds, as tolerated. HLD: PT on home meds to control. Will resume home meds, as tolerated.     Electronically signed by Nahum Mcdonald MD on 10/22/2022 at 5:24 PM

## 2022-10-22 NOTE — PLAN OF CARE
Problem: Discharge Planning  Goal: Discharge to home or other facility with appropriate resources  Outcome: Progressing     Problem: Chronic Conditions and Co-morbidities  Goal: Patient's chronic conditions and co-morbidity symptoms are monitored and maintained or improved  Outcome: Progressing     Problem: Pain  Goal: Verbalizes/displays adequate comfort level or baseline comfort level  Outcome: Progressing     Problem: Safety - Adult  Goal: Free from fall injury  Outcome: Progressing     Problem: ABCDS Injury Assessment  Goal: Absence of physical injury  Outcome: Progressing

## 2022-10-23 PROCEDURE — 6370000000 HC RX 637 (ALT 250 FOR IP): Performed by: NEUROLOGICAL SURGERY

## 2022-10-23 PROCEDURE — 97116 GAIT TRAINING THERAPY: CPT

## 2022-10-23 PROCEDURE — 6370000000 HC RX 637 (ALT 250 FOR IP): Performed by: NURSE PRACTITIONER

## 2022-10-23 PROCEDURE — 6360000002 HC RX W HCPCS: Performed by: NEUROLOGICAL SURGERY

## 2022-10-23 PROCEDURE — 2580000003 HC RX 258: Performed by: NEUROLOGICAL SURGERY

## 2022-10-23 PROCEDURE — 1210000000 HC MED SURG R&B

## 2022-10-23 RX ADMIN — BISACODYL 5 MG: 5 TABLET, COATED ORAL at 09:08

## 2022-10-23 RX ADMIN — CYCLOBENZAPRINE 10 MG: 10 TABLET, FILM COATED ORAL at 13:24

## 2022-10-23 RX ADMIN — ZIPRASIDONE HYDROCHLORIDE 80 MG: 20 CAPSULE ORAL at 17:58

## 2022-10-23 RX ADMIN — ACETAMINOPHEN 650 MG: 325 TABLET ORAL at 21:01

## 2022-10-23 RX ADMIN — Medication 10 ML: at 21:00

## 2022-10-23 RX ADMIN — OXYCODONE 5 MG: 5 TABLET ORAL at 21:01

## 2022-10-23 RX ADMIN — HYDROMORPHONE HYDROCHLORIDE 0.5 MG: 1 INJECTION, SOLUTION INTRAMUSCULAR; INTRAVENOUS; SUBCUTANEOUS at 09:08

## 2022-10-23 RX ADMIN — PANTOPRAZOLE SODIUM 40 MG: 40 TABLET, DELAYED RELEASE ORAL at 05:25

## 2022-10-23 RX ADMIN — LOSARTAN POTASSIUM AND HYDROCHLOROTHIAZIDE 1 TABLET: 50; 12.5 TABLET, FILM COATED ORAL at 09:08

## 2022-10-23 RX ADMIN — CYCLOBENZAPRINE 10 MG: 10 TABLET, FILM COATED ORAL at 21:00

## 2022-10-23 RX ADMIN — Medication 10 ML: at 09:10

## 2022-10-23 RX ADMIN — CYCLOBENZAPRINE 10 MG: 10 TABLET, FILM COATED ORAL at 05:25

## 2022-10-23 RX ADMIN — ACETAMINOPHEN 650 MG: 325 TABLET ORAL at 05:24

## 2022-10-23 RX ADMIN — ATORVASTATIN CALCIUM 10 MG: 10 TABLET, FILM COATED ORAL at 21:00

## 2022-10-23 RX ADMIN — ACETAMINOPHEN 650 MG: 325 TABLET ORAL at 13:24

## 2022-10-23 RX ADMIN — OXYCODONE 5 MG: 5 TABLET ORAL at 13:24

## 2022-10-23 RX ADMIN — VILAZODONE HYDROCHLORIDE 40 MG: 40 TABLET ORAL at 09:08

## 2022-10-23 RX ADMIN — HYDROMORPHONE HYDROCHLORIDE 0.5 MG: 1 INJECTION, SOLUTION INTRAMUSCULAR; INTRAVENOUS; SUBCUTANEOUS at 17:00

## 2022-10-23 RX ADMIN — OXYCODONE 5 MG: 5 TABLET ORAL at 05:25

## 2022-10-23 RX ADMIN — ZIPRASIDONE HYDROCHLORIDE 80 MG: 20 CAPSULE ORAL at 09:19

## 2022-10-23 RX ADMIN — TRAZODONE HYDROCHLORIDE 100 MG: 100 TABLET ORAL at 21:01

## 2022-10-23 ASSESSMENT — PAIN DESCRIPTION - ORIENTATION
ORIENTATION: MID

## 2022-10-23 ASSESSMENT — PAIN DESCRIPTION - DESCRIPTORS
DESCRIPTORS: ACHING
DESCRIPTORS: ACHING
DESCRIPTORS: THROBBING
DESCRIPTORS: THROBBING;ACHING
DESCRIPTORS: ACHING

## 2022-10-23 ASSESSMENT — PAIN DESCRIPTION - LOCATION
LOCATION: BACK

## 2022-10-23 ASSESSMENT — PAIN DESCRIPTION - PAIN TYPE
TYPE: SURGICAL PAIN
TYPE: SURGICAL PAIN

## 2022-10-23 ASSESSMENT — PAIN SCALES - GENERAL
PAINLEVEL_OUTOF10: 8
PAINLEVEL_OUTOF10: 7
PAINLEVEL_OUTOF10: 5
PAINLEVEL_OUTOF10: 5
PAINLEVEL_OUTOF10: 8

## 2022-10-23 ASSESSMENT — PAIN - FUNCTIONAL ASSESSMENT
PAIN_FUNCTIONAL_ASSESSMENT: PREVENTS OR INTERFERES WITH ALL ACTIVE AND SOME PASSIVE ACTIVITIES
PAIN_FUNCTIONAL_ASSESSMENT: PREVENTS OR INTERFERES WITH ALL ACTIVE AND SOME PASSIVE ACTIVITIES

## 2022-10-23 NOTE — PROGRESS NOTES
Assessment completed. Patient c/o pain 7/10  Patient medicated with prn pain meds  Patient voiding without difficulty  Dressing clean dry and intact. VSS  Patient given call light and bed alarm activated.

## 2022-10-23 NOTE — PROGRESS NOTES
Physical Therapy Med Surg Daily Treatment Note  [facility]  [facility]       NAME: Renetta Washington  : 1960 (58 y.o.)  MRN: [id number]  CODE STATUS: Full Code    Date of Service: 10/23/2022    Patient Diagnosis(es): Spondylolisthesis of lumbosacral region [M43.17]  Neurogenic claudication due to lumbar spinal stenosis [M48.062]  Spondylolisthesis, lumbosacral region [M43.17]   No chief complaint on file.     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 ([address] 2021    Hypertensive urgency 2021    Iron deficiency anemia secondary to blood loss (chronic) 2021    Peptic ulc, site unsp, unsp as ac or chr, w/o hemor or perf 2021    Acute ischemic heart disease, unspecified ([address] 02/10/2021    Hyperlipidemia, unspecified 2020    Peripheral vascular disease, unspecified ([address] 2020    Presence of left artificial knee joint 2020    Abnormal liver function tests 2020    Anemia 2020    Candidiasis of skin 2020    Cerebrovascular accident ([address] 2020    History of cerebrovascular accident 2020    Left hemiparesis ([address] 2020    Numbness of upper limb 2020    Refractory migraine without aura 2020    Carpal tunnel syndrome, left upper limb 10/21/2020    Spondylosis without myelopathy or radiculopathy, lumbar region 10/08/2020    History of disease 2020    Benign essential hypertension 2020    Disruptions of 24 hour sleep-wake cycle 2020    Fever 2020    Hematuria 2020    History of total knee arthroplasty 2020    Nausea, vomiting and diarrhea 2020    Lesion of skin of face 2020    Syncope 2020    Acquired absence of other specified parts of digestive tract 07/14/2020    Bradycardia, unspecified 07/14/2020    Migraine, unspecified, not intractable, without status migrainosus 07/14/2020    GERD (gastroesophageal reflux disease) 04/01/2020    Sleep terror disorder 04/01/2020    Migraine without aura and with status migrainosus, not intractable 04/01/2020    Intractable chronic migraine without aura and without status migrainosus 04/01/2020    Postlaminectomy syndrome of lumbar region 09/04/2018    Hallucinations, unspecified 07/03/2018    Mood disorder ([facility] 07/03/2018    Major depressive disorder, single episode, unspecified 07/03/2018    Cervical spondylosis without myelopathy 01/18/2018    Cervical disc disorder with radiculopathy, unspecified cervical region 12/30/2017    Cervical disc disorder with radiculopathy, unspecified cervical region 12/30/2017    Strain of unspecified muscle, fascia and tendon at shoulder and upper arm level, right arm, initial encounter 12/30/2017    Chronic pain syndrome 06/06/2017    Arthritis of knee, left 04/10/2017    Lumbar stenosis with neurogenic claudication 10/27/2016    Spondylolisthesis, lumbosacral region Right L2-3 10/27/2016    Lumbosacral spondylosis without myelopathy 05/13/2016    SI (sacroiliac) joint dysfunction 05/13/2016    Arthropathy of shoulder region 05/13/2016    Incomplete bladder emptying 09/29/2009    Cystocele 09/17/2009    Frequency 09/17/2009    Ovarian cyst 09/17/2009    Recurrent UTI 09/17/2009    Urge incontinence of urine 09/17/2009    Urinary incontinence 09/17/2009        Past Medical History:   Diagnosis Date    Anemia     Arthritis     Chicken pox     Chronic back pain     H/O bladder infections     Headache(784.0)     History of blood transfusion     post delivery of first child, again after gastric ulcer cauterization, again for anemia    Hyperlipidemia     meds for about 1 year    Hypertension     meds for about 2 years    Measles     Mumps     Stroke ([facility] 07/2020     Past Surgical History: Procedure Laterality Date    APPENDECTOMY      BACK SURGERY  2014    lumbar back fusion    CHOLECYSTECTOMY      2017    COLONOSCOPY      ENDOSCOPY, COLON, DIAGNOSTIC      GALLBLADDER SURGERY      HYSTERECTOMY (CERVIX STATUS UNKNOWN)      1980    JOINT REPLACEMENT Right 2020    JOINT REPLACEMENT Left 2018    KNEE SURGERY Right     LEG BIOPSY EXCISION Right 11/12/2021    SOFT TISSUE MASS EXCISION RIGHT ANKLE performed by Selena Awad MD at [address] [place] [address] Road              Restrictions:  Position Activity Restriction  Other position/activity restrictions: limit bending, twisitng, lifitng per clinical judgment d/t recent back surgery    SUBJECTIVE:   Subjective: \"I've been getting up and going to the bathroom\"    Pain  Pain: 5/10  LBP pre session; 8/10 post with declining intervention    OBJECTIVE:        Bed mobility  Supine to Sit: Supervision  Bed Mobility Comments: Patient states she will be sleeping in the recliner    Transfers  Sit to Stand: Supervision  Stand to Sit: Supervision    Ambulation  Surface: Level tile  Device: Rolling Walker  Assistance: Supervision  Quality of Gait: Guarded gait with NBOS and decreased tena  Gait Deviations: Slow Tena;Decreased step length;Decreased step height  Distance: 50ft                   PT Exercises  Exercise Treatment: TA activation in seated 5'' x10, TA with marching x10                     ASSESSMENT   Body Structures, Functions, Activity Limitations Requiring Skilled Therapeutic Intervention: Decreased functional mobility ; Decreased ROM; Decreased strength;Decreased endurance;Decreased sensation;Decreased balance; Increased pain;Decreased posture  Assessment: Patient able to increase ambulatory distance with Fair tolerance. VCs to decrease guarding and increase HAYDEE to improve gait. Initiated TA activation activities to increase strength. Reviewed spinal precautions with patient reporting good understanding.      Discharge Recommendations:  Continue to assess pending progress, Patient would benefit from continued therapy after discharge         Goals  Short Term Goals  Short Term Goal 1: Pt will demonstrate HEP indep  Long Term Goals  Long Term Goal 1: Pt will demonstrate bed mobility indep using log roll technique  Long Term Goal 2: Pt will demonstrate transfers mod indep with safest AD  Long Term Goal 3: Pt will demonstrate amb >/= 50ft mod indep with safest AD  Long Term Goal 4: Pt will demonstrate stair negotiation 1 step with safest AD mod indep    PLAN    General Plan:  (1-2 visits per day X 7 days a wk)        [facility] (6 CLICK) BASIC MOBILITY  AM-PAC Inpatient Mobility Raw Score : 18     Therapy Time   Individual   Time In 1139   Time Out 1155   Minutes 16     Timed Code Treatment Minutes: 16 Minutes   Gait: 10  There ex: [address], SAVANNA, 10/23/22 at 12:01 PM   Electronically signed by Izzy Vasquez PTA on 10/23/2022 at 12:01 PM        Definitions for assistance levels  Independent = pt does not require any physical supervision or assistance from another person for activity completion. Device may be needed.   Stand by assistance = pt requires verbal cues or instructions from another person, close to but not touching, to perform the activity  Minimal assistance= pt performs 75% or more of the activity; assistance is required to complete the activity  Moderate assistance= pt performs 50% of the activity; assistance is required to complete the activity  Maximal assistance = pt performs 25% of the activity; assistance is required to complete the activity  Dependent = pt requires total physical assistance to accomplish the task

## 2022-10-23 NOTE — PROGRESS NOTES
Hospitalist Daily Progress Note  Name: Dex Chavez  Age: 58 y.o. Gender: female  CodeStatus: Full Code  Allergies: Nsaids  Ibuprofen    Chief Complaint:No chief complaint on file. Primary Care Provider: Flora Magallon DO    InpatientTreatment Team: Treatment Team: Attending Provider: Nikki Wei MD; Surgeon: Nikki Wei MD; Consulting Physician: Kaylee Villalba MD; : Emanuel Marshall, RN; Respiratory Therapist (Day): Simin Roy RCP; Utilization Reviewer: Taryn Painter RN; Registered Nurse: Miquel Mcmillan RN    Admission Date: 10/21/2022      Subjective: No chest pain, sob, nausea. Pain moderately well controlled. Physical Exam  Vitals and nursing note reviewed. Constitutional:       Appearance: Normal appearance. Cardiovascular:      Rate and Rhythm: Normal rate and regular rhythm. Pulmonary:      Effort: Pulmonary effort is normal.      Breath sounds: Normal breath sounds. Abdominal:      General: Bowel sounds are normal.      Palpations: Abdomen is soft. Musculoskeletal:         General: Normal range of motion. Skin:     General: Skin is warm and dry. Neurological:      Mental Status: She is alert and oriented to person, place, and time. Mental status is at baseline.        Medications:  Reviewed    Infusion Medications:    sodium chloride 100 mL/hr at 10/21/22 1342    sodium chloride       Scheduled Medications:    fentaNYL  1 patch TransDERmal Q72H    sodium chloride flush  5-40 mL IntraVENous 2 times per day    acetaminophen  650 mg Oral Q6H    bisacodyl  5 mg Oral Daily    atorvastatin  10 mg Oral Nightly    losartan-hydroCHLOROthiazide  1 tablet Oral Daily    pantoprazole  40 mg Oral QAM AC    traZODone  100 mg Oral Nightly    vilazodone HCl  40 mg Oral Daily    ziprasidone  80 mg Oral BID WC     PRN Meds: cyclobenzaprine, sodium chloride flush, sodium chloride, oxyCODONE **OR** oxyCODONE, HYDROmorphone **OR** HYDROmorphone, ondansetron, magnesium hydroxide, polyethylene glycol    Labs:   No results for input(s): WBC, HGB, HCT, PLT in the last 72 hours. No results for input(s): NA, K, CL, CO2, BUN, CREATININE, CALCIUM, PHOS in the last 72 hours. Invalid input(s): MAGNES  No results for input(s): AST, ALT, BILIDIR, BILITOT, ALKPHOS in the last 72 hours. No results for input(s): INR in the last 72 hours. No results for input(s): [place] Bounds in the last 72 hours. Urinalysis:   No results found for: Melvena Fridge, BACTERIA, RBCUA, BLOODU, [address], Lillian [address] 994    Radiology:   Most recent    Chest CT      WITH CONTRAST:No results found for this or any previous visit. WITHOUT CONTRAST: No results found for this or any previous visit. CXR      2-view: No results found for this or any previous visit. Portable: Results for orders placed during the hospital encounter of 11/12/21    XR CHEST PORTABLE    Narrative  Exam: XR CHEST PORTABLE    History: Preoperative evaluation. Technique: AP portable view of the chest obtained. Comparison: None available    Findings: The cardiomediastinal silhouette is within normal limits. No pneumothorax, pleural effusion, or consolidation. Bones of the thorax appear intact. Impression  No radiographic evidence of acute intrathoracic process. Echo No results found for this or any previous visit. Assessment/Plan:    Active Hospital Problems    Diagnosis Date Noted    Postlaminectomy syndrome of lumbar region [M96.1] 09/04/2018    Lumbar stenosis with neurogenic claudication [M48.062] 10/27/2016    Spondylolisthesis, lumbosacral region Right L2-3 [M43.17] 10/27/2016    Lumbosacral spondylosis without myelopathy [M47.817] 05/13/2016     Spondylolisthesis, lumbosacral region Right L2-3: Status post surgery. Pain tolerable. No nausea vomiting. Being managed by neurosurgery.   10/22 - cont post op  care, pt/ot  10/23 -continue pain control, postop care, discharge planning per neurosurgery. Active Problems:  HTN: PT on home meds to control. Will resume home meds, as tolerated. Depression with anxiety: PT on home meds to control. Will resume home meds, as tolerated. OA: PT on home meds to control. Will resume home meds, as tolerated. GERD: PT on home meds to control. Will resume home meds, as tolerated. HLD: PT on home meds to control. Will resume home meds, as tolerated.     Electronically signed by Maggie Terry MD on 10/23/2022 at 2:32 PM No

## 2022-10-24 VITALS
WEIGHT: 196 LBS | HEIGHT: 65 IN | TEMPERATURE: 98.4 F | HEART RATE: 87 BPM | DIASTOLIC BLOOD PRESSURE: 88 MMHG | RESPIRATION RATE: 16 BRPM | SYSTOLIC BLOOD PRESSURE: 146 MMHG | BODY MASS INDEX: 32.65 KG/M2 | OXYGEN SATURATION: 98 %

## 2022-10-24 PROCEDURE — 2580000003 HC RX 258: Performed by: NEUROLOGICAL SURGERY

## 2022-10-24 PROCEDURE — 6370000000 HC RX 637 (ALT 250 FOR IP): Performed by: NURSE PRACTITIONER

## 2022-10-24 PROCEDURE — 6370000000 HC RX 637 (ALT 250 FOR IP): Performed by: NEUROLOGICAL SURGERY

## 2022-10-24 PROCEDURE — 97116 GAIT TRAINING THERAPY: CPT

## 2022-10-24 RX ORDER — CYCLOBENZAPRINE HCL 10 MG
10 TABLET ORAL 3 TIMES DAILY PRN
Qty: 30 TABLET | Refills: 0 | Status: SHIPPED | OUTPATIENT
Start: 2022-10-24 | End: 2022-11-08 | Stop reason: SDUPTHER

## 2022-10-24 RX ORDER — POLYETHYLENE GLYCOL 3350 17 G/17G
17 POWDER, FOR SOLUTION ORAL DAILY PRN
Qty: 527 G | Refills: 1 | Status: SHIPPED | OUTPATIENT
Start: 2022-10-24 | End: 2022-11-23

## 2022-10-24 RX ORDER — FENTANYL 12 UG/H
1 PATCH TRANSDERMAL
Qty: 5 PATCH | Refills: 0 | Status: SHIPPED | OUTPATIENT
Start: 2022-10-25 | End: 2022-11-07

## 2022-10-24 RX ADMIN — OXYCODONE 5 MG: 5 TABLET ORAL at 11:23

## 2022-10-24 RX ADMIN — OXYCODONE 5 MG: 5 TABLET ORAL at 06:02

## 2022-10-24 RX ADMIN — ACETAMINOPHEN 650 MG: 325 TABLET ORAL at 01:38

## 2022-10-24 RX ADMIN — VILAZODONE HYDROCHLORIDE 40 MG: 40 TABLET ORAL at 09:52

## 2022-10-24 RX ADMIN — Medication 10 ML: at 09:53

## 2022-10-24 RX ADMIN — ZIPRASIDONE HYDROCHLORIDE 80 MG: 20 CAPSULE ORAL at 09:52

## 2022-10-24 RX ADMIN — OXYCODONE 5 MG: 5 TABLET ORAL at 01:38

## 2022-10-24 RX ADMIN — ACETAMINOPHEN 650 MG: 325 TABLET ORAL at 07:29

## 2022-10-24 RX ADMIN — BISACODYL 5 MG: 5 TABLET, COATED ORAL at 09:52

## 2022-10-24 RX ADMIN — PANTOPRAZOLE SODIUM 40 MG: 40 TABLET, DELAYED RELEASE ORAL at 06:02

## 2022-10-24 RX ADMIN — CYCLOBENZAPRINE 10 MG: 10 TABLET, FILM COATED ORAL at 09:50

## 2022-10-24 RX ADMIN — LOSARTAN POTASSIUM AND HYDROCHLOROTHIAZIDE 1 TABLET: 50; 12.5 TABLET, FILM COATED ORAL at 09:51

## 2022-10-24 ASSESSMENT — PAIN DESCRIPTION - DESCRIPTORS
DESCRIPTORS: STABBING;ACHING
DESCRIPTORS: ACHING
DESCRIPTORS: ACHING;CRAMPING
DESCRIPTORS: STABBING;ACHING

## 2022-10-24 ASSESSMENT — PAIN SCALES - GENERAL
PAINLEVEL_OUTOF10: 6
PAINLEVEL_OUTOF10: 8
PAINLEVEL_OUTOF10: 8
PAINLEVEL_OUTOF10: 7
PAINLEVEL_OUTOF10: 7
PAINLEVEL_OUTOF10: 6

## 2022-10-24 ASSESSMENT — PAIN DESCRIPTION - ORIENTATION
ORIENTATION: MID

## 2022-10-24 ASSESSMENT — PAIN DESCRIPTION - LOCATION
LOCATION: BACK

## 2022-10-24 ASSESSMENT — PAIN DESCRIPTION - PAIN TYPE
TYPE: SURGICAL PAIN
TYPE: SURGICAL PAIN

## 2022-10-24 NOTE — PROGRESS NOTES
Following up on a patient for the first time covering my colleague. Patient is sitting in bed. She is comfortable. She denies any chest or abdominal pain. No nausea or vomiting. No lower extremities weakness or numbness. No bowel or bladder retention or incontinence. ROS: 12 system review otherwise is negative for acute signs or symptoms over the last 24 hrs. Exam: Awake, alert oriented, in no apparent distress  HEENT: Pink conjunctiva and buccal mucosa. Normal and throat and nose  Neck: Supple, no nuchal rigidity. Endo: No thyromegaly. Vascular: No JVD or carotid bruit. Chest: Clear to auscultation, no dullness to percussion. Heart: Regular rate and rhythm, no extra sounds. No murmur, no rub. Abdomen: Soft, no tenderness, no rebound, no rigidity. Clinically I could not exclude the possibility of intra-abdominal mass or organomegaly. LE: No cyanosis or clubbing, no varices or edema. Neuro: Awake, alert, oriented, normal speech, normal comprehension, normal extension, normal cranial nerves, normal and symmetrical motor and tone examination throughout. MS: No joint effusion or tenderness. Lower thoracic lumbar spine are covered with a surgical dressing to be inspected by spine surgery team.  Skin: No skin rash, itching, bruising or significant findings. Assessment and plan:     *Status post spinal surgery. All surgical related issues including but not limited to pain management, pharmacological DVT prophylaxis, monitoring of wound infection, wound bleed, wound dehiscence, ambulation instructions and outpatient follow-up are to be addressed by orthopedic team.  Spine surgery team    *Depression, schizoaffective disorder, patient is to follow-up with her PCP and psychiatrist.    I may or may not have addressed all of this pt symptoms, medical issues, abnormal labs and findings.  Pt will need additional work up, investigation, testing, surveillance, and treatment to be done at a later time and date during this hospitalization or post discharge by PCP and other out pt providers. Portion of patient care is managed by other providers. Please refer to their notes for details.

## 2022-10-24 NOTE — DISCHARGE SUMMARY
Discharge Summary     Date:10/24/2022        Patient Name:Kari Dimas     YOB: 1960     Age:62 y.o. Admit Date:10/21/2022   Admission Condition:good   Discharged Condition:good  Discharge Date: 10/24/22     Discharge Diagnoses   Principal Problem:    Spondylolisthesis, lumbosacral region Right L2-3  Active Problems:    Lumbosacral spondylosis without myelopathy    Lumbar stenosis with neurogenic claudication    Postlaminectomy syndrome of lumbar region  Resolved Problems:    * No resolved hospital problems. [facility] Stay   Narrative of Hospital Course:   Patient progressed well with therapies during hospitalization. Patient with acute postoperative pain requiring fentanyl patch and flexeril to be added to her pain regimen. Patient has history of chronic opioid use prior to surgery and pain control is challenging with this patient. She does however mention that the addition of fentanyl and flexeril has helped to provide better pain control. Her right lower extremity strength continues to improve and she has decreased numbness to this extremity postoperatively. Wound is healing well. Consultants:   IP CONSULT TO HOSPITALIST  IP CONSULT TO CASE MANAGEMENT  IP CONSULT TO SOCIAL WORK    Time Spent on Discharge:  30 minutes were spent in patient examination, evaluation, counseling as well as medication reconciliation, prescriptions for required medications, discharge plan and follow up.       Surgeries/Procedures Performed:  Procedure(s):  RIGHT BILATERAL L2-3 LAMINECTOMIES MICRODISSECTION FORAMINOTOMIES INTERBODY POSTEROLATERAL PEDICLE SCREW FUSION WITH IGS      Treatments:   IV hydration, analgesia: roxicodone, fentanyl, flexeril, and therapies: PT and OT    Significant Diagnostic Studies:   Recent Labs:  CBC:   Lab Results   Component Value Date/Time    WBC 5.5 10/06/2022 12:15 PM    RBC 4.09 10/06/2022 12:15 PM    HGB 13.4 10/06/2022 12:15 PM    HCT 38.4 10/06/2022 12:15 PM    MCV 94.0 10/06/2022 12:15 PM    MCH 32.8 10/06/2022 12:15 PM    MCHC 34.9 10/06/2022 12:15 PM    RDW 13.5 10/06/2022 12:15 PM     10/06/2022 12:15 PM     BMP:    Lab Results   Component Value Date/Time    GLUCOSE 117 10/06/2022 12:21 PM    GLUCOSE 116 12/17/2020 05:13 AM     10/06/2022 12:21 PM    K 3.5 10/06/2022 12:21 PM     10/06/2022 12:21 PM    CO2 25 10/06/2022 12:21 PM    ANIONGAP 13 10/06/2022 12:21 PM    BUN 12 10/06/2022 12:21 PM    CREATININE 0.77 10/06/2022 12:21 PM    CALCIUM 9.6 10/06/2022 12:21 PM    LABGLOM >60.0 10/06/2022 12:21 PM    GFRAA >60.0 10/06/2022 12:21 PM       Radiology Last 7 Days:  FLUORO FOR SURGICAL PROCEDURES    Result Date: 10/21/2022  Intraprocedural fluoroscopic spot images as above. See separate procedure report for more information. Discharge Plan   Disposition: Home    Provider Follow-Up:   Rajendra Santana MD  [facility] [address]  [phone]    Schedule an appointment as soon as possible for a visit on 11/22/2022  POST OP FOLLOW UP APPOINTMENT WITH DR. LEON ON:  Ester Jose 22, 2022 AT 9:00 [address]  [phone]    Follow up  As needed       Hospital/Incidental Findings Requiring Follow-Up:  There were not incidental findings during hospitalization that require follow up outside of her normal surgery follow up in four weeks. Patient Instructions   Diet: regular diet    Activity: activity as tolerated, ambulate in house, no lifting, Driving, or Strenuous exercise for until cleared by surgery, and no driving while on analgesics    Other Instructions:   Every day change dressing frequently to keep wound clean and dry using 4X4 gauze pads and paper tape. May shower in 3 days. Do not soak or soap wound for one week. Discharge prescriptions e-prescribed to pharmacy. Order done  as outpatient.   Rx Percocet 5/325 #[facility]    Obtain Xrays AP lateral lumbar spine at [facility] few days prior to recheck. Order done as outpatient. Recheck one month. Questions call office [phone]. Based on my clinical judgment, the patient was provided with a 7-day prescription for opioid medication at [facility], indicated for treatment of acute pain in the setting of recent L2-3 laminectory foraminotomy decompression fusion with instrumentation. OARRS report was run and has demonstrated an appropriate time course. The patient has been provided with counseling pertaining to safe use of opioid medication. Discharge Medications         Medication List        START taking these medications      cyclobenzaprine 10 MG tablet  Commonly known as: FLEXERIL  Take 1 tablet by mouth 3 times daily as needed for Muscle spasms     fentaNYL 12 MCG/HR  Commonly known as: Piilostentie 53 1 patch onto the skin every 72 hours for 5 doses. Start taking on: October 25, 2022     naloxone 4 MG/0.1ML Liqd nasal spray  1 spray by Nasal route as needed for Opioid Reversal     polyethylene glycol 17 g packet  Commonly known as: GLYCOLAX  Take 17 g by mouth daily as needed for Constipation            CONTINUE taking these medications      Aimovig 140 MG/ML Soaj  Generic drug: Erenumab-aooe  Inject 140 mg as directed every 30 days     ALPRAZolam 1 MG tablet  Commonly known as: XANAX     Aspirin Low Dose 81 MG EC tablet  Generic drug: aspirin     atorvastatin 10 MG tablet  Commonly known as: LIPITOR     gabapentin 400 MG capsule  Commonly known as: Neurontin  Take 1 capsule by mouth 4 times daily for 90 days. Handicap Placard Misc  by Does not apply route For 5 years     losartan-hydroCHLOROthiazide 50-12.5 MG per tablet  Commonly known as: HYZAAR     oxyCODONE-acetaminophen 5-325 MG per tablet  Commonly known as: Percocet  Take 1 tablet by mouth every 8-12 hours as needed for Pain for up to 30 days.  #80 tabs for 30 days     pantoprazole 40 MG tablet  Commonly known as: PROTONIX     traZODone 100 MG tablet  Commonly known as: DESYREL     vilazodone HCl 10 MG Tabs  Commonly known as: VIIBRYD     ziprasidone 80 MG capsule  Commonly known as: GEODON               Where to Get Your Medications        These medications were sent to [address] [place] [address]      Phone: [phone]   cyclobenzaprine 10 MG tablet  fentaNYL 12 MCG/HR  polyethylene glycol 17 g packet         Electronically signed by SARAH Comer CNP on 10/24/22 at 8:05 AM EDT

## 2022-10-24 NOTE — PROGRESS NOTES
Physical Therapy Med Surg Daily Treatment Note  Facility/Department: [facility]  [id number]       NAME: Catherine Kaba  : 1960 (58 y.o.)  MRN: [id number]  CODE STATUS: Full Code    Date of Service: 10/24/2022    Patient Diagnosis(es): Spondylolisthesis of lumbosacral region [M43.17]  Neurogenic claudication due to lumbar spinal stenosis [M48.062]  Spondylolisthesis, lumbosacral region [M43.17]   No chief complaint on file.     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 ([address] 2021    Hypertensive urgency 2021    Iron deficiency anemia secondary to blood loss (chronic) 2021    Peptic ulc, site unsp, unsp as ac or chr, w/o hemor or perf 2021    Acute ischemic heart disease, unspecified ([address] 02/10/2021    Hyperlipidemia, unspecified 2020    Peripheral vascular disease, unspecified ([address] 2020    Presence of left artificial knee joint 2020    Abnormal liver function tests 2020    Anemia 2020    Candidiasis of skin 2020    Cerebrovascular accident ([address] 2020    History of cerebrovascular accident 2020    Left hemiparesis ([address] 2020    Numbness of upper limb 2020    Refractory migraine without aura 2020    Carpal tunnel syndrome, left upper limb 10/21/2020    Spondylosis without myelopathy or radiculopathy, lumbar region 10/08/2020    History of disease 2020    Benign essential hypertension 2020    Disruptions of 24 hour sleep-wake cycle 2020    Fever 2020    Hematuria 2020    History of total knee arthroplasty 2020    Nausea, vomiting and diarrhea 2020    Lesion of skin of face 2020    Syncope 2020    Acquired absence of other specified parts of digestive tract 07/14/2020    Bradycardia, unspecified 07/14/2020    Migraine, unspecified, not intractable, without status migrainosus 07/14/2020    GERD (gastroesophageal reflux disease) 04/01/2020    Sleep terror disorder 04/01/2020    Migraine without aura and with status migrainosus, not intractable 04/01/2020    Intractable chronic migraine without aura and without status migrainosus 04/01/2020    Postlaminectomy syndrome of lumbar region 09/04/2018    Hallucinations, unspecified 07/03/2018    Mood disorder ([facility] 07/03/2018    Major depressive disorder, single episode, unspecified 07/03/2018    Cervical spondylosis without myelopathy 01/18/2018    Cervical disc disorder with radiculopathy, unspecified cervical region 12/30/2017    Cervical disc disorder with radiculopathy, unspecified cervical region 12/30/2017    Strain of unspecified muscle, fascia and tendon at shoulder and upper arm level, right arm, initial encounter 12/30/2017    Chronic pain syndrome 06/06/2017    Arthritis of knee, left 04/10/2017    Lumbar stenosis with neurogenic claudication 10/27/2016    Spondylolisthesis, lumbosacral region Right L2-3 10/27/2016    Lumbosacral spondylosis without myelopathy 05/13/2016    SI (sacroiliac) joint dysfunction 05/13/2016    Arthropathy of shoulder region 05/13/2016    Incomplete bladder emptying 09/29/2009    Cystocele 09/17/2009    Frequency 09/17/2009    Ovarian cyst 09/17/2009    Recurrent UTI 09/17/2009    Urge incontinence of urine 09/17/2009    Urinary incontinence 09/17/2009        Past Medical History:   Diagnosis Date    Anemia     Arthritis     Chicken pox     Chronic back pain     H/O bladder infections     Headache(784.0)     History of blood transfusion     post delivery of first child, again after gastric ulcer cauterization, again for anemia    Hyperlipidemia     meds for about 1 year    Hypertension     meds for about 2 years    Measles     Mumps     Stroke ([facility] 07/2020     Past Surgical History: Procedure Laterality Date    APPENDECTOMY      BACK SURGERY  2014    lumbar back fusion    CHOLECYSTECTOMY      2017    COLONOSCOPY      ENDOSCOPY, COLON, DIAGNOSTIC      GALLBLADDER SURGERY      HYSTERECTOMY (CERVIX STATUS UNKNOWN)      1980    JOINT REPLACEMENT Right 2020    JOINT REPLACEMENT Left 2018    KNEE SURGERY Right     LEG BIOPSY EXCISION Right 11/12/2021    SOFT TISSUE MASS EXCISION RIGHT ANKLE performed by Steven Saravia MD at [address] [facility] [address] N/A 10/21/2022    RIGHT BILATERAL L2-3 LAMINECTOMIES MICRODISSECTION FORAMINOTOMIES INTERBODY POSTEROLATERAL PEDICLE SCREW FUSION WITH IGS performed by Tiffanie Last MD at [address] [place] [address] Road              Restrictions: lumbar spine precautions       SUBJECTIVE:   Subjective: \"i just can't wait to go home\"    Pain  Pain: reports pain tolerable. 5/10 low back pain . meds taken    OBJECTIVE:   Orientation  Overall Orientation Status: Within Functional Limits  Cognition  Overall Cognitive Status: WFL    Bed Mobility Training  Bed Mobility Training: Yes  Overall Level of Assistance: Independent  Rolling: Independent  Supine to Sit: Independent  Sit to Supine: Independent    Transfer Training  Transfer Training: Yes  Overall Level of Assistance: Modified independent  Sit to Stand: Modified independent  Stand to Sit: Modified independent    Gait Training: Yes  Overall Level of Assistance: Modified independent  Distance (ft): 100 Feet  Assistive Device: Walker, rolling  Base of Support: Widened  Speed/Tena: Fluctuations  Right Side Weight Bearing: As tolerated  Left Side Weight Bearing: As tolerated                              Vitals  SpO2: 98 %  O2 Device: None (Room air)          ASSESSMENT pt doing very well and is looking fwd to going home this date. Pt fatigued after 100 feet and needed to turn around.          Discharge Recommendations:  Continue to assess pending progress, Patient would benefit from continued therapy after discharge         Goals  Short Term Goals  Short Term Goal 1: Pt will demonstrate HEP indep  Long Term Goals  Long Term Goal 1: Pt will demonstrate bed mobility indep using log roll technique  Long Term Goal 2: Pt will demonstrate transfers mod indep with safest AD  Long Term Goal 3: Pt will demonstrate amb >/= 50ft mod indep with safest AD  Long Term Goal 4: Pt will demonstrate stair negotiation 1 step with safest AD mod indep    PLAN    General Plan:  (1-2 visits per day X 7 days a wk)        [facility] (6 CLICK) BASIC MOBILITY  AM-PAC Inpatient Mobility Raw Score : 21     Therapy Time   Individual   Time In  0855   Time Out 0910   Minutes 15   8 minutes gt  7 minutes bed mob/transfers          [facility] BEATRIZ MARINO PTA, 10/24/22 at 9:17 AM         Definitions for assistance levels  Independent = pt does not require any physical supervision or assistance from another person for activity completion. Device may be needed.   Stand by assistance = pt requires verbal cues or instructions from another person, close to but not touching, to perform the activity  Minimal assistance= pt performs 75% or more of the activity; assistance is required to complete the activity  Moderate assistance= pt performs 50% of the activity; assistance is required to complete the activity  Maximal assistance = pt performs 25% of the activity; assistance is required to complete the activity  Dependent = pt requires total physical assistance to accomplish the task

## 2022-10-24 NOTE — FLOWSHEET NOTE
Removed dressing on back from surgical procedure. The upper incision had some small swelling. Both incisions were dry with no drainage. IV removed without complications. Went over discharge instructions. Discharged instructions were given to patient and patient signed discharge paperwork.

## 2022-10-24 NOTE — PLAN OF CARE
Problem: Discharge Planning  Goal: Discharge to home or other facility with appropriate resources  10/23/2022 2313 by Nini Brenner RN  Outcome: Progressing  Flowsheets (Taken 10/23/2022 0827 by Jodell Burkitt. Lucila Otoole RN)  Discharge to home or other facility with appropriate resources: Identify barriers to discharge with patient and caregiver  10/23/2022 2312 by Nini Brenner RN  Outcome: Progressing     Problem: Chronic Conditions and Co-morbidities  Goal: Patient's chronic conditions and co-morbidity symptoms are monitored and maintained or improved  10/23/2022 2313 by Nini Brenner RN  Outcome: Progressing  Flowsheets (Taken 10/23/2022 0827 by Jodell Burkitt.  Lucila Otoole RN)  Care Plan - Patient's Chronic Conditions and Co-Morbidity Symptoms are Monitored and Maintained or Improved: Monitor and assess patient's chronic conditions and comorbid symptoms for stability, deterioration, or improvement  10/23/2022 2312 by Nini Brenner RN  Outcome: Progressing     Problem: Pain  Goal: Verbalizes/displays adequate comfort level or baseline comfort level  10/23/2022 2313 by Nini Brenner RN  Outcome: Progressing  Flowsheets (Taken 10/23/2022 2313)  Verbalizes/displays adequate comfort level or baseline comfort level:   Encourage patient to monitor pain and request assistance   Assess pain using appropriate pain scale   Administer analgesics based on type and severity of pain and evaluate response  10/23/2022 2312 by Nini Brenner RN  Outcome: Progressing     Problem: Safety - Adult  Goal: Free from fall injury  10/23/2022 2313 by Nini Brenner RN  Outcome: Progressing  Flowsheets (Taken 10/23/2022 2313)  Free From Fall Injury:   Based on caregiver fall risk screen, instruct family/caregiver to ask for assistance with transferring infant if caregiver noted to have fall risk factors   Instruct family/caregiver on patient safety  10/23/2022 2312 by Nini Brenner RN  Outcome: Progressing     Problem: ABCDS Injury Assessment  Goal: Absence of physical injury  10/23/2022 2313 by Kristy Plaza RN  Outcome: Progressing  Flowsheets (Taken 10/23/2022 2313)  Absence of Physical Injury: Implement safety measures based on patient assessment  10/23/2022 2312 by Kristy Plaza RN  Outcome: Progressing

## 2022-10-24 NOTE — DISCHARGE INSTR - DIET

## 2022-10-25 LAB
BLOOD BANK DISPENSE STATUS: NORMAL
BLOOD BANK PRODUCT CODE: NORMAL
BPU ID: NORMAL
DESCRIPTION BLOOD BANK: NORMAL

## 2022-10-25 NOTE — PROGRESS NOTES
Physical Therapy  Facility/Department: [facility] MED SURG V696/[id number]  Physical Therapy Discharge      NAME: Cara Gutierrez    : 1960 (40 y.o.)  MRN: [id number]    Account: [de-identified]  Gender: female      Patient has been discharged from acute care hospital. DC patient from current PT program.      Electronically signed by Mo Ramirez PT on 10/25/22 at 4:39 PM EDT

## 2022-10-26 DIAGNOSIS — M47.817 LUMBOSACRAL SPONDYLOSIS WITHOUT MYELOPATHY: ICD-10-CM

## 2022-10-26 DIAGNOSIS — M47.812 CERVICAL SPONDYLOSIS WITHOUT MYELOPATHY: ICD-10-CM

## 2022-10-26 DIAGNOSIS — M96.1 POST LAMINECTOMY SYNDROME: ICD-10-CM

## 2022-10-26 DIAGNOSIS — G89.4 CHRONIC PAIN SYNDROME: ICD-10-CM

## 2022-10-26 DIAGNOSIS — M25.511 PAIN IN JOINT OF RIGHT SHOULDER: ICD-10-CM

## 2022-10-26 DIAGNOSIS — M48.062 SPINAL STENOSIS OF LUMBAR REGION WITH NEUROGENIC CLAUDICATION: ICD-10-CM

## 2022-10-26 DIAGNOSIS — M54.16 LUMBAR RADICULOPATHY: ICD-10-CM

## 2022-10-27 ENCOUNTER — PATIENT MESSAGE (OUTPATIENT)
Dept: PAIN MANAGEMENT | Age: 62
End: 2022-10-27

## 2022-10-27 DIAGNOSIS — M54.16 LUMBAR RADICULOPATHY: ICD-10-CM

## 2022-10-27 DIAGNOSIS — M47.812 CERVICAL SPONDYLOSIS WITHOUT MYELOPATHY: ICD-10-CM

## 2022-10-27 DIAGNOSIS — M48.062 SPINAL STENOSIS OF LUMBAR REGION WITH NEUROGENIC CLAUDICATION: ICD-10-CM

## 2022-10-27 DIAGNOSIS — M25.511 PAIN IN JOINT OF RIGHT SHOULDER: ICD-10-CM

## 2022-10-27 DIAGNOSIS — G89.4 CHRONIC PAIN SYNDROME: ICD-10-CM

## 2022-10-27 DIAGNOSIS — M96.1 POST LAMINECTOMY SYNDROME: ICD-10-CM

## 2022-10-27 DIAGNOSIS — M47.817 LUMBOSACRAL SPONDYLOSIS WITHOUT MYELOPATHY: ICD-10-CM

## 2022-10-27 RX ORDER — GABAPENTIN 400 MG/1
400 CAPSULE ORAL 4 TIMES DAILY
Qty: 360 CAPSULE | Refills: 0 | Status: SHIPPED | OUTPATIENT
Start: 2022-10-27 | End: 2023-01-25

## 2022-10-27 NOTE — TELEPHONE ENCOUNTER
Requested Prescriptions     Pending Prescriptions Disp Refills    gabapentin (NEURONTIN) 400 MG capsule 360 capsule 0     Sig: Take 1 capsule by mouth 4 times daily for 90 days. Patient last seen on:  9-27-22  Date of last surgery:  10-21-22 RIGHT BILATERAL L2-3 LAMINECTOMIES MICRODISSECTION FORAMINOTOMIES INTERBODY POSTEROLATERAL PEDICLE SCREW FUSION WITH IGS BY DR. LEON  Procedure  Date of last refill:  8-17-22  Future appts: 11-8-22    Patient Comment: My appointment isnt till Nov8 and Ill be out of my meds on the 30th

## 2022-10-27 NOTE — TELEPHONE ENCOUNTER
Requested Prescriptions     Pending Prescriptions Disp Refills    oxyCODONE-acetaminophen (PERCOCET) 5-325 MG per tablet 80 tablet 0     Sig: Take 1 tablet by mouth every 8-12 hours as needed for Pain for up to 30 days. #80 tabs for 30 days     Patient last seen on:  9-27-22  Date of last surgery:  10-21-22 RIGHT BILATERAL L2-3 LAMINECTOMIES MICRODISSECTION FORAMINOTOMIES INTERBODY POSTEROLATERAL PEDICLE SCREW FUSION WITH IGS BY DR. LEON  Procedure  Date of last refill:  9-28-22  Future appts: 11-8-22    Patient Comment: My appointment isnt till Nov8 and Ill be out of my meds on the 30th

## 2022-10-28 RX ORDER — OXYCODONE HYDROCHLORIDE AND ACETAMINOPHEN 5; 325 MG/1; MG/1
1 TABLET ORAL EVERY 6 HOURS PRN
Qty: 12 TABLET | Refills: 0 | Status: SHIPPED | OUTPATIENT
Start: 2022-11-05 | End: 2022-11-08 | Stop reason: SDUPTHER

## 2022-10-28 NOTE — TELEPHONE ENCOUNTER
Please call patient and inform she was given 14 day Percocet RX filled on 10/22, so percocet is not due until 11/5. RX sent today for 11/5-11/8.

## 2022-10-28 NOTE — TELEPHONE ENCOUNTER
Requested Prescriptions     Pending Prescriptions Disp Refills    oxyCODONE-acetaminophen (PERCOCET) 5-325 MG per tablet 80 tablet 0     Sig: Take 1 tablet by mouth every 8-12 hours as needed for Pain for up to 30 days.  #80 tabs for 30 days       Patient last seen on:  9/27/22  Date of last surgery:  Vikash Madsen @ [facility] - 10/21/2022 - RIGHT BILATERAL L 2-3 LAMINECTOMIES MICRODISSECTION FORAMINOTOMIES INTERBODY POSTEROLATERAL PEDICLE SCREW FUSION - IGS - INFUSE  Date of last refill:  9/28/22  Patient complaining of:  Pt request via [employer]  Future appts: 11/8/22

## 2022-10-28 NOTE — TELEPHONE ENCOUNTER
From: Catherine Kaba  To: Fadi Zamora  Sent: 10/27/2022 6:12 PM EDT  Subject: Oxycodone refill     The pharmacy called me to let me know that they cant process my gabapen till the 2nd . My appointment with you isnt till the 8th Im going to be out of the oxycodone by the 1st they said there was no script for the oxycodone.  Im just checking to see if you are going to call them in at a later date

## 2022-10-31 ENCOUNTER — TELEPHONE (OUTPATIENT)
Dept: NEUROSURGERY | Age: 62
End: 2022-10-31

## 2022-10-31 NOTE — TELEPHONE ENCOUNTER
Patient underwent RIGHT BILATERAL L2-3 LAMINECTOMIES MICRODISSECTION FORAMINOTOMIES INTERBODY POSTEROLATERAL PEDICLE SCREW FUSION WITH IGS on 10/21/22 with Dr. Balaji Woody. Pt asks what she is able to wash her back with and if anything in particular should be avoided? Pt also asks when she is able to ride in a car?

## 2022-11-01 NOTE — TELEPHONE ENCOUNTER
PT called back and said that she will listen to her voicemail and call back if she has any questions.

## 2022-11-08 ENCOUNTER — OFFICE VISIT (OUTPATIENT)
Dept: PAIN MANAGEMENT | Age: 62
End: 2022-11-08

## 2022-11-08 VITALS
DIASTOLIC BLOOD PRESSURE: 74 MMHG | HEIGHT: 65 IN | SYSTOLIC BLOOD PRESSURE: 122 MMHG | WEIGHT: 198 LBS | BODY MASS INDEX: 32.99 KG/M2 | TEMPERATURE: 97.5 F

## 2022-11-08 DIAGNOSIS — Z48.89 ENCOUNTER FOR POST SURGICAL WOUND CHECK: Primary | ICD-10-CM

## 2022-11-08 DIAGNOSIS — M48.062 SPINAL STENOSIS OF LUMBAR REGION WITH NEUROGENIC CLAUDICATION: ICD-10-CM

## 2022-11-08 DIAGNOSIS — M47.812 CERVICAL SPONDYLOSIS WITHOUT MYELOPATHY: ICD-10-CM

## 2022-11-08 DIAGNOSIS — M47.817 LUMBOSACRAL SPONDYLOSIS WITHOUT MYELOPATHY: ICD-10-CM

## 2022-11-08 DIAGNOSIS — M96.1 POST LAMINECTOMY SYNDROME: ICD-10-CM

## 2022-11-08 DIAGNOSIS — M25.511 PAIN IN JOINT OF RIGHT SHOULDER: ICD-10-CM

## 2022-11-08 DIAGNOSIS — M54.16 LUMBAR RADICULOPATHY: ICD-10-CM

## 2022-11-08 DIAGNOSIS — G89.4 CHRONIC PAIN SYNDROME: ICD-10-CM

## 2022-11-08 PROCEDURE — 99024 POSTOP FOLLOW-UP VISIT: CPT | Performed by: NURSE PRACTITIONER

## 2022-11-08 RX ORDER — OXYCODONE HYDROCHLORIDE AND ACETAMINOPHEN 5; 325 MG/1; MG/1
1 TABLET ORAL EVERY 8 HOURS PRN
Qty: 90 TABLET | Refills: 0 | Status: SHIPPED | OUTPATIENT
Start: 2022-11-08 | End: 2022-12-08

## 2022-11-08 RX ORDER — CYCLOBENZAPRINE HCL 10 MG
10 TABLET ORAL DAILY PRN
Qty: 30 TABLET | Refills: 0 | Status: SHIPPED | OUTPATIENT
Start: 2022-11-08 | End: 2022-12-08

## 2022-11-08 NOTE — PROGRESS NOTES
Pt here today for post op f/u. Patient is status post right bilateral L2-3 laminectomy microdissection foramniotomy interbody posteriolateral pedicle screw fusion with IGS on 10/21/2022 with Dr. Sita Kumar. She was given Percocet 5 mg up to 4 a day postoperatively. Advised to keep wound clean and dry. She was given order for x-ray for follow-up prior to seeing Dr. Sita Kumar. It appears she was given fentanyl postoperatively while in the hospital as well. She has a history of previous fusion L4-5 with Dr. Sita Kumar as well. Prescription for oxycodone recently refilled until her appointment today. She says she is now able to use her percocet TID PRN. She says her pain is already not \"As tense as it was before the surgery\" and says she is able to stand straight up now. Denies any issues with her incision, denies fever. Incision: scabbed over bother surgical incisions. No drainage. No open areas. Slight pink surround tissue of lower lumbar incision. No warmth. Non-tender with palpation. She rises easily from chair. Ambulates in upright position    Pain level: 7/10      PLAN: Has XR order to be done prior to f/u with Dr. Sita Kumar. Will continue her percocet but reduce to 5mg TID PRN pain. Goal to reduce further/wean next month. Refilled flexeril 10mg QHS PRN per pt request.  PT eval.

## 2022-11-14 RX ORDER — OXYCODONE HYDROCHLORIDE AND ACETAMINOPHEN 5; 325 MG/1; MG/1
1 TABLET ORAL
Qty: 80 TABLET | Refills: 0 | OUTPATIENT
Start: 2022-11-14 | End: 2022-12-14

## 2022-11-15 NOTE — PROGRESS NOTES
Patient Name: Rebecca Rodriguez : 1960        Date: 2022      Type of Appt: Post OP    Reason for appt: POST OP:  [facility] - 10/21/2022 - RIGHT BILATERAL L 2-3 LAMINECTOMIES MICRODISSECTION FORAMINOTOMIES INTERBODY POSTEROLATERAL PEDICLE SCREW FUSION - IGS - INFUSE. Patient aware to have x-rays done a few days before appt. Studies done: 22 L/S X-ray @ [facility]    Physical therapy: Ordered by [id number] Teterbororianna Camarena NP on 2022 x-ray lumbar spine  EXAMINATION:   XRAY VIEWS OF THE LUMBAR SPINE       2022 11:49 am       COMPARISON:   Number x-rays from 2016       HISTORY:   ORDERING SYSTEM PROVIDED HISTORY: Spinal stenosis of lumbar region with   neurogenic claudication   TECHNOLOGIST PROVIDED HISTORY:   What reading provider will be dictating this exam?->CRC       FINDINGS:   Status post L2-3 discectomy and spacer placement with transpedicular screws   on the right side and a vertical bar on the right. There is also a separate L4-5 discectomy and spacer placement with bilateral   transpedicular screws and vertical bars. Vertebral compression fracture: None. Acute fracture: None visualized radiographically. Alignement: Within normal limits. Disc narrowing: Of the remaining spaces there is severe joint space narrowing   at L5-S1, moderate joint space narrowing at L1-2, and mild narrowing at L3-4. Impression   There are no acute osseous changes. Status post discectomies and posterior   fusions at L2-3 and L4-5. Patient is \"a lot better\" now than before surgery. The pain in the back is improving she does not have any pain into the lower extremities as preoperatively. She gets up and tends to to the right side with some residual weakness but improving. She is able to go up on her toes and her heels with minimal assistance. Wound is healing. Start physical therapy.   Recheck 1 month at which time we will get the motion into her spine.   All questions answered

## 2022-11-18 ENCOUNTER — HOSPITAL ENCOUNTER (OUTPATIENT)
Dept: GENERAL RADIOLOGY | Age: 62
Discharge: HOME | End: 2022-11-20
Payer: COMMERCIAL

## 2022-11-18 DIAGNOSIS — M48.062 SPINAL STENOSIS OF LUMBAR REGION WITH NEUROGENIC CLAUDICATION: ICD-10-CM

## 2022-11-18 PROCEDURE — 72100 X-RAY EXAM L-S SPINE 2/3 VWS: CPT

## 2022-11-22 ENCOUNTER — OFFICE VISIT (OUTPATIENT)
Dept: NEUROSURGERY | Age: 62
End: 2022-11-22

## 2022-11-22 VITALS — BODY MASS INDEX: 32.99 KG/M2 | TEMPERATURE: 97.6 F | WEIGHT: 198 LBS | HEIGHT: 65 IN

## 2022-11-22 DIAGNOSIS — M43.17 ACQUIRED SPONDYLOLISTHESIS OF LUMBOSACRAL REGION: Primary | ICD-10-CM

## 2022-11-22 PROCEDURE — 99024 POSTOP FOLLOW-UP VISIT: CPT | Performed by: NEUROLOGICAL SURGERY

## 2022-12-01 ENCOUNTER — HOSPITAL ENCOUNTER (OUTPATIENT)
Dept: PHYSICAL THERAPY | Age: 62
Setting detail: THERAPIES SERIES
Discharge: HOME | End: 2022-12-01
Payer: COMMERCIAL

## 2022-12-01 PROCEDURE — 97162 PT EVAL MOD COMPLEX 30 MIN: CPT

## 2022-12-01 PROCEDURE — 97110 THERAPEUTIC EXERCISES: CPT

## 2022-12-01 ASSESSMENT — PAIN DESCRIPTION - PAIN TYPE: TYPE: CHRONIC PAIN

## 2022-12-01 ASSESSMENT — PAIN DESCRIPTION - DESCRIPTORS: DESCRIPTORS: ACHING;SHARP

## 2022-12-01 ASSESSMENT — PAIN SCALES - GENERAL: PAINLEVEL_OUTOF10: 8

## 2022-12-01 ASSESSMENT — PAIN DESCRIPTION - LOCATION: LOCATION: BACK

## 2022-12-01 ASSESSMENT — PAIN DESCRIPTION - ORIENTATION: ORIENTATION: LOWER;MID

## 2022-12-01 NOTE — DISCHARGE SUMMARY
[address]  [address]      XAOOI:[phone]    [] Certification  [] Recertification [x]  Plan of Care  [] Progress Note [] Discharge      Referring Provider: SARAH Tyler CNP     From:  Alvaro Julian PT     Patient: Antonia George (34 y.o. female) : 1960 Date: 2022   Medical Diagnosis: Spinal stenosis of lumbar region with neurogenic claudication [M48.062]  Post laminectomy syndrome [M96.1]    Treatment Diagnosis: low back pain, decreased LE strength, decreased core strength      Progress Report Period from:  2022  to 2022    Visits to Date: 1 No Show: 0 Cancelled Appts: 0    OBJECTIVE:   Short Term Goals - Time Frame for Short Term Goals: 3 weeks    Goals Current/Discharge status  Status   Short Term Goal 1: Patient will report </= 5/10 pain in low back with walking >/= 15 minutes. Patient reports 7/10 pain in mid/low back. New   Short Term Goal 2: Patient will be independent with HEP. Patient issued HEP. New     Long Term Goals - Time Frame for Long Term Goals : 4-6 weeks  Goals Current/ Discharge status Status   Long Term Goal 1: Patient will demonstrate bilateral SLR >/= 70 degrees for improve flexibility for ADL tolerance. PROM LLE (degrees)  L SLR: 60 deg   PROM RLE (degrees)  RLE General PROM: 62 deg            AROM Lumbar Spine   Lumbar spine general AROM: flexion 50% WFLs, bilateral SB 50% WFLs       New   Long Term Goal 2: Patient will increase bilateral LE strength >/= 4+/5 for improved functional tolerance.  Strength RLE  R Hip Flexion: 3+/5  R Hip Extension: NT (poor glut activation noted)  R Hip ABduction: 3+/5  R Knee Flexion: 4-/5  R Knee Extension: 4+/5  R Ankle Dorsiflexion: 4+/5  Strength LLE  L Hip Flexion: 3+/5  L Hip Extension: NT (poor glut activation noted)  L Hip ABduction: 3+/5  L Knee Flexion: 4-/5  L Knee Extension: 4/5  L Ankle Dorsiflexion: 4+/5 New   Long Term Goal 3: Modified Oswestry </= 22/50 to demonstrate functional improvements. 31/50 New     Body Structures, Functions, Activity Limitations Requiring Skilled Therapeutic Intervention: Decreased ROM, Decreased strength, Decreased endurance, Increased pain, Decreased posture, Decreased functional mobility   Assessment: Patient s/p lumbar surgery reports difficulty moving in her bed and walking for increased time. Upon PT evaluation, patient demonstrates limited lumbar/hamstring ROM with impaired LE/core strength. Further PT recommended to improve ROM, strength, and improve activity tolerance for overall quality of life. Therapy Prognosis: Good      PT Education: Goals;PT Role;Plan of Care;Home Exercise Program    PLAN: [x] Evaluate and Treat  Frequency/Duration:  Plan Frequency: 2  Plan weeks: 4-6  Current Treatment Recommendations: ROM, Strengthening, Endurance training, Gait training, Neuromuscular re-education, Manual, Home exercise program, Patient/Caregiver education & training, Equipment evaluation, education, & procurement, Safety education & training, Modalities  Modalities: Heat/Cold, E-stim - unattended     Precautions: Other position/activity restrictions: s/p lumbar surgery, activity as tolerates                  Patient Status:[x] Continue/ Initiate plan of Care     [] Discharge PT. Recommend pt continue with HEP. [] Additional visits requested, Please re-certify for additional visits:     [] Hold        Signature: Electronically signed by Clarisa Rodas PT on 12/1/2022 at 4:21 PM      If you have any questions or concerns, please don't hesitate to call. Thank you for your referral.    I have reviewed this plan of care and certify a need for medically necessary rehabilitation services.     Physician Signature:__________________________________________________________  Date:  Please sign and return

## 2022-12-01 NOTE — PROGRESS NOTES
[address] [place] [address]   EVALUATION            Physical Therapy: Initial Evaluation    Patient: Carley Doty (89 y.o.     female)   Examination Date: 2022   :  1960 ;    Confirmed: Yes MRN: [id number]  CSN: [id number]   Insurance: Payor: Karlene Guidrydavid / Plan: Carie Joyce / Product Type: *No Product type* /   Insurance ID: [id number] - ([employer]) Secondary Insurance (if applicable):    Referring Physician: SARAH Arellano - EHSAN      PCP: Eladia Tyler DO Visits to Date/Visits Approved:     No Show/Cancelled Appts: 0  0     Medical Diagnosis: Spinal stenosis of lumbar region with neurogenic claudication [M48.062]  Post laminectomy syndrome [M96.1]    Treatment Diagnosis: low back pain, decreased LE strength, decreased core strength     PERTINENT MEDICAL HISTORY   Patient Assessed for Rehabilitation Services: Yes       Medical History: Chart Reviewed: Yes   Past Medical History:   Diagnosis Date    Anemia     Arthritis     Chicken pox     Chronic back pain     H/O bladder infections     Headache(784.0)     History of blood transfusion     post delivery of first child, again after gastric ulcer cauterization, again for anemia    Hyperlipidemia     meds for about 1 year    Hypertension     meds for about 2 years    Measles     Mumps     Stroke ([facility] [address] 2020     Surgical History:   Past Surgical History:   Procedure Laterality Date    APPENDECTOMY      BACK SURGERY  2014    lumbar back fusion    CHOLECYSTECTOMY      2017    COLONOSCOPY      ENDOSCOPY, COLON, DIAGNOSTIC      GALLBLADDER SURGERY      HYSTERECTOMY (CERVIX STATUS UNKNOWN)      [facility] Right 2020    JOINT REPLACEMENT Left 2018    KNEE SURGERY Right     LEG BIOPSY EXCISION Right 2021    SOFT TISSUE MASS EXCISION RIGHT ANKLE performed by Lee Ann Gross MD at [address] [place] [address] N/A 10/21/2022    RIGHT BILATERAL L2-3 LAMINECTOMIES MICRODISSECTION FORAMINOTOMIES INTERBODY POSTEROLATERAL PEDICLE SCREW FUSION WITH IGS performed by Gemma Marroquin MD at [address] [place] [address]         Medications:   Current Outpatient Medications:     oxyCODONE-acetaminophen (PERCOCET) 5-325 MG per tablet, Take 1 tablet by mouth every 8 hours as needed for Pain for up to 30 days. , Disp: 90 tablet, Rfl: 0    cyclobenzaprine (FLEXERIL) 10 MG tablet, Take 1 tablet by mouth daily as needed for Muscle spasms, Disp: 30 tablet, Rfl: 0    gabapentin (NEURONTIN) 400 MG capsule, Take 1 capsule by mouth 4 times daily for 90 days. , Disp: 360 capsule, Rfl: 0    ziprasidone (GEODON) 80 MG capsule, Take 80 mg by mouth 2 times daily (with meals), Disp: , Rfl:     losartan-hydroCHLOROthiazide (HYZAAR) 50-12.5 MG per tablet, Take 1 tablet by mouth daily, Disp: , Rfl:     ALPRAZolam (XANAX) 1 MG tablet, TAKE 1 TABLET BY MOUTH DAILY AS NEEDED, Disp: , Rfl:     ASPIRIN LOW DOSE 81 MG EC tablet, TAKE 1 TABLET BY MOUTH ONCE DAILY, Disp: , Rfl:     AIMOVIG 140 MG/ML SOAJ, Inject 140 mg as directed every 30 days, Disp: 1 pen, Rfl: 5    vilazodone HCl (VIIBRYD) 10 MG TABS, Take 40 mg by mouth daily, Disp: , Rfl:     atorvastatin (LIPITOR) 10 MG tablet, Take 10 mg by mouth daily, Disp: , Rfl:     Handicap Placard MISC, by Does not apply route For 5 years, Disp: 1 each, Rfl: 0    naloxone 4 MG/0.1ML LIQD nasal spray, 1 spray by Nasal route as needed for Opioid Reversal, Disp: 1 each, Rfl: 0    pantoprazole (PROTONIX) 40 MG tablet, Take by mouth in the morning and at bedtime, Disp: , Rfl:     traZODone (DESYREL) 100 MG tablet, Take 300 mg by mouth nightly, Disp: , Rfl:   Allergies: Nsaids and Ibuprofen      SUBJECTIVE EXAMINATION      ,           Subjective History: Onset Date: 10/20/22  Subjective: Patient s/p laminectomy and fusion in L2-3 on 10/21/22. Patient has scoliosis and has hx of another surgery in the past.  Report some better sensation in LE since surgery.   Increased pain walking and standing for time.  Patient can only stand for 5-10 minutes due to back pain. Denies any changes in bowel or bladder control. Reports some relief with pain medication. Additional Pertinent Hx (if applicable): bilateral knee replacements   Imaging: XR LUMBAR SPINE (2-3 VIEWS)    Result Date: 11/18/2022  EXAMINATION: XRAY VIEWS OF THE LUMBAR SPINE 11/18/2022 11:49 am COMPARISON: Number x-rays from October 31, 2016 HISTORY: ORDERING SYSTEM PROVIDED HISTORY: Spinal stenosis of lumbar region with neurogenic claudication TECHNOLOGIST PROVIDED HISTORY: What reading provider will be dictating this exam?->CRC FINDINGS: Status post L2-3 discectomy and spacer placement with transpedicular screws on the right side and a vertical bar on the right. There is also a separate L4-5 discectomy and spacer placement with bilateral transpedicular screws and vertical bars. Vertebral compression fracture: None. Acute fracture: None visualized radiographically. Alignement: Within normal limits. Disc narrowing: Of the remaining spaces there is severe joint space narrowing at L5-S1, moderate joint space narrowing at L1-2, and mild narrowing at L3-4. There are no acute osseous changes. Status post discectomies and posterior fusions at L2-3 and L4-5.      Previous treatments prior to current episode?: Surgery      Learning/Language: Learning  Does the patient/guardian have any barriers to learning?: No barriers  What is the preferred language of the patient/guardian?: English  How does the patient/guardian prefer to learn new concepts?: Listening, Reading, Demonstration, Pictures/Videos     Pain Screening    Pain Screening  Patient Currently in Pain: Yes  Pain Assessment: 0-10  Pain Level: 8  Pain Type: Chronic pain  Pain Location: Back  Pain Orientation: Lower, Mid  Pain Descriptors: Aching, Sharp    Functional Status    Social History:    Social History  Lives With: Spouse  Home Layout: One level  Home Access: Stairs to enter without rails  Entrance Stairs - Number of Steps: 1  Home Equipment: Walker, rolling    Occupation/Interests:   Occupation: Unemployed    Prior Level of Function:     Independent        Current Level of Function:   Independent      ADL Assistance: Independent  Ambulation Assistance: Independent  Transfer Assistance: Independent         OBJECTIVE EXAMINATION     Restrictions:         Position Activity Restriction  Other position/activity restrictions: s/p lumbar surgery, activity as tolerates    Review of Systems:  Vision: Within Functional Limits  Hearing: Within functional limits  Follows Commands: Within Functional Limits    VBI Screening / Lumbar Screening:    Bowel/bladder disturbances: No  Saddle anesthesia: No  Unexplained weight loss: No  Severe motor weakness: No  Stumbling or giving way while walking: No  Unrelenting pain at night: No    Regional Screen:         Observations:   General Observations  General Observations: Yes  Posture: WFL (upright posture)    Palpation:   Lumbar Spine Palpation: tenderness to palpation    Mobility:            Ambulation  Surface: Carpet  Device: No Device  Assistance: Modified Independent  Gait Deviations: Slow Tena, Decreased step length, Decreased step height  Distance: clinical distance in department    Neuro Screen: Sensation  Overall Sensation Status: Impaired  Additional Comments: reports numbness in right LE      Left PROM  Right PROM         PROM LLE (degrees)  L SLR: 60 deg    PROM RLE (degrees)  RLE General PROM: 62 deg        Left Strength  Right Strength         Strength LLE  L Hip Flexion: 3+/5  L Hip Extension: NT (poor glut activation noted)  L Hip ABduction: 3+/5  L Knee Flexion: 4-/5  L Knee Extension: 4/5  L Ankle Dorsiflexion: 4+/5    Strength RLE  R Hip Flexion: 3+/5  R Hip Extension: NT (poor glut activation noted)  R Hip ABduction: 3+/5  R Knee Flexion: 4-/5  R Knee Extension: 4+/5  R Ankle Dorsiflexion: 4+/5       Lumbar Assessment     AROM Lumbar Spine Lumbar spine general AROM: flexion 50% WFLs, bilateral SB 50% WFLs        Trunk Strength      Decreased core strength based off functional mobility. Outcomes Score:  Exam: Modified Oswestry: 31/50         Treatment:    Exercises:   Exercises  Exercise 1: TA isometric 5'' x 10  Exercise 2: TA march x 10  Exercise 3: H/L hip adduction with ball 5'' x 10  Exercise 4: supine glut sets 5'' x 10  Exercise 5: seated hip abduction RTB 5'' x 10  Exercise 6: TA walkouts*  Exercise 7: progress to low bridge*  Exercise 8: SKTC*  Exercise 9: hamstring stretch*  Exercise 10: sit to stands*  Exercise 11: bike*  Exercise 12: seated TA rows*  Exercise 20: HEP: TA isometric, TA march, glut sets, hip adduction, hip abduction with band     Modalities:Modalities: Yes  Moist Heat (CPT 92315)  Number Minutes Moist Heat: *     Manual:  Manual Therapy  Soft Tissue Mobilizaton: lumbar*     *Indicates exercise,modality, or manual techniques to be initiated when appropriate       ASSESSMENT     Impression: Assessment: Patient s/p lumbar surgery reports difficulty moving in her bed and walking for increased time. Upon PT evaluation, patient demonstrates limited lumbar/hamstring ROM with impaired LE/core strength. Further PT recommended to improve ROM, strength, and improve activity tolerance for overall quality of life. Body Structures, Functions, Activity Limitations Requiring Skilled Therapeutic Intervention: Decreased ROM, Decreased strength, Decreased endurance, Increased pain, Decreased posture, Decreased functional mobility     Statement of Medical Necessity: Physical Therapy is both indicated and medically necessary as outlined in the POC to increase the likelihood of meeting the functionally related goals stated below.      Patient's Activity Tolerance: Patient tolerated evaluation without incident      Patient's rehabilitation potential/prognosis is considered to be: Good    Factors which may impact rehabilitation potential include: Pain tolerance/management, Age, Chronicity of problem     Patient Education: Goals, PT Role, Plan of Care, Home Exercise Program      GOALS   Patient Goal(s): Patient Goals : \"to be able to bend and not in pain all the time\"    Short Term Goals Completed by 3 weeks Goal Status   Patient will report </= 5/10 pain in low back with walking >/= 15 minutes. New   Patient will be independent with HEP. New     Long Term Goals Completed by 4-6 weeks Goal Status   LTG 1 Patient will demonstrate bilateral SLR >/= 70 degrees for improve flexibility for ADL tolerance. New   LTG 2 Patient will increase bilateral LE strength >/= 4+/5 for improved functional tolerance. New   LTG 3 Modified Oswestry </= 22/50 to demonstrate functional improvements. New     TREATMENT PLAN       Requires PT Follow-Up: Yes    Treatment may include any combination of the following: ROM, Strengthening, Endurance training, Gait training, Neuromuscular re-education, Manual, Home exercise program, Patient/Caregiver education & training, Equipment evaluation, education, & procurement, Safety education & training, Modalities     Frequency / Duration:  Patient to be seen 2 times per week for 4-6 weeks  Plan Comment:               Eval Complexity:   Decision Making: Medium Complexity  History: Personal Factors and/or Comorbidities Impacting POC: Medium  History: CVA, back surgery, bilateral knee replacements  Examination of body system(s) including body structures and functions, activity limitations, and/or participation restrictions: Medium  Exam: Modified Oswestry: 31/50  Clinical Presentation: Medium  Clinical Presentation: evolving  Clinical Decision Making : Medium Complexity    POST-PAIN     Pain Rating (0-10 pain scale):   7/10  Location and pain description same as pre-treatment unless indicated.    Action: [] NA  [] Call Physician  [x] Perform HEP  [x] Meds as prescribed    Evaluation and patient rights have been reviewed and patient agrees with plan of care. Yes  [x]  No  []   Explain:     [place] Fall Risk Assessment  Risk Factor Scale  Score   History of Falls [] Yes  [x] No 25  0 0   Secondary Diagnosis [] Yes  [x] No 15  0 0   Ambulatory Aid [] Furniture  [] Crutches/cane/walker  [x] None/bedrest/wheelchair/nurse 30  15  0 0   IV/Heparin Lock [] Yes  [x] No 20  0 0   Gait/Transferring [] Impaired  [x] Weak  [] Normal/bedrest/immobile 20  10  0 10   Mental Status [] Forgets limitations  [x] Oriented to own ability 15  0 0      Total:10     Based on the Assessment score: check the appropriate box.   [x]  No intervention needed   Low =   Score of 0-24  []  Use standard prevention interventions Moderate =  Score of 24-44   [] Discuss fall prevention strategies   [] Indicate moderate falls risk on eval  []  Use high risk prevention interventions High = Score of 45 and higher   [] Discuss fall prevention strategies   [] Provide supervision during treatment time      Minutes:  PT Individual Minutes  Time In: 1510  Time Out: 1545  Minutes: 35  Timed Code Treatment Minutes: 10 Minutes  Procedure Minutes: 25 PT evaluation minutes     Timed Activity Minutes Units   Ther Ex 10 1   Manual          Electronically signed by Lulu Skiff, PT on 12/1/22 at 4:17 PM EST

## 2022-12-01 NOTE — PLAN OF CARE
[address]  [address]      MJXBK:[phone]    [] Certification  [] Recertification [x]  Plan of Care  [] Progress Note [] Discharge      Referring Provider: SARAH Vanessa - CNP     From:  Tip Jhaveri PT     Patient: Catherine Kaba (28 y.o. female) : 1960 Date: 2022   Medical Diagnosis: Spinal stenosis of lumbar region with neurogenic claudication [M48.062]  Post laminectomy syndrome [M96.1]    Treatment Diagnosis: low back pain, decreased LE strength, decreased core strength      Progress Report Period from:  2022  to 2022    Visits to Date: 1 No Show: 0 Cancelled Appts: 0    OBJECTIVE:   Short Term Goals - Time Frame for Short Term Goals: 3 weeks    Goals Current/Discharge status  Status   Short Term Goal 1: Patient will report </= 5/10 pain in low back with walking >/= 15 minutes. Patient reports 7/10 pain in mid/low back. New   Short Term Goal 2: Patient will be independent with HEP. Patient issued HEP New     Long Term Goals - Time Frame for Long Term Goals : 4-6 weeks  Goals Current/ Discharge status Status   Long Term Goal 1: Patient will demonstrate bilateral SLR >/= 70 degrees for improve flexibility for ADL tolerance. PROM LLE (degrees)  L SLR: 60 deg   PROM RLE (degrees)  RLE General PROM: 62 deg            AROM Lumbar Spine   Lumbar spine general AROM: flexion 50% WFLs, bilateral SB 50% WFLs       New   Long Term Goal 2: Patient will increase bilateral LE strength >/= 4+/5 for improved functional tolerance.  Strength RLE  R Hip Flexion: 3+/5  R Hip Extension: NT (poor glut activation noted)  R Hip ABduction: 3+/5  R Knee Flexion: 4-/5  R Knee Extension: 4+/5  R Ankle Dorsiflexion: 4+/5  Strength LLE  L Hip Flexion: 3+/5  L Hip Extension: NT (poor glut activation noted)  L Hip ABduction: 3+/5  L Knee Flexion: 4-/5  L Knee Extension: 4/5  L Ankle Dorsiflexion: 4+/5 New   Long Term Goal 3: Modified Oswestry </= 22/50 to demonstrate functional improvements. 31/50 New     Body Structures, Functions, Activity Limitations Requiring Skilled Therapeutic Intervention: Decreased ROM, Decreased strength, Decreased endurance, Increased pain, Decreased posture, Decreased functional mobility   Assessment: Patient s/p lumbar surgery reports difficulty moving in her bed and walking for increased time. Upon PT evaluation, patient demonstrates limited lumbar/hamstring ROM with impaired LE/core strength. Further PT recommended to improve ROM, strength, and improve activity tolerance for overall quality of life. Therapy Prognosis: Good      PT Education: Goals;PT Role;Plan of Care;Home Exercise Program    PLAN: [x] Evaluate and Treat  Frequency/Duration:  Plan Frequency: 2  Plan weeks: 4-6  Current Treatment Recommendations: ROM, Strengthening, Endurance training, Gait training, Neuromuscular re-education, Manual, Home exercise program, Patient/Caregiver education & training, Equipment evaluation, education, & procurement, Safety education & training, Modalities  Modalities: Heat/Cold, E-stim - unattended     Precautions: Other position/activity restrictions: s/p lumbar surgery, activity as tolerates                  Patient Status:[x] Continue/ Initiate plan of Care     [] Discharge PT. Recommend pt continue with HEP. [] Additional visits requested, Please re-certify for additional visits:     [] Hold           Signature: Electronically signed by Poonam Vizcaino PT on 12/1/2022 at 4:25 PM      If you have any questions or concerns, please don't hesitate to call. Thank you for your referral.    I have reviewed this plan of care and certify a need for medically necessary rehabilitation services.     Physician Signature:__________________________________________________________  Date:  Please sign and return

## 2022-12-06 ENCOUNTER — APPOINTMENT (OUTPATIENT)
Dept: PHYSICAL THERAPY | Age: 62
End: 2022-12-06
Payer: COMMERCIAL

## 2022-12-06 ENCOUNTER — HOSPITAL ENCOUNTER (OUTPATIENT)
Dept: PHYSICAL THERAPY | Age: 62
Setting detail: THERAPIES SERIES
Discharge: HOME | End: 2022-12-06
Payer: COMMERCIAL

## 2022-12-06 PROCEDURE — 97110 THERAPEUTIC EXERCISES: CPT

## 2022-12-06 ASSESSMENT — PAIN DESCRIPTION - LOCATION: LOCATION: BACK

## 2022-12-06 ASSESSMENT — PAIN DESCRIPTION - ORIENTATION: ORIENTATION: LOWER

## 2022-12-06 ASSESSMENT — PAIN DESCRIPTION - PAIN TYPE: TYPE: CHRONIC PAIN

## 2022-12-06 ASSESSMENT — PAIN SCALES - GENERAL: PAINLEVEL_OUTOF10: 7

## 2022-12-06 NOTE — PROGRESS NOTES
[address]  RJW:[phone]  Treatment Note        Date: 2022  Patient: Angela Correa  : 1960   Confirmed: Yes  MRN: [id number]  Referring Provider: SARAH Felix CNP      Medical Diagnosis: Spinal stenosis of lumbar region with neurogenic claudication [M48.062]  Post laminectomy syndrome [M96.1]      Treatment Diagnosis: low back pain, decreased LE strength, decreased core strength    Visit Information:  Insurance: Payor: Juan Nix / Plan: Ruthie Johnston / Product Type: *No Product type* /   PT Visit Information  Onset Date: 10/20/22  PT Insurance Information: BCBS  Total # of Visits Approved: 60  Total # of Visits to Date: 2  No Show: 0  Canceled Appointment: 0  Progress Note Counter: -    Subjective Information:  Subjective: Pt reports that she is doing HEP at home. 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 HEP Compliance:  [x] Good [] Fair [] Poor [] Reports not doing due to:    Pain Screening  Patient Currently in Pain: Yes  Pain Assessment: 0-10  Pain Level: 7  Pain Type: Chronic pain  Pain Location: Back  Pain Orientation: Lower    Treatment:  Exercises:  Exercises  Exercise 1: TA isometric 5'' x 10 seated  Exercise 2: TA march x 10 seated  Exercise 3: seated  hip adduction with ball 5'' x 10  Exercise 4: seated glut sets 5'' x 10  Exercise 5: seated hip abduction RTB 5'' x 10  Exercise 9: hamstring stretch 3 x30sec  Exercise 11: bike 4 min     Modalities:  Cryotherapy (CPT 90238)  Patient Position: Seated  Cryotherapy location: Low back  Post treatment skin assessment: Intact  Limitations addressed: Pain modulation, Edema  Untimed (minutes): 0  Unbillable time (minutes): 8  Total Time: 8         Assessment:    Body Structures, Functions, Activity Limitations Requiring Skilled Therapeutic Intervention: Decreased ROM, Decreased strength, Decreased endurance, Increased pain, Decreased posture, Decreased

## 2022-12-07 ENCOUNTER — OFFICE VISIT (OUTPATIENT)
Dept: PAIN MANAGEMENT | Age: 62
End: 2022-12-07
Payer: COMMERCIAL

## 2022-12-07 VITALS
DIASTOLIC BLOOD PRESSURE: 76 MMHG | BODY MASS INDEX: 32.99 KG/M2 | SYSTOLIC BLOOD PRESSURE: 118 MMHG | WEIGHT: 198 LBS | TEMPERATURE: 97.8 F | HEIGHT: 65 IN

## 2022-12-07 DIAGNOSIS — M47.812 CERVICAL SPONDYLOSIS WITHOUT MYELOPATHY: ICD-10-CM

## 2022-12-07 DIAGNOSIS — M96.1 POST LAMINECTOMY SYNDROME: Primary | ICD-10-CM

## 2022-12-07 DIAGNOSIS — M62.838 MUSCLE SPASM: ICD-10-CM

## 2022-12-07 DIAGNOSIS — M54.16 LUMBAR RADICULOPATHY: ICD-10-CM

## 2022-12-07 DIAGNOSIS — M47.817 LUMBOSACRAL SPONDYLOSIS WITHOUT MYELOPATHY: ICD-10-CM

## 2022-12-07 DIAGNOSIS — M25.511 PAIN IN JOINT OF RIGHT SHOULDER: ICD-10-CM

## 2022-12-07 DIAGNOSIS — M48.062 SPINAL STENOSIS OF LUMBAR REGION WITH NEUROGENIC CLAUDICATION: ICD-10-CM

## 2022-12-07 DIAGNOSIS — G89.4 CHRONIC PAIN SYNDROME: ICD-10-CM

## 2022-12-07 PROCEDURE — 99214 OFFICE O/P EST MOD 30 MIN: CPT | Performed by: NURSE PRACTITIONER

## 2022-12-07 PROCEDURE — 3078F DIAST BP <80 MM HG: CPT | Performed by: NURSE PRACTITIONER

## 2022-12-07 PROCEDURE — 3074F SYST BP LT 130 MM HG: CPT | Performed by: NURSE PRACTITIONER

## 2022-12-07 RX ORDER — OXYCODONE HYDROCHLORIDE AND ACETAMINOPHEN 5; 325 MG/1; MG/1
1 TABLET ORAL EVERY 8 HOURS PRN
Qty: 90 TABLET | Refills: 0 | Status: SHIPPED | OUTPATIENT
Start: 2022-12-08 | End: 2023-01-07

## 2022-12-07 RX ORDER — CYCLOBENZAPRINE HCL 10 MG
10 TABLET ORAL DAILY PRN
Qty: 30 TABLET | Refills: 0 | Status: SHIPPED | OUTPATIENT
Start: 2022-12-08 | End: 2023-01-07

## 2022-12-07 ASSESSMENT — ENCOUNTER SYMPTOMS
TROUBLE SWALLOWING: 0
SHORTNESS OF BREATH: 0
DIARRHEA: 0
EYES NEGATIVE: 1
GASTROINTESTINAL NEGATIVE: 1
CONSTIPATION: 0
COUGH: 0
BACK PAIN: 1
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
## 2022-12-08 ENCOUNTER — HOSPITAL ENCOUNTER (OUTPATIENT)
Dept: PHYSICAL THERAPY | Age: 62
Setting detail: THERAPIES SERIES
Discharge: HOME | End: 2022-12-08
Payer: COMMERCIAL

## 2022-12-08 PROCEDURE — 97110 THERAPEUTIC EXERCISES: CPT

## 2022-12-08 PROCEDURE — 97140 MANUAL THERAPY 1/> REGIONS: CPT

## 2022-12-08 PROCEDURE — G0283 ELEC STIM OTHER THAN WOUND: HCPCS

## 2022-12-08 ASSESSMENT — PAIN DESCRIPTION - FREQUENCY: FREQUENCY: INTERMITTENT

## 2022-12-08 ASSESSMENT — PAIN DESCRIPTION - DESCRIPTORS: DESCRIPTORS: ACHING;SHARP

## 2022-12-08 ASSESSMENT — PAIN DESCRIPTION - PAIN TYPE: TYPE: SURGICAL PAIN

## 2022-12-08 ASSESSMENT — PAIN DESCRIPTION - ORIENTATION: ORIENTATION: LOWER

## 2022-12-08 ASSESSMENT — PAIN SCALES - GENERAL: PAINLEVEL_OUTOF10: 7

## 2022-12-08 ASSESSMENT — PAIN DESCRIPTION - LOCATION: LOCATION: BACK

## 2022-12-08 NOTE — PROGRESS NOTES
[address]  QVDAP:[phone]  Treatment Note        Date: 2022  Patient: Genoveva Boyer  : 1960   Confirmed: Yes  MRN: [id number]  Referring Provider: SARAH Erazo CNP      Medical Diagnosis: Spinal stenosis of lumbar region with neurogenic claudication [M48.062]  Post laminectomy syndrome [M96.1]      Treatment Diagnosis: low back pain, decreased LE strength, decreased core strength    Visit Information:  Insurance: Payor: Kayode Panda 150 / Plan: Juan Carlos Wang / Product Type: *No Product type* /   PT Visit Information  Onset Date: 10/20/22  PT Insurance Information: BCBS  Total # of Visits Approved: 60  Total # of Visits to Date: 3  No Show: 0  Canceled Appointment: 0  Progress Note Counter: 3/8-    Subjective Information:  Subjective: Pt reported that she did alot of walking yesterday causing her low back to have increase pain. Laying down still increases her low back pain and can not tolerate.   HEP Compliance:  [x] Good [] Fair [] Poor [] Reports not doing due to:    Pain Screening  Patient Currently in Pain: Yes  Pain Assessment: 0-10  Pain Level: 7  Pain Type: Surgical pain  Pain Location: Back  Pain Orientation: Lower  Pain Descriptors: Aching, Sharp  Pain Frequency: Intermittent    Treatment:  Exercises:  Exercises  Exercise 1: TA isometric 5'' x 10 seated  Exercise 2: TA march x 10 seated  Exercise 3: seated  hip adduction with ball 5'' x 10  Exercise 4: seated glut sets 5'' x 10  Exercise 5: seated hip abduction RTB 5'' x 10  Exercise 9: hamstring stretch 3 x30sec  Exercise 11: bike 5 min seat 4     Manual:   Manual Therapy  Soft Tissue Mobilizaton: lumbar x 8 mins seated (sitting on stool) supported by table/pillow     Modalities:  Cryotherapy (CPT 76001)  Patient Position: Seated  Number Minutes Cryotherapy: 10 mins seated  Cryotherapy location: Low back  Cryotherapy specified location: lumbar  Post treatment skin assessment: Intact  Post treatment skin assessment comments: n/a  Limitations addressed: Pain modulation, Edema  Limitations addressed: n/a  Untimed (minutes): 0  Unbillable time (minutes): 10  Total Time: 10     *Indicates exercise, modality, or manual techniques to be initiated when appropriate    Objective Measures:       Strength: [x] NT  [] MMT completed:     ROM: [x] NT  [] ROM measurements:      Assessment: Body Structures, Functions, Activity Limitations Requiring Skilled Therapeutic Intervention: Decreased ROM, Decreased strength, Decreased endurance, Increased pain, Decreased posture, Decreased functional mobility   Assessment: Pt tolerated seated exercises with no discomfort noted. STM completed to lumbar area in seated position. Tonicity noted R L paraspinals by incision and L  L1 area paraspinals. Pt tolerated well. Discussed possible supine position with elevation next visit, applying ifc and cold pack to help manage pain in that position. Treatment Diagnosis: low back pain, decreased LE strength, decreased core strength  Therapy Prognosis: Good     Post-Pain Assessment:       Pain Rating (0-10 pain scale):   5-6/10   Location and pain description same as pre-treatment unless indicated. Action: [] NA   [x] Perform HEP  [x] Meds as prescribed  [] Modalities as prescribed   [] Call Physician     GOALS   Patient Goal(s): Patient Goals : \"to be able to bend and not in pain all the time\"    Short Term Goals Completed by 3 weeks Goal Status   STG 1 Patient will report </= 5/10 pain in low back with walking >/= 15 minutes. In progress   STG 2 Patient will be independent with HEP. In progress     Long Term Goals Completed by 4-6 weeks Goal Status   LTG 1 Patient will demonstrate bilateral SLR >/= 70 degrees for improve flexibility for ADL tolerance. In progress   LTG 2 Patient will increase bilateral LE strength >/= 4+/5 for improved functional tolerance.  In progress   LTG 3 Modified Oswestry </= 22/50 to demonstrate functional improvements. In progress     Plan:  Frequency/Duration:  Plan  Plan Frequency: 2  Plan weeks: 4-6  Current Treatment Recommendations: ROM, Strengthening, Endurance training, Gait training, Neuromuscular re-education, Manual, Home exercise program, Patient/Caregiver education & training, Equipment evaluation, education, & procurement, Safety education & training, Modalities  Modalities: Heat/Cold, E-stim - unattended  Pt to continue current HEP. See objective section for any therapeutic exercise changes, additions or modifications this date.     Therapy Time:      PT Individual Minutes  Time In: 2495  Time Out: 1030  Minutes: 52  Timed Code Treatment Minutes: 38 Minutes  Procedure Minutes:ice pack lumbar x 10 mins   Timed Activity Minutes Units   Ther Ex 30 2   Manual  8 1     Electronically signed by Crystal Gaitan PTA on 12/8/22 at 10:29 AM EST

## 2022-12-13 ENCOUNTER — HOSPITAL ENCOUNTER (OUTPATIENT)
Dept: PHYSICAL THERAPY | Age: 62
Setting detail: THERAPIES SERIES
Discharge: HOME | End: 2022-12-13
Payer: COMMERCIAL

## 2022-12-13 PROCEDURE — 97140 MANUAL THERAPY 1/> REGIONS: CPT

## 2022-12-13 PROCEDURE — 97110 THERAPEUTIC EXERCISES: CPT

## 2022-12-13 ASSESSMENT — PAIN DESCRIPTION - LOCATION: LOCATION: BACK

## 2022-12-13 ASSESSMENT — PAIN DESCRIPTION - DESCRIPTORS: DESCRIPTORS: ACHING;SHARP

## 2022-12-13 ASSESSMENT — PAIN SCALES - GENERAL: PAINLEVEL_OUTOF10: 6

## 2022-12-13 ASSESSMENT — PAIN DESCRIPTION - ORIENTATION: ORIENTATION: LOWER

## 2022-12-13 ASSESSMENT — PAIN DESCRIPTION - FREQUENCY: FREQUENCY: INTERMITTENT

## 2022-12-13 NOTE — PROGRESS NOTES
[address]  KBGLF:[phone]  Treatment Note        Date: 2022  Patient: Nikki García  : 1960   Confirmed: Yes  MRN: [id number]  Referring Provider: SARAH Ye CNP      Medical Diagnosis: Spinal stenosis of lumbar region with neurogenic claudication [M48.062]  Post laminectomy syndrome [M96.1]      Treatment Diagnosis: low back pain, decreased LE strength, decreased core strength    Visit Information:  Insurance: Payor: Kayode Panda 150 / Plan: Malia Stall / Product Type: *No Product type* /   PT Visit Information  Onset Date: 10/20/22  PT Insurance Information: BCBS  Total # of Visits Approved: 60  Total # of Visits to Date: 4  No Show: 0  Canceled Appointment: 0  Progress Note Counter: -    Subjective Information:  Subjective: Pt stated that today she is feeling better than last visit. Did some stair climbing today before PT so just a little sore. Middle of low back is tender to touch. HEP Compliance:  [x] Good [] Fair [] Poor [] Reports not doing due to:    Pain Screening  Patient Currently in Pain: Yes  Pain Assessment: 0-10  Pain Level: 6  Pain Location: Back  Pain Orientation: Lower  Pain Descriptors: Aching, Sharp  Pain Frequency: Intermittent    Treatment:  Exercises:  Exercises  Exercise 1: TA isometric 5'' x 10 seated  Exercise 2: TA march x 10 seated  Exercise 3: seated  hip adduction with ball 5'' x 10  Exercise 4: seated glut sets 5'' x 10  Exercise 9: hamstring stretch 3 x30sec  Exercise 10: sit to stands  no UE support x 5 reps  Exercise 11: bike 7 min seat 4     Manual:   Manual Therapy  Soft Tissue Mobilizaton: lumbar x 10 mins seated (sitting on stool) supported by table/pillow   *Indicates exercise, modality, or manual techniques to be initiated when appropriate    Objective Measures:     Strength: [x] NT  [] MMT completed:     ROM: [x] NT  [] ROM measurements:        Assessment:    Body Structures, Functions, Activity Limitations Requiring Skilled Therapeutic Intervention: Decreased ROM, Decreased strength, Decreased endurance, Increased pain, Decreased posture, Decreased functional mobility   Assessment: Pt tolerated seated exercises with no discomfort noted. STM completed to lumbar area in seated position. Tonicity noted R L paraspinals by incision and L  L1 area paraspinals. Pt tolerated well. progress with sit to stand this visit and able to complete no UE support. Progress as tolerated. Treatment Diagnosis: low back pain, decreased LE strength, decreased core strength  Therapy Prognosis: Good     Post-Pain Assessment:       Pain Rating (0-10 pain scale):   4-5/10   Location and pain description same as pre-treatment unless indicated. Action: [] NA   [x] Perform HEP  [x] Meds as prescribed  [] Modalities as prescribed   [] Call Physician     GOALS   Patient Goal(s): Patient Goals : \"to be able to bend and not in pain all the time\"    Short Term Goals Completed by 3 weeks Goal Status   STG 1 Patient will report </= 5/10 pain in low back with walking >/= 15 minutes. In progress   STG 2 Patient will be independent with HEP. In progress     Long Term Goals Completed by 4-6 weeks Goal Status   LTG 1 Patient will demonstrate bilateral SLR >/= 70 degrees for improve flexibility for ADL tolerance. In progress   LTG 2 Patient will increase bilateral LE strength >/= 4+/5 for improved functional tolerance. In progress   LTG 3 Modified Oswestry </= 22/50 to demonstrate functional improvements.  In progress      Plan:  Frequency/Duration:  Plan  Plan Frequency: 2  Plan weeks: 4-6  Current Treatment Recommendations: ROM, Strengthening, Endurance training, Gait training, Neuromuscular re-education, Manual, Home exercise program, Patient/Caregiver education & training, Equipment evaluation, education, & procurement, Safety education & training, Modalities  Modalities: Heat/Cold, E-stim - unattended  Pt to continue current HEP. See objective section for any therapeutic exercise changes, additions or modifications this date.     Therapy Time:      PT Individual Minutes  Time In: 2969  Time Out: 0691  Minutes: 40  Timed Code Treatment Minutes: 38 Minutes  Timed Activity Minutes Units   Ther Ex 28 2   Manual  10 1     Electronically signed by Joesph Ramirez PTA on 12/13/22 at 12:26 PM EST

## 2022-12-15 ENCOUNTER — HOSPITAL ENCOUNTER (OUTPATIENT)
Dept: PHYSICAL THERAPY | Age: 62
Setting detail: THERAPIES SERIES
Discharge: HOME | End: 2022-12-15
Payer: COMMERCIAL

## 2022-12-15 DIAGNOSIS — M47.812 CERVICAL SPONDYLOSIS WITHOUT MYELOPATHY: ICD-10-CM

## 2022-12-15 DIAGNOSIS — G89.4 CHRONIC PAIN SYNDROME: ICD-10-CM

## 2022-12-15 DIAGNOSIS — M47.817 LUMBOSACRAL SPONDYLOSIS WITHOUT MYELOPATHY: ICD-10-CM

## 2022-12-15 DIAGNOSIS — M54.16 LUMBAR RADICULOPATHY: ICD-10-CM

## 2022-12-15 DIAGNOSIS — M96.1 POST LAMINECTOMY SYNDROME: ICD-10-CM

## 2022-12-15 DIAGNOSIS — M25.511 PAIN IN JOINT OF RIGHT SHOULDER: ICD-10-CM

## 2022-12-15 DIAGNOSIS — M48.062 SPINAL STENOSIS OF LUMBAR REGION WITH NEUROGENIC CLAUDICATION: ICD-10-CM

## 2022-12-15 PROCEDURE — 97140 MANUAL THERAPY 1/> REGIONS: CPT

## 2022-12-15 PROCEDURE — 97110 THERAPEUTIC EXERCISES: CPT

## 2022-12-15 ASSESSMENT — PAIN SCALES - GENERAL: PAINLEVEL_OUTOF10: 5

## 2022-12-15 ASSESSMENT — PAIN DESCRIPTION - LOCATION: LOCATION: BACK

## 2022-12-15 ASSESSMENT — PAIN DESCRIPTION - ORIENTATION: ORIENTATION: LOWER

## 2022-12-15 ASSESSMENT — PAIN DESCRIPTION - FREQUENCY: FREQUENCY: INTERMITTENT

## 2022-12-15 ASSESSMENT — PAIN DESCRIPTION - DESCRIPTORS: DESCRIPTORS: ACHING

## 2022-12-15 NOTE — PROGRESS NOTES
[facility], [address] [place] [address]  WJVEX:[phone]  Treatment Note        Date: 12/15/2022  Patient: Mitchell Husbands  : 1960   Confirmed: Yes  MRN: [id number]  Referring Provider: SARAH Alba CNP      Medical Diagnosis: Spinal stenosis of lumbar region with neurogenic claudication [M48.062]  Post laminectomy syndrome [M96.1]      Treatment Diagnosis: low back pain, decreased LE strength, decreased core strength    Visit Information:  Insurance: Payor: [employer] / Plan: [address] [place] [address] / Product Type: *No Product type* /   PT Visit Information  Onset Date: 10/20/22  PT Insurance Information: BCBS  Total # of Visits Approved: 60  Total # of Visits to Date: 5  No Show: 0  Canceled Appointment: 0  Progress Note Counter: -    Subjective Information:  Subjective: Pt reports that pain is getting better. HEP Compliance:  [x] Good [] Fair [] Poor [] Reports not doing due to:    Pain Screening  Patient Currently in Pain: Yes  Pain Assessment: 0-10  Pain Level: 5  Pain Location: Back  Pain Orientation: Lower  Pain Descriptors: Aching  Pain Frequency: Intermittent    Treatment:  Exercises:  Exercises  Exercise 1: TA isometric 5'' x 10 seated  Exercise 2: TA march x 10 seated  Exercise 3: seated  hip adduction with ball 5'' x 15  Exercise 4: seated glut sets 5'' x 10  Exercise 6: sink exercises with increased difflculty . x10  Exercise 9: hamstring stretch 3 x30sec  Exercise 10: sit to stands  no UE support x 8 reps  Exercise 11: bike 7 min seat 4       Manual:   Manual Therapy  Soft Tissue Mobilizaton: lumbar x 10 mins prone with and without tennis ball      Assessment: Body Structures, Functions, Activity Limitations Requiring Skilled Therapeutic Intervention: Decreased ROM, Decreased strength, Decreased endurance, Increased pain, Decreased posture, Decreased functional mobility   Assessment: Pt with increased difficulty with standing activities.  Pts rt le glut weakness. Pt able to lay prone for manual therapy. Treatment Diagnosis: low back pain, decreased LE strength, decreased core strength  Therapy Prognosis: Good          Post-Pain Assessment:       Pain Rating (0-10 pain scale):  0 /10   Location and pain description same as pre-treatment unless indicated. Action: [] NA   [x] Perform HEP  [] Meds as prescribed  [] Modalities as prescribed   [] Call Physician     GOALS   Patient Goal(s):      Short Term Goals Completed by 3 weeks Goal Status   STG 1 Patient will report </= 5/10 pain in low back with walking >/= 15 minutes. In progress   STG 2 Patient will be independent with HEP. In progress     Long Term Goals Completed by 4-6 weeks Goal Status   LTG 1 Patient will demonstrate bilateral SLR >/= 70 degrees for improve flexibility for ADL tolerance. In progress   LTG 2 Patient will increase bilateral LE strength >/= 4+/5 for improved functional tolerance. In progress   LTG 3 Modified Oswestry </= 22/50 to demonstrate functional improvements. In progress       Plan:  Frequency/Duration:  Plan  Plan Frequency: 2  Plan weeks: 4-6  Current Treatment Recommendations: ROM, Strengthening, Endurance training, Gait training, Neuromuscular re-education, Manual, Home exercise program, Patient/Caregiver education & training, Equipment evaluation, education, & procurement, Safety education & training, Modalities  Modalities: Heat/Cold, E-stim - unattended  Pt to continue current HEP. See objective section for any therapeutic exercise changes, additions or modifications this date.     Therapy Time:      PT Individual Minutes  Time In: 0935  Time Out: 1016  Minutes: 41  Timed Code Treatment Minutes: 41 Minutes    Timed Activity Minutes Units   Ther Ex 31 2   Manual  10 1     Electronically signed by Lars Alpers, PTA on 12/15/22 at 12:11 PM EST

## 2022-12-16 NOTE — PROGRESS NOTES
Patient Name: Adalid Carpenter : 1960        Date: 2022      Type of Appt: Post OP    Reason for appt: 1 MONTH FOLLOW UP, 10/21/2022 - RIGHT BILATERAL L 2-3 LAMINECTOMIES MICRODISSECTION FORAMINOTOMIES INTERBODY POSTEROLATERAL PEDICLE SCREW FUSION - IGS - INFUSE    Pt last seen by Dr. Cesar Meeks on 22    Surgeries: 10-21-22 Right bilateral L2-3 laminectomy, decompression,  facetectomy, foraminotomy, diskectomy, interbody posterolateral pedicle screw fusion, image-guided system, Infuse by Dr. Cesar Meeks     2016 right and L4-5 and bilateral microdissection, decompression, discectomy, interbody posterolateral fusion, pedicle screws by Dr. Cesar Meeks     2020 left and bilateral L2-3 L3-4 microdecompressions by Dr. Cesar Meeks     Physical therapy: 12-15-22 physical therapy @ [facility] for lumbar     Conservative Tx tried: Percocet, Gabapentin, Flexeril    Smoking: No    REVIEW OF SYSTEMS:    Headaches, Sleep Disturbance, Neck Pain, Back Pain          [address] [place] [address]  [facility] [address] [place] [address] 82: [phone]  F: [phone]      Patient: Adalid Carpenter  YOB: 1960  Date: 2022    The patient is a 58 y.o. female who presents today for follow up. EXAMINATION:   XRAY VIEWS OF THE LUMBAR SPINE       2022 11:49 am       COMPARISON:   Number x-rays from 2016       HISTORY:   ORDERING SYSTEM PROVIDED HISTORY: Spinal stenosis of lumbar region with   neurogenic claudication   TECHNOLOGIST PROVIDED HISTORY:   What reading provider will be dictating this exam?->CRC       FINDINGS:   Status post L2-3 discectomy and spacer placement with transpedicular screws   on the right side and a vertical bar on the right. There is also a separate L4-5 discectomy and spacer placement with bilateral   transpedicular screws and vertical bars. Vertebral compression fracture: None.        Acute fracture: None visualized radiographically. Alignement: Within normal limits. Disc narrowing: Of the remaining spaces there is severe joint space narrowing   at L5-S1, moderate joint space narrowing at L1-2, and mild narrowing at L3-4. Impression   There are no acute osseous changes. Status post discectomies and posterior   fusions at L2-3 and L4-5. She is doing well. She gets up easily walks on heel walk and toe walk. Wounds are all well-healed. Discussed she needs to start getting the motion back into her spine she was instructed in range of motion. Continue her physical therapy.   If she has any questions or problems she will contact the office    Kaden Blair MD

## 2022-12-19 RX ORDER — GABAPENTIN 400 MG/1
CAPSULE ORAL
Qty: 360 CAPSULE | Refills: 3 | OUTPATIENT
Start: 2022-12-19

## 2022-12-20 ENCOUNTER — HOSPITAL ENCOUNTER (OUTPATIENT)
Dept: PHYSICAL THERAPY | Age: 62
Setting detail: THERAPIES SERIES
Discharge: HOME | End: 2022-12-20
Payer: COMMERCIAL

## 2022-12-20 NOTE — PROGRESS NOTES
Therapy                            Cancellation/No-show Note    Date: 2022  Patient: Gerold Gitelman (88 y.o. female)  : 1960  MRN:  [id number]  Referring Physician: SARAH Ramirez CNP    Medical Diagnosis: Spinal stenosis of lumbar region with neurogenic claudication [M48.062]  Post laminectomy syndrome [M96.1]      Visit Information:  Insurance: Payor: [employer]ode Panda 150 / Plan: [address] [place] [address] / Product Type: *No Product type* /   Visits to Date: 5   No Show/Cancelled Appts: 0       For today's appointment patient:  [x]  Cancelled  []  Rescheduled appointment  []  No-show   [x]  Called pt to remind of next appointment     Reason given by patient:  []  Patient ill  []  Conflicting appointment  []  No transportation    []  Conflict with work  []  No reason given  [x]  Other: family conflict, confirmed next visit       [x] Pt has future appointments scheduled, no follow up needed  [] Pt requests to be on hold.     Reason:   If > 2 weeks please discuss with therapist.  [] Therapist to call pt for follow up     Comments:       Signature: Electronically signed by Homer June PTA on 22 at 6:17 PM EST

## 2022-12-22 ENCOUNTER — OFFICE VISIT (OUTPATIENT)
Dept: NEUROSURGERY | Age: 62
End: 2022-12-22

## 2022-12-22 ENCOUNTER — HOSPITAL ENCOUNTER (OUTPATIENT)
Dept: PHYSICAL THERAPY | Age: 62
Setting detail: THERAPIES SERIES
Discharge: HOME | End: 2022-12-22
Payer: COMMERCIAL

## 2022-12-22 VITALS
HEIGHT: 65 IN | TEMPERATURE: 97 F | SYSTOLIC BLOOD PRESSURE: 82 MMHG | WEIGHT: 198 LBS | DIASTOLIC BLOOD PRESSURE: 58 MMHG | BODY MASS INDEX: 32.99 KG/M2

## 2022-12-22 DIAGNOSIS — M43.17 ACQUIRED SPONDYLOLISTHESIS OF LUMBOSACRAL REGION: Primary | ICD-10-CM

## 2022-12-22 PROCEDURE — 99024 POSTOP FOLLOW-UP VISIT: CPT | Performed by: NEUROLOGICAL SURGERY

## 2022-12-27 ENCOUNTER — HOSPITAL ENCOUNTER (OUTPATIENT)
Dept: PHYSICAL THERAPY | Age: 62
Setting detail: THERAPIES SERIES
Discharge: HOME | End: 2022-12-27
Payer: COMMERCIAL

## 2022-12-27 PROCEDURE — G0283 ELEC STIM OTHER THAN WOUND: HCPCS

## 2022-12-27 PROCEDURE — 97110 THERAPEUTIC EXERCISES: CPT

## 2022-12-27 ASSESSMENT — PAIN DESCRIPTION - LOCATION: LOCATION: BACK

## 2022-12-27 ASSESSMENT — PAIN DESCRIPTION - PAIN TYPE: TYPE: SURGICAL PAIN

## 2022-12-27 ASSESSMENT — PAIN DESCRIPTION - DESCRIPTORS: DESCRIPTORS: ACHING

## 2022-12-27 ASSESSMENT — PAIN DESCRIPTION - FREQUENCY: FREQUENCY: INTERMITTENT

## 2022-12-27 ASSESSMENT — PAIN SCALES - GENERAL: PAINLEVEL_OUTOF10: 5

## 2022-12-27 ASSESSMENT — PAIN DESCRIPTION - ORIENTATION: ORIENTATION: LOWER

## 2022-12-27 NOTE — PROGRESS NOTES
[address]  QTPFJ:[phone]  Treatment Note        Date: 2022  Patient: Nikki García  : 1960   Confirmed: Yes  MRN: [id number]  Referring Provider: SARAH Ye CNP      Medical Diagnosis: Spinal stenosis of lumbar region with neurogenic claudication [M48.062]  Post laminectomy syndrome [M96.1]      Treatment Diagnosis: low back pain, decreased LE strength, decreased core strength    Visit Information:  Insurance: Payor: [employer]ode Panda 150 / Plan: Malia Stall / Product Type: *No Product type* /   PT Visit Information  Onset Date: 10/20/22  PT Insurance Information: BCBS  Total # of Visits Approved: 60  Total # of Visits to Date: 6  No Show: 0  Canceled Appointment: 1  Progress Note Counter: -    Subjective Information:  Subjective: Pt reports that her low back is feeling better, with the pain located in the middle of her back. Feels she is reaching her personal goal of decrease pain with bending. HEP Compliance:  [x] Good [] Fair [] Poor [] Reports not doing due to:    Pain Screening  Patient Currently in Pain: Yes  Pain Assessment: 0-10  Pain Level: 5  Pain Type: Surgical pain  Pain Location: Back  Pain Orientation: Lower  Pain Descriptors: Aching  Pain Frequency: Intermittent    Treatment:  Exercises:  Exercises  Exercise 4: seated glut sets 5'' x 10  HEP  Exercise 5: supine LTR  x 10 5 sec  Exercise 6: sink exercises with increased difflculty x10   reviewed to complete HEP  Exercise 7: progress to low bridge with TA stab  x 10  3 secs  Exercise 8: SKTC   5 x 10 secs  Exercise 9: supine hamstrings  5 x 10 secs  Exercise 11: bike 10 min seat 5     Modalities:  Electric stimulation, unattended (CPT 33992) /  (Medicare)  Patient Position: Seated  E-stim location: Low back  E-stim specified location: x10 mins  E-stim type:  Interferential (IFC)     *Indicates exercise, modality, or manual techniques to be initiated when appropriate    Objective Measures:       STG 2 Current Status[de-identified] Independent with current HEP and progressing as tolerated       LTG 1 Current Status[de-identified] R SLR 65 degrees   L SLR: 70 degrees     Assessment: Body Structures, Functions, Activity Limitations Requiring Skilled Therapeutic Intervention: Decreased ROM, Decreased strength, Decreased endurance, Increased pain, Decreased posture, Decreased functional mobility   Assessment: Pt able to tolerate supine position to complete lumbar and LE ROM and strengthening exercises. Pt able to complete TA stab with bridging reaching minimal height. Pt will progress as tolerated. SLR PROM taken with improving L>R. Pt limited with progression d/t initial session in supine position. IFC applied to lumbar spine to help manage pain levels. Treatment Diagnosis: low back pain, decreased LE strength, decreased core strength  Therapy Prognosis: Good      Post-Pain Assessment:       Pain Rating (0-10 pain scale):   4/10   Location and pain description same as pre-treatment unless indicated. Action: [] NA    [x]Perform HEP  [x] Meds as prescribed  [] Modalities as prescribed   [] Call Physician     GOALS   Patient Goal(s): Patient Goals : \"to be able to bend and not in pain all the time\"    Short Term Goals Completed by 3 weeks Goal Status   STG 1 Patient will report </= 5/10 pain in low back with walking >/= 15 minutes. In progress   STG 2 Patient will be independent with HEP. In progress     Long Term Goals Completed by 4-6 weeks Goal Status   LTG 1 Patient will demonstrate bilateral SLR >/= 70 degrees for improve flexibility for ADL tolerance. Partially met   LTG 2 Patient will increase bilateral LE strength >/= 4+/5 for improved functional tolerance. In progress   LTG 3 Modified Oswestry </= 22/50 to demonstrate functional improvements.  In progress     Plan:  Frequency/Duration:  Plan  Plan Frequency: 2  Plan weeks: 4-6  Current Treatment Recommendations: ROM, Strengthening, Endurance training, Gait training, Neuromuscular re-education, Manual, Home exercise program, Patient/Caregiver education & training, Equipment evaluation, education, & procurement, Safety education & training, Modalities  Modalities: Heat/Cold, E-stim - unattended  Pt to continue current HEP. See objective section for any therapeutic exercise changes, additions or modifications this date.     Therapy Time:      PT Individual Minutes  Time In: 1005  Time Out: 1050  Minutes: 45  Timed Code Treatment Minutes: 30 Minutes  Procedure Minutes:ifc Lumbar x 10 mins  Timed Activity Minutes Units   Ther Ex 30 2     Electronically signed by Ronal Ma PTA on 12/27/22 at 10:25 AM EST

## 2022-12-29 ENCOUNTER — HOSPITAL ENCOUNTER (OUTPATIENT)
Dept: PHYSICAL THERAPY | Age: 62
Setting detail: THERAPIES SERIES
Discharge: HOME | End: 2022-12-29
Payer: COMMERCIAL

## 2022-12-29 PROCEDURE — 97110 THERAPEUTIC EXERCISES: CPT

## 2022-12-29 ASSESSMENT — PAIN DESCRIPTION - DESCRIPTORS: DESCRIPTORS: ACHING

## 2022-12-29 ASSESSMENT — PAIN SCALES - GENERAL: PAINLEVEL_OUTOF10: 7

## 2022-12-29 ASSESSMENT — PAIN DESCRIPTION - ORIENTATION: ORIENTATION: LOWER

## 2022-12-29 ASSESSMENT — PAIN DESCRIPTION - LOCATION: LOCATION: BACK

## 2022-12-29 ASSESSMENT — PAIN DESCRIPTION - PAIN TYPE: TYPE: SURGICAL PAIN

## 2022-12-29 NOTE — PROGRESS NOTES
[address] [facility] [address]  [address]  ASUWE:[phone]  Treatment Note        Date: 2022  Patient: Beatris Godfrey  : 1960   Confirmed: Yes  MRN: [id number]  Referring Provider: SARAH Lomas CNP      Medical Diagnosis: Spinal stenosis of lumbar region with neurogenic claudication [M48.062]  Post laminectomy syndrome [M96.1]      Treatment Diagnosis: low back pain, decreased LE strength, decreased core strength    Visit Information:  Insurance: Payor: Kayode Panda 150 / Plan: [facility] / Product Type: *No Product type* /   PT Visit Information  Onset Date: 10/20/22  PT Insurance Information: BCBS  Total # of Visits Approved: 60  Total # of Visits to Date: 7  No Show: 0  Canceled Appointment: 1  Progress Note Counter: -    Subjective Information:  Subjective: Patient reports compliance with additional exercises. Denies increased pain or difficulty with exercise. HEP Compliance:  [x] Good [] Fair [] Poor [] Reports not doing due to:    Pain Screening  Patient Currently in Pain: Yes  Pain Assessment: 0-10  Pain Level: 7  Pain Type: Surgical pain  Pain Location: Back  Pain Orientation: Lower  Pain Descriptors: Aching    Treatment:  Exercises:  Exercises  Exercise 1: TA isometric 5'' x 15 supine  Exercise 2: clams/reverse clams x10 ea  Exercise 3: supine hip extension into Pball on wall 5\"x15  Exercise 4: SLR with TA iso x10 ea  Exercise 5: supine LTR  x 10 5 sec  Exercise 7: low bridge with TA stab  x 10  3 secs  Exercise 8: SKTC   5 x 10 secs  Exercise 9: supine hamstrings  5 x 20 secs    Modalities:  Electric stimulation, unattended (CPT 62767) /  (Medicare)  Patient Position: Seated  E-stim location: Low back  E-stim specified location: x10 mins  E-stim type: Interferential (IFC)    *Indicates exercise, modality, or manual techniques to be initiated when appropriate    Assessment:    Body Structures, Functions, Activity Limitations Requiring Skilled Therapeutic Intervention: Decreased ROM, Decreased strength, Decreased endurance, Increased pain, Decreased posture, Decreased functional mobility   Assessment: Continued current POC progressing exercises for core stability and bilateral LE strength. Patient demonstrates quick muscle fatigue with all exercises. Treatment Diagnosis: low back pain, decreased LE strength, decreased core strength  Therapy Prognosis: Good     Post-Pain Assessment:       Pain Rating (0-10 pain scale):   5/10   Location and pain description same as pre-treatment unless indicated. Action: [] NA   [x] Perform HEP  [] Meds as prescribed  [] Modalities as prescribed   [] Call Physician     GOALS   Patient Goal(s): Patient Goals : \"to be able to bend and not in pain all the time\"    Short Term Goals Completed by 3 weeks Goal Status   STG 1 Patient will report </= 5/10 pain in low back with walking >/= 15 minutes. In progress   STG 2 Patient will be independent with HEP. In progress       Long Term Goals Completed by 4-6 weeks Goal Status   LTG 1 Patient will demonstrate bilateral SLR >/= 70 degrees for improve flexibility for ADL tolerance. Partially met   LTG 2 Patient will increase bilateral LE strength >/= 4+/5 for improved functional tolerance. In progress   LTG 3 Modified Oswestry </= 22/50 to demonstrate functional improvements. In progress       Plan:  Frequency/Duration:  Plan  Plan Frequency: 2  Plan weeks: 4-6  Current Treatment Recommendations: ROM, Strengthening, Endurance training, Gait training, Neuromuscular re-education, Manual, Home exercise program, Patient/Caregiver education & training, Equipment evaluation, education, & procurement, Safety education & training, Modalities  Modalities: Heat/Cold, E-stim - unattended  Pt to continue current HEP. See objective section for any therapeutic exercise changes, additions or modifications this date.     Therapy Time:      PT Individual Minutes  Time In: 0930  Time Out: 1025  Minutes: 55  Timed Code Treatment Minutes: 45 Minutes  Procedure Minutes:10  Timed Activity Minutes Units   Ther Ex 45 3     Electronically signed by Joel Palmer PTA on 12/29/22 at 11:12 AM EST

## 2023-01-04 ENCOUNTER — HOSPITAL ENCOUNTER (OUTPATIENT)
Dept: PHYSICAL THERAPY | Age: 63
Setting detail: THERAPIES SERIES
Discharge: HOME | End: 2023-01-04
Payer: MEDICARE

## 2023-01-04 DIAGNOSIS — M96.1 POST LAMINECTOMY SYNDROME: ICD-10-CM

## 2023-01-04 DIAGNOSIS — M25.511 PAIN IN JOINT OF RIGHT SHOULDER: ICD-10-CM

## 2023-01-04 DIAGNOSIS — M54.16 LUMBAR RADICULOPATHY: ICD-10-CM

## 2023-01-04 DIAGNOSIS — M47.812 CERVICAL SPONDYLOSIS WITHOUT MYELOPATHY: ICD-10-CM

## 2023-01-04 DIAGNOSIS — M48.062 SPINAL STENOSIS OF LUMBAR REGION WITH NEUROGENIC CLAUDICATION: ICD-10-CM

## 2023-01-04 DIAGNOSIS — G89.4 CHRONIC PAIN SYNDROME: ICD-10-CM

## 2023-01-04 DIAGNOSIS — M47.817 LUMBOSACRAL SPONDYLOSIS WITHOUT MYELOPATHY: ICD-10-CM

## 2023-01-04 DIAGNOSIS — M62.838 MUSCLE SPASM: ICD-10-CM

## 2023-01-04 PROCEDURE — 97140 MANUAL THERAPY 1/> REGIONS: CPT

## 2023-01-04 PROCEDURE — 97110 THERAPEUTIC EXERCISES: CPT

## 2023-01-04 RX ORDER — CYCLOBENZAPRINE HCL 10 MG
10 TABLET ORAL DAILY PRN
Qty: 4 TABLET | Refills: 0 | Status: SHIPPED | OUTPATIENT
Start: 2023-01-07 | End: 2023-01-10 | Stop reason: SDUPTHER

## 2023-01-04 RX ORDER — OXYCODONE HYDROCHLORIDE AND ACETAMINOPHEN 5; 325 MG/1; MG/1
1 TABLET ORAL EVERY 8 HOURS PRN
Qty: 12 TABLET | Refills: 0 | Status: SHIPPED | OUTPATIENT
Start: 2023-01-07 | End: 2023-01-10 | Stop reason: SDUPTHER

## 2023-01-04 ASSESSMENT — PAIN DESCRIPTION - PAIN TYPE: TYPE: SURGICAL PAIN

## 2023-01-04 ASSESSMENT — PAIN DESCRIPTION - DESCRIPTORS: DESCRIPTORS: ACHING

## 2023-01-04 ASSESSMENT — PAIN DESCRIPTION - FREQUENCY: FREQUENCY: INTERMITTENT

## 2023-01-04 ASSESSMENT — PAIN SCALES - GENERAL: PAINLEVEL_OUTOF10: 4

## 2023-01-04 ASSESSMENT — PAIN DESCRIPTION - LOCATION: LOCATION: BACK

## 2023-01-04 NOTE — TELEPHONE ENCOUNTER
Requested Prescriptions     Pending Prescriptions Disp Refills    oxyCODONE-acetaminophen (PERCOCET) 5-325 MG per tablet 90 tablet 0     Sig: Take 1 tablet by mouth every 8 hours as needed for Pain for up to 30 days. cyclobenzaprine (FLEXERIL) 10 MG tablet 30 tablet 0     Sig: Take 1 tablet by mouth daily as needed for Muscle spasms       Patient last seen on:  12/7 W/CDL  Date of last surgery:  10/21/2 W/[facility] system  Date of last refill:  12/8/22  Pain level:    Patient complaining of: Future appts: 1/10/2023 W/CDL     PT. IS REQUESTING REFILLS ON MEDICATION. STATES SHE WILL BE OUT OF MEDICATION ON 1/8. HER NEXT SCHEDULED APPT. IS 1/10. WILL RAUL AUTHORIZE PARTIAL REFILL FOR THIS PATIENT?

## 2023-01-04 NOTE — PROGRESS NOTES
[address], [address] [place] [address]  ZZMPQ:[phone]  Treatment Note        Date: 2023  Patient: Ken Greenwood  : 1960   Confirmed: Yes  MRN: [id number]  Referring Provider: Viann Gitelman, APRN - CNP      Medical Diagnosis: Spinal stenosis of lumbar region with neurogenic claudication [M48.062]  Post laminectomy syndrome [M96.1]      Treatment Diagnosis: low back pain, decreased LE strength, decreased core strength    Visit Information:  Insurance: Payor: [facility] MEDICARE / Plan: Delores Cherry / Product Type: *No Product type* /   PT Visit Information  Onset Date: 10/20/22  PT Insurance Information: BCBS  Total # of Visits Approved: 60  Total # of Visits to Date: 8  No Show: 0  Canceled Appointment: 1  Progress Note Counter: -    Subjective Information:  Subjective: Pt reported increase pain as the day goes on with walking and bending. Pt reports feeling muscle weakness and challlenged to lift into a bridge movement. Ableto lay in bed for 5+ hours then the recliner feels better for her low back. HEP Compliance:  [x] Good [] Fair [] Poor [] Reports not doing due to:    Pain Screening  Patient Currently in Pain: Yes  Pain Assessment: 0-10  Pain Level: 4  Worst Pain Level: 8  Pain Type: Surgical pain  Pain Location: Back  Pain Descriptors: Aching  Pain Frequency: Intermittent    Treatment:  Exercises:  Exercises  Exercise 1: TA isometric 5'' x 15 supine  Exercise 2: standing with UE support heel raises, single x 2 each  Dbl x 5  Exercise 3: Prone SLR x 5 each demo  HEP next visit. Limited with ROM  Exercise 4: SLR with TA iso x10 ea  Exercise 5: supine LTR  x 10 5 sec  Exercise 6: sink exercises:  on BBD abd/ext 5 each for demo vc's to with TA Stab with UE support. HEP next visit.   Exercise 7: low bridge with TA stab  x 10  3 secs  Exercise 8: SKTC   5 x 10 secs  Exercise 9: supine hamstrings  5 x 20 secs  Exercise 10: SL Hip abd/CW/CCW circles  x 5  each for intro  progress next visit to 10 then HEP  R hip abd/ AAROM with eccentric x 5  Exercise 11: bike 10 min seat 4  Exercise 20: HEP  Update next visit      *Indicates exercise, modality, or manual techniques to be initiated when appropriate    Objective Measures:        STG 2 Current Status[de-identified] Independent with current HEP and progressing as tolerated       LTG 1 Current Status[de-identified] R SLR 65 degrees   L SLR: 70 degrees  LTG 2 Current Status[de-identified] B LE strength ranges 3+ to 4+/5 for functional tolerance. Assessment: Body Structures, Functions, Activity Limitations Requiring Skilled Therapeutic Intervention: Decreased ROM, Decreased strength, Decreased endurance, Increased pain, Decreased posture, Decreased functional mobility   Assessment: Objective measures taken MMT LE's. Pt continues to improve on LE strength working towards LTG's. Pt demonstrates improve strength L>R. Progress as tolerated next visit working towards LTG's. Treatment Diagnosis: low back pain, decreased LE strength, decreased core strength  Therapy Prognosis: Good      Post-Pain Assessment:       Pain Rating (0-10 pain scale):   3-4/10   Location and pain description same as pre-treatment unless indicated. Action: [] NA   [x] Perform HEP  [x] Meds as prescribed  [] Modalities as prescribed   [] Call Physician     GOALS   Patient Goal(s): Patient Goals : \"to be able to bend and not in pain all the time\"    Short Term Goals Completed by 3 weeks Goal Status   STG 1 Patient will report </= 5/10 pain in low back with walking >/= 15 minutes. In progress   STG 2 Patient will be independent with HEP. In progress     Long Term Goals Completed by 4-6 weeks Goal Status   LTG 1 Patient will demonstrate bilateral SLR >/= 70 degrees for improve flexibility for ADL tolerance. Partially met   LTG 2 Patient will increase bilateral LE strength >/= 4+/5 for improved functional tolerance.  In progress   LTG 3 Modified Oswestry </= 22/50 to demonstrate functional improvements. In progress     Plan:  Frequency/Duration:  Plan  Plan Frequency: 2  Plan weeks: 4-6  Current Treatment Recommendations: ROM, Strengthening, Endurance training, Gait training, Neuromuscular re-education, Manual, Home exercise program, Patient/Caregiver education & training, Equipment evaluation, education, & procurement, Safety education & training, Modalities  Modalities: Heat/Cold, E-stim - unattended  Pt to continue current HEP. See objective section for any therapeutic exercise changes, additions or modifications this date.     Therapy Time:      PT Individual Minutes  Time In: 0840  Time Out: 6097  Minutes: 47  Timed Code Treatment Minutes: 45 Minutes    Timed Activity Minutes Units   Ther Ex 40 3   Manual  5 0     Electronically signed by Levi Pallas, PTA on 1/4/23 at 9:34 AM EST

## 2023-01-10 ENCOUNTER — OFFICE VISIT (OUTPATIENT)
Dept: PAIN MANAGEMENT | Age: 63
End: 2023-01-10
Payer: MEDICARE

## 2023-01-10 ENCOUNTER — HOSPITAL ENCOUNTER (OUTPATIENT)
Dept: PHYSICAL THERAPY | Age: 63
Setting detail: THERAPIES SERIES
Discharge: HOME | End: 2023-01-10
Payer: MEDICARE

## 2023-01-10 VITALS
WEIGHT: 198 LBS | HEIGHT: 65 IN | BODY MASS INDEX: 32.99 KG/M2 | SYSTOLIC BLOOD PRESSURE: 132 MMHG | TEMPERATURE: 97 F | DIASTOLIC BLOOD PRESSURE: 86 MMHG

## 2023-01-10 DIAGNOSIS — M62.838 MUSCLE SPASM: ICD-10-CM

## 2023-01-10 DIAGNOSIS — M54.16 LUMBAR RADICULOPATHY: ICD-10-CM

## 2023-01-10 DIAGNOSIS — G89.4 CHRONIC PAIN SYNDROME: ICD-10-CM

## 2023-01-10 DIAGNOSIS — M25.511 PAIN IN JOINT OF RIGHT SHOULDER: ICD-10-CM

## 2023-01-10 DIAGNOSIS — M48.062 SPINAL STENOSIS OF LUMBAR REGION WITH NEUROGENIC CLAUDICATION: ICD-10-CM

## 2023-01-10 DIAGNOSIS — M47.812 CERVICAL SPONDYLOSIS WITHOUT MYELOPATHY: ICD-10-CM

## 2023-01-10 DIAGNOSIS — M47.817 LUMBOSACRAL SPONDYLOSIS WITHOUT MYELOPATHY: ICD-10-CM

## 2023-01-10 DIAGNOSIS — M96.1 POST LAMINECTOMY SYNDROME: ICD-10-CM

## 2023-01-10 PROCEDURE — 3075F SYST BP GE 130 - 139MM HG: CPT | Performed by: NURSE PRACTITIONER

## 2023-01-10 PROCEDURE — 99214 OFFICE O/P EST MOD 30 MIN: CPT | Performed by: NURSE PRACTITIONER

## 2023-01-10 PROCEDURE — 3079F DIAST BP 80-89 MM HG: CPT | Performed by: NURSE PRACTITIONER

## 2023-01-10 RX ORDER — CYCLOBENZAPRINE HCL 10 MG
10 TABLET ORAL DAILY PRN
Qty: 30 TABLET | Refills: 1 | Status: SHIPPED | OUTPATIENT
Start: 2023-01-11 | End: 2023-03-12

## 2023-01-10 RX ORDER — OXYCODONE HYDROCHLORIDE AND ACETAMINOPHEN 5; 325 MG/1; MG/1
1 TABLET ORAL EVERY 8 HOURS PRN
Qty: 90 TABLET | Refills: 0 | Status: CANCELLED | OUTPATIENT
Start: 2023-01-11 | End: 2023-02-10

## 2023-01-10 RX ORDER — OXYCODONE HYDROCHLORIDE AND ACETAMINOPHEN 5; 325 MG/1; MG/1
1 TABLET ORAL 2 TIMES DAILY PRN
Qty: 60 TABLET | Refills: 0 | Status: SHIPPED | OUTPATIENT
Start: 2023-01-10 | End: 2023-02-19

## 2023-01-10 ASSESSMENT — PAIN DESCRIPTION - ORIENTATION: ORIENTATION: LOWER

## 2023-01-10 ASSESSMENT — ENCOUNTER SYMPTOMS
COUGH: 0
BACK PAIN: 1
DIARRHEA: 0
GASTROINTESTINAL NEGATIVE: 1
SHORTNESS OF BREATH: 0
TROUBLE SWALLOWING: 0
CONSTIPATION: 0
EYES NEGATIVE: 1

## 2023-01-10 ASSESSMENT — PAIN DESCRIPTION - LOCATION: LOCATION: BACK

## 2023-01-10 ASSESSMENT — PAIN SCALES - GENERAL: PAINLEVEL_OUTOF10: 5

## 2023-01-10 ASSESSMENT — PAIN DESCRIPTION - PAIN TYPE: TYPE: SURGICAL PAIN
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
## 2023-01-10 NOTE — PROGRESS NOTES
[address] [facility], [facility] [address]  MKEJV:[phone]  Treatment Note        Date: 1/10/2023  Patient: Sury White  : 1960   Confirmed: Yes  MRN: [id number]  Referring Provider: SARAH Olivia CNP      Medical Diagnosis: Spinal stenosis of lumbar region with neurogenic claudication [M48.062]  Post laminectomy syndrome [M96.1]      Treatment Diagnosis: low back pain, decreased LE strength, decreased core strength    Visit Information:  Insurance: Payor: [facility] MEDICARE / Plan: Lorrie Hedge / Product Type: *No Product type* /   PT Visit Information  Onset Date: 10/20/22  PT Insurance Information: BCBS  Total # of Visits Approved: 60  Total # of Visits to Date:   No Show: 0  Canceled Appointment: 1  Progress Note Counter:     Subjective Information:  Subjective: pt reports that she had follow up with dr price looks good. HEP Compliance:  [x] Good [] Fair [] Poor [] Reports not doing due to:    Pain Screening  Patient Currently in Pain: Yes  Pain Assessment: 0-10  Pain Level: 5  Pain Type: Surgical pain  Pain Location: Back  Pain Orientation: Lower    Treatment:  Exercises:  Exercises  Exercise 2: standing with UE support heel raises, single x 2 each  Dbl x 5  Exercise 3: attempted prone hip extension unable  Exercise 4: SLR with TA iso x12 ea  Exercise 5: supine LTR  x 10 5 sec  Exercise 6: sink exercises:  on BBD abd/ext 5 each for demo vc's to with TA Stab with UE support. Exercise 7: low bridge with TA stab  x 10  3 secs  Exercise 8: SKTC   5 x 10 secs  Exercise 11: bike 10 min seat 4  Exercise 20: HEp sink exercises.       Modalities:  Cryotherapy (CPT 24579)  Patient Position: Prone  Cryotherapy location: Low back  Post treatment skin assessment: Intact  Limitations addressed: Pain modulation, Edema  Electric stimulation, unattended (CPT 36523) /  (Medicare)  Patient Position: Prone  E-stim location: Low back  E-stim specified location: x10 mins  E-stim type: Interferential (IFC)      Assessment: Body Structures, Functions, Activity Limitations Requiring Skilled Therapeutic Intervention: Decreased ROM, Decreased strength, Decreased endurance, Increased pain, Decreased posture, Decreased functional mobility   Assessment: Pt with some increased lb pain when attempting hip extension estim and cp applied. Pt unable to lift le off mat with hip extension exercises. Treatment Diagnosis: low back pain, decreased LE strength, decreased core strength  Therapy Prognosis: Good          Post-Pain Assessment:       Pain Rating (0-10 pain scale):   3/10   Location and pain description same as pre-treatment unless indicated. Action: [] NA   [x] Perform HEP  [] Meds as prescribed  [] Modalities as prescribed   [] Call Physician     GOALS   Patient Goal(s):      Short Term Goals Completed by 3 weeks Goal Status   STG 1 Patient will report </= 5/10 pain in low back with walking >/= 15 minutes. In progress   STG 2 Patient will be independent with HEP. In progress     Long Term Goals Completed by 4-6 weeks Goal Status   LTG 1 Patient will demonstrate bilateral SLR >/= 70 degrees for improve flexibility for ADL tolerance. Partially met   LTG 2 Patient will increase bilateral LE strength >/= 4+/5 for improved functional tolerance. In progress   LTG 3 Modified Oswestry </= 22/50 to demonstrate functional improvements. In progress       Plan:  Frequency/Duration:  Plan  Plan Frequency: 2  Plan weeks: 4-6  Current Treatment Recommendations: ROM, Strengthening, Endurance training, Gait training, Neuromuscular re-education, Manual, Home exercise program, Patient/Caregiver education & training, Equipment evaluation, education, & procurement, Safety education & training, Modalities  Modalities: Heat/Cold, E-stim - unattended  Pt to continue current HEP.   See objective section for any therapeutic exercise changes, additions or modifications this date.    Therapy Time:      PT Individual Minutes  Time In: 1018  Time Out: 1110  Minutes: 52  Timed Code Treatment Minutes: 42 Minutes  Procedure Minutes:10 min cp with estim prone   Timed Activity Minutes Units   Ther Ex 42 3     Electronically signed by Houston Raymundo PTA on 1/10/23 at 12:23 PM EST 21-Jul-2017 14:08

## 2023-01-11 ENCOUNTER — TELEPHONE (OUTPATIENT)
Dept: PAIN MANAGEMENT | Age: 63
End: 2023-01-11

## 2023-01-11 NOTE — TELEPHONE ENCOUNTER
RIGHT C3,4,5 RFA    NO AUTH REQUIRED    OK to schedule procedure approved as above. Please note sides/levels approved and date range.    (If applicable, sides/levels approved may differ from those ordered)    TO BE SCHEDULED WITH DR. Lucero Abdullahi

## 2023-01-12 ENCOUNTER — HOSPITAL ENCOUNTER (OUTPATIENT)
Dept: PHYSICAL THERAPY | Age: 63
Setting detail: THERAPIES SERIES
Discharge: HOME | End: 2023-01-12
Payer: MEDICARE

## 2023-01-12 NOTE — PROGRESS NOTES
Therapy                            Cancellation/No-show Note    Date: 2023  Patient: Kari Dimas (62 y.o. female)  : 1960  MRN:  [id number]  Referring Physician: Rosalba Zamora APRN - CNP    Medical Diagnosis: Spinal stenosis of lumbar region with neurogenic claudication [M48.062]  Post laminectomy syndrome [M96.1]      Visit Information:  Insurance: Payor: [facility] MEDICARE / Plan: [facility] MEDICARE ADVANTAGE / Product Type: *No Product type* /   Visits to Date: 9   No Show/Cancelled Appts: 0  2      For today's appointment patient:  [x]  Cancelled  []  Rescheduled appointment  []  No-show   [x]  Called pt to remind of next appointment     Reason given by patient:  []  Patient ill  []  Conflicting appointment  []  No transportation    []  Conflict with work  []  No reason given  [x]  Other: \"conflict\"      [x] Pt has future appointments scheduled, no follow up needed  [] Pt requests to be on hold.    Reason:   If > 2 weeks please discuss with therapist.  [] Therapist to call pt for follow up     Comments:       Signature: Electronically signed by Usha Kulkarni PTA on 23 at 7:53 AM EST

## 2023-01-17 ENCOUNTER — HOSPITAL ENCOUNTER (OUTPATIENT)
Dept: PHYSICAL THERAPY | Age: 63
Setting detail: THERAPIES SERIES
Discharge: HOME | End: 2023-01-17
Payer: MEDICARE

## 2023-01-17 PROCEDURE — 97110 THERAPEUTIC EXERCISES: CPT

## 2023-01-17 PROCEDURE — 97140 MANUAL THERAPY 1/> REGIONS: CPT

## 2023-01-17 ASSESSMENT — PAIN DESCRIPTION - PAIN TYPE: TYPE: SURGICAL PAIN

## 2023-01-17 ASSESSMENT — PAIN SCALES - GENERAL: PAINLEVEL_OUTOF10: 2

## 2023-01-17 ASSESSMENT — PAIN DESCRIPTION - DESCRIPTORS: DESCRIPTORS: THROBBING

## 2023-01-17 ASSESSMENT — PAIN DESCRIPTION - LOCATION: LOCATION: BACK

## 2023-01-17 ASSESSMENT — PAIN DESCRIPTION - ORIENTATION: ORIENTATION: LOWER

## 2023-01-17 NOTE — PROGRESS NOTES
[address] [address], [address] [place] [address]  NDOEW:[phone]  Treatment Note        Date: 2023  Patient: Teetee Stair  : 1960   Confirmed: Yes  MRN: [id number]  Referring Provider: SARAH Villatoro CNP      Medical Diagnosis: Spinal stenosis of lumbar region with neurogenic claudication [M48.062]  Post laminectomy syndrome [M96.1]      Treatment Diagnosis: low back pain, decreased LE strength, decreased core strength    Visit Information:  Insurance: Payor: Melia Alvarado / Plan: MEDICARE PART A AND B / Product Type: *No Product type* /   PT Visit Information  Onset Date: 10/20/22  PT Insurance Information: BCBS  Total # of Visits Approved: 60  Total # of Visits to Date: 10  No Show: 0  Canceled Appointment: 1  Progress Note Counter: 10/12    Subjective Information:  Subjective: pt reports that hip extension is still ahrd. HEP Compliance:  [x] Good [] Fair [] Poor [] Reports not doing due to:    Pain Screening  Patient Currently in Pain: Yes  Pain Assessment: 0-10  Pain Level: 2  Pain Type: Surgical pain  Pain Location: Back  Pain Orientation: Lower  Pain Descriptors: Throbbing    Treatment:  Exercises:  Exercises  Exercise 1: clamshells x10  Exercise 4: SLR with TA iso x12 ea  Exercise 5: supine LTR  x 10 5 sec  Exercise 6: sink exercises:  x10 standing with 1 ue support  Exercise 7: low bridge with TA stab  x 10  3 secs  Exercise 8: SKTC   5 x 10 secs  Exercise 9: seate dhamstring stretch 3x30 sec    Manual:   Manual Therapy  Soft Tissue Mobilizaton: lumbar x 10 mins prone with and without tennis ball     *Indicates exercise, modality, or manual techniques to be initiated when appropriate    Objective Measures:     STG 1 Current Status[de-identified] Pt reports that pain increased to 8/10 withpostional changes after being in one position for a while. Assessment:    Body Structures, Functions, Activity Limitations Requiring Skilled Therapeutic Intervention: Decreased ROM, Decreased strength, Decreased endurance, Increased pain, Decreased posture, Decreased functional mobility   Assessment: Pt with good roseann to exercises. Pt with cont diffiiculty with hip extension in standing. Focused on hip strenthening this date. Treatment Diagnosis: low back pain, decreased LE strength, decreased core strength  Therapy Prognosis: Good          Post-Pain Assessment:       Pain Rating (0-10 pain scale):  2 /10   Location and pain description same as pre-treatment unless indicated. Action: [] NA   [x] Perform HEP  [] Meds as prescribed  [] Modalities as prescribed   [] Call Physician     GOALS   Patient Goal(s):      Short Term Goals Completed by 3 weeks Goal Status   STG 1 Patient will report </= 5/10 pain in low back with walking >/= 15 minutes. In progress   STG 2 Patient will be independent with HEP. In progress     Long Term Goals Completed by 4-6 weeks Goal Status   LTG 1 Patient will demonstrate bilateral SLR >/= 70 degrees for improve flexibility for ADL tolerance. Partially met   LTG 2 Patient will increase bilateral LE strength >/= 4+/5 for improved functional tolerance. In progress   LTG 3 Modified Oswestry </= 22/50 to demonstrate functional improvements. In progress     Plan:  Frequency/Duration:  Plan  Plan Frequency: 2  Plan weeks: 4-6  Current Treatment Recommendations: ROM, Strengthening, Endurance training, Gait training, Neuromuscular re-education, Manual, Home exercise program, Patient/Caregiver education & training, Equipment evaluation, education, & procurement, Safety education & training, Modalities  Modalities: Heat/Cold, E-stim - unattended  Pt to continue current HEP. See objective section for any therapeutic exercise changes, additions or modifications this date.     Therapy Time:      PT Individual Minutes  Time In: 0933  Time Out: 6338  Minutes: 39  Timed Code Treatment Minutes: 39 Minutes    Timed Activity Minutes Units   Ther Ex 29 2   Manual  10 1 Electronically signed by Janice Mitchell PTA on 1/17/23 at 10:24 AM EST

## 2023-01-19 ENCOUNTER — HOSPITAL ENCOUNTER (OUTPATIENT)
Dept: PHYSICAL THERAPY | Age: 63
Setting detail: THERAPIES SERIES
Discharge: HOME | End: 2023-01-19
Payer: MEDICARE

## 2023-01-19 PROCEDURE — 97140 MANUAL THERAPY 1/> REGIONS: CPT

## 2023-01-19 PROCEDURE — 97110 THERAPEUTIC EXERCISES: CPT

## 2023-01-19 PROCEDURE — G0283 ELEC STIM OTHER THAN WOUND: HCPCS

## 2023-01-19 ASSESSMENT — PAIN SCALES - GENERAL: PAINLEVEL_OUTOF10: 2

## 2023-01-19 ASSESSMENT — PAIN DESCRIPTION - ORIENTATION: ORIENTATION: LOWER

## 2023-01-19 ASSESSMENT — PAIN DESCRIPTION - LOCATION: LOCATION: BACK

## 2023-01-19 ASSESSMENT — PAIN DESCRIPTION - DESCRIPTORS: DESCRIPTORS: TIGHTNESS

## 2023-01-19 NOTE — PROGRESS NOTES
[address] [facility], [address] [place] [address]  [id number]:[phone]  Treatment Note        Date: 2023  Patient: Dago Fuentes  : 1960   Confirmed: Yes  MRN: [id number]  Referring Provider: SARAH Blackwell CNP      Medical Diagnosis: Spinal stenosis of lumbar region with neurogenic claudication [M48.062]  Post laminectomy syndrome [M96.1]      Treatment Diagnosis: low back pain, decreased LE strength, decreased core strength    Visit Information:  Insurance: Payor: MEDICARE / Plan: MEDICARE PART A AND B / Product Type: *No Product type* /   PT Visit Information  Onset Date: 10/20/22  PT Insurance Information: BCBS  Total # of Visits Approved: 60  Total # of Visits to Date:   No Show: 0  Canceled Appointment: 1  Progress Note Counter:     Subjective Information:  Subjective: Pt report shtat she woke up stff this date. Pt also had some cramping in rt hip down leg last night.   HEP Compliance:  [x] Good [] Fair [] Poor [] Reports not doing due to:    Pain Screening  Patient Currently in Pain: Yes  Pain Assessment: 0-10  Pain Level: 2  Pain Location: Back  Pain Orientation: Lower  Pain Descriptors: Tightness    Treatment:  Exercises:  Exercises  Exercise 1: clamshells x10 RTB  Exercise 4: SLR with TA iso x15 ea  Exercise 5: supine LTR  x 10 5 sec  Exercise 7: low bridge with TA stab  x 15  3 secs  Exercise 8: SKTC   3 x 20 secs  Exercise 11: bike 10 min seat 4       Manual:   Manual Therapy  Soft Tissue Mobilizaton: lumbar x 10 mins S/L with and without tennis ball LB it band       Modalities:  Cryotherapy (CPT 28340)  Patient Position: Prone  Number Minutes Cryotherapy: 10 mins prone  Cryotherapy location: Low back  Cryotherapy specified location: lumbar  Post treatment skin assessment: Intact  Limitations addressed: Pain modulation, Edema  Unbillable time (minutes): 10  Electric stimulation, unattended (CPT 78065) /  (Medicare)  Patient Position: Prone  E-stim location: Low back  E-stim specified location: x10 mins  E-stim type: Interferential (IFC)      Assessment: Body Structures, Functions, Activity Limitations Requiring Skilled Therapeutic Intervention: Decreased ROM, Decreased strength, Decreased endurance, Increased pain, Decreased posture, Decreased functional mobility   Assessment: Pt with some increased tightness this date. Focused on Rom and stretching exercsises. Treatment Diagnosis: low back pain, decreased LE strength, decreased core strength  Therapy Prognosis: Good          Post-Pain Assessment:       Pain Rating (0-10 pain scale): 0  /10   Location and pain description same as pre-treatment unless indicated. Action: [] NA   [x] Perform HEP  [] Meds as prescribed  [] Modalities as prescribed   [] Call Physician     GOALS   Patient Goal(s):      Short Term Goals Completed by 3 weeks Goal Status   STG 1 Patient will report </= 5/10 pain in low back with walking >/= 15 minutes. In progress   STG 2 Patient will be independent with HEP. In progress     Long Term Goals Completed by 4-6 weeks Goal Status   LTG 1 Patient will demonstrate bilateral SLR >/= 70 degrees for improve flexibility for ADL tolerance. Partially met   LTG 2 Patient will increase bilateral LE strength >/= 4+/5 for improved functional tolerance. In progress   LTG 3 Modified Oswestry </= 22/50 to demonstrate functional improvements. In progress       Plan:  Frequency/Duration:  Plan  Plan Frequency: 2  Plan weeks: 4-6  Current Treatment Recommendations: ROM, Strengthening, Endurance training, Gait training, Neuromuscular re-education, Manual, Home exercise program, Patient/Caregiver education & training, Equipment evaluation, education, & procurement, Safety education & training, Modalities  Modalities: Heat/Cold, E-stim - unattended  Pt to continue current HEP. See objective section for any therapeutic exercise changes, additions or modifications this date.     Therapy Time: PT Individual Minutes  Time In: 0930  Time Out: 1025  Minutes: 55  Timed Code Treatment Minutes: 45 Minutes  Procedure Minutes:10 min estim with CP  Timed Activity Minutes Units   Ther Ex 35 2   Manual  10 1     Electronically signed by Armando Leslie PTA on 1/19/23 at 12:56 PM EST

## 2023-01-23 ENCOUNTER — HOSPITAL ENCOUNTER (OUTPATIENT)
Dept: PHYSICAL THERAPY | Age: 63
Setting detail: THERAPIES SERIES
Discharge: HOME | End: 2023-01-23
Payer: MEDICARE

## 2023-01-23 ENCOUNTER — OFFICE VISIT (OUTPATIENT)
Dept: PAIN MANAGEMENT | Age: 63
End: 2023-01-23

## 2023-01-23 DIAGNOSIS — M47.812 CERVICAL SPONDYLOSIS WITHOUT MYELOPATHY: ICD-10-CM

## 2023-01-23 PROCEDURE — 97110 THERAPEUTIC EXERCISES: CPT

## 2023-01-23 PROCEDURE — 97140 MANUAL THERAPY 1/> REGIONS: CPT

## 2023-01-23 RX ORDER — LIDOCAINE HYDROCHLORIDE 10 MG/ML
10 INJECTION, SOLUTION EPIDURAL; INFILTRATION; INTRACAUDAL; PERINEURAL ONCE
Status: COMPLETED | OUTPATIENT
Start: 2023-01-23 | End: 2023-01-23

## 2023-01-23 RX ORDER — DEXAMETHASONE SODIUM PHOSPHATE 10 MG/ML
10 INJECTION, EMULSION INTRAMUSCULAR; INTRAVENOUS ONCE
Status: COMPLETED | OUTPATIENT
Start: 2023-01-23 | End: 2023-01-23

## 2023-01-23 RX ADMIN — LIDOCAINE HYDROCHLORIDE 10 MG: 10 INJECTION, SOLUTION EPIDURAL; INFILTRATION; INTRACAUDAL; PERINEURAL at 10:29

## 2023-01-23 RX ADMIN — DEXAMETHASONE SODIUM PHOSPHATE 10 MG: 10 INJECTION, EMULSION INTRAMUSCULAR; INTRAVENOUS at 10:29

## 2023-01-23 ASSESSMENT — PAIN DESCRIPTION - DESCRIPTORS: DESCRIPTORS: TIGHTNESS

## 2023-01-23 ASSESSMENT — PAIN DESCRIPTION - ORIENTATION: ORIENTATION: LOWER

## 2023-01-23 ASSESSMENT — PAIN SCALES - GENERAL: PAINLEVEL_OUTOF10: 2

## 2023-01-23 ASSESSMENT — PAIN DESCRIPTION - LOCATION: LOCATION: BACK

## 2023-01-23 NOTE — PROGRESS NOTES
[address]  [address]      [phone]    [] Certification  [] Recertification []  Plan of Care  [] Progress Note [x] Discharge      Referring Provider: SARAH Larose - EHSAN    From:  Kelsea Magaña PTA     Patient: Gabriella Manrique (89 y.o. female) : 1960 Date: 2023   Medical Diagnosis: Spinal stenosis of lumbar region with neurogenic claudication [M48.062]  Post laminectomy syndrome [M96.1]    Treatment Diagnosis: low back pain, decreased LE strength, decreased core strength      Progress Report Period from: 22 to 2023    Visits to Date: 12 No Show: 0 Cancelled Appts: 1    OBJECTIVE:   Short Term Goals - Time Frame for Short Term Goals: 3 weeks    Goals Current/Discharge status  Status   Short Term Goal 1: Patient will report </= 5/10 pain in low back with walking >/= 15 minutes. STG 1 Current Status[de-identified] Pt reporrts increased pain to 5/10 with walking   Met   Short Term Goal 2: Patient will be independent with HEP. STG 2 Current Status[de-identified] Pt I with HEP   Met     Long Term Goals - Time Frame for Long Term Goals : 4-6 weeks  Goals Current/ Discharge status Status   Long Term Goal 1: Patient will demonstrate bilateral SLR >/= 70 degrees for improve flexibility for ADL tolerance. LTG 1 Current Status[de-identified] RT SLR 70 degrees  Lt SLR 72 degrees   Met   Long Term Goal 2: Patient will increase bilateral LE strength >/= 4+/5 for improved functional tolerance. Strength RLE  R Hip Flexion: 4+/5  R Hip Extension: 4-/5  R Hip ABduction: 4+/5  Strength LLE  L Hip Flexion: 5/5  L Hip Extension: 4-/5  L Hip ABduction: 4+/5     Partially met   Long Term Goal 3: Modified Oswestry </= 22/50 to demonstrate functional improvements.  LTG 3 Current Status[de-identified] Oswestry 15   Met     Body Structures, Functions, Activity Limitations Requiring Skilled Therapeutic Intervention: Decreased ROM, Decreased strength, Decreased endurance, Increased pain, Decreased posture, Decreased functional mobility   Assessment: Pt met 4/5 goals. Pt with some cont decreased strength in tammie LE. Pt to be D/c'd at this time and cont strengthening at home. Therapy Prognosis: Good       PLAN: D/C                    Patient Status:[] Continue/ Initiate plan of Care     [x] Discharge PT. Recommend pt continue with HEP. [] Additional visits requested, Please re-certify for additional visits:     [] Hold     Objective information provided by: Electronically signed by Jose R Ross PTA on 1/23/23 at 12:36 PM EST        Signature: Electronically signed by Ayden Welsh PT on 1/27/2023 at 9:11 AM      If you have any questions or concerns, please don't hesitate to call. Thank you for your referral.    I have reviewed this plan of care and certify a need for medically necessary rehabilitation services.     Physician Signature:__________________________________________________________  Date:  Please sign and return

## 2023-01-23 NOTE — PROGRESS NOTES
Procedure: R c345 cervical radiofrequency medial branch ablation using fluoroscopic needle guidance. Timeout taken to identify correct patient, procedure and side. Patient lying in the prone position the patient was prepped and draped in the usual sterile fashion using Betadine. The levels were determined under fluoroscopy. 1% preservative-free lidocaine was used to numb the skin using a 27-gauge 1-1/2 inch needle. Radiofrequency needle was introduced to the anatomic location of the medial branch at the lateral masses utilizing intermittent fluoroscopy. Motor stimulation up to 2 V was done to confirm no ablation of the ventral ramus at each level. Prior to lesioning at 80 °C for 90 seconds 1.5 mL of 1% preservative-free lidocaine along with 10 mg dexamethasone preservative-free was divided equally amongst the levels and injected with negative aspiration. This was done at each level. The procedure was tolerated well without complications. The patient was monitored after procedure, had their usual motor strength. And discharged home in stable condition. Patient will ice the area and  take it easy. All questions answered, chart was reviewed.

## 2023-01-23 NOTE — PROGRESS NOTES
[address]  HZWP:[phone]  Treatment Note        Date: 2023  Patient: Rachana Gamino  : 1960   Confirmed: Yes  MRN: [id number]  Referring Provider: SARAH Deleon CNP      Medical Diagnosis: Spinal stenosis of lumbar region with neurogenic claudication [M48.062]  Post laminectomy syndrome [M96.1]      Treatment Diagnosis: low back pain, decreased LE strength, decreased core strength    Visit Information:  Insurance: Payor: Bette Yoselin / Plan: MEDICARE PART A AND B / Product Type: *No Product type* /   PT Visit Information  Onset Date: 10/20/22  PT Insurance Information: BCBS  Total # of Visits Approved: 60  Total # of Visits to Date:   No Show: 0  Canceled Appointment: 1  Progress Note Counter:     Subjective Information:  Subjective: Pt reports that she is doing good.   HEP Compliance:  [x] Good [] Fair [] Poor [] Reports not doing due to:    Pain Screening  Patient Currently in Pain: Yes  Pain Assessment: 0-10  Pain Level: 2  Pain Location: Back  Pain Orientation: Lower  Pain Descriptors: Tightness    Treatment:  Exercises:  Exercises  Exercise 1: clamshells x15 RTB  Exercise 4: SLR with TA iso x15 ea hip abduction x10  Exercise 5: supine LTR  x 10 5 sec  Exercise 7: low bridge with TA stab  x 15  3 secs  Exercise 8: SKTC   3 x 20 secs  Exercise 11: bike 10 min seat 4     Manual:    Objective measurements taken 10 min     *Indicates exercise, modality, or manual techniques to be initiated when appropriate    Objective Measures:     STG 1 Current Status[de-identified] Pt reporrts increased pain to 5/10 with walking  STG 2 Current Status[de-identified] Pt I with HEP     LTG 1 Current Status[de-identified] RT SLR 70 degrees  Lt SLR 72 degrees  LTG 2 Current Status[de-identified] Strength R LE R Hip Flexion: 4+/5, R Hip Extension: 4-/5, R Hip ABduction: 4+/5  Strength L LE L Hip Flexion: 5/5, L Hip Extension: 4-/5, L Hip ABduction: 4+/5  LTG 3 Current Status[de-identified] Oswestry 15         Assessment: Body Structures, Functions, Activity Limitations Requiring Skilled Therapeutic Intervention: Decreased ROM, Decreased strength, Decreased endurance, Increased pain, Decreased posture, Decreased functional mobility   Assessment: Pt met 4/5 goals. Pt with some cont decreased strength in tammie LE. Pt to be D/c'd at this time and cont strengthening at home. Treatment Diagnosis: low back pain, decreased LE strength, decreased core strength  Therapy Prognosis: Good      Post-Pain Assessment:       Pain Rating (0-10 pain scale):  0 /10   Location and pain description same as pre-treatment unless indicated. Action: [] NA   [x] Perform HEP  [] Meds as prescribed  [] Modalities as prescribed   [] Call Physician     GOALS   Patient Goal(s):      Short Term Goals Completed by 3 weeks Goal Status   STG 1 Patient will report </= 5/10 pain in low back with walking >/= 15 minutes. Met   STG 2 Patient will be independent with HEP. Met     Long Term Goals Completed by 4-6 weeks Goal Status   LTG 1 Patient will demonstrate bilateral SLR >/= 70 degrees for improve flexibility for ADL tolerance. Met   LTG 2 Patient will increase bilateral LE strength >/= 4+/5 for improved functional tolerance. Partially met   LTG 3 Modified Oswestry </= 22/50 to demonstrate functional improvements. Met       Plan: D/C  Frequency/Duration:  Plan  Plan Frequency: 2  Plan weeks: 4-6  Current Treatment Recommendations: ROM, Strengthening, Endurance training, Gait training, Neuromuscular re-education, Manual, Home exercise program, Patient/Caregiver education & training, Equipment evaluation, education, & procurement, Safety education & training, Modalities  Modalities: Heat/Cold, E-stim - unattended  Pt to continue current HEP. See objective section for any therapeutic exercise changes, additions or modifications this date.     Therapy Time:      PT Individual Minutes  Time In: 0926  Time Out: 1010  Minutes: 44  Timed Code Treatment Minutes: 44 Minutes    Timed Activity Minutes Units   Ther Ex 34 2   Manual  10 1     Electronically signed by Red Butler PTA on 1/23/23 at 12:33 PM EST

## 2023-01-24 ENCOUNTER — APPOINTMENT (OUTPATIENT)
Dept: PHYSICAL THERAPY | Age: 63
End: 2023-01-24
Payer: MEDICARE

## 2023-01-26 ENCOUNTER — APPOINTMENT (OUTPATIENT)
Dept: PHYSICAL THERAPY | Age: 63
End: 2023-01-26
Payer: MEDICARE

## 2023-02-02 DIAGNOSIS — M47.812 CERVICAL SPONDYLOSIS WITHOUT MYELOPATHY: ICD-10-CM

## 2023-02-02 DIAGNOSIS — G89.4 CHRONIC PAIN SYNDROME: ICD-10-CM

## 2023-02-02 DIAGNOSIS — M25.511 PAIN IN JOINT OF RIGHT SHOULDER: ICD-10-CM

## 2023-02-02 DIAGNOSIS — M96.1 POST LAMINECTOMY SYNDROME: ICD-10-CM

## 2023-02-02 DIAGNOSIS — M47.817 LUMBOSACRAL SPONDYLOSIS WITHOUT MYELOPATHY: ICD-10-CM

## 2023-02-02 DIAGNOSIS — M48.062 SPINAL STENOSIS OF LUMBAR REGION WITH NEUROGENIC CLAUDICATION: ICD-10-CM

## 2023-02-02 DIAGNOSIS — M54.16 LUMBAR RADICULOPATHY: ICD-10-CM

## 2023-02-02 RX ORDER — GABAPENTIN 400 MG/1
400 CAPSULE ORAL 4 TIMES DAILY
Qty: 360 CAPSULE | Refills: 0 | Status: SHIPPED | OUTPATIENT
Start: 2023-02-02 | End: 2023-05-03

## 2023-02-02 NOTE — TELEPHONE ENCOUNTER
Requested Prescriptions     Pending Prescriptions Disp Refills    gabapentin (NEURONTIN) 400 MG capsule 360 capsule 0     Sig: Take 1 capsule by mouth 4 times daily for 90 days. Patient last seen on:  1/23/23  Date of last surgery:  n/a  Date of last refill:  10/27/22  Pain level:  n/a  Patient complaining of:  Pt asks for rx of gabapentin to be sent to [facility] in [facility].    Future appts: 3/8/23

## 2023-02-17 PROBLEM — M54.9 BACK PAIN: Status: ACTIVE | Noted: 2023-02-17

## 2023-02-17 PROBLEM — R20.0 LEG NUMBNESS: Status: ACTIVE | Noted: 2023-02-17

## 2023-02-17 PROBLEM — R73.9 HYPERGLYCEMIA: Status: ACTIVE | Noted: 2023-02-17

## 2023-02-17 PROBLEM — M79.89 LEG SWELLING: Status: ACTIVE | Noted: 2023-02-17

## 2023-02-17 PROBLEM — R11.10 VOMITING: Status: ACTIVE | Noted: 2023-02-17

## 2023-02-17 PROBLEM — R44.1 HALLUCINATIONS, VISUAL: Status: ACTIVE | Noted: 2023-02-17

## 2023-02-17 PROBLEM — Z96.652 STATUS POST LEFT KNEE REPLACEMENT: Status: ACTIVE | Noted: 2023-02-17

## 2023-02-17 PROBLEM — R41.3 MEMORY LOSS: Status: ACTIVE | Noted: 2023-02-17

## 2023-02-17 PROBLEM — K27.9 PUD (PEPTIC ULCER DISEASE): Status: ACTIVE | Noted: 2023-02-17

## 2023-02-17 PROBLEM — R41.840 ATTENTION DISTURBANCE: Status: ACTIVE | Noted: 2023-02-17

## 2023-02-17 PROBLEM — I10 BENIGN ESSENTIAL HYPERTENSION: Status: ACTIVE | Noted: 2023-02-17

## 2023-02-17 PROBLEM — D64.9 ANEMIA: Status: ACTIVE | Noted: 2023-02-17

## 2023-02-17 PROBLEM — G43.909 MIGRAINE: Status: ACTIVE | Noted: 2023-02-17

## 2023-02-17 PROBLEM — G89.29 CHRONIC PAIN: Status: ACTIVE | Noted: 2023-02-17

## 2023-02-17 PROBLEM — F51.4 NIGHT TERRORS, ADULT: Status: ACTIVE | Noted: 2023-02-17

## 2023-02-17 PROBLEM — M48.061 LUMBAR STENOSIS: Status: ACTIVE | Noted: 2023-02-17

## 2023-02-17 PROBLEM — Z86.73 HISTORY OF CVA (CEREBROVASCULAR ACCIDENT): Status: ACTIVE | Noted: 2023-02-17

## 2023-02-17 PROBLEM — E86.0 DEHYDRATION: Status: ACTIVE | Noted: 2023-02-17

## 2023-02-17 PROBLEM — L98.9 SKIN LESION OF CHEEK: Status: ACTIVE | Noted: 2023-02-17

## 2023-02-17 PROBLEM — F33.2 SEVERE EPISODE OF RECURRENT MAJOR DEPRESSIVE DISORDER, WITHOUT PSYCHOTIC FEATURES (MULTI): Status: ACTIVE | Noted: 2023-02-17

## 2023-02-17 PROBLEM — R30.0 DYSURIA: Status: ACTIVE | Noted: 2023-02-17

## 2023-02-17 PROBLEM — R20.0 LEFT ARM NUMBNESS: Status: ACTIVE | Noted: 2023-02-17

## 2023-02-17 PROBLEM — L21.9 SEBORRHEA: Status: ACTIVE | Noted: 2023-02-17

## 2023-02-17 PROBLEM — R31.9 HEMATURIA: Status: ACTIVE | Noted: 2023-02-17

## 2023-02-17 PROBLEM — F31.12: Status: ACTIVE | Noted: 2023-02-17

## 2023-02-17 RX ORDER — LOSARTAN POTASSIUM AND HYDROCHLOROTHIAZIDE 12.5; 5 MG/1; MG/1
1 TABLET ORAL DAILY
COMMUNITY
Start: 2022-07-28 | End: 2023-03-13 | Stop reason: SDUPTHER

## 2023-02-17 RX ORDER — TRAZODONE HYDROCHLORIDE 100 MG/1
3 TABLET ORAL NIGHTLY
COMMUNITY

## 2023-02-17 RX ORDER — ATORVASTATIN CALCIUM 40 MG/1
1 TABLET, FILM COATED ORAL NIGHTLY
COMMUNITY
Start: 2020-08-27

## 2023-02-17 RX ORDER — TIZANIDINE 4 MG/1
1 TABLET ORAL 3 TIMES DAILY PRN
COMMUNITY
Start: 2021-04-05 | End: 2023-10-02 | Stop reason: SDUPTHER

## 2023-02-17 RX ORDER — GABAPENTIN 300 MG/1
300 CAPSULE ORAL 3 TIMES DAILY
COMMUNITY
End: 2023-10-02 | Stop reason: WASHOUT

## 2023-02-17 RX ORDER — KETOCONAZOLE 20 MG/G
CREAM TOPICAL
COMMUNITY
Start: 2021-10-11 | End: 2023-10-20

## 2023-02-17 RX ORDER — ASPIRIN 81 MG/1
1 TABLET ORAL DAILY
COMMUNITY
End: 2023-04-10 | Stop reason: WASHOUT

## 2023-02-17 RX ORDER — OXYCODONE AND ACETAMINOPHEN 5; 325 MG/1; MG/1
TABLET ORAL
COMMUNITY
Start: 2015-01-21 | End: 2023-04-10 | Stop reason: ALTCHOICE

## 2023-02-17 RX ORDER — PANTOPRAZOLE SODIUM 40 MG/1
40 FOR SUSPENSION ORAL
COMMUNITY
Start: 2022-04-01 | End: 2023-11-28 | Stop reason: WASHOUT

## 2023-02-17 RX ORDER — FREMANEZUMAB-VFRM 225 MG/1.5ML
675 INJECTION SUBCUTANEOUS
COMMUNITY
Start: 2021-06-23 | End: 2023-10-02 | Stop reason: SDUPTHER

## 2023-03-08 ENCOUNTER — OFFICE VISIT (OUTPATIENT)
Dept: PAIN MANAGEMENT | Age: 63
End: 2023-03-08
Payer: MEDICARE

## 2023-03-08 VITALS
BODY MASS INDEX: 33.32 KG/M2 | HEIGHT: 65 IN | TEMPERATURE: 96.8 F | DIASTOLIC BLOOD PRESSURE: 88 MMHG | WEIGHT: 200 LBS | SYSTOLIC BLOOD PRESSURE: 128 MMHG

## 2023-03-08 DIAGNOSIS — M48.062 SPINAL STENOSIS OF LUMBAR REGION WITH NEUROGENIC CLAUDICATION: ICD-10-CM

## 2023-03-08 DIAGNOSIS — M96.1 POST LAMINECTOMY SYNDROME: ICD-10-CM

## 2023-03-08 DIAGNOSIS — G89.4 CHRONIC PAIN SYNDROME: ICD-10-CM

## 2023-03-08 DIAGNOSIS — M46.1 SACROILIITIS (HCC): ICD-10-CM

## 2023-03-08 DIAGNOSIS — M47.812 CERVICAL SPONDYLOSIS WITHOUT MYELOPATHY: Primary | ICD-10-CM

## 2023-03-08 DIAGNOSIS — M25.511 PAIN IN JOINT OF RIGHT SHOULDER: ICD-10-CM

## 2023-03-08 DIAGNOSIS — Z98.1 HISTORY OF LUMBAR FUSION: ICD-10-CM

## 2023-03-08 DIAGNOSIS — M47.817 LUMBOSACRAL SPONDYLOSIS WITHOUT MYELOPATHY: ICD-10-CM

## 2023-03-08 DIAGNOSIS — M54.16 LUMBAR RADICULOPATHY: ICD-10-CM

## 2023-03-08 PROCEDURE — 3074F SYST BP LT 130 MM HG: CPT | Performed by: NURSE PRACTITIONER

## 2023-03-08 PROCEDURE — 3079F DIAST BP 80-89 MM HG: CPT | Performed by: NURSE PRACTITIONER

## 2023-03-08 PROCEDURE — G8417 CALC BMI ABV UP PARAM F/U: HCPCS | Performed by: NURSE PRACTITIONER

## 2023-03-08 PROCEDURE — G8427 DOCREV CUR MEDS BY ELIG CLIN: HCPCS | Performed by: NURSE PRACTITIONER

## 2023-03-08 PROCEDURE — 1036F TOBACCO NON-USER: CPT | Performed by: NURSE PRACTITIONER

## 2023-03-08 PROCEDURE — G8484 FLU IMMUNIZE NO ADMIN: HCPCS | Performed by: NURSE PRACTITIONER

## 2023-03-08 PROCEDURE — 3017F COLORECTAL CA SCREEN DOC REV: CPT | Performed by: NURSE PRACTITIONER

## 2023-03-08 PROCEDURE — 99214 OFFICE O/P EST MOD 30 MIN: CPT | Performed by: NURSE PRACTITIONER

## 2023-03-08 RX ORDER — OXYCODONE HYDROCHLORIDE AND ACETAMINOPHEN 5; 325 MG/1; MG/1
1 TABLET ORAL 2 TIMES DAILY PRN
Qty: 60 TABLET | Refills: 0 | Status: CANCELLED | OUTPATIENT
Start: 2023-03-08 | End: 2023-04-17

## 2023-03-08 RX ORDER — TIZANIDINE 4 MG/1
4 TABLET ORAL 3 TIMES DAILY PRN
Qty: 90 TABLET | Refills: 2 | Status: SHIPPED | OUTPATIENT
Start: 2023-03-08 | End: 2023-06-06

## 2023-03-08 ASSESSMENT — ENCOUNTER SYMPTOMS
TROUBLE SWALLOWING: 0
SHORTNESS OF BREATH: 0
DIARRHEA: 0
CONSTIPATION: 0
BACK PAIN: 1
COUGH: 0
EYES NEGATIVE: 1
GASTROINTESTINAL NEGATIVE: 1
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
## 2023-03-13 ENCOUNTER — OFFICE VISIT (OUTPATIENT)
Dept: PAIN MANAGEMENT | Age: 63
End: 2023-03-13

## 2023-03-13 ENCOUNTER — TELEPHONE (OUTPATIENT)
Dept: PRIMARY CARE | Facility: CLINIC | Age: 63
End: 2023-03-13

## 2023-03-13 DIAGNOSIS — I10 BENIGN ESSENTIAL HYPERTENSION: Primary | ICD-10-CM

## 2023-03-13 DIAGNOSIS — M47.812 CERVICAL SPONDYLOSIS WITHOUT MYELOPATHY: ICD-10-CM

## 2023-03-13 RX ORDER — DEXAMETHASONE SODIUM PHOSPHATE 10 MG/ML
10 INJECTION, EMULSION INTRAMUSCULAR; INTRAVENOUS ONCE
Status: COMPLETED | OUTPATIENT
Start: 2023-03-13 | End: 2023-03-13

## 2023-03-13 RX ORDER — LOSARTAN POTASSIUM AND HYDROCHLOROTHIAZIDE 12.5; 5 MG/1; MG/1
1 TABLET ORAL DAILY
Qty: 90 TABLET | Refills: 3 | Status: SHIPPED | OUTPATIENT
Start: 2023-03-13 | End: 2023-10-02 | Stop reason: SDUPTHER

## 2023-03-13 RX ORDER — LIDOCAINE HYDROCHLORIDE 10 MG/ML
10 INJECTION, SOLUTION EPIDURAL; INFILTRATION; INTRACAUDAL; PERINEURAL ONCE
Status: COMPLETED | OUTPATIENT
Start: 2023-03-13 | End: 2023-03-13

## 2023-03-13 RX ADMIN — LIDOCAINE HYDROCHLORIDE 10 MG: 10 INJECTION, SOLUTION EPIDURAL; INFILTRATION; INTRACAUDAL; PERINEURAL at 10:22

## 2023-03-13 RX ADMIN — DEXAMETHASONE SODIUM PHOSPHATE 10 MG: 10 INJECTION, EMULSION INTRAMUSCULAR; INTRAVENOUS at 10:21

## 2023-03-13 NOTE — PROGRESS NOTES
Procedure: L [id number] radiofrequency medial branch ablation using fluoroscopic needle guidance. Timeout taken to identify correct patient, procedure and side. Patient lying in the prone position the patient was prepped and draped in the usual sterile fashion using Betadine. The levels were determined under fluoroscopy. 1% preservative-free lidocaine was used to numb the skin using a 27-gauge 1-1/2 inch needle. Radiofrequency needle was introduced to the anatomic location of the medial branch at the lateral masses utilizing intermittent fluoroscopy. Motor stimulation up to 2 V was done to confirm no ablation of the ventral ramus at each level. Prior to lesioning at 80 °C for 90 seconds 1.5 mL of 1% preservative-free lidocaine along with 10 mg dexamethasone preservative-free was divided equally amongst the levels and injected with negative aspiration. This was done at each level. The procedure was tolerated well without complications. The patient was monitored after procedure, had their usual motor strength. And discharged home in stable condition. Patient will ice the area and  take it easy. All questions answered, chart was reviewed.

## 2023-03-27 ENCOUNTER — PROCEDURE VISIT (OUTPATIENT)
Dept: PAIN MANAGEMENT | Age: 63
End: 2023-03-27
Payer: MEDICARE

## 2023-03-27 DIAGNOSIS — M46.1 SACROILIITIS (HCC): ICD-10-CM

## 2023-03-27 PROCEDURE — 27096 INJECT SACROILIAC JOINT: CPT | Performed by: PAIN MEDICINE

## 2023-03-27 RX ORDER — LIDOCAINE HYDROCHLORIDE 10 MG/ML
10 INJECTION, SOLUTION EPIDURAL; INFILTRATION; INTRACAUDAL; PERINEURAL ONCE
Status: COMPLETED | OUTPATIENT
Start: 2023-03-27 | End: 2023-03-27

## 2023-03-27 RX ORDER — BETAMETHASONE SODIUM PHOSPHATE AND BETAMETHASONE ACETATE 3; 3 MG/ML; MG/ML
6 INJECTION, SUSPENSION INTRA-ARTICULAR; INTRALESIONAL; INTRAMUSCULAR; SOFT TISSUE ONCE
Status: COMPLETED | OUTPATIENT
Start: 2023-03-27 | End: 2023-03-27

## 2023-03-27 RX ADMIN — BETAMETHASONE SODIUM PHOSPHATE AND BETAMETHASONE ACETATE 6 MG: 3; 3 INJECTION, SUSPENSION INTRA-ARTICULAR; INTRALESIONAL; INTRAMUSCULAR; SOFT TISSUE at 09:38

## 2023-03-27 RX ADMIN — LIDOCAINE HYDROCHLORIDE 10 MG: 10 INJECTION, SOLUTION EPIDURAL; INFILTRATION; INTRACAUDAL; PERINEURAL at 09:39

## 2023-03-27 NOTE — PROGRESS NOTES
has significant psychological and functional impairment due to this condition. Standard ASIPP guidelines were followed and sterile technique used. Area was cleaned with Betadine x3. Informed consent was obtained. Fluoroscopic guidance was used for this procedure. S.I. JOINT:  Bilateral  Appropriate length spinal needle was advanced to the S.I. Joint. Negative aspiration was achieved. In total, approximately 6mg of Betamethasone and 2ml of 1% preservative free Lidocaine was injected without difficulty. Patient tolerated the procedure well, no obvious complications occurred during the procedure. Patient was appropriately monitored and discharged home in stable condition with their usual motor strength. Post Op Instructions were given to patient. Relevant and recent imaging reviewed with patient today.                                       Isidra Hernandez DO

## 2023-04-10 ENCOUNTER — OFFICE VISIT (OUTPATIENT)
Dept: PRIMARY CARE | Facility: CLINIC | Age: 63
End: 2023-04-10
Payer: MEDICARE

## 2023-04-10 ENCOUNTER — LAB (OUTPATIENT)
Dept: LAB | Facility: LAB | Age: 63
End: 2023-04-10
Payer: MEDICARE

## 2023-04-10 VITALS
OXYGEN SATURATION: 97 % | HEART RATE: 70 BPM | RESPIRATION RATE: 14 BRPM | WEIGHT: 214 LBS | HEIGHT: 66 IN | SYSTOLIC BLOOD PRESSURE: 130 MMHG | TEMPERATURE: 96.4 F | BODY MASS INDEX: 34.39 KG/M2 | DIASTOLIC BLOOD PRESSURE: 78 MMHG

## 2023-04-10 DIAGNOSIS — R55 SYNCOPE, UNSPECIFIED SYNCOPE TYPE: ICD-10-CM

## 2023-04-10 DIAGNOSIS — R53.83 OTHER FATIGUE: ICD-10-CM

## 2023-04-10 DIAGNOSIS — R35.0 URINARY FREQUENCY: ICD-10-CM

## 2023-04-10 DIAGNOSIS — D64.9 ANEMIA, UNSPECIFIED TYPE: ICD-10-CM

## 2023-04-10 DIAGNOSIS — I69.354 HEMIPLGA FOLLOWING CEREBRAL INFRC AFFECTING LEFT NONDOM SIDE (MULTI): ICD-10-CM

## 2023-04-10 DIAGNOSIS — R94.31 ABNORMAL EKG: ICD-10-CM

## 2023-04-10 DIAGNOSIS — K27.9 PUD (PEPTIC ULCER DISEASE): ICD-10-CM

## 2023-04-10 DIAGNOSIS — E55.9 VITAMIN D DEFICIENCY: ICD-10-CM

## 2023-04-10 DIAGNOSIS — Z78.0 ASYMPTOMATIC MENOPAUSAL STATE: ICD-10-CM

## 2023-04-10 DIAGNOSIS — F33.2 SEVERE EPISODE OF RECURRENT MAJOR DEPRESSIVE DISORDER, WITHOUT PSYCHOTIC FEATURES (MULTI): ICD-10-CM

## 2023-04-10 DIAGNOSIS — Z00.00 WELLNESS EXAMINATION: ICD-10-CM

## 2023-04-10 DIAGNOSIS — I73.9 PERIPHERAL VASCULAR DISEASE, UNSPECIFIED (CMS-HCC): ICD-10-CM

## 2023-04-10 DIAGNOSIS — Z11.59 NEED FOR HEPATITIS C SCREENING TEST: ICD-10-CM

## 2023-04-10 DIAGNOSIS — I69.354 HEMIPLGA FOLLOWING CEREBRAL INFRC AFFECTING LEFT NONDOM SIDE (MULTI): Primary | ICD-10-CM

## 2023-04-10 DIAGNOSIS — Z12.31 ENCOUNTER FOR SCREENING MAMMOGRAM FOR BREAST CANCER: ICD-10-CM

## 2023-04-10 DIAGNOSIS — Z23 NEED FOR TETANUS BOOSTER: ICD-10-CM

## 2023-04-10 DIAGNOSIS — Z00.00 ROUTINE GENERAL MEDICAL EXAMINATION AT HEALTH CARE FACILITY: ICD-10-CM

## 2023-04-10 DIAGNOSIS — F31.12: ICD-10-CM

## 2023-04-10 PROBLEM — R41.3 MEMORY LOSS: Status: RESOLVED | Noted: 2023-02-17 | Resolved: 2023-04-10

## 2023-04-10 PROBLEM — L21.9 SEBORRHEA: Status: RESOLVED | Noted: 2023-02-17 | Resolved: 2023-04-10

## 2023-04-10 PROBLEM — Z96.652 STATUS POST LEFT KNEE REPLACEMENT: Status: RESOLVED | Noted: 2023-02-17 | Resolved: 2023-04-10

## 2023-04-10 PROBLEM — R20.0 LEFT ARM NUMBNESS: Status: RESOLVED | Noted: 2023-02-17 | Resolved: 2023-04-10

## 2023-04-10 PROBLEM — R11.10 VOMITING: Status: RESOLVED | Noted: 2023-02-17 | Resolved: 2023-04-10

## 2023-04-10 PROBLEM — M48.061 LUMBAR STENOSIS: Status: RESOLVED | Noted: 2023-02-17 | Resolved: 2023-04-10

## 2023-04-10 PROBLEM — M79.89 LEG SWELLING: Status: RESOLVED | Noted: 2023-02-17 | Resolved: 2023-04-10

## 2023-04-10 PROBLEM — R20.0 LEG NUMBNESS: Status: RESOLVED | Noted: 2023-02-17 | Resolved: 2023-04-10

## 2023-04-10 PROBLEM — E86.0 DEHYDRATION: Status: RESOLVED | Noted: 2023-02-17 | Resolved: 2023-04-10

## 2023-04-10 PROBLEM — R44.1 HALLUCINATIONS, VISUAL: Status: RESOLVED | Noted: 2023-02-17 | Resolved: 2023-04-10

## 2023-04-10 PROBLEM — M54.9 BACK PAIN: Status: RESOLVED | Noted: 2023-02-17 | Resolved: 2023-04-10

## 2023-04-10 PROBLEM — G89.29 CHRONIC PAIN: Status: RESOLVED | Noted: 2023-02-17 | Resolved: 2023-04-10

## 2023-04-10 PROBLEM — L98.9 SKIN LESION OF CHEEK: Status: RESOLVED | Noted: 2023-02-17 | Resolved: 2023-04-10

## 2023-04-10 PROBLEM — R73.9 HYPERGLYCEMIA: Status: RESOLVED | Noted: 2023-02-17 | Resolved: 2023-04-10

## 2023-04-10 PROBLEM — R41.840 ATTENTION DISTURBANCE: Status: RESOLVED | Noted: 2023-02-17 | Resolved: 2023-04-10

## 2023-04-10 LAB
ALANINE AMINOTRANSFERASE (SGPT) (U/L) IN SER/PLAS: 22 U/L (ref 7–45)
ALBUMIN (G/DL) IN SER/PLAS: 4.2 G/DL (ref 3.4–5)
ALKALINE PHOSPHATASE (U/L) IN SER/PLAS: 62 U/L (ref 33–136)
ANION GAP IN SER/PLAS: 10 MMOL/L (ref 10–20)
ASPARTATE AMINOTRANSFERASE (SGOT) (U/L) IN SER/PLAS: 26 U/L (ref 9–39)
BILIRUBIN TOTAL (MG/DL) IN SER/PLAS: 0.6 MG/DL (ref 0–1.2)
CALCIDIOL (25 OH VITAMIN D3) (NG/ML) IN SER/PLAS: 18 NG/ML
CALCIUM (MG/DL) IN SER/PLAS: 9.2 MG/DL (ref 8.6–10.3)
CARBON DIOXIDE, TOTAL (MMOL/L) IN SER/PLAS: 30 MMOL/L (ref 21–32)
CHLORIDE (MMOL/L) IN SER/PLAS: 105 MMOL/L (ref 98–107)
CHOLESTEROL (MG/DL) IN SER/PLAS: 155 MG/DL (ref 0–199)
CHOLESTEROL IN HDL (MG/DL) IN SER/PLAS: 81.6 MG/DL
CHOLESTEROL/HDL RATIO: 1.9
COBALAMIN (VITAMIN B12) (PG/ML) IN SER/PLAS: 285 PG/ML (ref 211–911)
CREATININE (MG/DL) IN SER/PLAS: 0.85 MG/DL (ref 0.5–1.05)
ERYTHROCYTE DISTRIBUTION WIDTH (RATIO) BY AUTOMATED COUNT: 14.4 % (ref 11.5–14.5)
ERYTHROCYTE MEAN CORPUSCULAR HEMOGLOBIN CONCENTRATION (G/DL) BY AUTOMATED: 32.5 G/DL (ref 32–36)
ERYTHROCYTE MEAN CORPUSCULAR VOLUME (FL) BY AUTOMATED COUNT: 99 FL (ref 80–100)
ERYTHROCYTES (10*6/UL) IN BLOOD BY AUTOMATED COUNT: 3.67 X10E12/L (ref 4–5.2)
GFR FEMALE: 77 ML/MIN/1.73M2
GLUCOSE (MG/DL) IN SER/PLAS: 93 MG/DL (ref 74–99)
HEMATOCRIT (%) IN BLOOD BY AUTOMATED COUNT: 36.3 % (ref 36–46)
HEMOGLOBIN (G/DL) IN BLOOD: 11.8 G/DL (ref 12–16)
HEPATITIS C VIRUS AB PRESENCE IN SERUM: NONREACTIVE
IRON (UG/DL) IN SER/PLAS: 124 UG/DL (ref 35–150)
IRON BINDING CAPACITY (UG/DL) IN SER/PLAS: 342 UG/DL (ref 240–445)
IRON SATURATION (%) IN SER/PLAS: 36 % (ref 25–45)
LDL: 63 MG/DL (ref 0–99)
LEUKOCYTES (10*3/UL) IN BLOOD BY AUTOMATED COUNT: 7.5 X10E9/L (ref 4.4–11.3)
MAGNESIUM (MG/DL) IN SER/PLAS: 1.68 MG/DL (ref 1.6–2.4)
PLATELETS (10*3/UL) IN BLOOD AUTOMATED COUNT: 294 X10E9/L (ref 150–450)
POC APPEARANCE, URINE: ABNORMAL
POC BILIRUBIN, URINE: ABNORMAL
POC BLOOD, URINE: ABNORMAL
POC COLOR, URINE: YELLOW
POC GLUCOSE, URINE: NEGATIVE MG/DL
POC KETONES, URINE: ABNORMAL MG/DL
POC LEUKOCYTES, URINE: ABNORMAL
POC NITRITE,URINE: NEGATIVE
POC PH, URINE: 7 PH
POC PROTEIN, URINE: ABNORMAL MG/DL
POC SPECIFIC GRAVITY, URINE: 1.01
POC UROBILINOGEN, URINE: 0.2 EU/DL
POTASSIUM (MMOL/L) IN SER/PLAS: 3.8 MMOL/L (ref 3.5–5.3)
PROTEIN TOTAL: 6.3 G/DL (ref 6.4–8.2)
SODIUM (MMOL/L) IN SER/PLAS: 141 MMOL/L (ref 136–145)
TRIGLYCERIDE (MG/DL) IN SER/PLAS: 53 MG/DL (ref 0–149)
UREA NITROGEN (MG/DL) IN SER/PLAS: 18 MG/DL (ref 6–23)
VLDL: 11 MG/DL (ref 0–40)

## 2023-04-10 PROCEDURE — 83550 IRON BINDING TEST: CPT

## 2023-04-10 PROCEDURE — 99214 OFFICE O/P EST MOD 30 MIN: CPT | Performed by: INTERNAL MEDICINE

## 2023-04-10 PROCEDURE — 82306 VITAMIN D 25 HYDROXY: CPT

## 2023-04-10 PROCEDURE — 3075F SYST BP GE 130 - 139MM HG: CPT | Performed by: INTERNAL MEDICINE

## 2023-04-10 PROCEDURE — 93000 ELECTROCARDIOGRAM COMPLETE: CPT | Performed by: INTERNAL MEDICINE

## 2023-04-10 PROCEDURE — 80061 LIPID PANEL: CPT

## 2023-04-10 PROCEDURE — 90471 IMMUNIZATION ADMIN: CPT | Performed by: INTERNAL MEDICINE

## 2023-04-10 PROCEDURE — G0439 PPPS, SUBSEQ VISIT: HCPCS | Performed by: INTERNAL MEDICINE

## 2023-04-10 PROCEDURE — 85027 COMPLETE CBC AUTOMATED: CPT

## 2023-04-10 PROCEDURE — 81002 URINALYSIS NONAUTO W/O SCOPE: CPT | Performed by: INTERNAL MEDICINE

## 2023-04-10 PROCEDURE — 90715 TDAP VACCINE 7 YRS/> IM: CPT | Performed by: INTERNAL MEDICINE

## 2023-04-10 PROCEDURE — 3078F DIAST BP <80 MM HG: CPT | Performed by: INTERNAL MEDICINE

## 2023-04-10 PROCEDURE — 80053 COMPREHEN METABOLIC PANEL: CPT

## 2023-04-10 PROCEDURE — 36415 COLL VENOUS BLD VENIPUNCTURE: CPT

## 2023-04-10 PROCEDURE — 82607 VITAMIN B-12: CPT

## 2023-04-10 PROCEDURE — 1036F TOBACCO NON-USER: CPT | Performed by: INTERNAL MEDICINE

## 2023-04-10 PROCEDURE — 83735 ASSAY OF MAGNESIUM: CPT

## 2023-04-10 PROCEDURE — 83540 ASSAY OF IRON: CPT

## 2023-04-10 PROCEDURE — 86803 HEPATITIS C AB TEST: CPT

## 2023-04-10 RX ORDER — VILAZODONE HYDROCHLORIDE 40 MG/1
40 TABLET ORAL
COMMUNITY
End: 2024-04-11 | Stop reason: ALTCHOICE

## 2023-04-10 RX ORDER — NITROFURANTOIN 25; 75 MG/1; MG/1
100 CAPSULE ORAL 2 TIMES DAILY
Qty: 14 CAPSULE | Refills: 0 | Status: SHIPPED | OUTPATIENT
Start: 2023-04-10 | End: 2023-04-17

## 2023-04-10 ASSESSMENT — ACTIVITIES OF DAILY LIVING (ADL)
TAKING_MEDICATION: INDEPENDENT
DRESSING: INDEPENDENT
MANAGING_FINANCES: INDEPENDENT
BATHING: INDEPENDENT
DOING_HOUSEWORK: INDEPENDENT
GROCERY_SHOPPING: INDEPENDENT

## 2023-04-10 ASSESSMENT — ENCOUNTER SYMPTOMS
OCCASIONAL FEELINGS OF UNSTEADINESS: 0
DEPRESSION: 1
LOSS OF SENSATION IN FEET: 0

## 2023-04-10 NOTE — PATIENT INSTRUCTIONS
Restart asa as long as it does not bother your stomache  She will increase fluids. Wear compression stalkings  Refer to cardio.  Check mamm and bone density  Take abx with food.    Call  with any problems or questions.   Follow up in 6 months

## 2023-04-10 NOTE — PROGRESS NOTES
"Subjective    Sofi Steve is a 62 y.o. female who presents for Annual Exam and Medicare Annual Wellness Visit Initial.  HPI  No pap  Mammogram 4/2022  Colonoscopy 2020 she is good    Due for tdap    C/o burning,frequency, odor pressure burining with urination for about a week.   Shehas been lightheaded  and she has epsiodes  of syncope after standing for 2months.  No new meds. She does not know what meds she is on.   She has no chest pain. It is worsen when she stands up she gets  lightheaded.    She was not taking her asa. She has not been taking her asa.  She did not think she needed it.   She is fatigued    Review of Systems   All other systems reviewed and are negative.        Objective     /78 (BP Location: Left arm, Patient Position: Sitting, BP Cuff Size: Adult)   Pulse 70   Temp 35.8 °C (96.4 °F)   Resp 14   Ht 1.676 m (5' 6\")   Wt 97.1 kg (214 lb)   SpO2 97%   BMI 34.54 kg/m²    Physical Exam  Vitals reviewed.   Constitutional:       General: She is not in acute distress.     Appearance: Normal appearance.   Cardiovascular:      Rate and Rhythm: Normal rate and regular rhythm.      Pulses: Normal pulses.      Heart sounds: Normal heart sounds.   Pulmonary:      Effort: Pulmonary effort is normal.      Breath sounds: Normal breath sounds.   Chest:      Chest wall: No mass or tenderness.   Breasts:     Right: Normal.      Left: Normal.   Abdominal:      General: Abdomen is flat.      Tenderness: There is no abdominal tenderness.   Musculoskeletal:         General: No swelling.      Cervical back: Neck supple. No tenderness.   Lymphadenopathy:      Cervical: No cervical adenopathy.      Upper Body:      Right upper body: No supraclavicular or axillary adenopathy.      Left upper body: No supraclavicular or axillary adenopathy.   Skin:     General: Skin is warm and dry.   Neurological:      Mental Status: She is alert.   Psychiatric:         Attention and Perception: Attention and perception " normal.         Mood and Affect: Mood and affect normal.       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 (CMS/HCC) - Primary    Relevant Orders    CBC       Circulatory    Peripheral vascular disease, unspecified (CMS/HCC)       Digestive    PUD (peptic ulcer disease)       Hematologic    Anemia    Relevant Orders    Iron and TIBC       Other    Bipolar disorder, manic, moderate (CMS/HCC)    Severe episode of recurrent major depressive disorder, without psychotic features (CMS/HCC)     Other Visit Diagnoses       Urinary frequency        Relevant Orders    POCT UA (nonautomated) manually resulted (Completed)    CBC    Syncope, unspecified syncope type        Relevant Orders    ECG 12 lead    Wellness examination        Relevant Orders    CBC    Comprehensive Metabolic Panel    Lipid Panel    Asymptomatic menopausal state        Relevant Orders    XR DEXA bone density    Encounter for screening mammogram for breast cancer        Relevant Orders    BI mammo bilateral screening tomosynthesis    Need for hepatitis C screening test        Relevant Orders    Hepatitis C antibody    Routine general medical examination at health care facility        Other fatigue        Relevant Orders    Iron and TIBC    Vitamin B12    Vitamin D, Total    Vitamin D deficiency        Relevant Orders    Vitamin D, Total        Stable based on symptoms. Continue established treatment plan and follow up at least yearly  Ekg nsr with  mildly prolonged qt check labs.  Will treat with macrobid take with food. Increase fluids  And wear compression stalkings.   Refer to cardio.   Check labs. \  Boosterix given

## 2023-04-11 ENCOUNTER — TELEPHONE (OUTPATIENT)
Dept: PRIMARY CARE | Facility: CLINIC | Age: 63
End: 2023-04-11
Payer: MEDICARE

## 2023-04-11 RX ORDER — ZIPRASIDONE HYDROCHLORIDE 80 MG/1
80 CAPSULE ORAL
COMMUNITY

## 2023-04-11 RX ORDER — PRAZOSIN HYDROCHLORIDE 1 MG/1
1 CAPSULE ORAL NIGHTLY
COMMUNITY

## 2023-04-11 NOTE — TELEPHONE ENCOUNTER
----- Message from Shanel Man DO sent at 4/11/2023 12:23 PM EDT -----  Her labs shows low b12 and vit d. Would recommend she take otc vit b12 1000 mcg daily and vit d3 5000 international units daily  Her iron levels are good.

## 2023-04-11 NOTE — RESULT ENCOUNTER NOTE
Her labs shows low b12 and vit d. Would recommend she take otc vit b12 1000 mcg daily and vit d3 5000 international units daily  Her iron levels are good.

## 2023-04-13 ENCOUNTER — TELEPHONE (OUTPATIENT)
Dept: PRIMARY CARE | Facility: CLINIC | Age: 63
End: 2023-04-13
Payer: MEDICARE

## 2023-04-13 DIAGNOSIS — N30.01 ACUTE CYSTITIS WITH HEMATURIA: ICD-10-CM

## 2023-04-13 DIAGNOSIS — N30.01 ACUTE CYSTITIS WITH HEMATURIA: Primary | ICD-10-CM

## 2023-04-13 LAB — URINE CULTURE: ABNORMAL

## 2023-04-13 RX ORDER — SULFAMETHOXAZOLE AND TRIMETHOPRIM 800; 160 MG/1; MG/1
1 TABLET ORAL 2 TIMES DAILY
Qty: 14 TABLET | Refills: 0 | Status: SHIPPED | OUTPATIENT
Start: 2023-04-13 | End: 2023-04-20

## 2023-04-13 RX ORDER — SULFAMETHOXAZOLE AND TRIMETHOPRIM 800; 160 MG/1; MG/1
1 TABLET ORAL 2 TIMES DAILY
Qty: 14 TABLET | Refills: 0 | Status: SHIPPED | OUTPATIENT
Start: 2023-04-13 | End: 2023-04-13 | Stop reason: SDUPTHER

## 2023-05-25 DIAGNOSIS — M25.511 PAIN IN JOINT OF RIGHT SHOULDER: ICD-10-CM

## 2023-05-25 DIAGNOSIS — M47.817 LUMBOSACRAL SPONDYLOSIS WITHOUT MYELOPATHY: ICD-10-CM

## 2023-05-25 DIAGNOSIS — M54.16 LUMBAR RADICULOPATHY: ICD-10-CM

## 2023-05-25 DIAGNOSIS — M47.812 CERVICAL SPONDYLOSIS WITHOUT MYELOPATHY: ICD-10-CM

## 2023-05-25 DIAGNOSIS — M96.1 POST LAMINECTOMY SYNDROME: ICD-10-CM

## 2023-05-25 DIAGNOSIS — G89.4 CHRONIC PAIN SYNDROME: ICD-10-CM

## 2023-05-25 DIAGNOSIS — M48.062 SPINAL STENOSIS OF LUMBAR REGION WITH NEUROGENIC CLAUDICATION: ICD-10-CM

## 2023-05-25 LAB
LV EF: 58 %
LVEF MODALITY: NORMAL

## 2023-05-25 NOTE — TELEPHONE ENCOUNTER
Requested Prescriptions     Pending Prescriptions Disp Refills    gabapentin (NEURONTIN) 400 MG capsule 360 capsule 0     Sig: Take 1 capsule by mouth 4 times daily for 90 days. Patient last seen on:  3-27-23 INJECTION WITH DR. Devin Richter, 3-8-23 OFFICE WITH LEONARDO CARTER  Date of last refill:  2-2-23  Future appts: 6-8-23

## 2023-05-26 RX ORDER — GABAPENTIN 400 MG/1
400 CAPSULE ORAL 4 TIMES DAILY
Qty: 360 CAPSULE | Refills: 0 | Status: SHIPPED | OUTPATIENT
Start: 2023-05-26 | End: 2023-08-24

## 2023-06-19 ENCOUNTER — HOSPITAL ENCOUNTER (OUTPATIENT)
Dept: DATA CONVERSION | Facility: HOSPITAL | Age: 63
End: 2023-06-19
Attending: INTERNAL MEDICINE | Admitting: INTERNAL MEDICINE
Payer: MEDICARE

## 2023-06-19 DIAGNOSIS — R55 SYNCOPE AND COLLAPSE: ICD-10-CM

## 2023-06-19 DIAGNOSIS — R06.02 SHORTNESS OF BREATH: ICD-10-CM

## 2023-06-29 DIAGNOSIS — M25.511 PAIN IN JOINT OF RIGHT SHOULDER: ICD-10-CM

## 2023-06-29 DIAGNOSIS — G89.4 CHRONIC PAIN SYNDROME: ICD-10-CM

## 2023-06-29 DIAGNOSIS — Z98.1 HISTORY OF LUMBAR FUSION: ICD-10-CM

## 2023-06-29 DIAGNOSIS — M96.1 POST LAMINECTOMY SYNDROME: ICD-10-CM

## 2023-06-29 DIAGNOSIS — M48.062 SPINAL STENOSIS OF LUMBAR REGION WITH NEUROGENIC CLAUDICATION: ICD-10-CM

## 2023-06-29 DIAGNOSIS — M47.812 CERVICAL SPONDYLOSIS WITHOUT MYELOPATHY: ICD-10-CM

## 2023-06-29 DIAGNOSIS — M47.817 LUMBOSACRAL SPONDYLOSIS WITHOUT MYELOPATHY: ICD-10-CM

## 2023-06-29 RX ORDER — TIZANIDINE 4 MG/1
4 TABLET ORAL 3 TIMES DAILY PRN
Qty: 90 TABLET | Refills: 0 | Status: SHIPPED | OUTPATIENT
Start: 2023-06-29 | End: 2023-09-27

## 2023-07-03 ENCOUNTER — OFFICE VISIT (OUTPATIENT)
Dept: PAIN MANAGEMENT | Age: 63
End: 2023-07-03
Payer: MEDICARE

## 2023-07-03 VITALS
WEIGHT: 202 LBS | DIASTOLIC BLOOD PRESSURE: 84 MMHG | SYSTOLIC BLOOD PRESSURE: 130 MMHG | BODY MASS INDEX: 33.66 KG/M2 | HEIGHT: 65 IN | TEMPERATURE: 97.1 F

## 2023-07-03 DIAGNOSIS — M48.062 SPINAL STENOSIS OF LUMBAR REGION WITH NEUROGENIC CLAUDICATION: ICD-10-CM

## 2023-07-03 DIAGNOSIS — G89.29 CHRONIC RIGHT SHOULDER PAIN: ICD-10-CM

## 2023-07-03 DIAGNOSIS — M46.1 SACROILIITIS (HCC): Primary | ICD-10-CM

## 2023-07-03 DIAGNOSIS — M25.511 CHRONIC RIGHT SHOULDER PAIN: ICD-10-CM

## 2023-07-03 DIAGNOSIS — M47.817 LUMBOSACRAL SPONDYLOSIS WITHOUT MYELOPATHY: ICD-10-CM

## 2023-07-03 PROCEDURE — 3079F DIAST BP 80-89 MM HG: CPT | Performed by: NURSE PRACTITIONER

## 2023-07-03 PROCEDURE — 99214 OFFICE O/P EST MOD 30 MIN: CPT | Performed by: NURSE PRACTITIONER

## 2023-07-03 PROCEDURE — G8417 CALC BMI ABV UP PARAM F/U: HCPCS | Performed by: NURSE PRACTITIONER

## 2023-07-03 PROCEDURE — 1036F TOBACCO NON-USER: CPT | Performed by: NURSE PRACTITIONER

## 2023-07-03 PROCEDURE — 3017F COLORECTAL CA SCREEN DOC REV: CPT | Performed by: NURSE PRACTITIONER

## 2023-07-03 PROCEDURE — G8427 DOCREV CUR MEDS BY ELIG CLIN: HCPCS | Performed by: NURSE PRACTITIONER

## 2023-07-03 PROCEDURE — 3075F SYST BP GE 130 - 139MM HG: CPT | Performed by: NURSE PRACTITIONER

## 2023-07-03 ASSESSMENT — ENCOUNTER SYMPTOMS
DIARRHEA: 0
CONSTIPATION: 0
COUGH: 0
GASTROINTESTINAL NEGATIVE: 1
EYES NEGATIVE: 1
SHORTNESS OF BREATH: 0
BACK PAIN: 1
TROUBLE SWALLOWING: 0

## 2023-07-03 NOTE — PROGRESS NOTES
Cecilia Tarango  (1960)    7/3/2023    Subjective:     Cecilia Tarango is 58 y.o. female who complains today of:    Chief Complaint   Patient presents with    Back Pain     Middle, Lower    Shoulder Pain     Right         Allergies:  Nsaids and Ibuprofen    Past Medical History:   Diagnosis Date    Anemia     Arthritis     Chicken pox     Chronic back pain     H/O bladder infections     Headache(784.0)     History of blood transfusion     post delivery of first child, again after gastric ulcer cauterization, again for anemia    Hyperlipidemia     meds for about 1 year    Hypertension     meds for about 2 years    Measles     Mumps     Stroke ([address]) 07/2020     Past Surgical History:   Procedure Laterality Date    APPENDECTOMY      BACK SURGERY  2014    lumbar back fusion    CHOLECYSTECTOMY      2017    COLONOSCOPY      ENDOSCOPY, COLON, DIAGNOSTIC      GALLBLADDER SURGERY      HYSTERECTOMY (CERVIX STATUS UNKNOWN)      [address] Right 2020    JOINT REPLACEMENT Left 2018    KNEE SURGERY Right     LEG BIOPSY EXCISION Right 11/12/2021    SOFT TISSUE MASS EXCISION RIGHT ANKLE performed by Arnaud Keith MD at [facility] N/A 10/21/2022    RIGHT BILATERAL L2-3 LAMINECTOMIES MICRODISSECTION FORAMINOTOMIES INTERBODY POSTEROLATERAL PEDICLE SCREW FUSION WITH IGS performed by Amarilys Bah MD at [address]       Family History   Problem Relation Age of Onset    Stroke Mother     Cancer Father     Diabetes Sister     High Blood Pressure Sister     High Blood Pressure Brother     Diabetes Brother     No Known Problems Daughter      Social History     Socioeconomic History    Marital status:      Spouse name: Not on file    Number of children: Not on file    Years of education: Not on file    Highest education level: Not on file   Occupational History    Not on file   Tobacco Use    Smoking status: Never    Smokeless tobacco: Never   Vaping Use

## 2023-07-05 ENCOUNTER — TELEPHONE (OUTPATIENT)
Dept: PAIN MANAGEMENT | Age: 63
End: 2023-07-05

## 2023-07-05 NOTE — TELEPHONE ENCOUNTER
BILAT SI JOINT INJ    NO AUTH REQUIRED    OK to schedule procedure approved as above. Please note sides/levels approved and date range.    (If applicable, sides/levels approved may differ from those ordered)    TO BE SCHEDULED Kobe Smith

## 2023-07-10 ENCOUNTER — INITIAL CONSULT (OUTPATIENT)
Dept: SPORTS MEDICINE | Age: 63
End: 2023-07-10
Payer: MEDICARE

## 2023-07-10 VITALS — WEIGHT: 202 LBS | TEMPERATURE: 98.9 F | HEIGHT: 65 IN | BODY MASS INDEX: 33.66 KG/M2

## 2023-07-10 DIAGNOSIS — M19.011 ARTHROSIS OF RIGHT ACROMIOCLAVICULAR JOINT: Primary | ICD-10-CM

## 2023-07-10 DIAGNOSIS — M75.51 SUBACROMIAL BURSITIS OF RIGHT SHOULDER JOINT: ICD-10-CM

## 2023-07-10 PROCEDURE — G8417 CALC BMI ABV UP PARAM F/U: HCPCS | Performed by: FAMILY MEDICINE

## 2023-07-10 PROCEDURE — G8427 DOCREV CUR MEDS BY ELIG CLIN: HCPCS | Performed by: FAMILY MEDICINE

## 2023-07-10 PROCEDURE — 99204 OFFICE O/P NEW MOD 45 MIN: CPT | Performed by: FAMILY MEDICINE

## 2023-07-10 PROCEDURE — 3017F COLORECTAL CA SCREEN DOC REV: CPT | Performed by: FAMILY MEDICINE

## 2023-07-10 PROCEDURE — 1036F TOBACCO NON-USER: CPT | Performed by: FAMILY MEDICINE

## 2023-07-10 ASSESSMENT — ENCOUNTER SYMPTOMS
BACK PAIN: 0
NAUSEA: 0
DIARRHEA: 0
SHORTNESS OF BREATH: 0
CONSTIPATION: 0

## 2023-07-10 NOTE — PROGRESS NOTES
[facility] ([facility]) Physicians  Neurosurgery and Pain [facility]  [address], [address] [facility], [address] [place] [address] [place]: [phone]  F: [phone]      [address]  (1960)    7/10/2023    Subjective:     [address] is 61 y.o. female who complains today of:    Chief Complaint   Patient presents with    Consultation    Shoulder Pain     RIGHT        HPI     This patient comes in complaining of right shoulder pain she said over 20 years ago she had rotator cuff surgery and was doing fairly well until the last few months does not recall injuring it again says it bothers her when she lays on it or when she does overhead activities  Allergies:  Nsaids and Ibuprofen    Past Medical History:   Diagnosis Date    Anemia     Arthritis     Chicken pox     Chronic back pain     H/O bladder infections     Headache(784.0)     History of blood transfusion     post delivery of first child, again after gastric ulcer cauterization, again for anemia    Hyperlipidemia     meds for about 1 year    Hypertension     meds for about 2 years    Measles     Mumps     Stroke ([facility]) 07/2020     Past Surgical History:   Procedure Laterality Date    APPENDECTOMY      BACK SURGERY  2014    lumbar back fusion    CHOLECYSTECTOMY      2017    COLONOSCOPY      ENDOSCOPY, COLON, DIAGNOSTIC      GALLBLADDER SURGERY      HYSTERECTOMY (CERVIX STATUS UNKNOWN)      1980    JOINT REPLACEMENT Right 2020    JOINT REPLACEMENT Left 2018    KNEE SURGERY Right     LEG BIOPSY EXCISION Right 11/12/2021    SOFT TISSUE MASS EXCISION RIGHT ANKLE performed by Wilfredo Seth MD at [address] [facility] N/A 10/21/2022    RIGHT BILATERAL L2-3 LAMINECTOMIES MICRODISSECTION FORAMINOTOMIES INTERBODY POSTEROLATERAL PEDICLE SCREW FUSION WITH IGS performed by Nayeli Mistry MD at [address]       Family History   Problem Relation Age of Onset    Stroke Mother     Cancer Father     Diabetes Sister

## 2023-07-13 ENCOUNTER — PROCEDURE VISIT (OUTPATIENT)
Dept: SPORTS MEDICINE | Age: 63
End: 2023-07-13
Payer: MEDICARE

## 2023-07-13 DIAGNOSIS — M19.011 ARTHROSIS OF RIGHT ACROMIOCLAVICULAR JOINT: Primary | ICD-10-CM

## 2023-07-13 PROCEDURE — 99999 PR OFFICE/OUTPT VISIT,PROCEDURE ONLY: CPT | Performed by: FAMILY MEDICINE

## 2023-07-13 PROCEDURE — 20604 DRAIN/INJ JOINT/BURSA W/US: CPT | Performed by: FAMILY MEDICINE

## 2023-07-13 PROCEDURE — 96372 THER/PROPH/DIAG INJ SC/IM: CPT | Performed by: FAMILY MEDICINE

## 2023-07-13 RX ORDER — BETAMETHASONE SODIUM PHOSPHATE AND BETAMETHASONE ACETATE 3; 3 MG/ML; MG/ML
3 INJECTION, SUSPENSION INTRA-ARTICULAR; INTRALESIONAL; INTRAMUSCULAR; SOFT TISSUE ONCE
Status: COMPLETED | OUTPATIENT
Start: 2023-07-13 | End: 2023-07-13

## 2023-07-13 RX ORDER — LIDOCAINE HYDROCHLORIDE 10 MG/ML
0.5 INJECTION, SOLUTION INFILTRATION; PERINEURAL ONCE
Status: COMPLETED | OUTPATIENT
Start: 2023-07-13 | End: 2023-07-13

## 2023-07-13 RX ADMIN — BETAMETHASONE SODIUM PHOSPHATE AND BETAMETHASONE ACETATE 3 MG: 3; 3 INJECTION, SUSPENSION INTRA-ARTICULAR; INTRALESIONAL; INTRAMUSCULAR; SOFT TISSUE at 11:19

## 2023-07-13 RX ADMIN — LIDOCAINE HYDROCHLORIDE 0.5 ML: 10 INJECTION, SOLUTION INFILTRATION; PERINEURAL at 11:19

## 2023-07-13 NOTE — PROGRESS NOTES
@[facility]@   Spine Surgery  Advanced Pain Management           Provider: Gertrudis Rivera DO          Patient Name: Jey Roebrtson : 1960         Date: 23          PROCEDURE: US Shoulder Injection  Dx right acromioclavicular arthritis    After informed consent patient was put in a seated position, the right shoulder was exposed and draped. Using msk US the A/C joint was identified and prepped with Betadine. Using US guidance a 22g needle was used to inject 0.5 cc celestone, 0.5 cc lidocaine, with relief of symptoms.   Pt tolerated procedure well, left stable, told to ice the shoulder today and resume normal activity in 2 days  US images of procedure were saved

## 2023-07-17 ENCOUNTER — PROCEDURE VISIT (OUTPATIENT)
Dept: PAIN MANAGEMENT | Age: 63
End: 2023-07-17
Payer: MEDICARE

## 2023-07-17 DIAGNOSIS — M46.1 SACROILIITIS (HCC): ICD-10-CM

## 2023-07-17 PROCEDURE — 27096 INJECT SACROILIAC JOINT: CPT | Performed by: PAIN MEDICINE

## 2023-07-17 RX ORDER — LIDOCAINE HYDROCHLORIDE 10 MG/ML
10 INJECTION, SOLUTION EPIDURAL; INFILTRATION; INTRACAUDAL; PERINEURAL ONCE
Status: COMPLETED | OUTPATIENT
Start: 2023-07-17 | End: 2023-07-17

## 2023-07-17 RX ORDER — BETAMETHASONE SODIUM PHOSPHATE AND BETAMETHASONE ACETATE 3; 3 MG/ML; MG/ML
6 INJECTION, SUSPENSION INTRA-ARTICULAR; INTRALESIONAL; INTRAMUSCULAR; SOFT TISSUE ONCE
Status: COMPLETED | OUTPATIENT
Start: 2023-07-17 | End: 2023-07-17

## 2023-07-17 RX ADMIN — LIDOCAINE HYDROCHLORIDE 10 MG: 10 INJECTION, SOLUTION EPIDURAL; INFILTRATION; INTRACAUDAL; PERINEURAL at 10:29

## 2023-07-17 RX ADMIN — BETAMETHASONE SODIUM PHOSPHATE AND BETAMETHASONE ACETATE 6 MG: 3; 3 INJECTION, SUSPENSION INTRA-ARTICULAR; INTRALESIONAL; INTRAMUSCULAR; SOFT TISSUE at 10:29

## 2023-07-17 NOTE — PROGRESS NOTES
[facility]) Physicians  Neurosurgery and Pain [place] [id number] [place] [address], [address] [place] [address] [place]: [phone]  F: [phone]      Patient Name: Jey Robertson  : 1960     Date:  2023      Physician: Darius Lee is here today for interventional pain management. Preoperatively, the patient presents with SI joint mediated pain, as per history and exam. Patient has failed NSAIDs, PT, and conservative treatment. Patient has significant psychological and functional impairment due to this condition. Standard ASIPP guidelines were followed and sterile technique used. Area was cleaned with Betadine x3. Informed consent was obtained. Fluoroscopic guidance was used for this procedure. S.I. JOINT:  Bilateral  Appropriate length spinal needle was advanced to the S.I. Joint. Negative aspiration was achieved. In total, approximately 6mg of Betamethasone and 2ml of 1% preservative free Lidocaine was injected without difficulty. Patient tolerated the procedure well, no obvious complications occurred during the procedure. Patient was appropriately monitored and discharged home in stable condition with their usual motor strength. Post Op Instructions were given to patient. Relevant and recent imaging reviewed with patient today.                                       Ramonita Wright DO

## 2023-07-26 DIAGNOSIS — M25.511 CHRONIC RIGHT SHOULDER PAIN: ICD-10-CM

## 2023-07-26 DIAGNOSIS — G89.29 CHRONIC RIGHT SHOULDER PAIN: ICD-10-CM

## 2023-08-03 ENCOUNTER — OFFICE VISIT (OUTPATIENT)
Dept: SPORTS MEDICINE | Age: 63
End: 2023-08-03
Payer: MEDICARE

## 2023-08-03 VITALS
SYSTOLIC BLOOD PRESSURE: 120 MMHG | HEIGHT: 65 IN | WEIGHT: 210 LBS | DIASTOLIC BLOOD PRESSURE: 80 MMHG | BODY MASS INDEX: 34.99 KG/M2 | TEMPERATURE: 97.2 F

## 2023-08-03 DIAGNOSIS — M19.011 PRIMARY OSTEOARTHRITIS OF RIGHT SHOULDER: Primary | ICD-10-CM

## 2023-08-03 PROCEDURE — 3079F DIAST BP 80-89 MM HG: CPT | Performed by: FAMILY MEDICINE

## 2023-08-03 PROCEDURE — G8427 DOCREV CUR MEDS BY ELIG CLIN: HCPCS | Performed by: FAMILY MEDICINE

## 2023-08-03 PROCEDURE — 1036F TOBACCO NON-USER: CPT | Performed by: FAMILY MEDICINE

## 2023-08-03 PROCEDURE — 99213 OFFICE O/P EST LOW 20 MIN: CPT | Performed by: FAMILY MEDICINE

## 2023-08-03 PROCEDURE — 3074F SYST BP LT 130 MM HG: CPT | Performed by: FAMILY MEDICINE

## 2023-08-03 PROCEDURE — G8417 CALC BMI ABV UP PARAM F/U: HCPCS | Performed by: FAMILY MEDICINE

## 2023-08-03 PROCEDURE — 3017F COLORECTAL CA SCREEN DOC REV: CPT | Performed by: FAMILY MEDICINE

## 2023-08-03 ASSESSMENT — ENCOUNTER SYMPTOMS
CONSTIPATION: 0
NAUSEA: 0
BACK PAIN: 0
DIARRHEA: 0
SHORTNESS OF BREATH: 0

## 2023-08-03 NOTE — PROGRESS NOTES
[facility] ([facility]) Physicians  Neurosurgery and Pain [facility]  [address] [facility], [address] [place]: [phone]  F: [phone]      [address]  (1960)    8/3/2023    Subjective:     [address] is 61 y.o. female who complains today of:    Chief Complaint   Patient presents with    Shoulder Pain     Right.     Follow-up     Pt states it still hurts       HPI     Patient returns stating that she is about 50% better says she still gets some pain in the anterior and posterior aspects of her shoulder  Allergies:  Nsaids and Ibuprofen    Past Medical History:   Diagnosis Date    Anemia     Arthritis     Chicken pox     Chronic back pain     H/O bladder infections     Headache(784.0)     History of blood transfusion     post delivery of first child, again after gastric ulcer cauterization, again for anemia    Hyperlipidemia     meds for about 1 year    Hypertension     meds for about 2 years    Measles     Mumps     Stroke ([address]) 07/2020     Past Surgical History:   Procedure Laterality Date    APPENDECTOMY      BACK SURGERY  2014    lumbar back fusion    CHOLECYSTECTOMY      2017    COLONOSCOPY      ENDOSCOPY, COLON, DIAGNOSTIC      GALLBLADDER SURGERY      HYSTERECTOMY (CERVIX STATUS UNKNOWN)      [address] Right 2020    JOINT REPLACEMENT Left 2018    KNEE SURGERY Right     LEG BIOPSY EXCISION Right 11/12/2021    SOFT TISSUE MASS EXCISION RIGHT ANKLE performed by Tiburcio Barrios MD at [address] [facility] N/A 10/21/2022    RIGHT BILATERAL L2-3 LAMINECTOMIES MICRODISSECTION FORAMINOTOMIES INTERBODY POSTEROLATERAL PEDICLE SCREW FUSION WITH IGS performed by Edmond Mcdaniel MD at [address]       Family History   Problem Relation Age of Onset    Stroke Mother     Cancer Father     Diabetes Sister     High Blood Pressure Sister     High Blood Pressure Brother     Diabetes Brother     No Known Problems Daughter

## 2023-08-10 ENCOUNTER — PROCEDURE VISIT (OUTPATIENT)
Dept: SPORTS MEDICINE | Age: 63
End: 2023-08-10

## 2023-08-10 DIAGNOSIS — M19.011 PRIMARY OSTEOARTHRITIS OF RIGHT SHOULDER: Primary | ICD-10-CM

## 2023-08-10 RX ORDER — LIDOCAINE HYDROCHLORIDE 10 MG/ML
3 INJECTION, SOLUTION INFILTRATION; PERINEURAL ONCE
Status: COMPLETED | OUTPATIENT
Start: 2023-08-10 | End: 2023-08-10

## 2023-08-10 RX ORDER — BETAMETHASONE SODIUM PHOSPHATE AND BETAMETHASONE ACETATE 3; 3 MG/ML; MG/ML
12 INJECTION, SUSPENSION INTRA-ARTICULAR; INTRALESIONAL; INTRAMUSCULAR; SOFT TISSUE ONCE
Status: COMPLETED | OUTPATIENT
Start: 2023-08-10 | End: 2023-08-10

## 2023-08-10 RX ORDER — BUPIVACAINE HYDROCHLORIDE 2.5 MG/ML
3 INJECTION, SOLUTION INFILTRATION; PERINEURAL ONCE
Status: COMPLETED | OUTPATIENT
Start: 2023-08-10 | End: 2023-08-10

## 2023-08-10 RX ADMIN — BUPIVACAINE HYDROCHLORIDE 7.5 MG: 2.5 INJECTION, SOLUTION INFILTRATION; PERINEURAL at 11:20

## 2023-08-10 RX ADMIN — BETAMETHASONE SODIUM PHOSPHATE AND BETAMETHASONE ACETATE 12 MG: 3; 3 INJECTION, SUSPENSION INTRA-ARTICULAR; INTRALESIONAL; INTRAMUSCULAR; SOFT TISSUE at 11:19

## 2023-08-10 RX ADMIN — LIDOCAINE HYDROCHLORIDE 3 ML: 10 INJECTION, SOLUTION INFILTRATION; PERINEURAL at 11:21

## 2023-08-10 NOTE — PROGRESS NOTES
@[facility]@   Spine Surgery  Advanced Pain Management           Provider: Mariana Lipscomb DO          Patient Name: Walt Magaña : 1960         Date: 8/10/23          PROCEDURE: US Shoulder Injection  Dx DJD right shoulder    After informed consent patient was put in a seated position, the right shoulder was exposed and draped. Using msk US the shoulder joint was identified and prepped with Betadine. Using US guidance a 22g needle was used to inject 2cc celestone, 3cc lidocaine, and 3cc marcaine with relief of symptoms.   Pt tolerated procedure well, left stable, told to ice the shoulder today and resume normal activity in 2 days  US images of procedure were saved

## 2023-08-22 ENCOUNTER — OFFICE VISIT (OUTPATIENT)
Dept: PAIN MANAGEMENT | Age: 63
End: 2023-08-22
Payer: MEDICARE

## 2023-08-22 VITALS
HEIGHT: 65 IN | SYSTOLIC BLOOD PRESSURE: 126 MMHG | TEMPERATURE: 97 F | DIASTOLIC BLOOD PRESSURE: 84 MMHG | WEIGHT: 209 LBS | BODY MASS INDEX: 34.82 KG/M2

## 2023-08-22 DIAGNOSIS — M47.812 CERVICAL SPONDYLOSIS WITHOUT MYELOPATHY: ICD-10-CM

## 2023-08-22 DIAGNOSIS — Z98.1 HISTORY OF LUMBAR FUSION: ICD-10-CM

## 2023-08-22 DIAGNOSIS — G89.4 CHRONIC PAIN SYNDROME: ICD-10-CM

## 2023-08-22 DIAGNOSIS — M25.511 PAIN IN JOINT OF RIGHT SHOULDER: ICD-10-CM

## 2023-08-22 DIAGNOSIS — M48.062 SPINAL STENOSIS OF LUMBAR REGION WITH NEUROGENIC CLAUDICATION: ICD-10-CM

## 2023-08-22 DIAGNOSIS — M96.1 POST LAMINECTOMY SYNDROME: ICD-10-CM

## 2023-08-22 DIAGNOSIS — M47.817 LUMBOSACRAL SPONDYLOSIS WITHOUT MYELOPATHY: ICD-10-CM

## 2023-08-22 DIAGNOSIS — M54.16 LUMBAR RADICULOPATHY: ICD-10-CM

## 2023-08-22 PROBLEM — M17.11 LOCALIZED OSTEOARTHRITIS OF RIGHT KNEE: Status: ACTIVE | Noted: 2023-08-16

## 2023-08-22 PROBLEM — E55.9 VITAMIN D DEFICIENCY: Status: ACTIVE | Noted: 2023-04-10

## 2023-08-22 PROBLEM — F31.12: Status: ACTIVE | Noted: 2023-02-17

## 2023-08-22 PROCEDURE — 1036F TOBACCO NON-USER: CPT | Performed by: NURSE PRACTITIONER

## 2023-08-22 PROCEDURE — G8427 DOCREV CUR MEDS BY ELIG CLIN: HCPCS | Performed by: NURSE PRACTITIONER

## 2023-08-22 PROCEDURE — 3079F DIAST BP 80-89 MM HG: CPT | Performed by: NURSE PRACTITIONER

## 2023-08-22 PROCEDURE — 99213 OFFICE O/P EST LOW 20 MIN: CPT | Performed by: NURSE PRACTITIONER

## 2023-08-22 PROCEDURE — G8417 CALC BMI ABV UP PARAM F/U: HCPCS | Performed by: NURSE PRACTITIONER

## 2023-08-22 PROCEDURE — 3017F COLORECTAL CA SCREEN DOC REV: CPT | Performed by: NURSE PRACTITIONER

## 2023-08-22 PROCEDURE — 3074F SYST BP LT 130 MM HG: CPT | Performed by: NURSE PRACTITIONER

## 2023-08-22 RX ORDER — GABAPENTIN 400 MG/1
400 CAPSULE ORAL 4 TIMES DAILY
Qty: 360 CAPSULE | Refills: 0 | Status: SHIPPED | OUTPATIENT
Start: 2023-08-22 | End: 2023-11-20

## 2023-08-22 RX ORDER — TIZANIDINE 4 MG/1
4 TABLET ORAL 3 TIMES DAILY PRN
Qty: 90 TABLET | Refills: 0 | Status: SHIPPED | OUTPATIENT
Start: 2023-08-22 | End: 2023-11-20

## 2023-08-22 RX ORDER — OMEPRAZOLE 20 MG/1
TABLET, DELAYED RELEASE ORAL
COMMUNITY

## 2023-08-22 ASSESSMENT — ENCOUNTER SYMPTOMS
DIARRHEA: 0
NAUSEA: 1
CONSTIPATION: 0
BACK PAIN: 1
COUGH: 0
EYES NEGATIVE: 1
TROUBLE SWALLOWING: 1
SHORTNESS OF BREATH: 0
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
## 2023-08-25 DIAGNOSIS — G89.4 CHRONIC PAIN SYNDROME: ICD-10-CM

## 2023-08-25 DIAGNOSIS — M25.511 PAIN IN JOINT OF RIGHT SHOULDER: ICD-10-CM

## 2023-08-25 DIAGNOSIS — M48.062 SPINAL STENOSIS OF LUMBAR REGION WITH NEUROGENIC CLAUDICATION: ICD-10-CM

## 2023-08-25 DIAGNOSIS — M47.817 LUMBOSACRAL SPONDYLOSIS WITHOUT MYELOPATHY: ICD-10-CM

## 2023-08-25 DIAGNOSIS — Z98.1 HISTORY OF LUMBAR FUSION: ICD-10-CM

## 2023-08-25 DIAGNOSIS — M47.812 CERVICAL SPONDYLOSIS WITHOUT MYELOPATHY: ICD-10-CM

## 2023-08-25 DIAGNOSIS — M96.1 POST LAMINECTOMY SYNDROME: ICD-10-CM

## 2023-08-25 RX ORDER — TIZANIDINE 4 MG/1
TABLET ORAL
Qty: 90 TABLET | Refills: 0 | OUTPATIENT
Start: 2023-08-25

## 2023-08-31 ENCOUNTER — OFFICE VISIT (OUTPATIENT)
Dept: SPORTS MEDICINE | Age: 63
End: 2023-08-31
Payer: MEDICARE

## 2023-08-31 VITALS — TEMPERATURE: 97.6 F | HEIGHT: 65 IN | WEIGHT: 210 LBS | BODY MASS INDEX: 34.99 KG/M2

## 2023-08-31 DIAGNOSIS — M75.21 BICEPS TENDINITIS OF RIGHT UPPER EXTREMITY: Primary | ICD-10-CM

## 2023-08-31 PROCEDURE — 99213 OFFICE O/P EST LOW 20 MIN: CPT | Performed by: FAMILY MEDICINE

## 2023-08-31 PROCEDURE — G8417 CALC BMI ABV UP PARAM F/U: HCPCS | Performed by: FAMILY MEDICINE

## 2023-08-31 PROCEDURE — 1036F TOBACCO NON-USER: CPT | Performed by: FAMILY MEDICINE

## 2023-08-31 PROCEDURE — 3017F COLORECTAL CA SCREEN DOC REV: CPT | Performed by: FAMILY MEDICINE

## 2023-08-31 PROCEDURE — G8427 DOCREV CUR MEDS BY ELIG CLIN: HCPCS | Performed by: FAMILY MEDICINE

## 2023-08-31 ASSESSMENT — ENCOUNTER SYMPTOMS
CONSTIPATION: 0
SHORTNESS OF BREATH: 0
DIARRHEA: 0
BACK PAIN: 0
NAUSEA: 0

## 2023-08-31 NOTE — PROGRESS NOTES
[facility] ([facility]) Physicians  Neurosurgery and Pain [facility]  [address], 137 [facility], [address] [place] [address] [place]: [phone]  F: [phone]      [address]  (1960)    8/31/2023    Subjective:     [address] is 61 y.o. female who complains today of:    Chief Complaint   Patient presents with    Shoulder Pain     RIGHT        HPI     This patient returns stating that some of her pain is gone and some is remained still has some pain in the anterior part of her shoulder  Allergies:  Nsaids and Ibuprofen    Past Medical History:   Diagnosis Date    Anemia     Arthritis     Chicken pox     Chronic back pain     H/O bladder infections     Headache(784.0)     History of blood transfusion     post delivery of first child, again after gastric ulcer cauterization, again for anemia    Hyperlipidemia     meds for about 1 year    Hypertension     meds for about 2 years    Measles     Mumps     Stroke ([address]) 07/2020     Past Surgical History:   Procedure Laterality Date    APPENDECTOMY      BACK SURGERY  2014    lumbar back fusion    CHOLECYSTECTOMY      2017    COLONOSCOPY      ENDOSCOPY, COLON, DIAGNOSTIC      GALLBLADDER SURGERY      HYSTERECTOMY (CERVIX STATUS UNKNOWN)      [address] Right 2020    JOINT REPLACEMENT Left 2018    KNEE SURGERY Right     LEG BIOPSY EXCISION Right 11/12/2021    SOFT TISSUE MASS EXCISION RIGHT ANKLE performed by Tiburcio Barrios MD at [address] [facility] N/A 10/21/2022    RIGHT BILATERAL L2-3 LAMINECTOMIES MICRODISSECTION FORAMINOTOMIES INTERBODY POSTEROLATERAL PEDICLE SCREW FUSION WITH IGS performed by Edmond Mcdaniel MD at [address]       Family History   Problem Relation Age of Onset    Stroke Mother     Cancer Father     Diabetes Sister     High Blood Pressure Sister     High Blood Pressure Brother     Diabetes Brother     No Known Problems Daughter      Social History     Socioeconomic

## 2023-09-01 ENCOUNTER — TELEPHONE (OUTPATIENT)
Dept: PAIN MANAGEMENT | Age: 63
End: 2023-09-01

## 2023-09-01 NOTE — TELEPHONE ENCOUNTER
ZV- RT Upper Bicep Tendinitis Injection          No Prior Auth Required       OK to schedule procedure approved as above. Please note sides/levels approved and date range.    (If applicable, sides/levels approved may differ from those ordered)        Patient to be scheduled with Dr. Clint North

## 2023-09-07 ENCOUNTER — PROCEDURE VISIT (OUTPATIENT)
Dept: SPORTS MEDICINE | Age: 63
End: 2023-09-07

## 2023-09-07 DIAGNOSIS — M75.21 BICEPS TENDINITIS OF RIGHT UPPER EXTREMITY: Primary | ICD-10-CM

## 2023-09-07 RX ORDER — LIDOCAINE HYDROCHLORIDE 10 MG/ML
2 INJECTION, SOLUTION INFILTRATION; PERINEURAL ONCE
Status: COMPLETED | OUTPATIENT
Start: 2023-09-07 | End: 2023-09-07

## 2023-09-07 RX ORDER — BETAMETHASONE SODIUM PHOSPHATE AND BETAMETHASONE ACETATE 3; 3 MG/ML; MG/ML
6 INJECTION, SUSPENSION INTRA-ARTICULAR; INTRALESIONAL; INTRAMUSCULAR; SOFT TISSUE ONCE
Status: COMPLETED | OUTPATIENT
Start: 2023-09-07 | End: 2023-09-07

## 2023-09-07 RX ADMIN — BETAMETHASONE SODIUM PHOSPHATE AND BETAMETHASONE ACETATE 6 MG: 3; 3 INJECTION, SUSPENSION INTRA-ARTICULAR; INTRALESIONAL; INTRAMUSCULAR; SOFT TISSUE at 09:38

## 2023-09-07 RX ADMIN — LIDOCAINE HYDROCHLORIDE 2 ML: 10 INJECTION, SOLUTION INFILTRATION; PERINEURAL at 09:38

## 2023-09-07 NOTE — PROGRESS NOTES
US TENDON INJECTION  Dx right biceps tendinitis    Before injection risks/benefits of a cortisone injection including infection, local skin irritation, skin atrophy, calcification, continued pain/discomfort, elevated blood sugar, burning, failure to relieve pain, possible late infection were discussed with patient. Post-procedure discomfort can be alleviated with additional medications/ice/elevation/rest over the first 24 hours as recommended. Patient verbalized understanding and wanted to proceed with planned procedure. After informed consent was provided and allergies verified, the patient was positioned appropriately on hospital bed. The right biceps tendon tendon was identified using US. The area was prepped and draped with betadine to provide sterile environment. US was used to guide the needle peritendonous, and then was injected with 1cc celestone and 2cc lidocaine with relief of symptoms. Pt left stable, is to ice and rest for 2 days then slowly return to activities. US guided images of procedure were saved  .

## 2023-09-08 PROBLEM — M47.816 SPONDYLOSIS WITHOUT MYELOPATHY OR RADICULOPATHY, LUMBAR REGION: Status: ACTIVE | Noted: 2020-10-08

## 2023-09-08 PROBLEM — R00.1 BRADYCARDIA, UNSPECIFIED: Status: ACTIVE | Noted: 2020-07-14

## 2023-09-08 PROBLEM — M96.1 POSTLAMINECTOMY SYNDROME OF LUMBAR REGION: Status: ACTIVE | Noted: 2018-09-04

## 2023-09-08 PROBLEM — E66.9 CLASS 1 OBESITY WITH BODY MASS INDEX (BMI) OF 34.0 TO 34.9 IN ADULT: Status: ACTIVE | Noted: 2023-09-08

## 2023-09-08 PROBLEM — G56.02 CARPAL TUNNEL SYNDROME, LEFT UPPER LIMB: Status: ACTIVE | Noted: 2020-10-21

## 2023-09-08 PROBLEM — G43.019 REFRACTORY MIGRAINE WITHOUT AURA: Status: ACTIVE | Noted: 2020-12-16

## 2023-09-08 PROBLEM — G81.94 LEFT HEMIPARESIS (MULTI): Status: ACTIVE | Noted: 2020-12-16

## 2023-09-08 PROBLEM — K21.9 GERD (GASTROESOPHAGEAL REFLUX DISEASE): Status: ACTIVE | Noted: 2023-09-08

## 2023-09-08 PROBLEM — Z90.49 ACQUIRED ABSENCE OF OTHER SPECIFIED PARTS OF DIGESTIVE TRACT: Status: ACTIVE | Noted: 2020-07-14

## 2023-09-08 PROBLEM — M47.812 CERVICAL SPONDYLOSIS WITHOUT MYELOPATHY: Status: ACTIVE | Noted: 2018-01-18

## 2023-09-08 PROBLEM — R55 SYNCOPE: Status: ACTIVE | Noted: 2023-09-08

## 2023-09-08 PROBLEM — R79.89 ABNORMAL LIVER FUNCTION TESTS: Status: ACTIVE | Noted: 2020-12-16

## 2023-09-08 PROBLEM — Z96.652 PRESENCE OF LEFT ARTIFICIAL KNEE JOINT: Status: ACTIVE | Noted: 2020-12-17

## 2023-09-08 PROBLEM — B37.2 CANDIDIASIS OF SKIN: Status: ACTIVE | Noted: 2020-12-16

## 2023-09-08 PROBLEM — I16.0 HYPERTENSIVE URGENCY: Status: ACTIVE | Noted: 2021-02-12

## 2023-09-08 PROBLEM — F39 MOOD DISORDER (CMS-HCC): Status: ACTIVE | Noted: 2018-07-03

## 2023-09-08 PROBLEM — R00.0 TACHYCARDIA: Status: ACTIVE | Noted: 2023-09-08

## 2023-09-08 PROBLEM — I24.9 ACUTE ISCHEMIC HEART DISEASE, UNSPECIFIED (MULTI): Status: ACTIVE | Noted: 2021-02-10

## 2023-09-08 PROBLEM — M17.12 ARTHRITIS OF KNEE, LEFT: Status: ACTIVE | Noted: 2017-04-10

## 2023-09-08 PROBLEM — R06.02 SHORTNESS OF BREATH: Status: ACTIVE | Noted: 2023-09-08

## 2023-09-08 PROBLEM — S46.911A STRAIN OF UNSPECIFIED MUSCLE, FASCIA AND TENDON AT SHOULDER AND UPPER ARM LEVEL, RIGHT ARM, INITIAL ENCOUNTER: Status: ACTIVE | Noted: 2017-12-30

## 2023-09-08 PROBLEM — M50.10 CERVICAL DISC DISORDER WITH RADICULOPATHY, UNSPECIFIED CERVICAL REGION: Status: ACTIVE | Noted: 2017-12-30

## 2023-09-08 PROBLEM — F32.9 MAJOR DEPRESSIVE DISORDER, SINGLE EPISODE, UNSPECIFIED: Status: ACTIVE | Noted: 2018-07-03

## 2023-09-08 PROBLEM — R20.0 NUMBNESS OF UPPER LIMB: Status: ACTIVE | Noted: 2020-12-16

## 2023-09-08 PROBLEM — I95.1 ORTHOSTATIC SYNCOPE: Status: ACTIVE | Noted: 2023-09-08

## 2023-09-08 PROBLEM — E78.5 HYPERLIPIDEMIA, UNSPECIFIED: Status: ACTIVE | Noted: 2020-12-17

## 2023-09-08 PROBLEM — R11.2 NAUSEA, VOMITING AND DIARRHEA: Status: ACTIVE | Noted: 2020-08-17

## 2023-09-08 PROBLEM — E66.811 CLASS 1 OBESITY WITH BODY MASS INDEX (BMI) OF 34.0 TO 34.9 IN ADULT: Status: ACTIVE | Noted: 2023-09-08

## 2023-09-08 PROBLEM — D50.0 IRON DEFICIENCY ANEMIA SECONDARY TO BLOOD LOSS (CHRONIC): Status: ACTIVE | Noted: 2021-02-12

## 2023-09-08 PROBLEM — G43.719 INTRACTABLE CHRONIC MIGRAINE WITHOUT AURA AND WITHOUT STATUS MIGRAINOSUS: Status: ACTIVE | Noted: 2020-04-01

## 2023-09-08 PROBLEM — R19.7 NAUSEA, VOMITING AND DIARRHEA: Status: ACTIVE | Noted: 2020-08-17

## 2023-09-08 PROBLEM — G47.20 DISRUPTIONS OF 24 HOUR SLEEP-WAKE CYCLE: Status: ACTIVE | Noted: 2020-08-17

## 2023-09-08 PROBLEM — I63.9 CEREBROVASCULAR ACCIDENT (MULTI): Status: ACTIVE | Noted: 2020-12-16

## 2023-09-08 PROBLEM — G43.001 MIGRAINE WITHOUT AURA AND WITH STATUS MIGRAINOSUS, NOT INTRACTABLE: Status: ACTIVE | Noted: 2020-04-01

## 2023-09-08 PROBLEM — F17.200 SMOKER: Status: ACTIVE | Noted: 2022-08-25

## 2023-09-08 RX ORDER — ONDANSETRON 4 MG/1
1 TABLET, ORALLY DISINTEGRATING ORAL EVERY 6 HOURS PRN
COMMUNITY
Start: 2022-08-23 | End: 2023-10-02 | Stop reason: ALTCHOICE

## 2023-09-08 RX ORDER — GABAPENTIN 400 MG/1
1 CAPSULE ORAL 4 TIMES DAILY
COMMUNITY
Start: 2023-05-30

## 2023-09-08 RX ORDER — CYCLOBENZAPRINE HCL 10 MG
1 TABLET ORAL DAILY PRN
COMMUNITY
Start: 2023-02-16 | End: 2023-10-02 | Stop reason: ALTCHOICE

## 2023-09-08 RX ORDER — OXYCODONE AND ACETAMINOPHEN 5; 325 MG/1; MG/1
1 TABLET ORAL 3 TIMES DAILY PRN
COMMUNITY
Start: 2015-01-21 | End: 2023-10-02 | Stop reason: WASHOUT

## 2023-09-08 RX ORDER — URSODIOL 250 MG/1
1 TABLET, FILM COATED ORAL 2 TIMES DAILY
COMMUNITY
Start: 2022-04-11 | End: 2023-10-02 | Stop reason: ALTCHOICE

## 2023-09-08 RX ORDER — MELOXICAM 7.5 MG/1
1 TABLET ORAL DAILY PRN
COMMUNITY
Start: 2023-08-16 | End: 2023-10-02 | Stop reason: ALTCHOICE

## 2023-09-08 RX ORDER — LAMOTRIGINE 100 MG/1
1 TABLET ORAL NIGHTLY
COMMUNITY
Start: 2023-03-10 | End: 2023-10-02 | Stop reason: DRUGHIGH

## 2023-09-08 RX ORDER — FLUOXETINE HYDROCHLORIDE 20 MG/1
CAPSULE ORAL
COMMUNITY
Start: 2022-10-27 | End: 2023-10-02 | Stop reason: ALTCHOICE

## 2023-09-08 RX ORDER — POLYETHYLENE GLYCOL 3350 17 G/17G
17 POWDER, FOR SOLUTION ORAL DAILY PRN
COMMUNITY
Start: 2022-10-24 | End: 2023-10-02 | Stop reason: WASHOUT

## 2023-09-08 RX ORDER — LAMOTRIGINE 25 MG/1
TABLET ORAL
COMMUNITY
Start: 2023-02-09 | End: 2023-10-02 | Stop reason: DRUGHIGH

## 2023-09-08 RX ORDER — BUTALBITAL, ACETAMINOPHEN AND CAFFEINE 50; 325; 40 MG/1; MG/1; MG/1
1 TABLET ORAL DAILY PRN
COMMUNITY
Start: 2022-12-19 | End: 2023-10-02 | Stop reason: ALTCHOICE

## 2023-09-08 RX ORDER — LAMOTRIGINE 150 MG/1
1 TABLET ORAL NIGHTLY
COMMUNITY
Start: 2023-04-06

## 2023-09-08 RX ORDER — BUTALBITAL, ACETAMINOPHEN AND CAFFEINE 300; 40; 50 MG/1; MG/1; MG/1
1 CAPSULE ORAL DAILY PRN
COMMUNITY
Start: 2023-06-02 | End: 2023-10-02 | Stop reason: ALTCHOICE

## 2023-09-08 RX ORDER — AMOXICILLIN 500 MG/1
TABLET, FILM COATED ORAL
COMMUNITY
Start: 2022-11-16 | End: 2023-10-02 | Stop reason: ALTCHOICE

## 2023-09-08 RX ORDER — ALPRAZOLAM 1 MG/1
1 TABLET ORAL EVERY 12 HOURS
COMMUNITY
Start: 2014-11-21

## 2023-09-08 RX ORDER — ESOMEPRAZOLE MAGNESIUM 40 MG/1
1 CAPSULE, DELAYED RELEASE ORAL EVERY MORNING
COMMUNITY
Start: 2023-05-24

## 2023-09-08 RX ORDER — ASPIRIN 81 MG/1
1 TABLET ORAL DAILY
COMMUNITY

## 2023-09-28 ENCOUNTER — OFFICE VISIT (OUTPATIENT)
Dept: SPORTS MEDICINE | Age: 63
End: 2023-09-28
Payer: MEDICARE

## 2023-09-28 VITALS
BODY MASS INDEX: 34.99 KG/M2 | DIASTOLIC BLOOD PRESSURE: 76 MMHG | TEMPERATURE: 96.5 F | SYSTOLIC BLOOD PRESSURE: 120 MMHG | WEIGHT: 210 LBS | HEIGHT: 65 IN

## 2023-09-28 DIAGNOSIS — M75.111 NONTRAUMATIC INCOMPLETE TEAR OF RIGHT ROTATOR CUFF: Primary | ICD-10-CM

## 2023-09-28 PROCEDURE — 3074F SYST BP LT 130 MM HG: CPT | Performed by: FAMILY MEDICINE

## 2023-09-28 PROCEDURE — G8427 DOCREV CUR MEDS BY ELIG CLIN: HCPCS | Performed by: FAMILY MEDICINE

## 2023-09-28 PROCEDURE — 3078F DIAST BP <80 MM HG: CPT | Performed by: FAMILY MEDICINE

## 2023-09-28 PROCEDURE — 3017F COLORECTAL CA SCREEN DOC REV: CPT | Performed by: FAMILY MEDICINE

## 2023-09-28 PROCEDURE — 99213 OFFICE O/P EST LOW 20 MIN: CPT | Performed by: FAMILY MEDICINE

## 2023-09-28 PROCEDURE — G8417 CALC BMI ABV UP PARAM F/U: HCPCS | Performed by: FAMILY MEDICINE

## 2023-09-28 PROCEDURE — 1036F TOBACCO NON-USER: CPT | Performed by: FAMILY MEDICINE

## 2023-09-28 ASSESSMENT — ENCOUNTER SYMPTOMS
SHORTNESS OF BREATH: 0
NAUSEA: 0
DIARRHEA: 0
CONSTIPATION: 0
BACK PAIN: 0

## 2023-09-28 NOTE — PROGRESS NOTES
[address] Physicians  Neurosurgery and Pain [facility]  [address], [address] [address] [facility], [address] [place] [address] [place]: [phone]  F: [phone]      [address]  (1960)    9/28/2023    Subjective:     [address] is 61 y.o. female who complains today of:    Chief Complaint   Patient presents with    Follow-up     Pt states her bicep still hurts but is better- states that it keeps her up at night.         HPI     This patient comes in complaining of right shoulder pain she states the injections really has not seem to help her neither has any other therapy she has been doing  Allergies:  Nsaids and Ibuprofen    Past Medical History:   Diagnosis Date    Anemia     Arthritis     Chicken pox     Chronic back pain     H/O bladder infections     Headache(784.0)     History of blood transfusion     post delivery of first child, again after gastric ulcer cauterization, again for anemia    Hyperlipidemia     meds for about 1 year    Hypertension     meds for about 2 years    Measles     Mumps     Stroke ([address]) 07/2020     Past Surgical History:   Procedure Laterality Date    APPENDECTOMY      BACK SURGERY  2014    lumbar back fusion    CHOLECYSTECTOMY      2017    COLONOSCOPY      ENDOSCOPY, COLON, DIAGNOSTIC      GALLBLADDER SURGERY      HYSTERECTOMY (CERVIX STATUS UNKNOWN)      [address] Right 2020    JOINT REPLACEMENT Left 2018    KNEE SURGERY Right     LEG BIOPSY EXCISION Right 11/12/2021    SOFT TISSUE MASS EXCISION RIGHT ANKLE performed by Audie Jenkins MD at [address] [facility] N/A 10/21/2022    RIGHT BILATERAL L2-3 LAMINECTOMIES MICRODISSECTION FORAMINOTOMIES INTERBODY POSTEROLATERAL PEDICLE SCREW FUSION WITH IGS performed by Mag Ma MD at [address] [facility]       Family History   Problem Relation Age of Onset    Stroke Mother     Cancer Father     Diabetes Sister     High Blood Pressure Sister     High Blood Pressure

## 2023-10-02 ENCOUNTER — TELEPHONE (OUTPATIENT)
Dept: SPORTS MEDICINE | Age: 63
End: 2023-10-02

## 2023-10-02 ENCOUNTER — TELEPHONE (OUTPATIENT)
Dept: PRIMARY CARE | Facility: CLINIC | Age: 63
End: 2023-10-02

## 2023-10-02 ENCOUNTER — OFFICE VISIT (OUTPATIENT)
Dept: PRIMARY CARE | Facility: CLINIC | Age: 63
End: 2023-10-02
Payer: MEDICARE

## 2023-10-02 VITALS
HEART RATE: 86 BPM | TEMPERATURE: 97.2 F | BODY MASS INDEX: 33.66 KG/M2 | OXYGEN SATURATION: 96 % | SYSTOLIC BLOOD PRESSURE: 130 MMHG | DIASTOLIC BLOOD PRESSURE: 80 MMHG | WEIGHT: 202 LBS | HEIGHT: 65 IN | RESPIRATION RATE: 14 BRPM

## 2023-10-02 DIAGNOSIS — M54.31 SCIATICA OF RIGHT SIDE: Primary | ICD-10-CM

## 2023-10-02 DIAGNOSIS — J40 BRONCHITIS: ICD-10-CM

## 2023-10-02 DIAGNOSIS — I10 BENIGN ESSENTIAL HYPERTENSION: ICD-10-CM

## 2023-10-02 DIAGNOSIS — Z23 ENCOUNTER FOR VACCINATION: ICD-10-CM

## 2023-10-02 DIAGNOSIS — R55 SYNCOPE, UNSPECIFIED SYNCOPE TYPE: ICD-10-CM

## 2023-10-02 DIAGNOSIS — J40 BRONCHITIS: Primary | ICD-10-CM

## 2023-10-02 PROCEDURE — 3079F DIAST BP 80-89 MM HG: CPT | Performed by: INTERNAL MEDICINE

## 2023-10-02 PROCEDURE — 99214 OFFICE O/P EST MOD 30 MIN: CPT | Performed by: INTERNAL MEDICINE

## 2023-10-02 PROCEDURE — 1036F TOBACCO NON-USER: CPT | Performed by: INTERNAL MEDICINE

## 2023-10-02 PROCEDURE — G0008 ADMIN INFLUENZA VIRUS VAC: HCPCS | Performed by: INTERNAL MEDICINE

## 2023-10-02 PROCEDURE — 3075F SYST BP GE 130 - 139MM HG: CPT | Performed by: INTERNAL MEDICINE

## 2023-10-02 PROCEDURE — 90686 IIV4 VACC NO PRSV 0.5 ML IM: CPT | Performed by: INTERNAL MEDICINE

## 2023-10-02 RX ORDER — METHYLPREDNISOLONE 4 MG/1
TABLET ORAL
Qty: 21 TABLET | Refills: 0 | Status: SHIPPED | OUTPATIENT
Start: 2023-10-02 | End: 2023-10-09

## 2023-10-02 RX ORDER — DOXYCYCLINE 100 MG/1
100 CAPSULE ORAL 2 TIMES DAILY
Qty: 20 CAPSULE | Refills: 0 | Status: SHIPPED | OUTPATIENT
Start: 2023-10-02 | End: 2023-10-12

## 2023-10-02 RX ORDER — ALBUTEROL SULFATE 90 UG/1
2 AEROSOL, METERED RESPIRATORY (INHALATION) EVERY 4 HOURS PRN
Qty: 8.5 G | Refills: 0 | Status: SHIPPED | OUTPATIENT
Start: 2023-10-02 | End: 2024-10-01

## 2023-10-02 RX ORDER — TIZANIDINE 4 MG/1
4 TABLET ORAL 3 TIMES DAILY PRN
Qty: 30 TABLET | Refills: 1 | Status: SHIPPED | OUTPATIENT
Start: 2023-10-02

## 2023-10-02 RX ORDER — AZITHROMYCIN 250 MG/1
TABLET, FILM COATED ORAL
Qty: 6 TABLET | Refills: 0 | Status: SHIPPED | OUTPATIENT
Start: 2023-10-02 | End: 2023-10-02

## 2023-10-02 RX ORDER — LOSARTAN POTASSIUM AND HYDROCHLOROTHIAZIDE 12.5; 5 MG/1; MG/1
1 TABLET ORAL DAILY
Qty: 90 TABLET | Refills: 3 | Status: SHIPPED | OUTPATIENT
Start: 2023-10-02 | End: 2024-10-01

## 2023-10-02 NOTE — PROGRESS NOTES
"Subjective    Sofi Steve is a 63 y.o. female who presents for Back Pain and Hypertension.  HPI  6 month follow up htn  Denies sob, chest pain, dizziness, no leg swelling   Reports low back pain, right sided, radiates into buttocks and down into right leg x 2 days ago. She was standing up and bent over immediate pain in her left buttock and numbness down her right leg to  behind her knee. Has not been able to sleep  C/o cough x 1 week. Taking Robitussin nightly x 1 week  Has been sick for a week. Cough. She has colored phegm.  She has lot so sinus drainage.      Would like flu vaccine  Review of Systems   All other systems reviewed and are negative.        Objective     /80 (BP Location: Left arm, Patient Position: Sitting, BP Cuff Size: Adult)   Pulse 86   Temp 36.2 °C (97.2 °F) (Skin)   Resp 14   Ht 1.651 m (5' 5\")   Wt 91.6 kg (202 lb)   SpO2 96%   BMI 33.61 kg/m²    Physical Exam  Vitals reviewed.   Constitutional:       General: She is not in acute distress.     Appearance: Normal appearance.   Cardiovascular:      Rate and Rhythm: Normal rate and regular rhythm.      Pulses: Normal pulses.      Heart sounds: Normal heart sounds.   Pulmonary:      Effort: Pulmonary effort is normal.      Breath sounds: Wheezing present.      Comments: Wheeze with cough  Abdominal:      Tenderness: There is no abdominal tenderness.   Musculoskeletal:         General: No swelling.      Lumbar back: Tenderness present. Negative right straight leg raise test and negative left straight leg raise test.      Comments: Tenderness right lower back into buttocks.   Skin:     General: Skin is warm and dry.   Neurological:      Mental Status: She is alert.       Health Maintenance Due   Topic Date Due    HIV Screening  Never done    MMR Vaccines (1 of 1 - Standard series) Never done    Diabetes Screening  Never done    Cervical Cancer Screening  04/29/2016    COVID-19 Vaccine (5 - Mixed Product series) 01/23/2023    " Influenza Vaccine (1) 09/01/2023          Assessment/Plan   Problem List Items Addressed This Visit       Benign essential hypertension    Relevant Medications    losartan-hydrochlorothiazide (Hyzaar) 50-12.5 mg tablet    Syncope     Other Visit Diagnoses       Sciatica of right side    -  Primary    Relevant Medications    methylPREDNISolone (Medrol Dospak) 4 mg tablets    tiZANidine (Zanaflex) 4 mg tablet    Encounter for vaccination        Relevant Orders    Flu vaccine (IIV4) age 6 months and greater, preservative free    Bronchitis        Relevant Medications    azithromycin (Zithromax) 250 mg tablet    albuterol (ProAir HFA) 90 mcg/actuation inhaler        Rest, heating pad for 15  minutes at a time  Call  with any problems or questions.   Increase fluids and rest. Call if sx worse or not improving. Follow up as needed  Follow up in 6 months.

## 2023-10-02 NOTE — TELEPHONE ENCOUNTER
[facility] [phone] left vm reporting interaction w/Z-pack and Ziprazidone.  Prolonged QT interval.

## 2023-10-02 NOTE — TELEPHONE ENCOUNTER
Patient states that she is still having pain, she is wondering if Dr Clint North can prescribe her something for pain? She is scheduled for her MRI 10/30 and then to f/u in the office.

## 2023-10-03 NOTE — TELEPHONE ENCOUNTER
DO Eliceo Roman, [facility]  Caller: Unspecified (Yesterday,  4:05 PM)  Switched to doxy

## 2023-10-12 ENCOUNTER — APPOINTMENT (OUTPATIENT)
Dept: NEUROLOGY | Facility: CLINIC | Age: 63
End: 2023-10-12
Payer: MEDICARE

## 2023-10-16 ENCOUNTER — APPOINTMENT (OUTPATIENT)
Dept: PRIMARY CARE | Facility: CLINIC | Age: 63
End: 2023-10-16
Payer: MEDICARE

## 2023-10-20 ENCOUNTER — OFFICE VISIT (OUTPATIENT)
Dept: PRIMARY CARE | Facility: CLINIC | Age: 63
End: 2023-10-20
Payer: MEDICARE

## 2023-10-20 VITALS
TEMPERATURE: 97.9 F | SYSTOLIC BLOOD PRESSURE: 124 MMHG | OXYGEN SATURATION: 94 % | BODY MASS INDEX: 32.62 KG/M2 | WEIGHT: 196 LBS | DIASTOLIC BLOOD PRESSURE: 78 MMHG
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
## 2023-10-20 PROCEDURE — 3078F DIAST BP <80 MM HG: CPT | Performed by: NURSE PRACTITIONER

## 2023-10-20 PROCEDURE — 99213 OFFICE O/P EST LOW 20 MIN: CPT | Performed by: NURSE PRACTITIONER

## 2023-10-20 PROCEDURE — 1036F TOBACCO NON-USER: CPT | Performed by: NURSE PRACTITIONER

## 2023-10-20 PROCEDURE — 3074F SYST BP LT 130 MM HG: CPT | Performed by: NURSE PRACTITIONER

## 2023-10-20 RX ORDER — OMEPRAZOLE 20 MG/1
20 TABLET, DELAYED RELEASE ORAL
COMMUNITY
End: 2023-11-28 | Stop reason: WASHOUT

## 2023-10-20 RX ORDER — PREDNISONE 20 MG/1
TABLET ORAL
Qty: 18 TABLET | Refills: 0 | Status: SHIPPED | OUTPATIENT
Start: 2023-10-20 | End: 2023-11-28 | Stop reason: WASHOUT

## 2023-10-20 ASSESSMENT — ENCOUNTER SYMPTOMS
GASTROINTESTINAL NEGATIVE: 1
CARDIOVASCULAR NEGATIVE: 1
RESPIRATORY NEGATIVE: 1
CONSTITUTIONAL NEGATIVE: 1
MUSCULOSKELETAL NEGATIVE: 1

## 2023-10-20 NOTE — PROGRESS NOTES
Subjective   Patient ID: Sofi Steve is a 63 y.o. female who presents for Insect Bite.    Patient was in [place] for a wedding. The wedding was outside on 10/13 and on 10/16, she developed bug bites on her arms and legs. She says that the she has tried calamine lotion and benadryl for the itch but it did not really help. Patient's  does not have the rash. Patient's friend at the wedding does have the rash. No fevers, chills. Pt is feeling well overall.     Review of Systems   Constitutional: Negative.    HENT: Negative.     Respiratory: Negative.     Cardiovascular: Negative.    Gastrointestinal: Negative.    Musculoskeletal: Negative.    Skin:  Positive for rash.     Visit Vitals  /78   Temp 36.6 °C (97.9 °F) (Temporal)   Wt 88.9 kg (196 lb)   SpO2 94%   BMI 32.62 kg/m²   Smoking Status Never   BSA 2.02 m²        Physical Exam  Vitals reviewed.   Constitutional:       General: She is not in acute distress.     Appearance: Normal appearance. She is not ill-appearing.   HENT:      Head: Atraumatic.   Cardiovascular:      Rate and Rhythm: Normal rate and regular rhythm.      Heart sounds: Normal heart sounds. No murmur heard.  Pulmonary:      Effort: Pulmonary effort is normal.      Breath sounds: Normal breath sounds. No wheezing.   Musculoskeletal:         General: Normal range of motion.   Skin:     General: Skin is warm and dry.      Findings: Rash present.      Comments: Several small erythematous insect bites scattered on the legs and arms. They are blanchable. There is no discharge/drainage, surrounding redness or s/s of infection   Neurological:      Mental Status: She is alert.   Psychiatric:         Mood and Affect: Mood normal.         Behavior: Behavior normal.     Assessment/Plan   Problem List Items Addressed This Visit    None  Visit Diagnoses         Codes    Multiple insect bites    -  Primary W57.XXXA    Relevant Medications    predniSONE (Deltasone) 20 mg tablet        Patient  started on prednisone at this time. She was reminded to avoid other anti-inflammatories while on the steroids; she agreed (she doesn't take them anyway). Discussed that patient is to go to the ER for any worsening rash, redness, s/s of infection or new/concerning symptoms; she agreed. Patient is to follow up in 2-3 days if no better despite the use of the medication.

## 2023-10-24 ENCOUNTER — OFFICE VISIT (OUTPATIENT)
Dept: PRIMARY CARE | Facility: CLINIC | Age: 63
End: 2023-10-24
Payer: MEDICARE

## 2023-10-24 ENCOUNTER — TELEPHONE (OUTPATIENT)
Dept: PRIMARY CARE | Facility: CLINIC | Age: 63
End: 2023-10-24

## 2023-10-24 VITALS
OXYGEN SATURATION: 96 % | DIASTOLIC BLOOD PRESSURE: 70 MMHG | TEMPERATURE: 97.5 F | HEIGHT: 65 IN | RESPIRATION RATE: 14 BRPM | BODY MASS INDEX: 32.65 KG/M2 | WEIGHT: 196 LBS | HEART RATE: 74 BPM | SYSTOLIC BLOOD PRESSURE: 128 MMHG

## 2023-10-24 DIAGNOSIS — R21 RASH: ICD-10-CM

## 2023-10-24 DIAGNOSIS — R30.0 DYSURIA: Primary | ICD-10-CM

## 2023-10-24 DIAGNOSIS — Z87.442 HISTORY OF KIDNEY STONES: ICD-10-CM

## 2023-10-24 DIAGNOSIS — R10.9 FLANK PAIN: ICD-10-CM

## 2023-10-24 DIAGNOSIS — R31.9 HEMATURIA, UNSPECIFIED TYPE: ICD-10-CM

## 2023-10-24 LAB
POC APPEARANCE, URINE: ABNORMAL
POC BILIRUBIN, URINE: NEGATIVE
POC BLOOD, URINE: ABNORMAL
POC COLOR, URINE: ABNORMAL
POC GLUCOSE, URINE: NEGATIVE MG/DL
POC KETONES, URINE: NEGATIVE MG/DL
POC LEUKOCYTES, URINE: ABNORMAL
POC NITRITE,URINE: NEGATIVE
POC PH, URINE: 7 PH
POC PROTEIN, URINE: NEGATIVE MG/DL
POC SPECIFIC GRAVITY, URINE: 1.01
POC UROBILINOGEN, URINE: 0.2 EU/DL

## 2023-10-24 PROCEDURE — 87086 URINE CULTURE/COLONY COUNT: CPT

## 2023-10-24 PROCEDURE — 99214 OFFICE O/P EST MOD 30 MIN: CPT | Performed by: INTERNAL MEDICINE

## 2023-10-24 PROCEDURE — 3078F DIAST BP <80 MM HG: CPT | Performed by: INTERNAL MEDICINE

## 2023-10-24 PROCEDURE — 1036F TOBACCO NON-USER: CPT | Performed by: INTERNAL MEDICINE

## 2023-10-24 PROCEDURE — 3074F SYST BP LT 130 MM HG: CPT | Performed by: INTERNAL MEDICINE

## 2023-10-24 PROCEDURE — 81002 URINALYSIS NONAUTO W/O SCOPE: CPT | Performed by: INTERNAL MEDICINE

## 2023-10-24 RX ORDER — SULFAMETHOXAZOLE AND TRIMETHOPRIM 800; 160 MG/1; MG/1
1 TABLET ORAL 2 TIMES DAILY
Qty: 14 TABLET | Refills: 0 | Status: SHIPPED | OUTPATIENT
Start: 2023-10-24 | End: 2023-10-31

## 2023-10-24 RX ORDER — BETAMETHASONE DIPROPIONATE 0.5 MG/G
CREAM TOPICAL 2 TIMES DAILY PRN
Qty: 45 G | Refills: 0 | Status: SHIPPED | OUTPATIENT
Start: 2023-10-24 | End: 2024-02-21

## 2023-10-24 NOTE — PROGRESS NOTES
"Subjective    Sofi Steve is a 63 y.o. female who presents for Blood in Urine.  HPI  Sx started about a week ago. days ago. Hematuria, frequency, urgency, low back abd, and lower right quadrant pain. Chills no fever it is gross hematuria.     She is fatigued.  Loose stool that is chronic  Was in [place] recently. Rash on both legs, arms. Red spots, itchy. Went to [facility] and was prescribed prednisone. Has one day left. There steroid cream has helped     Requesting referral for drooping eyelids. Having trouble seeing out of corner of eyes    Review of Systems   All other systems reviewed and are negative.          Objective     /70 (BP Location: Left arm, Patient Position: Sitting, BP Cuff Size: Large adult)   Pulse 74   Temp 36.4 °C (97.5 °F) (Skin)   Resp 14   Ht 1.651 m (5' 5\")   Wt 88.9 kg (196 lb)   SpO2 96%   BMI 32.62 kg/m²    Physical Exam  Vitals reviewed.   Constitutional:       General: She is not in acute distress.     Appearance: Normal appearance.   Cardiovascular:      Rate and Rhythm: Normal rate and regular rhythm.      Pulses: Normal pulses.      Heart sounds: Normal heart sounds.   Pulmonary:      Effort: Pulmonary effort is normal.      Breath sounds: Normal breath sounds.   Abdominal:      General: Abdomen is flat.      Palpations: Abdomen is soft.      Tenderness: There is abdominal tenderness.      Comments: Mild right cva tenderness  Right lower quadrant tenderness. No rebounds.   Musculoskeletal:         General: No swelling.   Skin:     General: Skin is warm and dry.   Neurological:      Mental Status: She is alert.       Health Maintenance Due   Topic Date Due    HIV Screening  Never done    MMR Vaccines (1 of 1 - Standard series) Never done    Diabetes Screening  Never done    Cervical Cancer Screening  04/29/2016    COVID-19 Vaccine (5 - Mixed Product series) 01/23/2023          Assessment/Plan   Problem List Items Addressed This Visit       Dysuria - Primary    " Relevant Medications    sulfamethoxazole-trimethoprim (Bactrim DS) 800-160 mg tablet    Hematuria    Relevant Orders    POCT UA (nonautomated) manually resulted (Completed)    CT abdomen pelvis wo IV contrast     Other Visit Diagnoses       Rash        Relevant Medications    betamethasone dipropionate 0.05 % cream    Flank pain        Relevant Orders    CT abdomen pelvis wo IV contrast    History of kidney stones            Increase fluids take abx with food   Check stat ct with right flank pain and hx of kidney stones.   To er for fever or worsening pain.   Call  with any problems or questions.   Follow up depending on results.

## 2023-10-25 ENCOUNTER — ANCILLARY PROCEDURE (OUTPATIENT)
Dept: RADIOLOGY | Facility: CLINIC | Age: 63
End: 2023-10-25
Payer: MEDICARE

## 2023-10-25 DIAGNOSIS — R31.9 HEMATURIA, UNSPECIFIED TYPE: ICD-10-CM

## 2023-10-25 DIAGNOSIS — R10.9 FLANK PAIN: ICD-10-CM

## 2023-10-25 LAB — BACTERIA UR CULT: NORMAL

## 2023-10-25 PROCEDURE — 74176 CT ABD & PELVIS W/O CONTRAST: CPT | Performed by: STUDENT IN AN ORGANIZED HEALTH CARE EDUCATION/TRAINING PROGRAM

## 2023-10-25 PROCEDURE — 74176 CT ABD & PELVIS W/O CONTRAST: CPT | Mod: ME

## 2023-10-26 ENCOUNTER — TELEPHONE (OUTPATIENT)
Dept: PRIMARY CARE | Facility: CLINIC | Age: 63
End: 2023-10-26

## 2023-10-26 ENCOUNTER — APPOINTMENT (OUTPATIENT)
Dept: PRIMARY CARE | Facility: CLINIC | Age: 63
End: 2023-10-26
Payer: MEDICARE

## 2023-10-26 DIAGNOSIS — R31.0 GROSS HEMATURIA: Primary | ICD-10-CM

## 2023-10-26 NOTE — TELEPHONE ENCOUNTER
----- Message from Shanel Man DO sent at 10/25/2023  9:42 AM EDT -----  Her ct is negative. She has a punctate stone in her left kidney but it is not causing any problems

## 2023-10-28 DIAGNOSIS — M47.812 CERVICAL SPONDYLOSIS WITHOUT MYELOPATHY: ICD-10-CM

## 2023-10-28 DIAGNOSIS — G89.4 CHRONIC PAIN SYNDROME: ICD-10-CM

## 2023-10-28 DIAGNOSIS — M47.817 LUMBOSACRAL SPONDYLOSIS WITHOUT MYELOPATHY: ICD-10-CM

## 2023-10-28 DIAGNOSIS — M25.511 PAIN IN JOINT OF RIGHT SHOULDER: ICD-10-CM

## 2023-10-28 DIAGNOSIS — M54.16 LUMBAR RADICULOPATHY: ICD-10-CM

## 2023-10-28 DIAGNOSIS — M48.062 SPINAL STENOSIS OF LUMBAR REGION WITH NEUROGENIC CLAUDICATION: ICD-10-CM

## 2023-10-28 DIAGNOSIS — M96.1 POST LAMINECTOMY SYNDROME: ICD-10-CM

## 2023-10-30 ENCOUNTER — LAB (OUTPATIENT)
Dept: LAB | Facility: LAB | Age: 63
End: 2023-10-30
Payer: MEDICARE

## 2023-10-30 ENCOUNTER — OFFICE VISIT (OUTPATIENT)
Dept: PRIMARY CARE | Facility: CLINIC | Age: 63
End: 2023-10-30
Payer: MEDICARE

## 2023-10-30 ENCOUNTER — HOSPITAL ENCOUNTER (OUTPATIENT)
Dept: MRI IMAGING | Age: 63
Discharge: HOME | End: 2023-11-01
Payer: MEDICARE

## 2023-10-30 VITALS
HEART RATE: 72 BPM | RESPIRATION RATE: 18 BRPM | BODY MASS INDEX: 33.28 KG/M2 | TEMPERATURE: 97.1 F | WEIGHT: 200 LBS | OXYGEN SATURATION: 98 % | DIASTOLIC BLOOD PRESSURE: 84 MMHG | SYSTOLIC BLOOD PRESSURE: 128 MMHG

## 2023-10-30 DIAGNOSIS — R31.0 GROSS HEMATURIA: ICD-10-CM

## 2023-10-30 DIAGNOSIS — R30.0 DYSURIA: ICD-10-CM

## 2023-10-30 DIAGNOSIS — R10.31 RIGHT LOWER QUADRANT ABDOMINAL PAIN: Primary | ICD-10-CM

## 2023-10-30 DIAGNOSIS — M75.111 NONTRAUMATIC INCOMPLETE TEAR OF RIGHT ROTATOR CUFF: ICD-10-CM

## 2023-10-30 LAB
ALBUMIN SERPL BCP-MCNC: 4.4 G/DL (ref 3.4–5)
ALP SERPL-CCNC: 51 U/L (ref 33–136)
ALT SERPL W P-5'-P-CCNC: 16 U/L (ref 7–45)
ANION GAP SERPL CALC-SCNC: 15 MMOL/L (ref 10–20)
AST SERPL W P-5'-P-CCNC: 15 U/L (ref 9–39)
BILIRUB SERPL-MCNC: 0.8 MG/DL (ref 0–1.2)
BUN SERPL-MCNC: 14 MG/DL (ref 6–23)
CALCIUM SERPL-MCNC: 9.7 MG/DL (ref 8.6–10.3)
CHLORIDE SERPL-SCNC: 100 MMOL/L (ref 98–107)
CO2 SERPL-SCNC: 32 MMOL/L (ref 21–32)
CREAT SERPL-MCNC: 0.9 MG/DL (ref 0.5–1.05)
ERYTHROCYTE [DISTWIDTH] IN BLOOD BY AUTOMATED COUNT: 13.6 % (ref 11.5–14.5)
GFR SERPL CREATININE-BSD FRML MDRD: 72 ML/MIN/1.73M*2
GLUCOSE SERPL-MCNC: 96 MG/DL (ref 74–99)
HCT VFR BLD AUTO: 43.6 % (ref 36–46)
HGB BLD-MCNC: 14.4 G/DL (ref 12–16)
MCH RBC QN AUTO: 32.1 PG (ref 26–34)
MCHC RBC AUTO-ENTMCNC: 33 G/DL (ref 32–36)
MCV RBC AUTO: 97 FL (ref 80–100)
NRBC BLD-RTO: 0 /100 WBCS (ref 0–0)
PLATELET # BLD AUTO: 262 X10*3/UL (ref 150–450)
PMV BLD AUTO: 9.2 FL (ref 7.5–11.5)
POTASSIUM SERPL-SCNC: 3.7 MMOL/L (ref 3.5–5.3)
PROT SERPL-MCNC: 6.7 G/DL (ref 6.4–8.2)
RBC # BLD AUTO: 4.49 X10*6/UL (ref 4–5.2)
SODIUM SERPL-SCNC: 143 MMOL/L (ref 136–145)
WBC # BLD AUTO: 9.3 X10*3/UL (ref 4.4–11.3)

## 2023-10-30 PROCEDURE — 80053 COMPREHEN METABOLIC PANEL: CPT

## 2023-10-30 PROCEDURE — 1036F TOBACCO NON-USER: CPT | Performed by: INTERNAL MEDICINE

## 2023-10-30 PROCEDURE — 36415 COLL VENOUS BLD VENIPUNCTURE: CPT

## 2023-10-30 PROCEDURE — 3074F SYST BP LT 130 MM HG: CPT | Performed by: INTERNAL MEDICINE

## 2023-10-30 PROCEDURE — 3079F DIAST BP 80-89 MM HG: CPT | Performed by: INTERNAL MEDICINE

## 2023-10-30 PROCEDURE — 73221 MRI JOINT UPR EXTREM W/O DYE: CPT

## 2023-10-30 PROCEDURE — 99214 OFFICE O/P EST MOD 30 MIN: CPT | Performed by: INTERNAL MEDICINE

## 2023-10-30 PROCEDURE — 85027 COMPLETE CBC AUTOMATED: CPT

## 2023-10-30 RX ORDER — PHENAZOPYRIDINE HYDROCHLORIDE 200 MG/1
200 TABLET, FILM COATED ORAL 3 TIMES DAILY PRN
Qty: 6 TABLET | Refills: 0 | Status: SHIPPED | OUTPATIENT
Start: 2023-10-30 | End: 2023-11-01

## 2023-10-30 RX ORDER — GABAPENTIN 400 MG/1
400 CAPSULE ORAL 4 TIMES DAILY
Qty: 360 CAPSULE | Refills: 3 | OUTPATIENT
Start: 2023-10-30

## 2023-10-30 ASSESSMENT — ENCOUNTER SYMPTOMS: BACK PAIN: 1

## 2023-10-30 NOTE — PROGRESS NOTES
Subjective   Patient ID: Sofi Steve is a 63 y.o. female who presents for Back Pain.    She has improvement of sx. She is still on abx.  She continues to have some right sided/supra pubic  abd pain. It is not as sharp it has eased some  She still has blood in her urine it is also improved.    Back Pain  This is a recurrent problem. The current episode started 1 to 4 weeks ago. The problem occurs constantly. The problem is unchanged. The pain is present in the lumbar spine (right side). The quality of the pain is described as aching. The pain does not radiate. The pain is at a severity of 7/10. The pain is moderate. The pain is Worse during the night. The symptoms are aggravated by lying down. She has tried heat for the symptoms. The treatment provided mild relief.        Review of Systems   Musculoskeletal:  Positive for back pain.   All other systems reviewed and are negative.      Objective   /84   Pulse 72   Temp 36.2 °C (97.1 °F) (Temporal)   Resp 18   Wt 90.7 kg (200 lb)   SpO2 98%   BMI 33.28 kg/m²     Physical Exam  Vitals reviewed.   Constitutional:       General: She is not in acute distress.     Appearance: Normal appearance.   Cardiovascular:      Rate and Rhythm: Normal rate and regular rhythm.      Pulses: Normal pulses.      Heart sounds: Normal heart sounds.   Pulmonary:      Effort: Pulmonary effort is normal.      Breath sounds: Normal breath sounds.   Abdominal:      General: Abdomen is flat.      Palpations: Abdomen is soft.      Tenderness: There is abdominal tenderness.      Comments: Mild suprapubic right sided lower abd pain.    Musculoskeletal:         General: No swelling.   Skin:     General: Skin is warm and dry.   Neurological:      Mental Status: She is alert.         Assessment/Plan   Problem List Items Addressed This Visit             ICD-10-CM    Dysuria R30.0    Relevant Medications    phenazopyridine (Pyridium) 200 mg tablet    Hematuria R31.9    Relevant Orders     CBC    Comprehensive Metabolic Panel     Other Visit Diagnoses         Codes    Right lower quadrant abdominal pain    -  Primary R10.31          She still has right lower quadrant pain. It is mild. She is seeing urology on Wednesday.   Take abx with food.  She will call  if her abd pain worsens. She has negative ct. Has hx of appendectomy and pain /blood with urination  Will order pyridium   Turkmen out abx.  Call  with any problems or questions.   Follow up next week if still with pain  Call if sx worsen.

## 2023-10-31 ENCOUNTER — TELEPHONE (OUTPATIENT)
Dept: PRIMARY CARE | Facility: CLINIC | Age: 63
End: 2023-10-31
Payer: MEDICARE

## 2023-11-01 ENCOUNTER — LAB (OUTPATIENT)
Dept: LAB | Facility: LAB | Age: 63
End: 2023-11-01
Payer: MEDICARE

## 2023-11-01 ENCOUNTER — OFFICE VISIT (OUTPATIENT)
Dept: UROLOGY | Facility: CLINIC | Age: 63
End: 2023-11-01
Payer: MEDICARE

## 2023-11-01 VITALS
TEMPERATURE: 96.6 F | BODY MASS INDEX: 32.99 KG/M2 | WEIGHT: 198 LBS | HEART RATE: 86 BPM | SYSTOLIC BLOOD PRESSURE: 122 MMHG | DIASTOLIC BLOOD PRESSURE: 86 MMHG | HEIGHT: 65 IN

## 2023-11-01 DIAGNOSIS — R10.9 ACUTE RIGHT FLANK PAIN: ICD-10-CM

## 2023-11-01 DIAGNOSIS — R31.0 GROSS HEMATURIA: Primary | ICD-10-CM

## 2023-11-01 DIAGNOSIS — R31.0 GROSS HEMATURIA: ICD-10-CM

## 2023-11-01 DIAGNOSIS — R35.0 FREQUENCY OF URINATION: ICD-10-CM

## 2023-11-01 DIAGNOSIS — R10.2 PELVIC PRESSURE IN FEMALE: Primary | ICD-10-CM

## 2023-11-01 LAB
ANION GAP SERPL CALC-SCNC: 12 MMOL/L (ref 10–20)
BUN SERPL-MCNC: 15 MG/DL (ref 6–23)
CALCIUM SERPL-MCNC: 9.7 MG/DL (ref 8.6–10.3)
CHLORIDE SERPL-SCNC: 100 MMOL/L (ref 98–107)
CO2 SERPL-SCNC: 31 MMOL/L (ref 21–32)
CREAT SERPL-MCNC: 0.94 MG/DL (ref 0.5–1.05)
GFR SERPL CREATININE-BSD FRML MDRD: 68 ML/MIN/1.73M*2
GLUCOSE SERPL-MCNC: 101 MG/DL (ref 74–99)
POC APPEARANCE, URINE: CLEAR
POC BILIRUBIN, URINE: NEGATIVE
POC BLOOD, URINE: ABNORMAL
POC COLOR, URINE: YELLOW
POC GLUCOSE, URINE: NEGATIVE MG/DL
POC KETONES, URINE: NEGATIVE MG/DL
POC LEUKOCYTES, URINE: ABNORMAL
POC NITRITE,URINE: NEGATIVE
POC PH, URINE: 7 PH
POC PROTEIN, URINE: NEGATIVE MG/DL
POC SPECIFIC GRAVITY, URINE: 1.01
POC UROBILINOGEN, URINE: 0.2 EU/DL
POTASSIUM SERPL-SCNC: 3.9 MMOL/L (ref 3.5–5.3)
SODIUM SERPL-SCNC: 139 MMOL/L (ref 136–145)

## 2023-11-01 PROCEDURE — 3079F DIAST BP 80-89 MM HG: CPT | Performed by: NURSE PRACTITIONER

## 2023-11-01 PROCEDURE — 3074F SYST BP LT 130 MM HG: CPT | Performed by: NURSE PRACTITIONER

## 2023-11-01 PROCEDURE — 80048 BASIC METABOLIC PNL TOTAL CA: CPT

## 2023-11-01 PROCEDURE — 99204 OFFICE O/P NEW MOD 45 MIN: CPT | Performed by: NURSE PRACTITIONER

## 2023-11-01 PROCEDURE — 51798 US URINE CAPACITY MEASURE: CPT | Performed by: NURSE PRACTITIONER

## 2023-11-01 PROCEDURE — 81003 URINALYSIS AUTO W/O SCOPE: CPT | Performed by: NURSE PRACTITIONER

## 2023-11-01 PROCEDURE — 1036F TOBACCO NON-USER: CPT | Performed by: NURSE PRACTITIONER

## 2023-11-01 PROCEDURE — 36415 COLL VENOUS BLD VENIPUNCTURE: CPT

## 2023-11-01 RX ORDER — OXYBUTYNIN CHLORIDE 5 MG/1
5 TABLET, EXTENDED RELEASE ORAL DAILY
Qty: 30 TABLET | Refills: 11 | Status: SHIPPED | OUTPATIENT
Start: 2023-11-01 | End: 2024-10-31

## 2023-11-01 NOTE — PROGRESS NOTES
"11/01/23   [id number]    Gross hematuria, Right flank pain  Kindly referred by Dr. Shanel Man.      Subjective      HPI Sofi Steve is a 63 y.o. female who presents for gross hematuria, R flank pain past 2 weeks this time; She has experienced these symptoms intermittently over past couple years but before always had UTI simultaneously; Per patient, this is different type of pain; Recently started leaking urine and now seeing blood on pad; Hysterectomy 1983 d/t bleeding;    CT abd/pel wo IV contrast Left punctate renal mid pole stone, no hydro; no acute abnormality;    Creatinine 0.90    Urine culture 10/24 negative    UA neg today PVR 0 cc    Objective     /86   Pulse 86   Temp 35.9 °C (96.6 °F)   Ht 1.651 m (5' 5\")   Wt 89.8 kg (198 lb)   BMI 32.95 kg/m²    Physical Exam  Genitourinary:     General: Normal vulva.      Comments: Vulva normal, small cystocele anterior wall, min to small rectocele, s/p hysterectomy; Neg levator ani tenderness; bladder tender w palpation; Neg CST lying down;      General: Appears comfortable and in no apparent distress, well nourished  Head: Normocephalic, atraumatic  Neck: trachea midline  Respiratory: respirations unlabored, no wheezes, and no use of accessory muscles  Cardiovascular: at rest no dyspnea, well perfused  Skin: no visible rashes or lesions  Neurologic: grossly intact, oriented to person, place, and time  Psychiatric: mood and affect appropriate  Musculoskeletal: in chair for appt. no difficulty w upper body movement      Assessment/Plan   Problem List Items Addressed This Visit          Genitourinary and Reproductive    Hematuria    Relevant Orders    POCT UA Automated manually resulted (Completed)    Non-gynecologic cytology    CT urography w 3D volume rendered imaging    Basic metabolic panel    Urine culture     Other Visit Diagnoses       Pelvic pressure in female    -  Primary    Relevant Medications    oxybutynin XL (Ditropan XL) 5 mg 24 hr " tablet    Other Relevant Orders    Post-Void Residual (Completed)    CT urography w 3D volume rendered imaging    Frequency of urination        Relevant Medications    oxybutynin XL (Ditropan XL) 5 mg 24 hr tablet    Other Relevant Orders    Post-Void Residual (Completed)    Acute right flank pain        Relevant Orders    CT urography w 3D volume rendered imaging    Basic metabolic panel    Urine culture          Orders Placed This Encounter   Procedures    Post-Void Residual    Urine culture     Standing Status:   Future     Standing Expiration Date:   11/8/2023     Order Specific Question:   Release result to [facility]     Answer:   Immediate [1]    CT urography w 3D volume rendered imaging     Standing Status:   Future     Standing Expiration Date:   11/1/2024     Order Specific Question:   PHE Acuity     Answer:   urgent     Order Specific Question:   Reason for exam:     Answer:   right flank pain, hematuria     Order Specific Question:   Did the patient have a similar exam previously at a location outside of ?     Answer:   No     Order Specific Question:   Radiologist to Determine Optimal Study     Answer:   Yes     Order Specific Question:   Release result to [facility]     Answer:   Immediate [1]     Order Specific Question:   Is this exam part of a Research Study? If Yes, link this order to the research study     Answer:   No    Basic metabolic panel     Standing Status:   Future     Number of Occurrences:   1     Standing Expiration Date:   11/1/2024     Order Specific Question:   Release result to [facility]     Answer:   Immediate [1]    POCT UA Automated manually resulted     Order Specific Question:   Release result to [facility]     Answer:   Immediate [1]      Plan:   Urine cytology today sent  CT Urogram, call [phone] to schedule  BMP one week prior if needed  Cystoscopy w Urologist  Urine culture 2 weeks prior to cystoscopy, drop off at lab    Oxybutynin ER 5 mg daily, discussed possible side effect  dry mouth or constipation  MiraLAX or Colace as needed  Nurse line [phone]         Naye Geronimo, APRN-CNP  Lab Results   Component Value Date    GLUCOSE 96 10/30/2023    CALCIUM 9.7 10/30/2023     10/30/2023    K 3.7 10/30/2023    CO2 32 10/30/2023     10/30/2023    BUN 14 10/30/2023    CREATININE 0.90 10/30/2023

## 2023-11-01 NOTE — PROGRESS NOTES
Please be advised, specimen container was dropped in transit to lab and broken, urine spilled out and we are unable to use. Please place another order for a cytology urine to be dropped off at the lab. Once order is placed, I will call the pt to let her know, thanks.

## 2023-11-02 ENCOUNTER — PHARMACY VISIT (OUTPATIENT)
Dept: PHARMACY | Facility: CLINIC | Age: 63
End: 2023-11-02
Payer: COMMERCIAL

## 2023-11-02 ENCOUNTER — SPECIALTY PHARMACY (OUTPATIENT)
Dept: PHARMACY | Facility: CLINIC | Age: 63
End: 2023-11-02

## 2023-11-02 PROCEDURE — RXMED WILLOW AMBULATORY MEDICATION CHARGE

## 2023-11-03 ENCOUNTER — SPECIALTY PHARMACY (OUTPATIENT)
Dept: PHARMACY | Facility: CLINIC | Age: 63
End: 2023-11-03

## 2023-11-03 ENCOUNTER — LAB (OUTPATIENT)
Dept: LAB | Facility: LAB | Age: 63
End: 2023-11-03
Payer: MEDICARE

## 2023-11-03 DIAGNOSIS — R31.0 GROSS HEMATURIA: ICD-10-CM

## 2023-11-03 DIAGNOSIS — R10.9 ACUTE RIGHT FLANK PAIN: ICD-10-CM

## 2023-11-03 PROCEDURE — 88112 CYTOPATH CELL ENHANCE TECH: CPT

## 2023-11-03 PROCEDURE — 87086 URINE CULTURE/COLONY COUNT: CPT

## 2023-11-04 LAB — BACTERIA UR CULT: NORMAL

## 2023-11-06 LAB
LABORATORY COMMENT REPORT: NORMAL
LABORATORY COMMENT REPORT: NORMAL
PATH REPORT.FINAL DX SPEC: NORMAL
PATH REPORT.GROSS SPEC: NORMAL
PATH REPORT.RELEVANT HX SPEC: NORMAL
PATH REPORT.TOTAL CANCER: NORMAL

## 2023-11-20 ENCOUNTER — ANCILLARY PROCEDURE (OUTPATIENT)
Dept: RADIOLOGY | Facility: CLINIC | Age: 63
End: 2023-11-20
Payer: MEDICARE

## 2023-11-20 DIAGNOSIS — R10.9 ACUTE RIGHT FLANK PAIN: ICD-10-CM

## 2023-11-20 DIAGNOSIS — R10.2 PELVIC PRESSURE IN FEMALE: ICD-10-CM

## 2023-11-20 DIAGNOSIS — R31.0 GROSS HEMATURIA: Primary | ICD-10-CM

## 2023-11-20 PROCEDURE — 2550000001 HC RX 255 CONTRASTS: Performed by: NURSE PRACTITIONER

## 2023-11-20 PROCEDURE — 76377 3D RENDER W/INTRP POSTPROCES: CPT

## 2023-11-20 PROCEDURE — 76376 3D RENDER W/INTRP POSTPROCES: CPT | Performed by: RADIOLOGY

## 2023-11-20 PROCEDURE — 74178 CT ABD&PLV WO CNTR FLWD CNTR: CPT | Performed by: RADIOLOGY

## 2023-11-20 RX ADMIN — IOHEXOL 75 ML: 350 INJECTION, SOLUTION INTRAVENOUS at 09:52

## 2023-11-21 ENCOUNTER — OFFICE VISIT (OUTPATIENT)
Dept: PAIN MANAGEMENT | Age: 63
End: 2023-11-21
Payer: MEDICARE

## 2023-11-21 VITALS
TEMPERATURE: 97.1 F | SYSTOLIC BLOOD PRESSURE: 128 MMHG | WEIGHT: 210 LBS | DIASTOLIC BLOOD PRESSURE: 70 MMHG | HEIGHT: 65 IN | BODY MASS INDEX: 34.99 KG/M2

## 2023-11-21 DIAGNOSIS — Z98.1 HISTORY OF LUMBAR FUSION: ICD-10-CM

## 2023-11-21 DIAGNOSIS — M48.062 SPINAL STENOSIS OF LUMBAR REGION WITH NEUROGENIC CLAUDICATION: ICD-10-CM

## 2023-11-21 DIAGNOSIS — M25.511 PAIN IN JOINT OF RIGHT SHOULDER: ICD-10-CM

## 2023-11-21 DIAGNOSIS — M96.1 POST LAMINECTOMY SYNDROME: ICD-10-CM

## 2023-11-21 DIAGNOSIS — G89.4 CHRONIC PAIN SYNDROME: ICD-10-CM

## 2023-11-21 DIAGNOSIS — M47.817 LUMBOSACRAL SPONDYLOSIS WITHOUT MYELOPATHY: ICD-10-CM

## 2023-11-21 DIAGNOSIS — M54.16 LUMBAR RADICULOPATHY: ICD-10-CM

## 2023-11-21 DIAGNOSIS — M47.812 CERVICAL SPONDYLOSIS WITHOUT MYELOPATHY: ICD-10-CM

## 2023-11-21 PROCEDURE — 99214 OFFICE O/P EST MOD 30 MIN: CPT | Performed by: NURSE PRACTITIONER

## 2023-11-21 PROCEDURE — G8417 CALC BMI ABV UP PARAM F/U: HCPCS | Performed by: NURSE PRACTITIONER

## 2023-11-21 PROCEDURE — G8427 DOCREV CUR MEDS BY ELIG CLIN: HCPCS | Performed by: NURSE PRACTITIONER

## 2023-11-21 PROCEDURE — 3017F COLORECTAL CA SCREEN DOC REV: CPT | Performed by: NURSE PRACTITIONER

## 2023-11-21 PROCEDURE — 3074F SYST BP LT 130 MM HG: CPT | Performed by: NURSE PRACTITIONER

## 2023-11-21 PROCEDURE — G8484 FLU IMMUNIZE NO ADMIN: HCPCS | Performed by: NURSE PRACTITIONER

## 2023-11-21 PROCEDURE — 3078F DIAST BP <80 MM HG: CPT | Performed by: NURSE PRACTITIONER

## 2023-11-21 PROCEDURE — 1036F TOBACCO NON-USER: CPT | Performed by: NURSE PRACTITIONER

## 2023-11-21 RX ORDER — TIZANIDINE 4 MG/1
4 TABLET ORAL 3 TIMES DAILY PRN
Qty: 90 TABLET | Refills: 0 | Status: SHIPPED | OUTPATIENT
Start: 2023-11-21 | End: 2024-02-19

## 2023-11-21 RX ORDER — GABAPENTIN 400 MG/1
400 CAPSULE ORAL 4 TIMES DAILY
Qty: 360 CAPSULE | Refills: 0 | Status: SHIPPED | OUTPATIENT
Start: 2023-11-21 | End: 2024-02-19

## 2023-11-21 ASSESSMENT — ENCOUNTER SYMPTOMS
BACK PAIN: 1
COUGH: 0
GASTROINTESTINAL NEGATIVE: 1
SHORTNESS OF BREATH: 0
DIARRHEA: 0
EYES NEGATIVE: 1
CONSTIPATION: 0
TROUBLE SWALLOWING: 0
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
## 2023-11-24 ENCOUNTER — LAB (OUTPATIENT)
Dept: LAB | Facility: LAB | Age: 63
End: 2023-11-24
Payer: MEDICARE

## 2023-11-24 DIAGNOSIS — R31.0 GROSS HEMATURIA: Primary | ICD-10-CM

## 2023-11-24 PROCEDURE — 88112 CYTOPATH CELL ENHANCE TECH: CPT

## 2023-11-28 ENCOUNTER — OFFICE VISIT (OUTPATIENT)
Dept: PRIMARY CARE | Facility: CLINIC | Age: 63
End: 2023-11-28
Payer: MEDICARE

## 2023-11-28 VITALS
WEIGHT: 196 LBS | OXYGEN SATURATION: 97 % | DIASTOLIC BLOOD PRESSURE: 88 MMHG | BODY MASS INDEX: 32.65 KG/M2 | HEART RATE: 72 BPM | TEMPERATURE: 96.8 F | HEIGHT: 65 IN | SYSTOLIC BLOOD PRESSURE: 120 MMHG | RESPIRATION RATE: 14 BRPM

## 2023-11-28 DIAGNOSIS — T84.498A LOOSENING OF HARDWARE IN SPINE (CMS-HCC): Primary | ICD-10-CM

## 2023-11-28 DIAGNOSIS — R19.7 DIARRHEA OF PRESUMED INFECTIOUS ORIGIN: ICD-10-CM

## 2023-11-28 PROCEDURE — 99214 OFFICE O/P EST MOD 30 MIN: CPT | Performed by: INTERNAL MEDICINE

## 2023-11-28 PROCEDURE — 3074F SYST BP LT 130 MM HG: CPT | Performed by: INTERNAL MEDICINE

## 2023-11-28 PROCEDURE — 1036F TOBACCO NON-USER: CPT | Performed by: INTERNAL MEDICINE

## 2023-11-28 PROCEDURE — 3079F DIAST BP 80-89 MM HG: CPT | Performed by: INTERNAL MEDICINE

## 2023-11-28 NOTE — PROGRESS NOTES
"Subjective    Sofi Steve is a 63 y.o. female who presents for Results.  HPI    Here to go over CT results done on 11/20/2023 through urologist. Told it was negative and that needs further work up for back  She had ct that showed possible loosening of hardware. She has pain left lower back that radiates to the front      C/o diarrhea for 5 days. No other sx. Taking imodium with little relief.. No new medications.   No blood in her stool. No abd pain. Wakes her up at night.   She has no hx of c.diff    Review of Systems   All other systems reviewed and are negative.        Objective     /88 (BP Location: Left arm, Patient Position: Sitting, BP Cuff Size: Adult)   Pulse 72   Temp 36 °C (96.8 °F) (Skin)   Resp 14   Ht 1.651 m (5' 5\")   Wt 88.9 kg (196 lb)   SpO2 97%   BMI 32.62 kg/m²    Physical Exam  Vitals reviewed.   Constitutional:       General: She is not in acute distress.     Appearance: Normal appearance.   Cardiovascular:      Rate and Rhythm: Normal rate and regular rhythm.      Pulses: Normal pulses.      Heart sounds: Normal heart sounds.   Pulmonary:      Effort: Pulmonary effort is normal.      Breath sounds: Normal breath sounds.   Abdominal:      General: Abdomen is flat.      Palpations: Abdomen is soft.      Tenderness: There is no abdominal tenderness.   Musculoskeletal:         General: Tenderness present. No swelling.      Comments: Lumbar tenderness and right sided paraspinal tenderness   Skin:     General: Skin is warm and dry.   Neurological:      Mental Status: She is alert.       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 difficile, PCR    CBC    Comprehensive Metabolic Panel    C-Reactive Protein    Sedimentation Rate    Stool Pathogen Panel, PCR        Her ct shows lucency suspicious for loosening and possible infection of her hardware.   Will check  Mri WITH DYE  She has appt with Dr. Palacios  She has had diarrhea for 5 days. Check  labs and stool studies.   Call  with any problems or questions.   Follow up in a week

## 2023-11-30 ENCOUNTER — LAB (OUTPATIENT)
Dept: LAB | Facility: LAB | Age: 63
End: 2023-11-30
Payer: MEDICARE

## 2023-11-30 DIAGNOSIS — T84.498A LOOSENING OF HARDWARE IN SPINE (CMS-HCC): ICD-10-CM

## 2023-11-30 DIAGNOSIS — R19.7 DIARRHEA OF PRESUMED INFECTIOUS ORIGIN: ICD-10-CM

## 2023-11-30 LAB
ALBUMIN SERPL BCP-MCNC: 4.2 G/DL (ref 3.4–5)
ALP SERPL-CCNC: 103 U/L (ref 33–136)
ALT SERPL W P-5'-P-CCNC: 27 U/L (ref 7–45)
ANION GAP SERPL CALC-SCNC: 11 MMOL/L (ref 10–20)
AST SERPL W P-5'-P-CCNC: 16 U/L (ref 9–39)
BILIRUB SERPL-MCNC: 0.3 MG/DL (ref 0–1.2)
BUN SERPL-MCNC: 19 MG/DL (ref 6–23)
C COLI+JEJ+UPSA DNA STL QL NAA+PROBE: NOT DETECTED
CALCIUM SERPL-MCNC: 9.3 MG/DL (ref 8.6–10.3)
CHLORIDE SERPL-SCNC: 106 MMOL/L (ref 98–107)
CO2 SERPL-SCNC: 30 MMOL/L (ref 21–32)
CREAT SERPL-MCNC: 0.89 MG/DL (ref 0.5–1.05)
CRP SERPL-MCNC: 0.52 MG/DL
EC STX1 GENE STL QL NAA+PROBE: NOT DETECTED
EC STX2 GENE STL QL NAA+PROBE: NOT DETECTED
ERYTHROCYTE [DISTWIDTH] IN BLOOD BY AUTOMATED COUNT: 12.8 % (ref 11.5–14.5)
ERYTHROCYTE [SEDIMENTATION RATE] IN BLOOD BY WESTERGREN METHOD: <1 MM/H (ref 0–30)
GFR SERPL CREATININE-BSD FRML MDRD: 73 ML/MIN/1.73M*2
GLUCOSE SERPL-MCNC: 147 MG/DL (ref 74–99)
HCT VFR BLD AUTO: 40.4 % (ref 36–46)
HGB BLD-MCNC: 13.4 G/DL (ref 12–16)
MCH RBC QN AUTO: 31.8 PG (ref 26–34)
MCHC RBC AUTO-ENTMCNC: 33.2 G/DL (ref 32–36)
MCV RBC AUTO: 96 FL (ref 80–100)
NOROVIRUS GI + GII RNA STL NAA+PROBE: DETECTED
NRBC BLD-RTO: 0 /100 WBCS (ref 0–0)
PLATELET # BLD AUTO: 243 X10*3/UL (ref 150–450)
POTASSIUM SERPL-SCNC: 3.6 MMOL/L (ref 3.5–5.3)
PROT SERPL-MCNC: 6.1 G/DL (ref 6.4–8.2)
RBC # BLD AUTO: 4.21 X10*6/UL (ref 4–5.2)
RV RNA STL NAA+PROBE: NOT DETECTED
SALMONELLA DNA STL QL NAA+PROBE: NOT DETECTED
SHIGELLA DNA SPEC QL NAA+PROBE: NOT DETECTED
SODIUM SERPL-SCNC: 143 MMOL/L (ref 136–145)
V CHOLERAE DNA STL QL NAA+PROBE: NOT DETECTED
WBC # BLD AUTO: 3.1 X10*3/UL (ref 4.4–11.3)
Y ENTEROCOL DNA STL QL NAA+PROBE: NOT DETECTED

## 2023-11-30 PROCEDURE — 36415 COLL VENOUS BLD VENIPUNCTURE: CPT

## 2023-11-30 PROCEDURE — 85027 COMPLETE CBC AUTOMATED: CPT

## 2023-11-30 PROCEDURE — 87506 IADNA-DNA/RNA PROBE TQ 6-11: CPT

## 2023-11-30 PROCEDURE — 80053 COMPREHEN METABOLIC PANEL: CPT

## 2023-11-30 PROCEDURE — 85652 RBC SED RATE AUTOMATED: CPT

## 2023-11-30 PROCEDURE — 86140 C-REACTIVE PROTEIN: CPT

## 2023-12-01 NOTE — RESULT ENCOUNTER NOTE
Spoke to pt regarding results.  Her sx are improving. Stay hydrated and call if sx not resolved by next week

## 2023-12-05 ENCOUNTER — APPOINTMENT (OUTPATIENT)
Dept: PRIMARY CARE | Facility: CLINIC | Age: 63
End: 2023-12-05
Payer: MEDICARE

## 2023-12-06 ENCOUNTER — SPECIALTY PHARMACY (OUTPATIENT)
Dept: PHARMACY | Facility: CLINIC | Age: 63
End: 2023-12-06

## 2023-12-06 ENCOUNTER — PROCEDURE VISIT (OUTPATIENT)
Dept: UROLOGY | Facility: CLINIC | Age: 63
End: 2023-12-06
Payer: MEDICARE

## 2023-12-06 VITALS
DIASTOLIC BLOOD PRESSURE: 95 MMHG | TEMPERATURE: 96.4 F | BODY MASS INDEX: 33.07 KG/M2 | WEIGHT: 198.7 LBS | HEART RATE: 59 BPM | SYSTOLIC BLOOD PRESSURE: 140 MMHG

## 2023-12-06 DIAGNOSIS — R82.90 ABNORMAL URINALYSIS: Primary | ICD-10-CM

## 2023-12-06 DIAGNOSIS — R31.29 OTHER MICROSCOPIC HEMATURIA: ICD-10-CM

## 2023-12-06 DIAGNOSIS — R31.9 HEMATURIA, UNSPECIFIED TYPE: ICD-10-CM

## 2023-12-06 LAB
BACTERIA #/AREA URNS AUTO: ABNORMAL /HPF
POC APPEARANCE, URINE: CLEAR
POC BILIRUBIN, URINE: NEGATIVE
POC BLOOD, URINE: ABNORMAL
POC COLOR, URINE: YELLOW
POC GLUCOSE, URINE: NEGATIVE MG/DL
POC KETONES, URINE: NEGATIVE MG/DL
POC LEUKOCYTES, URINE: ABNORMAL
POC NITRITE,URINE: POSITIVE
POC PH, URINE: 6.5 PH
POC PROTEIN, URINE: NEGATIVE MG/DL
POC SPECIFIC GRAVITY, URINE: 1.02
POC UROBILINOGEN, URINE: 0.2 EU/DL
RBC #/AREA URNS AUTO: ABNORMAL /HPF
RENAL EPI CELLS #/AREA UR COMP ASSIST: ABNORMAL /HPF
SQUAMOUS #/AREA URNS AUTO: ABNORMAL /HPF
WBC #/AREA URNS AUTO: >50 /HPF

## 2023-12-06 PROCEDURE — 52000 CYSTOURETHROSCOPY: CPT | Performed by: STUDENT IN AN ORGANIZED HEALTH CARE EDUCATION/TRAINING PROGRAM

## 2023-12-06 PROCEDURE — 81003 URINALYSIS AUTO W/O SCOPE: CPT | Performed by: STUDENT IN AN ORGANIZED HEALTH CARE EDUCATION/TRAINING PROGRAM

## 2023-12-06 PROCEDURE — 87086 URINE CULTURE/COLONY COUNT: CPT

## 2023-12-06 PROCEDURE — 81001 URINALYSIS AUTO W/SCOPE: CPT

## 2023-12-06 PROCEDURE — RXMED WILLOW AMBULATORY MEDICATION CHARGE

## 2023-12-06 PROCEDURE — 99203 OFFICE O/P NEW LOW 30 MIN: CPT | Performed by: STUDENT IN AN ORGANIZED HEALTH CARE EDUCATION/TRAINING PROGRAM

## 2023-12-06 PROCEDURE — 87186 SC STD MICRODIL/AGAR DIL: CPT

## 2023-12-06 NOTE — PROGRESS NOTES
Sofi presents for an evaluation.  The patient’s EMR has been reviewed.  Lives in [place], [place].  Previously seen by Naye Geronimo.   H/o gross hematuria and R flank pain.     Per Naye's Note (11/01/23):  H/o gross hematuria, R flank pain past 2 weeks this time; She has experienced these symptoms intermittently over past couple years but before always had UTI simultaneously; Per patient, this is different type of pain; Recently started leaking urine and now seeing blood on pad; Hysterectomy 1983 d/t bleeding;     CT A&P (10/24/23) showed non-obstructing punctate stones of LMP. No hydronephrosis. CTU (11/01/23) personally reviewed and showed a 1 cm Bosniak type 1 cyst of left kidney. Urine culture negative (10/24/23). Urine cytology negative.     SUBJECTIVE: HPI   TODAY (12/06/23)  Denies any recent UTIs.  Denies any UTI -related symptoms currently.  Reports noticing some blood on her pads.   Denies any gross hematuria or passing any visible clots.   Reports having some R flank pressure sensation.   Ongoing for the last couple months.   Denies any constipation.     PMH of nephrolithiasis.   PSH of hysterectomy (1983)  Denies FH of  malignancy.   SH: Non-smoker    Cystoscopy performed: 12/06/23  Sofi Steve identified using two (2) forms of identification.  Procedure: diagnostic cystourethroscopy  Indications for procedure: hematuria.  Risks, benefits, and alternatives were discussed in detail.   Patient appears to understand and agrees to proceed.   Patient has signed the procedure consent form.     Cystoscopy findings:  Urethra: normal course and caliber, no evidence of stricture or lesion.  Bladder: normal capacity, no trabeculations, no diverticulum, no stone, tumors or other lesions.     Post-cystoscopy: Patient tolerated procedure without complications.    Past medical, surgical, family and social history in the chart was reviewed and is accurate including any additions to what is in this HPI.    Review  of Systems   Constitutional: denies any unintentional weight loss or change in strength.  Integumentary: denies any rashes or pruritus.  Eyes: denies any double vision or eye pain.  Ear/Nose/Mouth/Throat: denies any nosebleeds or gum bleeds.  Cardiovascular: denies any chest pain or syncope.  Respiratory: denies hemoptysis.  Gastrointestinal: denies nausea or vomiting.  Musculoskeletal: denies muscle cramping or weakness.  Neurologic: denies convulsions or seizures.  Hematologic/Lymphatic: denies bleeding tendencies.  Endocrine: denies heat/cold intolerance.  All other systems have been reviewed and are negative unless otherwise noted in the HPI.    OBJECTIVE:  Visit Vitals  BP (!) 140/95   Pulse 59   Temp 35.8 °C (96.4 °F)     Physical Exam   Constitutional: No obvious distress.  Eyes: Non-injected conjunctiva, sclera clear, EOMI.  Ears/Nose/Mouth/Throat: No obvious drainage per ears or nose.  Cardiovascular: Extremities are warm and well perfused. No edema, cyanosis or pallor.  Respiratory: No audible wheezing/stridor; respirations do not appear labored.  Gastrointestinal: Abdomen soft, not distended.  Musculoskeletal: Normal ROM of extremities.  Skin: No obvious rashes or open sores.  Neurologic: Alert and oriented, CN 2-12 grossly intact.  Psychiatric: Answers questions appropriately with normal affect.  Hematologic/Lymphatic/Immunologic: No obvious bruises or sites of spontaneous bleeding.  Genitourinary: No CVA tenderness, bladder not palpable.     Labs and imaging:  Lab Results   Component Value Date    WBC 3.1 (L) 11/30/2023    HGB 13.4 11/30/2023    HCT 40.4 11/30/2023     11/30/2023    CHOL 155 04/10/2023    TRIG 53 04/10/2023    HDL 81.6 04/10/2023    ALT 27 11/30/2023    AST 16 11/30/2023     11/30/2023    K 3.6 11/30/2023     11/30/2023    CREATININE 0.89 11/30/2023    BUN 19 11/30/2023    CO2 30 11/30/2023    TSH 0.93 10/01/2021    INR 1.1 12/09/2020     ASSESSMENT:  Problem List  Items Addressed This Visit       Hematuria - Primary    Relevant Orders    Cystoscopy    POCT UA Automated manually resulted (Completed)      PLAN:  Cystoscopy negative for hematuria source.   Send urine for culture, treat with antibiotics based on C+S.   Otherwise follow up with Naye in 6 months for UA.     The renal cyst does not require surveillance this is a benign finding    All questions were answered to the patient’s satisfaction.  Patient agrees with the plan and wishes to proceed.    Scribed for Dr. Josh Alvares by Nicolas Lynne.  I, Dr. Josh Alvares have personally reviewed and agreed with the information entered by the Virtual Scribe. 12/06/23.

## 2023-12-07 ENCOUNTER — PHARMACY VISIT (OUTPATIENT)
Dept: PHARMACY | Facility: CLINIC | Age: 63
End: 2023-12-07
Payer: COMMERCIAL

## 2023-12-08 LAB — BACTERIA UR CULT: ABNORMAL

## 2023-12-11 DIAGNOSIS — N39.0 RECURRENT UTI: Primary | ICD-10-CM

## 2023-12-11 RX ORDER — SULFAMETHOXAZOLE AND TRIMETHOPRIM 800; 160 MG/1; MG/1
1 TABLET ORAL 2 TIMES DAILY
Qty: 20 TABLET | Refills: 0 | Status: SHIPPED | OUTPATIENT
Start: 2023-12-11 | End: 2023-12-21

## 2023-12-12 ENCOUNTER — OFFICE VISIT (OUTPATIENT)
Dept: ORTHOPEDIC SURGERY | Age: 63
End: 2023-12-12
Payer: MEDICARE

## 2023-12-12 DIAGNOSIS — M25.511 CHRONIC RIGHT SHOULDER PAIN: Primary | ICD-10-CM

## 2023-12-12 DIAGNOSIS — M67.813 TENDINOSIS OF RIGHT ROTATOR CUFF: ICD-10-CM

## 2023-12-12 DIAGNOSIS — G89.29 CHRONIC RIGHT SHOULDER PAIN: Primary | ICD-10-CM

## 2023-12-12 DIAGNOSIS — M19.011 GLENOHUMERAL ARTHRITIS, RIGHT: ICD-10-CM

## 2023-12-12 DIAGNOSIS — M75.111 NONTRAUMATIC INCOMPLETE TEAR OF RIGHT ROTATOR CUFF: ICD-10-CM

## 2023-12-12 PROCEDURE — G8484 FLU IMMUNIZE NO ADMIN: HCPCS | Performed by: STUDENT IN AN ORGANIZED HEALTH CARE EDUCATION/TRAINING PROGRAM

## 2023-12-12 PROCEDURE — G8417 CALC BMI ABV UP PARAM F/U: HCPCS | Performed by: STUDENT IN AN ORGANIZED HEALTH CARE EDUCATION/TRAINING PROGRAM

## 2023-12-12 PROCEDURE — 1036F TOBACCO NON-USER: CPT | Performed by: STUDENT IN AN ORGANIZED HEALTH CARE EDUCATION/TRAINING PROGRAM

## 2023-12-12 PROCEDURE — 99204 OFFICE O/P NEW MOD 45 MIN: CPT | Performed by: STUDENT IN AN ORGANIZED HEALTH CARE EDUCATION/TRAINING PROGRAM

## 2023-12-12 PROCEDURE — 3017F COLORECTAL CA SCREEN DOC REV: CPT | Performed by: STUDENT IN AN ORGANIZED HEALTH CARE EDUCATION/TRAINING PROGRAM

## 2023-12-12 PROCEDURE — G8427 DOCREV CUR MEDS BY ELIG CLIN: HCPCS | Performed by: STUDENT IN AN ORGANIZED HEALTH CARE EDUCATION/TRAINING PROGRAM

## 2023-12-12 ASSESSMENT — ENCOUNTER SYMPTOMS
RESPIRATORY NEGATIVE: 1
EYES NEGATIVE: 1
ALLERGIC/IMMUNOLOGIC NEGATIVE: 1
GASTROINTESTINAL NEGATIVE: 1

## 2023-12-12 NOTE — PROGRESS NOTES
Orthopedic Surgery and Sports Medicine    Subjective:      Patient ID: Lashae Flores is a 61 y.o. female who presents today for:  Chief Complaint   Patient presents with    New Patient     Here for exam for RT Shoulder pain, no injuries or falls as to the cause of pain, has been ongoing for 6 months, x-rays today        HPI  Samuel Bowser is a 20-year-old female who presents today for evaluation of her right shoulder pain. She reports pain in the right shoulder for approximately 6 months. She denies any recent injury. The pain has come on over time. She has a history of a right shoulder arthroscopic rotator cuff repair done approximately 10 years ago by Dr. Tommy Wood. She states that she did well after surgery. She did physical therapy postoperatively. She just started having pain approximately 6 months ago. She has been seeing Dr. Ruel Perkins. She has had approximately 3 cortisone injections by him in her shoulder. They were all in different regions. The most recent was the right biceps tendon on 9/7/2023. She did have an ultrasound-guided right shoulder injection on 8/10/2023. She reports really no improvement with the injections. She continues to have pain diffusely in the shoulder that is present constantly. It is worse with certain movements of the shoulder such as reaching. She does have pain at night that prevents her from sleeping. She already has an MRI of the shoulder. She has had both of her knees replaced as well as a lumbar spine surgery. Previous treatment: Right shoulder surgery by Dr. Tommy Wood 10 years ago, multiple right shoulder cortisone injections by Dr. Ruel Perkins   NSAIDs: No  Physical therapy: No    Hand Dominance: right  Occupation: Does not work  Workers Compensation:   Have you missed work for this issue? No  Is this issue being addressed under a worker's compensation claim?  No    Past Medical History:   Diagnosis Date    Anemia     Arthritis     Chicken pox     Chronic back pain

## 2023-12-13 DIAGNOSIS — M96.1 POST LAMINECTOMY SYNDROME: ICD-10-CM

## 2023-12-13 DIAGNOSIS — M25.511 PAIN IN JOINT OF RIGHT SHOULDER: ICD-10-CM

## 2023-12-13 DIAGNOSIS — M48.062 SPINAL STENOSIS OF LUMBAR REGION WITH NEUROGENIC CLAUDICATION: ICD-10-CM

## 2023-12-13 DIAGNOSIS — M47.812 CERVICAL SPONDYLOSIS WITHOUT MYELOPATHY: ICD-10-CM

## 2023-12-13 DIAGNOSIS — G89.4 CHRONIC PAIN SYNDROME: ICD-10-CM

## 2023-12-13 DIAGNOSIS — Z98.1 HISTORY OF LUMBAR FUSION: ICD-10-CM

## 2023-12-13 DIAGNOSIS — M47.817 LUMBOSACRAL SPONDYLOSIS WITHOUT MYELOPATHY: ICD-10-CM

## 2023-12-13 RX ORDER — TIZANIDINE 4 MG/1
TABLET ORAL
Qty: 90 TABLET | Refills: 0 | OUTPATIENT
Start: 2023-12-13

## 2023-12-15 ENCOUNTER — ANCILLARY PROCEDURE (OUTPATIENT)
Dept: RADIOLOGY | Facility: CLINIC | Age: 63
End: 2023-12-15
Payer: MEDICARE

## 2023-12-15 DIAGNOSIS — T84.498A LOOSENING OF HARDWARE IN SPINE (CMS-HCC): ICD-10-CM

## 2023-12-15 PROCEDURE — A9575 INJ GADOTERATE MEGLUMI 0.1ML: HCPCS | Performed by: INTERNAL MEDICINE

## 2023-12-15 PROCEDURE — 72158 MRI LUMBAR SPINE W/O & W/DYE: CPT

## 2023-12-15 PROCEDURE — 2550000001 HC RX 255 CONTRASTS: Performed by: INTERNAL MEDICINE

## 2023-12-15 PROCEDURE — 72158 MRI LUMBAR SPINE W/O & W/DYE: CPT | Performed by: RADIOLOGY

## 2023-12-15 RX ORDER — GADOTERATE MEGLUMINE 376.9 MG/ML
18 INJECTION INTRAVENOUS
Status: COMPLETED | OUTPATIENT
Start: 2023-12-15 | End: 2023-12-15

## 2023-12-15 RX ADMIN — GADOTERATE MEGLUMINE 18 ML: 376.9 INJECTION INTRAVENOUS at 10:29

## 2023-12-19 PROBLEM — M43.17 ACQUIRED SPONDYLOLISTHESIS OF LUMBOSACRAL REGION: Status: ACTIVE | Noted: 2023-12-19

## 2023-12-19 PROBLEM — Z98.1 HISTORY OF LUMBAR FUSION: Status: ACTIVE | Noted: 2023-12-19

## 2023-12-19 PROBLEM — M62.84 SARCOPENIA: Status: ACTIVE | Noted: 2023-12-19

## 2023-12-28 DIAGNOSIS — M96.1 POST LAMINECTOMY SYNDROME: ICD-10-CM

## 2023-12-28 DIAGNOSIS — G89.4 CHRONIC PAIN SYNDROME: ICD-10-CM

## 2023-12-28 DIAGNOSIS — Z98.1 HISTORY OF LUMBAR FUSION: ICD-10-CM

## 2023-12-28 DIAGNOSIS — M47.817 LUMBOSACRAL SPONDYLOSIS WITHOUT MYELOPATHY: ICD-10-CM

## 2023-12-28 DIAGNOSIS — M48.062 SPINAL STENOSIS OF LUMBAR REGION WITH NEUROGENIC CLAUDICATION: ICD-10-CM

## 2023-12-28 DIAGNOSIS — M25.511 PAIN IN JOINT OF RIGHT SHOULDER: ICD-10-CM

## 2023-12-28 DIAGNOSIS — M47.812 CERVICAL SPONDYLOSIS WITHOUT MYELOPATHY: ICD-10-CM

## 2023-12-28 RX ORDER — TIZANIDINE 4 MG/1
TABLET ORAL
Qty: 90 TABLET | Refills: 0 | OUTPATIENT
Start: 2023-12-28

## 2024-01-03 ENCOUNTER — APPOINTMENT (OUTPATIENT)
Dept: UROLOGY | Facility: CLINIC | Age: 64
End: 2024-01-03
Payer: MEDICARE

## 2024-01-03 DIAGNOSIS — M96.1 POST LAMINECTOMY SYNDROME: ICD-10-CM

## 2024-01-03 DIAGNOSIS — Z98.1 HISTORY OF LUMBAR FUSION: ICD-10-CM

## 2024-01-03 DIAGNOSIS — M47.817 LUMBOSACRAL SPONDYLOSIS WITHOUT MYELOPATHY: ICD-10-CM

## 2024-01-03 DIAGNOSIS — M25.511 PAIN IN JOINT OF RIGHT SHOULDER: ICD-10-CM

## 2024-01-03 DIAGNOSIS — G89.4 CHRONIC PAIN SYNDROME: ICD-10-CM

## 2024-01-03 DIAGNOSIS — M48.062 SPINAL STENOSIS OF LUMBAR REGION WITH NEUROGENIC CLAUDICATION: ICD-10-CM

## 2024-01-03 DIAGNOSIS — M47.812 CERVICAL SPONDYLOSIS WITHOUT MYELOPATHY: ICD-10-CM

## 2024-01-03 NOTE — TELEPHONE ENCOUNTER
Requested Prescriptions     Pending Prescriptions Disp Refills    tiZANidine (ZANAFLEX) 4 MG tablet 90 tablet 0     Sig: Take 1 tablet by mouth 3 times daily as needed (pain spasms)       Patient last seen on:  11/21/23  Date of last refill:  11/21/23  Future appts: 2/21/24

## 2024-01-04 RX ORDER — TIZANIDINE 4 MG/1
4 TABLET ORAL 3 TIMES DAILY PRN
Qty: 90 TABLET | Refills: 2 | Status: SHIPPED | OUTPATIENT
Start: 2024-01-04 | End: 2024-02-03

## 2024-01-06 ENCOUNTER — SPECIALTY PHARMACY (OUTPATIENT)
Dept: PHARMACY | Facility: CLINIC | Age: 64
End: 2024-01-06

## 2024-01-06 DIAGNOSIS — G43.911 INTRACTABLE MIGRAINE WITH STATUS MIGRAINOSUS, UNSPECIFIED MIGRAINE TYPE: Primary | ICD-10-CM

## 2024-01-10 ENCOUNTER — SPECIALTY PHARMACY (OUTPATIENT)
Dept: PHARMACY | Facility: CLINIC | Age: 64
End: 2024-01-10

## 2024-01-10 PROCEDURE — RXMED WILLOW AMBULATORY MEDICATION CHARGE

## 2024-01-11 ENCOUNTER — PHARMACY VISIT (OUTPATIENT)
Dept: PHARMACY | Facility: CLINIC | Age: 64
End: 2024-01-11
Payer: COMMERCIAL

## 2024-01-27 DIAGNOSIS — M96.1 POST LAMINECTOMY SYNDROME: ICD-10-CM

## 2024-01-27 DIAGNOSIS — M47.812 CERVICAL SPONDYLOSIS WITHOUT MYELOPATHY: ICD-10-CM

## 2024-01-27 DIAGNOSIS — G89.4 CHRONIC PAIN SYNDROME: ICD-10-CM

## 2024-01-27 DIAGNOSIS — M25.511 PAIN IN JOINT OF RIGHT SHOULDER: ICD-10-CM

## 2024-01-27 DIAGNOSIS — M48.062 SPINAL STENOSIS OF LUMBAR REGION WITH NEUROGENIC CLAUDICATION: ICD-10-CM

## 2024-01-27 DIAGNOSIS — M54.16 LUMBAR RADICULOPATHY: ICD-10-CM

## 2024-01-27 DIAGNOSIS — M47.817 LUMBOSACRAL SPONDYLOSIS WITHOUT MYELOPATHY: ICD-10-CM

## 2024-01-29 RX ORDER — GABAPENTIN 400 MG/1
400 CAPSULE ORAL 4 TIMES DAILY
Qty: 360 CAPSULE | Refills: 3 | OUTPATIENT
Start: 2024-01-29

## 2024-01-30 ENCOUNTER — APPOINTMENT (OUTPATIENT)
Dept: RADIOLOGY | Facility: HOSPITAL | Age: 64
End: 2024-01-30
Payer: MEDICARE

## 2024-01-30 ENCOUNTER — HOSPITAL ENCOUNTER (EMERGENCY)
Facility: HOSPITAL | Age: 64
Discharge: HOME | End: 2024-01-30
Attending: STUDENT IN AN ORGANIZED HEALTH CARE EDUCATION/TRAINING PROGRAM
Payer: MEDICARE

## 2024-01-30 ENCOUNTER — APPOINTMENT (OUTPATIENT)
Dept: CARDIOLOGY | Facility: HOSPITAL | Age: 64
End: 2024-01-30
Payer: MEDICARE

## 2024-01-30 VITALS
RESPIRATION RATE: 20 BRPM | BODY MASS INDEX: 32.32 KG/M2 | DIASTOLIC BLOOD PRESSURE: 89 MMHG | HEIGHT: 65 IN | HEART RATE: 74 BPM | OXYGEN SATURATION: 98 % | SYSTOLIC BLOOD PRESSURE: 181 MMHG | WEIGHT: 194 LBS | TEMPERATURE: 97.5 F

## 2024-01-30 DIAGNOSIS — R55 SYNCOPE AND COLLAPSE: Primary | ICD-10-CM

## 2024-01-30 DIAGNOSIS — R07.9 CHEST PAIN, UNSPECIFIED TYPE: ICD-10-CM

## 2024-01-30 LAB
ALBUMIN SERPL BCP-MCNC: 4.3 G/DL (ref 3.4–5)
ALP SERPL-CCNC: 60 U/L (ref 33–136)
ALT SERPL W P-5'-P-CCNC: 22 U/L (ref 7–45)
ANION GAP SERPL CALC-SCNC: 15 MMOL/L (ref 10–20)
APPEARANCE UR: CLEAR
AST SERPL W P-5'-P-CCNC: 31 U/L (ref 9–39)
BACTERIA #/AREA URNS AUTO: ABNORMAL /HPF
BASOPHILS # BLD AUTO: 0.05 X10*3/UL (ref 0–0.1)
BASOPHILS NFR BLD AUTO: 1.2 %
BILIRUB SERPL-MCNC: 0.3 MG/DL (ref 0–1.2)
BILIRUB UR STRIP.AUTO-MCNC: NEGATIVE MG/DL
BNP SERPL-MCNC: 12 PG/ML (ref 0–99)
BUN SERPL-MCNC: 12 MG/DL (ref 6–23)
CALCIUM SERPL-MCNC: 9.5 MG/DL (ref 8.6–10.3)
CARDIAC TROPONIN I PNL SERPL HS: 4 NG/L (ref 0–13)
CARDIAC TROPONIN I PNL SERPL HS: 5 NG/L (ref 0–13)
CHLORIDE SERPL-SCNC: 105 MMOL/L (ref 98–107)
CO2 SERPL-SCNC: 25 MMOL/L (ref 21–32)
COLOR UR: ABNORMAL
CREAT SERPL-MCNC: 0.87 MG/DL (ref 0.5–1.05)
D DIMER PPP FEU-MCNC: 329 NG/ML FEU
EGFRCR SERPLBLD CKD-EPI 2021: 75 ML/MIN/1.73M*2
EOSINOPHIL # BLD AUTO: 0.14 X10*3/UL (ref 0–0.7)
EOSINOPHIL NFR BLD AUTO: 3.3 %
ERYTHROCYTE [DISTWIDTH] IN BLOOD BY AUTOMATED COUNT: 12.9 % (ref 11.5–14.5)
ERYTHROCYTE [DISTWIDTH] IN BLOOD BY AUTOMATED COUNT: 13 % (ref 11.5–14.5)
FLUAV RNA RESP QL NAA+PROBE: NOT DETECTED
FLUBV RNA RESP QL NAA+PROBE: NOT DETECTED
GLUCOSE SERPL-MCNC: 94 MG/DL (ref 74–99)
GLUCOSE UR STRIP.AUTO-MCNC: NEGATIVE MG/DL
HCT VFR BLD AUTO: 40.3 % (ref 36–46)
HCT VFR BLD AUTO: 40.5 % (ref 36–46)
HGB BLD-MCNC: 13.4 G/DL (ref 12–16)
HGB BLD-MCNC: 13.9 G/DL (ref 12–16)
HOLD SPECIMEN: NORMAL
IMM GRANULOCYTES # BLD AUTO: 0.01 X10*3/UL (ref 0–0.7)
IMM GRANULOCYTES NFR BLD AUTO: 0.2 % (ref 0–0.9)
KETONES UR STRIP.AUTO-MCNC: NEGATIVE MG/DL
LEUKOCYTE ESTERASE UR QL STRIP.AUTO: ABNORMAL
LYMPHOCYTES # BLD AUTO: 1.81 X10*3/UL (ref 1.2–4.8)
LYMPHOCYTES NFR BLD AUTO: 42.5 %
MAGNESIUM SERPL-MCNC: 1.63 MG/DL (ref 1.6–2.4)
MCH RBC QN AUTO: 31.8 PG (ref 26–34)
MCH RBC QN AUTO: 32.6 PG (ref 26–34)
MCHC RBC AUTO-ENTMCNC: 33.3 G/DL (ref 32–36)
MCHC RBC AUTO-ENTMCNC: 34.3 G/DL (ref 32–36)
MCV RBC AUTO: 95 FL (ref 80–100)
MCV RBC AUTO: 96 FL (ref 80–100)
MONOCYTES # BLD AUTO: 0.29 X10*3/UL (ref 0.1–1)
MONOCYTES NFR BLD AUTO: 6.8 %
MUCOUS THREADS #/AREA URNS AUTO: ABNORMAL /LPF
NEUTROPHILS # BLD AUTO: 1.96 X10*3/UL (ref 1.2–7.7)
NEUTROPHILS NFR BLD AUTO: 46 %
NITRITE UR QL STRIP.AUTO: NEGATIVE
NRBC BLD-RTO: 0 /100 WBCS (ref 0–0)
NRBC BLD-RTO: 0 /100 WBCS (ref 0–0)
PH UR STRIP.AUTO: 6 [PH]
PLATELET # BLD AUTO: 241 X10*3/UL (ref 150–450)
PLATELET # BLD AUTO: 252 X10*3/UL (ref 150–450)
POTASSIUM SERPL-SCNC: 3.7 MMOL/L (ref 3.5–5.3)
PROT SERPL-MCNC: 6.8 G/DL (ref 6.4–8.2)
PROT UR STRIP.AUTO-MCNC: NEGATIVE MG/DL
RBC # BLD AUTO: 4.22 X10*6/UL (ref 4–5.2)
RBC # BLD AUTO: 4.27 X10*6/UL (ref 4–5.2)
RBC # UR STRIP.AUTO: NEGATIVE /UL
RBC #/AREA URNS AUTO: ABNORMAL /HPF
SARS-COV-2 RNA RESP QL NAA+PROBE: NOT DETECTED
SODIUM SERPL-SCNC: 141 MMOL/L (ref 136–145)
SP GR UR STRIP.AUTO: <=1.005
SQUAMOUS #/AREA URNS AUTO: ABNORMAL /HPF
UROBILINOGEN UR STRIP.AUTO-MCNC: <2 MG/DL
WBC # BLD AUTO: 4.1 X10*3/UL (ref 4.4–11.3)
WBC # BLD AUTO: 4.3 X10*3/UL (ref 4.4–11.3)
WBC #/AREA URNS AUTO: ABNORMAL /HPF

## 2024-01-30 PROCEDURE — 72125 CT NECK SPINE W/O DYE: CPT | Performed by: RADIOLOGY

## 2024-01-30 PROCEDURE — 36415 COLL VENOUS BLD VENIPUNCTURE: CPT | Performed by: STUDENT IN AN ORGANIZED HEALTH CARE EDUCATION/TRAINING PROGRAM

## 2024-01-30 PROCEDURE — 87086 URINE CULTURE/COLONY COUNT: CPT | Mod: ELYLAB | Performed by: STUDENT IN AN ORGANIZED HEALTH CARE EDUCATION/TRAINING PROGRAM

## 2024-01-30 PROCEDURE — 71045 X-RAY EXAM CHEST 1 VIEW: CPT

## 2024-01-30 PROCEDURE — 83880 ASSAY OF NATRIURETIC PEPTIDE: CPT | Performed by: STUDENT IN AN ORGANIZED HEALTH CARE EDUCATION/TRAINING PROGRAM

## 2024-01-30 PROCEDURE — 99285 EMERGENCY DEPT VISIT HI MDM: CPT | Performed by: STUDENT IN AN ORGANIZED HEALTH CARE EDUCATION/TRAINING PROGRAM

## 2024-01-30 PROCEDURE — 93005 ELECTROCARDIOGRAM TRACING: CPT

## 2024-01-30 PROCEDURE — 71275 CT ANGIOGRAPHY CHEST: CPT

## 2024-01-30 PROCEDURE — 2550000001 HC RX 255 CONTRASTS: Performed by: STUDENT IN AN ORGANIZED HEALTH CARE EDUCATION/TRAINING PROGRAM

## 2024-01-30 PROCEDURE — 87636 SARSCOV2 & INF A&B AMP PRB: CPT | Performed by: STUDENT IN AN ORGANIZED HEALTH CARE EDUCATION/TRAINING PROGRAM

## 2024-01-30 PROCEDURE — 70450 CT HEAD/BRAIN W/O DYE: CPT | Performed by: RADIOLOGY

## 2024-01-30 PROCEDURE — 85027 COMPLETE CBC AUTOMATED: CPT | Mod: 59 | Performed by: STUDENT IN AN ORGANIZED HEALTH CARE EDUCATION/TRAINING PROGRAM

## 2024-01-30 PROCEDURE — 85025 COMPLETE CBC W/AUTO DIFF WBC: CPT

## 2024-01-30 PROCEDURE — 81003 URINALYSIS AUTO W/O SCOPE: CPT | Performed by: STUDENT IN AN ORGANIZED HEALTH CARE EDUCATION/TRAINING PROGRAM

## 2024-01-30 PROCEDURE — 72125 CT NECK SPINE W/O DYE: CPT

## 2024-01-30 PROCEDURE — 84484 ASSAY OF TROPONIN QUANT: CPT | Performed by: STUDENT IN AN ORGANIZED HEALTH CARE EDUCATION/TRAINING PROGRAM

## 2024-01-30 PROCEDURE — 74174 CTA ABD&PLVS W/CONTRAST: CPT | Performed by: RADIOLOGY

## 2024-01-30 PROCEDURE — 36415 COLL VENOUS BLD VENIPUNCTURE: CPT

## 2024-01-30 PROCEDURE — 80053 COMPREHEN METABOLIC PANEL: CPT | Performed by: STUDENT IN AN ORGANIZED HEALTH CARE EDUCATION/TRAINING PROGRAM

## 2024-01-30 PROCEDURE — 71045 X-RAY EXAM CHEST 1 VIEW: CPT | Mod: FOREIGN READ | Performed by: RADIOLOGY

## 2024-01-30 PROCEDURE — 70450 CT HEAD/BRAIN W/O DYE: CPT

## 2024-01-30 PROCEDURE — 96361 HYDRATE IV INFUSION ADD-ON: CPT

## 2024-01-30 PROCEDURE — 96374 THER/PROPH/DIAG INJ IV PUSH: CPT

## 2024-01-30 PROCEDURE — 2500000004 HC RX 250 GENERAL PHARMACY W/ HCPCS (ALT 636 FOR OP/ED)

## 2024-01-30 PROCEDURE — 85379 FIBRIN DEGRADATION QUANT: CPT

## 2024-01-30 PROCEDURE — 2500000004 HC RX 250 GENERAL PHARMACY W/ HCPCS (ALT 636 FOR OP/ED): Performed by: STUDENT IN AN ORGANIZED HEALTH CARE EDUCATION/TRAINING PROGRAM

## 2024-01-30 PROCEDURE — 71275 CT ANGIOGRAPHY CHEST: CPT | Performed by: RADIOLOGY

## 2024-01-30 PROCEDURE — 83735 ASSAY OF MAGNESIUM: CPT

## 2024-01-30 PROCEDURE — 96376 TX/PRO/DX INJ SAME DRUG ADON: CPT

## 2024-01-30 RX ORDER — MORPHINE SULFATE 4 MG/ML
4 INJECTION, SOLUTION INTRAMUSCULAR; INTRAVENOUS ONCE
Status: COMPLETED | OUTPATIENT
Start: 2024-01-30 | End: 2024-01-30

## 2024-01-30 RX ORDER — POLYETHYLENE GLYCOL 3350 17 G/17G
17 POWDER, FOR SOLUTION ORAL DAILY
Qty: 5 PACKET | Refills: 0 | Status: SHIPPED | OUTPATIENT
Start: 2024-01-30 | End: 2024-02-04

## 2024-01-30 RX ADMIN — IOHEXOL 100 ML: 350 INJECTION, SOLUTION INTRAVENOUS at 21:02

## 2024-01-30 RX ADMIN — SODIUM CHLORIDE 1000 ML: 9 INJECTION, SOLUTION INTRAVENOUS at 20:41

## 2024-01-30 RX ADMIN — MORPHINE SULFATE 4 MG: 4 INJECTION, SOLUTION INTRAMUSCULAR; INTRAVENOUS at 20:41

## 2024-01-30 RX ADMIN — MORPHINE SULFATE 4 MG: 4 INJECTION, SOLUTION INTRAMUSCULAR; INTRAVENOUS at 22:34

## 2024-01-30 ASSESSMENT — PAIN SCALES - GENERAL
PAINLEVEL_OUTOF10: 10 - WORST POSSIBLE PAIN
PAINLEVEL_OUTOF10: 6
PAINLEVEL_OUTOF10: 6
PAINLEVEL_OUTOF10: 8
PAINLEVEL_OUTOF10: 10 - WORST POSSIBLE PAIN

## 2024-01-30 ASSESSMENT — LIFESTYLE VARIABLES
EVER HAD A DRINK FIRST THING IN THE MORNING TO STEADY YOUR NERVES TO GET RID OF A HANGOVER: NO
HAVE YOU EVER FELT YOU SHOULD CUT DOWN ON YOUR DRINKING: NO
EVER FELT BAD OR GUILTY ABOUT YOUR DRINKING: NO
HAVE PEOPLE ANNOYED YOU BY CRITICIZING YOUR DRINKING: NO

## 2024-01-30 ASSESSMENT — COLUMBIA-SUICIDE SEVERITY RATING SCALE - C-SSRS
1. IN THE PAST MONTH, HAVE YOU WISHED YOU WERE DEAD OR WISHED YOU COULD GO TO SLEEP AND NOT WAKE UP?: NO
2. HAVE YOU ACTUALLY HAD ANY THOUGHTS OF KILLING YOURSELF?: NO
6. HAVE YOU EVER DONE ANYTHING, STARTED TO DO ANYTHING, OR PREPARED TO DO ANYTHING TO END YOUR LIFE?: NO

## 2024-01-30 ASSESSMENT — PAIN DESCRIPTION - DESCRIPTORS: DESCRIPTORS_2: SHARP;SHOOTING

## 2024-01-30 ASSESSMENT — PAIN DESCRIPTION - PAIN TYPE: TYPE: ACUTE PAIN

## 2024-01-30 ASSESSMENT — PAIN - FUNCTIONAL ASSESSMENT
PAIN_FUNCTIONAL_ASSESSMENT: 0-10

## 2024-01-30 ASSESSMENT — PAIN DESCRIPTION - LOCATION
LOCATION_2: CHEST
LOCATION: HEAD

## 2024-01-31 LAB
ATRIAL RATE: 86 BPM
P AXIS: 30 DEGREES
P OFFSET: 183 MS
P ONSET: 130 MS
PR INTERVAL: 184 MS
Q ONSET: 222 MS
QRS COUNT: 14 BEATS
QRS DURATION: 88 MS
QT INTERVAL: 388 MS
QTC CALCULATION(BAZETT): 464 MS
QTC FREDERICIA: 437 MS
R AXIS: -15 DEGREES
T AXIS: -5 DEGREES
T OFFSET: 416 MS
VENTRICULAR RATE: 86 BPM

## 2024-01-31 NOTE — ED PROVIDER NOTES
HPI   Chief Complaint   Patient presents with    Syncope     Pt had LOC in bathroom at home, hit head on bathtub. No thinners. Alert and oriented in triage. Pt also states mid chest ain that began after falling rates pain as sharp. Hx of CVa       History provided by: Patient    Limitations to history: None    CC: Palpitations, chest pain, syncopal episode    HPI: 63-year-old female presents emergency department to be evaluated for chest pain, palpitations, and syncopal episode.  Patient states that her symptoms started around 630 today with palpitations while she was using the restroom.  She is not bearing down when this occurred.  States that she then started developing a sharp chest pain.  Patient is not sure what happened next but then she states that she passed out twice.  She states that when she fell she hit the bathtub hard enough to where her  heard it and came into the room.  Denies seizure-like activity.  Denies history of syncopal episodes.  States that the first time she hit her body on the tub and the  She hit the back of her head.  She denies use of anticoagulants or history intracranial hemorrhage per denies headache, vision changes, neck pain, nausea vomiting.  States that when the palpitations were bad she started having numbness in her left arm that is since resolved.  She denies having pain or injury from her syncopal episode but is still having the sharp chest pain.  She says the pain is worse when she breathes.  She denies weakness and fatigue.  Denies blood in urine or stool.  Denies flulike symptoms.  Denies all GI and  complaints.  She denies history of ACS, she has not had a stress test in quite some time has not required stent placement or bypass.  She denies history of heart failure and denies pain or swelling in the extremities.  She denies history of COPD or asthma or use of breathing treatments.  She denies history of arrhythmias or blood clots and denies recent plane flights  or surgeries.  Denies use of OCPs or history of cancer.  Denies taking anything for pain prior to arrival.  Patient takes a baby aspirin every day.   she has been able to walk without difficulty.  Denies all other systemic symptoms.    ROS: Negative unless mentioned in HPI    Social Hx: Denies tobacco and drug use.  Occasional alcohol use.    Medical Hx: Medical history sent in for hypertension, hyperlipidemia, and stroke.  Allergy to NSAIDs.  Immunizations are up-to-date.    Physical exam:    Constitutional: Patient is well-nourished and well-developed.  Sitting comfortably in the room and in no distress.  Oriented to person, place, time, and situation.    HEENT: Head is normocephalic, atraumatic. Patient's airway is patent.  Tympanic membranes are clear bilaterally.  Nasal mucosa clear.  Mouth with normal mucosa.  Throat is not erythematous and there are no oropharyngeal exudates, uvula is midline.  No obvious facial deformities.    Eyes: Clear bilaterally.  Pupils are equal round and reactive to light and accommodation.  Extraocular movements intact.      Cardiac: Regular rate, regular rhythm.  Heart sounds S1, S2.  No murmurs, rubs, or gallops.  PMI nondisplaced.  No JVD.    Respiratory: 98% on room air.  Regular respiratory rate and effort.  Breath sounds are clear and equal bilaterally, no adventitious lung sounds.  Patient is speaking in full sentences and is in no apparent respiratory distress. No use of accessory muscles.      Gastrointestinal: Abdomen is soft, nondistended, and nontender.  There are no obvious deformities.  No rebound tenderness or guarding.  Bowel sounds are normal active.    Genitourinary: No CVA or flank tenderness.    Musculoskeletal: No reproducible tenderness.  No obvious skin or bony deformities.  Patient has equal range of motion in all extremities and no strength deficiencies.  No muscle or joint tenderness. No back or neck tenderness.  Capillary refill less than 3 seconds.   Strong peripheral pulses.  No sensory deficits.    Neurological: Patient is alert and oriented.  No focal deficits.  5/5 strength in all extremities.  Cranial nerves II through XII intact. GCS15.     Skin: Skin is normal color for race and is warm, dry, and intact.  No evidence of trauma.  No lesions, rashes, bruising, jaundice, or masses.  No slurred speech or facial drooping.  Upper and lower extremity coordination intact.  No neurological deficits.  NIH is 0.    Psych: Appropriate mood and affect.  No apparent risk to self or others.    Heme/lymph: No adenopathy, lymphadenopathy, or splenomegaly    Physical exam is otherwise negative unless stated above or in history of present illness.                          [place] Coma Scale Score: 15                  Patient History   Past Medical History:   Diagnosis Date    Cerebral infarction due to unspecified occlusion or stenosis of bilateral cerebellar arteries (CMS/HCC) 10/05/2020    Cerebrovascular accident (CVA) due to bilateral occlusion of cerebellar arteries    Lumbar stenosis 02/17/2023    Migraine without aura, intractable, without status migrainosus 08/25/2021    Intractable migraine without aura and without status migrainosus    Personal history of other specified conditions     History of chest pain    Personal history of other specified conditions     History of dizziness    Personal history of transient ischemic attack (TIA), and cerebral infarction without residual deficits 01/27/2021    History of cerebrovascular accident    Pressure ulcer of other site, stage 1 03/01/2021    Pressure injury of foot, stage 1, unspecified laterality     Past Surgical History:   Procedure Laterality Date    APPENDECTOMY  01/25/2016    Appendectomy    BLADDER SURGERY  01/25/2016    Bladder Surgery    HYSTERECTOMY  01/25/2016    Hysterectomy    KNEE SURGERY  01/25/2016    Knee Surgery    MR HEAD ANGIO WO IV CONTRAST  2/11/2021    MR HEAD ANGIO WO IV CONTRAST 2/11/2021 [facility]    OTHER SURGICAL HISTORY  03/01/2021    Knee replacement     Family History   Problem Relation Name Age of Onset    Heart attack Mother      Hypertension Mother      Diabetes Mother      Other (CEREBRAL VASCULAR) Mother      Hyperlipidemia Mother      Other (abnormal cholesterol test) Mother      Heart attack Father      Hypertension Father      Hyperlipidemia Father      Other (abnormal cholesterol test) Father      Heart attack Sister      Hypertension Sister      Diabetes Sister      Other (CEREBRAL VASCULAR EV) Sister      Hyperlipidemia Sister      Other (abnormal cholesterol test) Sister      Hypertension Brother      Diabetes Brother       Social History     Tobacco Use    Smoking status: Never    Smokeless tobacco: Never   Vaping Use    Vaping Use: Never used   Substance Use Topics    Alcohol use: Yes     Alcohol/week: 2.0 standard drinks of alcohol     Types: 2 Standard drinks or equivalent per week    Drug use: Never       Physical Exam   ED Triage Vitals [01/30/24 1930]   Temperature Heart Rate Respirations BP   36.4 °C (97.5 °F) 86 18 169/87      Pulse Ox Temp Source Heart Rate Source Patient Position   98 % Axillary Monitor Sitting      BP Location FiO2 (%)     Right arm --       Physical Exam    ED Course & MDM        Medical Decision Making    Patient updated on plan for lab testing, IV insertion, radiology imaging, and medications to be administered while in the ER (if indicated). Patient updated on expected wait times for testing and results. Patient provided my name and told to ask any staff member for questions or concerns if they should arise. Electronic medical record reviewed.     MDM    Upon initial assessment, patient was healthy non-toxic appearing and in no apparent distress.     Patient presented to the emergency department with the chief complaint of sharp chest pain with 2 episodes of syncope. Patient is alert and oriented.  No focal deficits.  5/5 strength in all  extremities.  Cranial nerves II through XII intact. GCS15.   No slurred speech or facial drooping.  Upper and lower extremity coordination intact.  No neurological deficits.  NIH is 0.  Examination of her heart and lungs is unremarkable.  On arrival to the emergency department, vital signs were within normal limits    Will give the patient IV normal saline get basic blood work, EKG and troponin, chest x-ray, CTA to rule out a PE, magnesium, coagulation screen.  Patient to be given IV morphine for her discomfort.  Patient's EKG is benign at 1949 interpreted by me.  Normal sinus rhythm 86 beats minute.  Left axis deviation.  No ST elevation or depression.  No prolonged QT.  Patient has low voltage QRS in leads V1, V2, aVF, V3, and V4 through V6.  This appears to be new compared to the patient's previous especially in the anterior leads.  She has no muffled heart sound or JVD t or hypotension that would suggest cardiac tamponade.    Patient is resting comfortably and has no complaints at this time.  She is feeling well.  Updated her on her results.  Her urinalysis shows 1+ bacteria and 1+ leukocyte esterase however she denies all urinary symptoms so we will wait for culture to start her on antibiotics.  She tested negative for influenza and COVID.  Her magnesium is 1.63.  CBC shows no leukocytosis or anemia.  Original repeat troponin within normal limits.  Heart score is 3.  CMP shows no acute abnormalities.  CBC shows no leukocytosis or anemia.  CT of the head and neck reveals no dissection or PE.  She does have some coronary artery calcifications as well as atelectasis.  She is got some dilation postcholecystectomy however there are no history physical exam findings of just an acute biliary abnormality in her liver enzymes within normal limits.  Patient has diverticulosis without diverticulitis and some constipation, will give her some MiraLAX.  Patient feels well and would like to go home.  I do believe this is  reasonable since results and vital signs are stable and her heart score is 3.  Patient will be discharged with a prescription for p.o. MiraLAX but I recommend that she take her aspirin for pain and that she follow-up with her cardiologist, Dr. Arzola.  I recommend that she give him a call tomorrow to get a Holter monitor and to discuss an outpatient stress test.  All questions and concerns addressed.  Reasons to return to ER discussed.  Patient verbalized understanding and agreement with the treatment plan and they remained hemodynamically stable in the ER.    This note was dictated using a speech recognition program.  While an attempt was made at proof-reading to minimize errors, minor errors in transcription may be present    Procedure  Procedures     Sam Mendoza PA-C  01/30/24 2334

## 2024-02-01 LAB — BACTERIA UR CULT: NORMAL

## 2024-02-08 ENCOUNTER — SPECIALTY PHARMACY (OUTPATIENT)
Dept: PHARMACY | Facility: CLINIC | Age: 64
End: 2024-02-08

## 2024-02-08 PROCEDURE — RXMED WILLOW AMBULATORY MEDICATION CHARGE

## 2024-02-13 ENCOUNTER — PHARMACY VISIT (OUTPATIENT)
Dept: PHARMACY | Facility: CLINIC | Age: 64
End: 2024-02-13
Payer: COMMERCIAL

## 2024-02-21 ENCOUNTER — OFFICE VISIT (OUTPATIENT)
Dept: PAIN MANAGEMENT | Age: 64
End: 2024-02-21
Payer: MEDICARE

## 2024-02-21 ENCOUNTER — SPECIALTY PHARMACY (OUTPATIENT)
Dept: PHARMACY | Facility: CLINIC | Age: 64
End: 2024-02-21

## 2024-02-21 ENCOUNTER — TELEPHONE (OUTPATIENT)
Dept: PAIN MANAGEMENT | Age: 64
End: 2024-02-21

## 2024-02-21 VITALS
WEIGHT: 210 LBS | SYSTOLIC BLOOD PRESSURE: 120 MMHG | DIASTOLIC BLOOD PRESSURE: 78 MMHG | TEMPERATURE: 97 F | BODY MASS INDEX: 34.99 KG/M2 | HEIGHT: 65 IN

## 2024-02-21 DIAGNOSIS — M47.812 CERVICAL SPONDYLOSIS WITHOUT MYELOPATHY: ICD-10-CM

## 2024-02-21 DIAGNOSIS — M54.16 LUMBAR RADICULOPATHY: ICD-10-CM

## 2024-02-21 DIAGNOSIS — M96.1 POST LAMINECTOMY SYNDROME: ICD-10-CM

## 2024-02-21 DIAGNOSIS — Z98.1 HISTORY OF LUMBAR FUSION: ICD-10-CM

## 2024-02-21 DIAGNOSIS — M62.838 MUSCLE SPASM: ICD-10-CM

## 2024-02-21 DIAGNOSIS — M25.511 PAIN IN JOINT OF RIGHT SHOULDER: ICD-10-CM

## 2024-02-21 DIAGNOSIS — M48.062 SPINAL STENOSIS OF LUMBAR REGION WITH NEUROGENIC CLAUDICATION: ICD-10-CM

## 2024-02-21 DIAGNOSIS — M47.817 LUMBOSACRAL SPONDYLOSIS WITHOUT MYELOPATHY: Primary | ICD-10-CM

## 2024-02-21 DIAGNOSIS — G89.4 CHRONIC PAIN SYNDROME: ICD-10-CM

## 2024-02-21 PROCEDURE — G8427 DOCREV CUR MEDS BY ELIG CLIN: HCPCS | Performed by: NURSE PRACTITIONER

## 2024-02-21 PROCEDURE — 3017F COLORECTAL CA SCREEN DOC REV: CPT | Performed by: NURSE PRACTITIONER

## 2024-02-21 PROCEDURE — 3074F SYST BP LT 130 MM HG: CPT | Performed by: NURSE PRACTITIONER

## 2024-02-21 PROCEDURE — 99214 OFFICE O/P EST MOD 30 MIN: CPT | Performed by: NURSE PRACTITIONER

## 2024-02-21 PROCEDURE — G8484 FLU IMMUNIZE NO ADMIN: HCPCS | Performed by: NURSE PRACTITIONER

## 2024-02-21 PROCEDURE — 1036F TOBACCO NON-USER: CPT | Performed by: NURSE PRACTITIONER

## 2024-02-21 PROCEDURE — 3078F DIAST BP <80 MM HG: CPT | Performed by: NURSE PRACTITIONER

## 2024-02-21 PROCEDURE — G8417 CALC BMI ABV UP PARAM F/U: HCPCS | Performed by: NURSE PRACTITIONER

## 2024-02-21 RX ORDER — METAXALONE 800 MG/1
800 TABLET ORAL 2 TIMES DAILY PRN
Qty: 60 TABLET | Refills: 0 | Status: SHIPPED | OUTPATIENT
Start: 2024-02-21 | End: 2024-03-22

## 2024-02-21 RX ORDER — GABAPENTIN 400 MG/1
400 CAPSULE ORAL 3 TIMES DAILY
Qty: 270 CAPSULE | Refills: 0 | Status: SHIPPED | OUTPATIENT
Start: 2024-02-21 | End: 2024-05-21

## 2024-02-21 RX ORDER — TIZANIDINE 4 MG/1
4 TABLET ORAL 3 TIMES DAILY PRN
Qty: 90 TABLET | Refills: 2 | Status: CANCELLED | OUTPATIENT
Start: 2024-02-21 | End: 2024-05-21

## 2024-02-21 ASSESSMENT — ENCOUNTER SYMPTOMS
GASTROINTESTINAL NEGATIVE: 1
TROUBLE SWALLOWING: 0
COUGH: 0
DIARRHEA: 0
BACK PAIN: 1
SHORTNESS OF BREATH: 0
CONSTIPATION: 0
EYES NEGATIVE: 1

## 2024-02-21 NOTE — PROGRESS NOTES
It appears patient was seen in the emergency      Kari Dimas  (1960)    2/21/2024    Subjective:     Kari Dimas is 63 y.o. female who complains today of:    Chief Complaint   Patient presents with    Follow-up    Back Pain     Middle to lower    Shoulder Pain     Right         Allergies:  Nsaids and Ibuprofen    Past Medical History:   Diagnosis Date    Anemia     Arthritis     Chicken pox     Chronic back pain     H/O bladder infections     Headache(784.0)     History of blood transfusion     post delivery of first child, again after gastric ulcer cauterization, again for anemia    Hyperlipidemia     meds for about 1 year    Hypertension     meds for about 2 years    Incomplete bladder emptying 09/29/2009    Measles     Mumps     Nausea, vomiting and diarrhea 08/17/2020    Stroke (HCC) 07/2020     Past Surgical History:   Procedure Laterality Date    APPENDECTOMY      BACK SURGERY  2014    lumbar back fusion    CHOLECYSTECTOMY      2017    COLONOSCOPY      ENDOSCOPY, COLON, DIAGNOSTIC      GALLBLADDER SURGERY      HYSTERECTOMY (CERVIX STATUS UNKNOWN)      1980    JOINT REPLACEMENT Right 2020    JOINT REPLACEMENT Left 2018    KNEE SURGERY Right     LEG BIOPSY EXCISION Right 11/12/2021    SOFT TISSUE MASS EXCISION RIGHT ANKLE performed by Carter Jung MD at [facility] [place]    LUMBAR FUSION N/A 10/21/2022    RIGHT BILATERAL L2-3 LAMINECTOMIES MICRODISSECTION FORAMINOTOMIES INTERBODY POSTEROLATERAL PEDICLE SCREW FUSION WITH IGS performed by Delores Santillan MD at [facility] OR    ROTATOR CUFF REPAIR      TONSILLECTOMY       Family History   Problem Relation Age of Onset    Stroke Mother     Cancer Father     Diabetes Sister     High Blood Pressure Sister     High Blood Pressure Brother     Diabetes Brother     No Known Problems Daughter      Social History     Socioeconomic History    Marital status:      Spouse name: Not on file    Number of children: Not on file    Years of education: Not on file    Highest

## 2024-02-21 NOTE — PROGRESS NOTES
[facility] Clinical Note    Sofi Steve is a 63 y.o. female, who is on the specialty pharmacy service for management of: Migraine Core with status of: (Enrolled)     Sofi was contacted on 2/21/2024.    Refer to the encounter summary report for documentation details about patient counseling and education.      Medication Adherence  The importance of adherence was discussed with the patient and they were advised to take the medication as prescribed by their provider. Sofi was encouraged to call her physician's office if they have a question regarding a missed dose.     Conclusion  Rate your quality of life on scale of 1-10: -- (Patient states that medication is working well and whenever she does experiences migraines it's come down to about 4/10 in severity.)  Rate your satisfaction with  Specialty Pharmacy on scale of 1-10: 9      Patient advised to contact the pharmacy if there are any changes to her medication list, including prescriptions, OTC medications, herbal products, or supplements. Patient was advised of [facility]’s dispensing process, refill timeline, contact information ([phone]), and patient management follow up. Patient confirmed understanding of education conducted during assessment. All patient questions and concerns were addressed to the best of my ability. Patient was encouraged to contact the specialty pharmacy with any questions or concerns.    Confirmed follow-up outreaches are properly scheduled. Reviewed goals of therapy in the program targets.    ELIZ BERMEO, LaurenD

## 2024-02-21 NOTE — TELEPHONE ENCOUNTER
Per [employer], Metaxalone (Skelaxin) 800 mg tablet approved.    Approved today  Request Reference Number: [id number]. METAXALONE TAB 800MG is approved through 12/31/2024. Your patient may now fill this prescription and it will be covered.  Authorization Expiration Date: 12/31/2024

## 2024-02-21 NOTE — TELEPHONE ENCOUNTER
Prior authorization has been started on Cover My Med for metaxalone (Skelaxin) 800 mg tablet  Clinical uploaded, authorization pending Key: [id number]

## 2024-02-23 ENCOUNTER — TELEPHONE (OUTPATIENT)
Dept: PAIN MANAGEMENT | Age: 64
End: 2024-02-23

## 2024-02-27 NOTE — TELEPHONE ENCOUNTER
Unable to submit auth for facet joint injections, since pt had previous RFA @ levels, insurance will not cover.

## 2024-02-28 ENCOUNTER — SPECIALTY PHARMACY (OUTPATIENT)
Dept: PHARMACY | Facility: CLINIC | Age: 64
End: 2024-02-28

## 2024-02-28 PROCEDURE — RXMED WILLOW AMBULATORY MEDICATION CHARGE

## 2024-02-29 ENCOUNTER — PHARMACY VISIT (OUTPATIENT)
Dept: PHARMACY | Facility: CLINIC | Age: 64
End: 2024-02-29
Payer: COMMERCIAL

## 2024-02-29 DIAGNOSIS — M47.817 LUMBOSACRAL SPONDYLOSIS WITHOUT MYELOPATHY: Primary | ICD-10-CM

## 2024-03-05 ENCOUNTER — OFFICE VISIT (OUTPATIENT)
Dept: PAIN MANAGEMENT | Age: 64
End: 2024-03-05
Payer: MEDICARE

## 2024-03-05 DIAGNOSIS — M47.817 LUMBOSACRAL SPONDYLOSIS WITHOUT MYELOPATHY: ICD-10-CM

## 2024-03-05 PROCEDURE — 64635 DESTROY LUMB/SAC FACET JNT: CPT | Performed by: PAIN MEDICINE

## 2024-03-05 PROCEDURE — 64636 DESTROY L/S FACET JNT ADDL: CPT | Performed by: PAIN MEDICINE

## 2024-03-05 RX ORDER — LIDOCAINE HYDROCHLORIDE 10 MG/ML
3 INJECTION, SOLUTION EPIDURAL; INFILTRATION; INTRACAUDAL; PERINEURAL ONCE
Status: COMPLETED | OUTPATIENT
Start: 2024-03-05 | End: 2024-03-05

## 2024-03-05 RX ORDER — BETAMETHASONE SODIUM PHOSPHATE AND BETAMETHASONE ACETATE 3; 3 MG/ML; MG/ML
6 INJECTION, SUSPENSION INTRA-ARTICULAR; INTRALESIONAL; INTRAMUSCULAR; SOFT TISSUE ONCE
Status: COMPLETED | OUTPATIENT
Start: 2024-03-05 | End: 2024-03-05

## 2024-03-05 RX ADMIN — BETAMETHASONE SODIUM PHOSPHATE AND BETAMETHASONE ACETATE 6 MG: 3; 3 INJECTION, SUSPENSION INTRA-ARTICULAR; INTRALESIONAL; INTRAMUSCULAR; SOFT TISSUE at 09:16

## 2024-03-05 RX ADMIN — LIDOCAINE HYDROCHLORIDE 3 ML: 10 INJECTION, SOLUTION EPIDURAL; INFILTRATION; INTRACAUDAL; PERINEURAL at 09:15

## 2024-03-05 NOTE — PROGRESS NOTES
[facility]  Neurosurgery and Pain Management Center  [address], [address]  [place], [place]  P: [phone]  F: [phone]      Lumbar Radio Frequency Ablation     Provider: MAURA STOLL DO          Patient Name: Kari Dimas : 1960        Date: 3/5/2024      Kari Dimas is here today for interventional pain management.  Standard ASI guidelines were followed and sterile technique used.  Area was cleaned with Betadine x3.  Informed consent was obtained.  Fluoroscopic guidance was used for this procedure. Multiple views of fluoroscopy were used during procedure to assist with needle placement. Appropriate sized RF 10mm active tip needle was used and advance to appropriate anatomic location.    There was appropriate multifidus contraction noted with motor stimulation at 2 Hz between 0.5-1.5 volts. No limb or gluteal contraction was noted taking it up to 3.5 volts. Prior to lesioning at 80 degrees Celsius for 90 seconds, approximately 0.75mg/1mg of Celestone and ½ cc of 1% preservative free Lidocaine was injected. Impedance was between 200-500 ohms during the procedure.     Patient tolerated the procedure well, no obvious complications occurred during the procedure.  Patient was appropriately monitored and discharged home in stable condition with their usual motor strength. Post Op instructions were given to patient.          [x] Bilateral [] T11 [] L1 [] S1     [] T12 [x] L2 [] S2    [] Right  [x] L3 [] S3      [] L4 [] S4    [] Left  [x] L5                              MAURA STOLL DO

## 2024-03-07 ENCOUNTER — PHARMACY VISIT (OUTPATIENT)
Dept: PHARMACY | Facility: CLINIC | Age: 64
End: 2024-03-07
Payer: COMMERCIAL

## 2024-03-07 ENCOUNTER — SPECIALTY PHARMACY (OUTPATIENT)
Dept: PHARMACY | Facility: CLINIC | Age: 64
End: 2024-03-07

## 2024-03-07 DIAGNOSIS — G43.E19 INTRACTABLE CHRONIC MIGRAINE WITH AURA AND WITHOUT STATUS MIGRAINOSUS: Primary | ICD-10-CM

## 2024-03-07 PROCEDURE — RXMED WILLOW AMBULATORY MEDICATION CHARGE

## 2024-03-08 RX ORDER — FREMANEZUMAB-VFRM 225 MG/1.5ML
INJECTION SUBCUTANEOUS
Qty: 4.5 ML | Refills: 3 | Status: SHIPPED | OUTPATIENT
Start: 2024-03-08 | End: 2025-03-08

## 2024-03-21 ENCOUNTER — OFFICE VISIT (OUTPATIENT)
Dept: PAIN MANAGEMENT | Age: 64
End: 2024-03-21
Payer: MEDICARE

## 2024-03-21 VITALS
SYSTOLIC BLOOD PRESSURE: 132 MMHG | DIASTOLIC BLOOD PRESSURE: 88 MMHG | WEIGHT: 198 LBS | HEIGHT: 65 IN | BODY MASS INDEX: 32.99 KG/M2

## 2024-03-21 DIAGNOSIS — M96.1 POST LAMINECTOMY SYNDROME: ICD-10-CM

## 2024-03-21 DIAGNOSIS — M48.062 SPINAL STENOSIS OF LUMBAR REGION WITH NEUROGENIC CLAUDICATION: ICD-10-CM

## 2024-03-21 DIAGNOSIS — G89.4 CHRONIC PAIN SYNDROME: ICD-10-CM

## 2024-03-21 DIAGNOSIS — M47.817 LUMBOSACRAL SPONDYLOSIS WITHOUT MYELOPATHY: Primary | ICD-10-CM

## 2024-03-21 DIAGNOSIS — M46.1 SACROILIITIS, NOT ELSEWHERE CLASSIFIED (HCC): ICD-10-CM

## 2024-03-21 PROCEDURE — G8484 FLU IMMUNIZE NO ADMIN: HCPCS | Performed by: PAIN MEDICINE

## 2024-03-21 PROCEDURE — 3075F SYST BP GE 130 - 139MM HG: CPT | Performed by: PAIN MEDICINE

## 2024-03-21 PROCEDURE — 3017F COLORECTAL CA SCREEN DOC REV: CPT | Performed by: PAIN MEDICINE

## 2024-03-21 PROCEDURE — 99214 OFFICE O/P EST MOD 30 MIN: CPT | Performed by: PAIN MEDICINE

## 2024-03-21 PROCEDURE — 3079F DIAST BP 80-89 MM HG: CPT | Performed by: PAIN MEDICINE

## 2024-03-21 PROCEDURE — G8417 CALC BMI ABV UP PARAM F/U: HCPCS | Performed by: PAIN MEDICINE

## 2024-03-21 PROCEDURE — G8427 DOCREV CUR MEDS BY ELIG CLIN: HCPCS | Performed by: PAIN MEDICINE

## 2024-03-21 PROCEDURE — 1036F TOBACCO NON-USER: CPT | Performed by: PAIN MEDICINE

## 2024-03-21 RX ORDER — OXYCODONE HYDROCHLORIDE AND ACETAMINOPHEN 5; 325 MG/1; MG/1
1 TABLET ORAL 3 TIMES DAILY PRN
Qty: 90 TABLET | Refills: 0 | Status: SHIPPED | OUTPATIENT
Start: 2024-03-21 | End: 2024-04-20

## 2024-03-21 NOTE — PROGRESS NOTES
[facility]  Neurosurgery and Pain Management Center  [address], [address]  [place], [place]  P: [phone]  F: [phone]        Kari Dimas  (1960)    3/21/2024    Subjective:     Kari Dimas is 63 y.o. female who complains today of:     Chief Complaint   Patient presents with    Back Pain    Neck Pain    Shoulder Pain     right     Patient here today for follow-up.  Unfortunately RF ablation and lumbar spine did not help much.  She had 3 back surgeries fusion at L4-5.  She has lateral upper buttock low back pain.  Is worse with bending standing activity walking.  Better with rest.  She also has a bad shoulder she will need a replacement she says.  She was on Percocet was discontinued a year and half ago.  She smokes marijuana.  She says she has been clean for a year.  She does take gabapentin.  She has chronic pain.  Lifting using the right arm bothers her.  Sleep is impaired.  She is done physical therapy in the past.  Pain affects her quality life considerably.  Can be achy sharp she gets spasms.    Plan: We discussed options in great detail.  [place] is reviewed narcotic drug policy reviewed.  She denies any illegal substances.  She no longer smokes marijuana.  Will reinstitute Percocet 5 mg 3 times a day #90.  Will check urine drug screen today.  I like to authorize bilateral sacroiliac injections for mechanical pain below her fusion.  Options otherwise limited.  She has failed conservative treatment.  She has multiple back surgeries.  Questions answered chart was reviewed in great detail.  She denies any alcohol use.    Allergies:  Nsaids and Ibuprofen    Past Medical History:   Diagnosis Date    Anemia     Arthritis     Chicken pox     Chronic back pain     H/O bladder infections     Headache(784.0)     History of blood transfusion     post delivery of first child, again after gastric ulcer cauterization, again for anemia    Hyperlipidemia

## 2024-03-29 ENCOUNTER — SPECIALTY PHARMACY (OUTPATIENT)
Dept: PHARMACY | Facility: CLINIC | Age: 64
End: 2024-03-29

## 2024-04-01 PROCEDURE — RXMED WILLOW AMBULATORY MEDICATION CHARGE

## 2024-04-02 ENCOUNTER — SPECIALTY PHARMACY (OUTPATIENT)
Dept: PHARMACY | Facility: CLINIC | Age: 64
End: 2024-04-02

## 2024-04-04 ENCOUNTER — PHARMACY VISIT (OUTPATIENT)
Dept: PHARMACY | Facility: CLINIC | Age: 64
End: 2024-04-04
Payer: COMMERCIAL

## 2024-04-04 ENCOUNTER — PROCEDURE VISIT (OUTPATIENT)
Dept: PAIN MANAGEMENT | Age: 64
End: 2024-04-04
Payer: MEDICARE

## 2024-04-04 DIAGNOSIS — M46.1 SACROILIITIS, NOT ELSEWHERE CLASSIFIED (HCC): Primary | ICD-10-CM

## 2024-04-04 PROCEDURE — 27096 INJECT SACROILIAC JOINT: CPT | Performed by: PAIN MEDICINE

## 2024-04-04 RX ORDER — BETAMETHASONE SODIUM PHOSPHATE AND BETAMETHASONE ACETATE 3; 3 MG/ML; MG/ML
6 INJECTION, SUSPENSION INTRA-ARTICULAR; INTRALESIONAL; INTRAMUSCULAR; SOFT TISSUE ONCE
Status: COMPLETED | OUTPATIENT
Start: 2024-04-04 | End: 2024-04-04

## 2024-04-04 RX ORDER — LIDOCAINE HYDROCHLORIDE 10 MG/ML
3 INJECTION, SOLUTION EPIDURAL; INFILTRATION; INTRACAUDAL; PERINEURAL ONCE
Status: COMPLETED | OUTPATIENT
Start: 2024-04-04 | End: 2024-04-04

## 2024-04-04 RX ADMIN — LIDOCAINE HYDROCHLORIDE 3 ML: 10 INJECTION, SOLUTION EPIDURAL; INFILTRATION; INTRACAUDAL; PERINEURAL at 13:28

## 2024-04-04 RX ADMIN — BETAMETHASONE SODIUM PHOSPHATE AND BETAMETHASONE ACETATE 6 MG: 3; 3 INJECTION, SUSPENSION INTRA-ARTICULAR; INTRALESIONAL; INTRAMUSCULAR; SOFT TISSUE at 13:27

## 2024-04-04 NOTE — PROGRESS NOTES
[facility]  Neurosurgery and Pain Management Center  [address], [address]  [place], [place]  P: [phone]  F: [phone]      Patient Name: Kari Dimas  : 1960     Date:  2024      Physician: MAURA STOLL DO        Kari Dimas is here today for interventional pain management.    Preoperatively, the patient presents with SI joint mediated pain, as per history and exam. Patient has failed NSAIDs, PT, and conservative treatment. Patient has significant psychological and functional impairment due to this condition.  Standard ASI guidelines were followed and sterile technique used.  Area was cleaned with Betadine x3.  Informed consent was obtained.  Fluoroscopic guidance was used for this procedure.    S.I. JOINT:  Bilateral  Appropriate length spinal needle was advanced to the S.I. Joint.  Negative aspiration was achieved. In total, approximately 6mg of Betamethasone and 2ml of 1% preservative free Lidocaine was injected without difficulty.    Patient tolerated the procedure well, no obvious complications occurred during the procedure.  Patient was appropriately monitored and discharged home in stable condition with their usual motor strength. Post Op Instructions were given to patient. Relevant and recent imaging reviewed with patient today.                                      MAURA STOLL DO

## 2024-04-05 ENCOUNTER — APPOINTMENT (OUTPATIENT)
Dept: NEUROLOGY | Facility: CLINIC | Age: 64
End: 2024-04-05
Payer: MEDICARE

## 2024-04-11 ENCOUNTER — TELEPHONE (OUTPATIENT)
Dept: SCHEDULING | Age: 64
End: 2024-04-11

## 2024-04-11 ENCOUNTER — OFFICE VISIT (OUTPATIENT)
Dept: PRIMARY CARE | Facility: CLINIC | Age: 64
End: 2024-04-11
Payer: MEDICARE

## 2024-04-11 ENCOUNTER — LAB (OUTPATIENT)
Dept: LAB | Facility: LAB | Age: 64
End: 2024-04-11
Payer: MEDICARE

## 2024-04-11 VITALS
SYSTOLIC BLOOD PRESSURE: 122 MMHG | WEIGHT: 192 LBS | HEIGHT: 65 IN | OXYGEN SATURATION: 95 % | TEMPERATURE: 97.4 F | BODY MASS INDEX: 31.99 KG/M2 | HEART RATE: 69 BPM | DIASTOLIC BLOOD PRESSURE: 80 MMHG | RESPIRATION RATE: 14 BRPM

## 2024-04-11 DIAGNOSIS — Z00.00 ROUTINE GENERAL MEDICAL EXAMINATION AT HEALTH CARE FACILITY: ICD-10-CM

## 2024-04-11 DIAGNOSIS — F39 MOOD DISORDER (CMS-HCC): ICD-10-CM

## 2024-04-11 DIAGNOSIS — I63.89 CEREBROVASCULAR ACCIDENT (CVA) DUE TO OTHER MECHANISM (MULTI): ICD-10-CM

## 2024-04-11 DIAGNOSIS — I73.9 PERIPHERAL VASCULAR DISEASE, UNSPECIFIED (CMS-HCC): ICD-10-CM

## 2024-04-11 DIAGNOSIS — Z11.59 NEED FOR HEPATITIS C SCREENING TEST: ICD-10-CM

## 2024-04-11 DIAGNOSIS — K52.831 COLLAGENOUS COLITIS: ICD-10-CM

## 2024-04-11 DIAGNOSIS — F31.12: ICD-10-CM

## 2024-04-11 DIAGNOSIS — I69.354 HEMIPLGA FOLLOWING CEREBRAL INFRC AFFECTING LEFT NONDOM SIDE (MULTI): ICD-10-CM

## 2024-04-11 DIAGNOSIS — Z00.00 ROUTINE GENERAL MEDICAL EXAMINATION AT HEALTH CARE FACILITY: Primary | ICD-10-CM

## 2024-04-11 DIAGNOSIS — I10 BENIGN ESSENTIAL HYPERTENSION: ICD-10-CM

## 2024-04-11 DIAGNOSIS — F33.2 SEVERE EPISODE OF RECURRENT MAJOR DEPRESSIVE DISORDER, WITHOUT PSYCHOTIC FEATURES (MULTI): ICD-10-CM

## 2024-04-11 DIAGNOSIS — Z12.31 ENCOUNTER FOR SCREENING MAMMOGRAM FOR BREAST CANCER: ICD-10-CM

## 2024-04-11 PROBLEM — R00.0 TACHYCARDIA: Status: RESOLVED | Noted: 2023-09-08 | Resolved: 2024-04-11

## 2024-04-11 PROBLEM — S46.911A STRAIN OF UNSPECIFIED MUSCLE, FASCIA AND TENDON AT SHOULDER AND UPPER ARM LEVEL, RIGHT ARM, INITIAL ENCOUNTER: Status: RESOLVED | Noted: 2017-12-30 | Resolved: 2024-04-11

## 2024-04-11 PROBLEM — R30.0 DYSURIA: Status: RESOLVED | Noted: 2023-02-17 | Resolved: 2024-04-11

## 2024-04-11 PROBLEM — F32.9 MAJOR DEPRESSIVE DISORDER, SINGLE EPISODE, UNSPECIFIED: Status: RESOLVED | Noted: 2018-07-03 | Resolved: 2024-04-11

## 2024-04-11 PROBLEM — R06.02 SHORTNESS OF BREATH: Status: RESOLVED | Noted: 2023-09-08 | Resolved: 2024-04-11

## 2024-04-11 PROBLEM — I63.9 CEREBROVASCULAR ACCIDENT (MULTI): Status: RESOLVED | Noted: 2020-12-16 | Resolved: 2024-04-11

## 2024-04-11 PROBLEM — G47.20 DISRUPTIONS OF 24 HOUR SLEEP-WAKE CYCLE: Status: RESOLVED | Noted: 2020-08-17 | Resolved: 2024-04-11

## 2024-04-11 PROBLEM — M17.12 ARTHRITIS OF KNEE, LEFT: Status: RESOLVED | Noted: 2017-04-10 | Resolved: 2024-04-11

## 2024-04-11 PROBLEM — F17.200 SMOKER: Status: RESOLVED | Noted: 2022-08-25 | Resolved: 2024-04-11

## 2024-04-11 PROBLEM — R55 SYNCOPE: Status: RESOLVED | Noted: 2023-09-08 | Resolved: 2024-04-11

## 2024-04-11 PROBLEM — M96.1 POSTLAMINECTOMY SYNDROME OF LUMBAR REGION: Status: RESOLVED | Noted: 2018-09-04 | Resolved: 2024-04-11

## 2024-04-11 PROBLEM — R20.0 NUMBNESS OF UPPER LIMB: Status: RESOLVED | Noted: 2020-12-16 | Resolved: 2024-04-11

## 2024-04-11 PROBLEM — I16.0 HYPERTENSIVE URGENCY: Status: RESOLVED | Noted: 2021-02-12 | Resolved: 2024-04-11

## 2024-04-11 PROBLEM — R00.1 BRADYCARDIA, UNSPECIFIED: Status: RESOLVED | Noted: 2020-07-14 | Resolved: 2024-04-11

## 2024-04-11 PROBLEM — G43.719 INTRACTABLE CHRONIC MIGRAINE WITHOUT AURA AND WITHOUT STATUS MIGRAINOSUS: Status: RESOLVED | Noted: 2020-04-01 | Resolved: 2024-04-11

## 2024-04-11 PROBLEM — Z96.652 PRESENCE OF LEFT ARTIFICIAL KNEE JOINT: Status: RESOLVED | Noted: 2020-12-17 | Resolved: 2024-04-11

## 2024-04-11 PROBLEM — G43.019 REFRACTORY MIGRAINE WITHOUT AURA: Status: RESOLVED | Noted: 2020-12-16 | Resolved: 2024-04-11

## 2024-04-11 PROBLEM — B37.2 CANDIDIASIS OF SKIN: Status: RESOLVED | Noted: 2020-12-16 | Resolved: 2024-04-11

## 2024-04-11 LAB
ALBUMIN SERPL BCP-MCNC: 4.3 G/DL (ref 3.4–5)
ALP SERPL-CCNC: 150 U/L (ref 33–136)
ALT SERPL W P-5'-P-CCNC: 44 U/L (ref 7–45)
ANION GAP SERPL CALC-SCNC: 11 MMOL/L (ref 10–20)
AST SERPL W P-5'-P-CCNC: 16 U/L (ref 9–39)
BILIRUB SERPL-MCNC: 0.5 MG/DL (ref 0–1.2)
BUN SERPL-MCNC: 15 MG/DL (ref 6–23)
CALCIUM SERPL-MCNC: 9.6 MG/DL (ref 8.6–10.3)
CHLORIDE SERPL-SCNC: 104 MMOL/L (ref 98–107)
CHOLEST SERPL-MCNC: 185 MG/DL (ref 0–199)
CHOLESTEROL/HDL RATIO: 2.5
CO2 SERPL-SCNC: 31 MMOL/L (ref 21–32)
CREAT SERPL-MCNC: 0.79 MG/DL (ref 0.5–1.05)
EGFRCR SERPLBLD CKD-EPI 2021: 84 ML/MIN/1.73M*2
ERYTHROCYTE [DISTWIDTH] IN BLOOD BY AUTOMATED COUNT: 13.3 % (ref 11.5–14.5)
GLUCOSE SERPL-MCNC: 104 MG/DL (ref 74–99)
HCT VFR BLD AUTO: 42 % (ref 36–46)
HCV AB SER QL: NONREACTIVE
HDLC SERPL-MCNC: 75 MG/DL
HGB BLD-MCNC: 13.9 G/DL (ref 12–16)
LDLC SERPL CALC-MCNC: 94 MG/DL
MCH RBC QN AUTO: 31.9 PG (ref 26–34)
MCHC RBC AUTO-ENTMCNC: 33.1 G/DL (ref 32–36)
MCV RBC AUTO: 96 FL (ref 80–100)
NON HDL CHOLESTEROL: 110 MG/DL (ref 0–149)
NRBC BLD-RTO: 0 /100 WBCS (ref 0–0)
PLATELET # BLD AUTO: 329 X10*3/UL (ref 150–450)
POTASSIUM SERPL-SCNC: 3.4 MMOL/L (ref 3.5–5.3)
PROT SERPL-MCNC: 6.3 G/DL (ref 6.4–8.2)
RBC # BLD AUTO: 4.36 X10*6/UL (ref 4–5.2)
SODIUM SERPL-SCNC: 143 MMOL/L (ref 136–145)
TRIGL SERPL-MCNC: 82 MG/DL (ref 0–149)
VLDL: 16 MG/DL (ref 0–40)
WBC # BLD AUTO: 7.9 X10*3/UL (ref 4.4–11.3)

## 2024-04-11 PROCEDURE — 3074F SYST BP LT 130 MM HG: CPT | Performed by: INTERNAL MEDICINE

## 2024-04-11 PROCEDURE — 86803 HEPATITIS C AB TEST: CPT

## 2024-04-11 PROCEDURE — 99396 PREV VISIT EST AGE 40-64: CPT | Performed by: INTERNAL MEDICINE

## 2024-04-11 PROCEDURE — 80053 COMPREHEN METABOLIC PANEL: CPT

## 2024-04-11 PROCEDURE — 85027 COMPLETE CBC AUTOMATED: CPT

## 2024-04-11 PROCEDURE — G0439 PPPS, SUBSEQ VISIT: HCPCS | Performed by: INTERNAL MEDICINE

## 2024-04-11 PROCEDURE — 80061 LIPID PANEL: CPT

## 2024-04-11 PROCEDURE — 3079F DIAST BP 80-89 MM HG: CPT | Performed by: INTERNAL MEDICINE

## 2024-04-11 PROCEDURE — 36415 COLL VENOUS BLD VENIPUNCTURE: CPT

## 2024-04-11 PROCEDURE — 1036F TOBACCO NON-USER: CPT | Performed by: INTERNAL MEDICINE

## 2024-04-11 RX ORDER — BUDESONIDE 3 MG/1
CAPSULE, COATED PELLETS ORAL
COMMUNITY
Start: 2024-04-09 | End: 2024-06-25

## 2024-04-11 RX ORDER — VORTIOXETINE 20 MG/1
20 TABLET, FILM COATED ORAL DAILY
COMMUNITY
Start: 2024-03-14

## 2024-04-11 ASSESSMENT — ENCOUNTER SYMPTOMS
LOSS OF SENSATION IN FEET: 0
OCCASIONAL FEELINGS OF UNSTEADINESS: 0
DEPRESSION: 0

## 2024-04-11 ASSESSMENT — ACTIVITIES OF DAILY LIVING (ADL)
TAKING_MEDICATION: INDEPENDENT
DRESSING: INDEPENDENT
BATHING: INDEPENDENT
GROCERY_SHOPPING: INDEPENDENT
DOING_HOUSEWORK: INDEPENDENT
MANAGING_FINANCES: INDEPENDENT

## 2024-04-11 NOTE — PROGRESS NOTES
"Subjective    Sofi Steve is a 63 y.o. female who presents for Medicare Annual Wellness Visit Subsequent.  HPI    Mammogram 4/2023  Colonoscopy 2020   Utd on shingrix, tdap  She just had a stress test last year. No chest pain.   Her mood is good    Does not have living will, blank copy provided     Diagnosed with Collagenous Colitis and was prescribed budesonide. Just started it  has not taken effect    Review of Systems   All other systems reviewed and are negative.        Objective     /80 (BP Location: Left arm, Patient Position: Sitting, BP Cuff Size: Adult)   Pulse 69   Temp 36.3 °C (97.4 °F) (Skin)   Resp 14   Ht 1.651 m (5' 5\")   Wt 87.1 kg (192 lb)   SpO2 95%   BMI 31.95 kg/m²    Physical Exam  Vitals reviewed.   Constitutional:       General: She is not in acute distress.     Appearance: Normal appearance.   Cardiovascular:      Rate and Rhythm: Normal rate and regular rhythm.      Pulses: Normal pulses.      Heart sounds: Normal heart sounds.   Pulmonary:      Effort: Pulmonary effort is normal.      Breath sounds: Normal breath sounds.   Chest:      Chest wall: No mass or tenderness.   Breasts:     Right: Normal.      Left: Normal.   Abdominal:      General: Abdomen is flat.      Tenderness: There is no abdominal tenderness.   Musculoskeletal:         General: No swelling.      Cervical back: Neck supple. No tenderness.   Lymphadenopathy:      Cervical: No cervical adenopathy.      Upper Body:      Right upper body: No supraclavicular or axillary adenopathy.      Left upper body: No supraclavicular or axillary adenopathy.   Skin:     General: Skin is warm and dry.   Neurological:      Mental Status: She is alert.   Psychiatric:         Attention and Perception: Attention and perception normal.         Mood and Affect: Mood and affect normal.       Health Maintenance Due   Topic Date Due    Medicare Annual Wellness Visit (AWV)  Never done    HIV Screening  Never done    MMR Vaccines (1 " of 1 - Standard series) Never done    Diabetes Screening  Never done    Cervical Cancer Screening  04/29/2016    COVID-19 Vaccine (6 - 2023-24 season) 09/01/2023    Mammogram  04/24/2024          Assessment/Plan   Problem List Items Addressed This Visit       Benign essential hypertension    Relevant Orders    CBC    Bipolar disorder, manic, moderate (CMS/HCC)    Severe episode of recurrent major depressive disorder, without psychotic features (CMS/HCC)    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 She has just started on the steroid for her colitis.  She is feeling well.   Check labs.   Check mamm.  Call  with any problems or questions.   Follow up in 6 months.

## 2024-04-12 ENCOUNTER — TELEPHONE (OUTPATIENT)
Dept: PRIMARY CARE | Facility: CLINIC | Age: 64
End: 2024-04-12
Payer: MEDICARE

## 2024-04-12 DIAGNOSIS — R74.8 ELEVATED ALKALINE PHOSPHATASE LEVEL: Primary | ICD-10-CM

## 2024-04-12 NOTE — RESULT ENCOUNTER NOTE
Labs are ok. One iof her liver enzymes is just slightly elevated. Would just repeat in a month. Her potassium is just slightly low. Increase potassium judith food will recheck that as well

## 2024-04-12 NOTE — TELEPHONE ENCOUNTER
----- Message from Shanel Man DO sent at 4/12/2024 10:45 AM EDT -----  Labs are ok. One iof her liver enzymes is just slightly elevated. Would just repeat in a month. Her potassium is just slightly low. Increase potassium judith food will recheck that as well   
Pt informed of results   
negative

## 2024-04-22 ENCOUNTER — OFFICE VISIT (OUTPATIENT)
Dept: PAIN MANAGEMENT | Age: 64
End: 2024-04-22
Payer: MEDICARE

## 2024-04-22 VITALS
DIASTOLIC BLOOD PRESSURE: 78 MMHG | HEIGHT: 65 IN | BODY MASS INDEX: 32.32 KG/M2 | SYSTOLIC BLOOD PRESSURE: 128 MMHG | TEMPERATURE: 98 F | WEIGHT: 194 LBS

## 2024-04-22 DIAGNOSIS — M47.817 LUMBOSACRAL SPONDYLOSIS WITHOUT MYELOPATHY: ICD-10-CM

## 2024-04-22 DIAGNOSIS — Z98.1 HISTORY OF LUMBAR FUSION: ICD-10-CM

## 2024-04-22 DIAGNOSIS — M25.511 PAIN IN JOINT OF RIGHT SHOULDER: ICD-10-CM

## 2024-04-22 DIAGNOSIS — G89.4 CHRONIC PAIN SYNDROME: ICD-10-CM

## 2024-04-22 DIAGNOSIS — M54.16 LUMBAR RADICULOPATHY: ICD-10-CM

## 2024-04-22 DIAGNOSIS — M48.062 SPINAL STENOSIS OF LUMBAR REGION WITH NEUROGENIC CLAUDICATION: ICD-10-CM

## 2024-04-22 DIAGNOSIS — M96.1 POST LAMINECTOMY SYNDROME: ICD-10-CM

## 2024-04-22 DIAGNOSIS — M47.812 CERVICAL SPONDYLOSIS WITHOUT MYELOPATHY: Primary | ICD-10-CM

## 2024-04-22 PROCEDURE — 3078F DIAST BP <80 MM HG: CPT | Performed by: NURSE PRACTITIONER

## 2024-04-22 PROCEDURE — 1036F TOBACCO NON-USER: CPT | Performed by: NURSE PRACTITIONER

## 2024-04-22 PROCEDURE — G8417 CALC BMI ABV UP PARAM F/U: HCPCS | Performed by: NURSE PRACTITIONER

## 2024-04-22 PROCEDURE — 99214 OFFICE O/P EST MOD 30 MIN: CPT | Performed by: NURSE PRACTITIONER

## 2024-04-22 PROCEDURE — 3017F COLORECTAL CA SCREEN DOC REV: CPT | Performed by: NURSE PRACTITIONER

## 2024-04-22 PROCEDURE — G8427 DOCREV CUR MEDS BY ELIG CLIN: HCPCS | Performed by: NURSE PRACTITIONER

## 2024-04-22 PROCEDURE — 3074F SYST BP LT 130 MM HG: CPT | Performed by: NURSE PRACTITIONER

## 2024-04-22 RX ORDER — OXYCODONE HYDROCHLORIDE AND ACETAMINOPHEN 5; 325 MG/1; MG/1
1 TABLET ORAL 3 TIMES DAILY PRN
Qty: 90 TABLET | Refills: 0 | Status: SHIPPED | OUTPATIENT
Start: 2024-04-22 | End: 2024-05-22

## 2024-04-22 RX ORDER — GABAPENTIN 400 MG/1
400 CAPSULE ORAL 3 TIMES DAILY
Qty: 270 CAPSULE | Refills: 0 | Status: SHIPPED | OUTPATIENT
Start: 2024-04-22 | End: 2024-07-21

## 2024-04-22 RX ORDER — NALOXONE HYDROCHLORIDE 4 MG/.1ML
1 SPRAY NASAL PRN
Qty: 2 EACH | Refills: 1 | Status: SHIPPED | OUTPATIENT
Start: 2024-04-22

## 2024-04-22 RX ORDER — TIZANIDINE 4 MG/1
4 TABLET ORAL 2 TIMES DAILY PRN
Qty: 180 TABLET | Refills: 0 | Status: SHIPPED | OUTPATIENT
Start: 2024-04-22 | End: 2024-07-21

## 2024-04-22 ASSESSMENT — ENCOUNTER SYMPTOMS
COUGH: 0
SHORTNESS OF BREATH: 0
DIARRHEA: 0
CONSTIPATION: 0
GASTROINTESTINAL NEGATIVE: 1
BACK PAIN: 1
EYES NEGATIVE: 1
TROUBLE SWALLOWING: 0

## 2024-04-22 NOTE — PROGRESS NOTES
mouth nightly       No current facility-administered medications on file prior to visit.           Pt presents today for a f/u of her pain.   PCP is Dr. Santa DO.Patient last saw Dr. Alarcon for bilateral SI joint injection on 4/4/2024 and says she had relief, but not quite 50%. She had a follow-up to discuss medications on 3/21/2024 with him.  Unfortunately the RF ablation in the lumbar spine did not as much as she was hoping for.  She has a history of lumbar fusion at L4-5 and 3 back surgeries.  Her Percocet in the past was discontinued due to marijuana and she is no longer smoking this.  It was discussed at that time and UDS and NDP was discussed and Percocet 5 mg 3 times daily as needed pen was restarted.  She denies alcohol use.  U tox was consistent with 2024 with Xanax and gabapentin which she is prescribed and also very low levels of metabolite of ethanol. She admits she had a drink d/t her grandon's wedding.  On 3/5/2024 as when she had bilateral L2, 3, 5 RF ablation.    She is having more neck pain and feels like it is \"pulling\" and like she needs to \"crack my neck and it won't crack\".  Past cervical RF ablation has helped significantly > 50% she reports    Seen Dr. Santillan on 12/19/2023 and is noted in his note that exam shows limited motion of the left lumbar spine and mechanical pain in the back and the MRI shows new retrolisthesis L1 on L2 with left-sided disc protrusion and moderate canal stenosis above the area of previous surgeries.  Patient was advised x-rays and physical therapy.  Seen ortho for her right shoulder pain and told needs reverse total Rt shoulder replacement and she wants to wait on this.  She says she has seen cardiology d/t her syncope and told orthostatic hypotension.  She says she quit smoking pot.  She admits to drinking \"maybe one drink a week\" of alcohol.      Had Rt shoulder MRI done in October report showing partial thickness tear for rotator cuff. She tried and failed

## 2024-04-23 ENCOUNTER — TELEPHONE (OUTPATIENT)
Dept: PAIN MANAGEMENT | Age: 64
End: 2024-04-23

## 2024-04-23 NOTE — TELEPHONE ENCOUNTER
RIGHT THEN LEFT C345 RFA    NO AUTH REQUIRED    OK to schedule procedure approved as above.   Please note sides/levels approved and date range.   (If applicable, sides/levels approved may differ from those ordered)    TO BE SCHEDULED WITH DR STOLL

## 2024-04-24 ENCOUNTER — HOSPITAL ENCOUNTER (OUTPATIENT)
Dept: RADIOLOGY | Facility: HOSPITAL | Age: 64
Discharge: HOME | End: 2024-04-24
Payer: MEDICARE

## 2024-04-24 VITALS — WEIGHT: 192 LBS | HEIGHT: 65 IN | BODY MASS INDEX: 31.99 KG/M2

## 2024-04-24 DIAGNOSIS — Z12.31 ENCOUNTER FOR SCREENING MAMMOGRAM FOR BREAST CANCER: ICD-10-CM

## 2024-04-24 PROCEDURE — 77067 SCR MAMMO BI INCL CAD: CPT

## 2024-04-24 PROCEDURE — 77063 BREAST TOMOSYNTHESIS BI: CPT | Performed by: RADIOLOGY

## 2024-04-24 PROCEDURE — 77067 SCR MAMMO BI INCL CAD: CPT | Performed by: RADIOLOGY

## 2024-04-24 NOTE — TELEPHONE ENCOUNTER
[facility]  [id number] [place] [address] Dr Gurwinder BRTIO [id number]           I have received a copy of my After Visit Summary and discharge instructions from [facility].    INSTRUCTIONS RECEIVED AND UNDERSTOOD BY:                     Patient/Patient Representative: ________________________________________________________________     Date/Time: ________________________________________________________________                     Instructions Given By: ________________________________________________________________     Date/Time: ________________________________________________________________            L/m to set up rfa

## 2024-04-26 ENCOUNTER — SPECIALTY PHARMACY (OUTPATIENT)
Dept: PHARMACY | Facility: CLINIC | Age: 64
End: 2024-04-26

## 2024-04-26 DIAGNOSIS — L21.9 SEBORRHEA: ICD-10-CM

## 2024-04-26 RX ORDER — KETOCONAZOLE 20 MG/G
CREAM TOPICAL DAILY
COMMUNITY
End: 2024-04-26 | Stop reason: SDUPTHER

## 2024-04-26 RX ORDER — KETOCONAZOLE 20 MG/G
CREAM TOPICAL DAILY
Qty: 15 G | Refills: 0 | Status: SHIPPED | OUTPATIENT
Start: 2024-04-26

## 2024-04-26 NOTE — TELEPHONE ENCOUNTER
Rx Refill Request Telephone Encounter    Name:  Sofi Steve  :  [id number]  Medication Name:  ketoconazole (NIZOral) 2 % cream   Specific Pharmacy location:  [facility]  Date of last appointment:  24  Date of next appointment:  10/15/24  Best number to reach patient:  [phone]

## 2024-05-06 ENCOUNTER — OFFICE VISIT (OUTPATIENT)
Dept: PAIN MANAGEMENT | Age: 64
End: 2024-05-06
Payer: MEDICARE

## 2024-05-06 DIAGNOSIS — M47.812 CERVICAL SPONDYLOSIS WITHOUT MYELOPATHY: ICD-10-CM

## 2024-05-06 PROCEDURE — 64633 DESTROY CERV/THOR FACET JNT: CPT | Performed by: PAIN MEDICINE

## 2024-05-06 PROCEDURE — 64634 DESTROY C/TH FACET JNT ADDL: CPT | Performed by: PAIN MEDICINE

## 2024-05-06 RX ORDER — DEXAMETHASONE SODIUM PHOSPHATE 10 MG/ML
10 INJECTION, SOLUTION INTRAMUSCULAR; INTRAVENOUS ONCE
Status: COMPLETED | OUTPATIENT
Start: 2024-05-06 | End: 2024-05-06

## 2024-05-06 RX ORDER — LIDOCAINE HYDROCHLORIDE 10 MG/ML
3 INJECTION, SOLUTION EPIDURAL; INFILTRATION; INTRACAUDAL; PERINEURAL ONCE
Status: COMPLETED | OUTPATIENT
Start: 2024-05-06 | End: 2024-05-06

## 2024-05-06 RX ADMIN — LIDOCAINE HYDROCHLORIDE 3 ML: 10 INJECTION, SOLUTION EPIDURAL; INFILTRATION; INTRACAUDAL; PERINEURAL at 09:27

## 2024-05-06 RX ADMIN — DEXAMETHASONE SODIUM PHOSPHATE 10 MG: 10 INJECTION, SOLUTION INTRAMUSCULAR; INTRAVENOUS at 09:28

## 2024-05-06 NOTE — PROGRESS NOTES
Procedure: B c345 cervical radiofrequency medial branch ablation using fluoroscopic needle guidance.    Timeout taken to identify correct patient, procedure and side.    Patient lying in the prone position the patient was prepped and draped in the usual sterile fashion using Betadine.  The levels were determined under fluoroscopy.  1% preservative-free lidocaine was used to numb the skin using a 27-gauge 1-1/2 inch needle.  Radiofrequency needle was introduced to the anatomic location of the medial branch at the lateral masses utilizing intermittent fluoroscopy.  Motor stimulation up to 2 V was done to confirm no ablation of the ventral ramus at each level.  Prior to lesioning at 80 °C for 90 seconds 1.5 mL of 1% preservative-free lidocaine along with 10 mg dexamethasone preservative-free was divided equally amongst the levels and injected with negative aspiration.  This was done at each level.    The procedure was tolerated well without complications.  The patient was monitored after procedure, had their usual motor strength.  And discharged home in stable condition.  Patient will ice the area and  take it easy.  All questions answered, chart was reviewed.

## 2024-05-17 ENCOUNTER — SPECIALTY PHARMACY (OUTPATIENT)
Dept: PHARMACY | Facility: CLINIC | Age: 64
End: 2024-05-17

## 2024-05-21 ENCOUNTER — OFFICE VISIT (OUTPATIENT)
Dept: PAIN MANAGEMENT | Age: 64
End: 2024-05-21
Payer: MEDICARE

## 2024-05-21 ENCOUNTER — LAB (OUTPATIENT)
Dept: LAB | Facility: LAB | Age: 64
End: 2024-05-21
Payer: MEDICARE

## 2024-05-21 VITALS
DIASTOLIC BLOOD PRESSURE: 74 MMHG | WEIGHT: 194 LBS | SYSTOLIC BLOOD PRESSURE: 126 MMHG | HEIGHT: 65 IN | BODY MASS INDEX: 32.32 KG/M2

## 2024-05-21 DIAGNOSIS — M25.511 PAIN IN JOINT OF RIGHT SHOULDER: ICD-10-CM

## 2024-05-21 DIAGNOSIS — M48.062 SPINAL STENOSIS OF LUMBAR REGION WITH NEUROGENIC CLAUDICATION: ICD-10-CM

## 2024-05-21 DIAGNOSIS — M96.1 POST LAMINECTOMY SYNDROME: ICD-10-CM

## 2024-05-21 DIAGNOSIS — M47.817 LUMBOSACRAL SPONDYLOSIS WITHOUT MYELOPATHY: ICD-10-CM

## 2024-05-21 DIAGNOSIS — Z98.1 HISTORY OF LUMBAR FUSION: ICD-10-CM

## 2024-05-21 DIAGNOSIS — G89.4 CHRONIC PAIN SYNDROME: ICD-10-CM

## 2024-05-21 DIAGNOSIS — M54.16 LUMBAR RADICULOPATHY: ICD-10-CM

## 2024-05-21 DIAGNOSIS — R74.8 ELEVATED ALKALINE PHOSPHATASE LEVEL: ICD-10-CM

## 2024-05-21 DIAGNOSIS — M47.812 CERVICAL SPONDYLOSIS WITHOUT MYELOPATHY: ICD-10-CM

## 2024-05-21 LAB
ALBUMIN SERPL BCP-MCNC: 4.3 G/DL (ref 3.4–5)
ALP SERPL-CCNC: 52 U/L (ref 33–136)
ALT SERPL W P-5'-P-CCNC: 13 U/L (ref 7–45)
ANION GAP SERPL CALC-SCNC: 10 MMOL/L (ref 10–20)
AST SERPL W P-5'-P-CCNC: 17 U/L (ref 9–39)
BILIRUB SERPL-MCNC: 0.4 MG/DL (ref 0–1.2)
BUN SERPL-MCNC: 13 MG/DL (ref 6–23)
CALCIUM SERPL-MCNC: 9.2 MG/DL (ref 8.6–10.3)
CHLORIDE SERPL-SCNC: 106 MMOL/L (ref 98–107)
CO2 SERPL-SCNC: 31 MMOL/L (ref 21–32)
CREAT SERPL-MCNC: 0.99 MG/DL (ref 0.5–1.05)
EGFRCR SERPLBLD CKD-EPI 2021: 64 ML/MIN/1.73M*2
GLUCOSE SERPL-MCNC: 90 MG/DL (ref 74–99)
POTASSIUM SERPL-SCNC: 3.8 MMOL/L (ref 3.5–5.3)
PROT SERPL-MCNC: 6.2 G/DL (ref 6.4–8.2)
SODIUM SERPL-SCNC: 143 MMOL/L (ref 136–145)

## 2024-05-21 PROCEDURE — 99214 OFFICE O/P EST MOD 30 MIN: CPT | Performed by: NURSE PRACTITIONER

## 2024-05-21 PROCEDURE — 36415 COLL VENOUS BLD VENIPUNCTURE: CPT

## 2024-05-21 PROCEDURE — 3017F COLORECTAL CA SCREEN DOC REV: CPT | Performed by: NURSE PRACTITIONER

## 2024-05-21 PROCEDURE — 3078F DIAST BP <80 MM HG: CPT | Performed by: NURSE PRACTITIONER

## 2024-05-21 PROCEDURE — G8427 DOCREV CUR MEDS BY ELIG CLIN: HCPCS | Performed by: NURSE PRACTITIONER

## 2024-05-21 PROCEDURE — 3074F SYST BP LT 130 MM HG: CPT | Performed by: NURSE PRACTITIONER

## 2024-05-21 PROCEDURE — G8417 CALC BMI ABV UP PARAM F/U: HCPCS | Performed by: NURSE PRACTITIONER

## 2024-05-21 PROCEDURE — 1036F TOBACCO NON-USER: CPT | Performed by: NURSE PRACTITIONER

## 2024-05-21 PROCEDURE — 80053 COMPREHEN METABOLIC PANEL: CPT

## 2024-05-21 RX ORDER — LAMOTRIGINE 200 MG/1
TABLET ORAL
COMMUNITY
Start: 2024-05-08

## 2024-05-21 RX ORDER — PRAZOSIN HYDROCHLORIDE 1 MG/1
CAPSULE ORAL
COMMUNITY
Start: 2024-05-08

## 2024-05-21 RX ORDER — UBROGEPANT 100 MG/1
TABLET ORAL
COMMUNITY
Start: 2024-05-17

## 2024-05-21 RX ORDER — BUDESONIDE 3 MG/1
CAPSULE, COATED PELLETS ORAL
COMMUNITY
Start: 2024-04-09 | End: 2024-06-25

## 2024-05-21 RX ORDER — OXYCODONE HYDROCHLORIDE AND ACETAMINOPHEN 5; 325 MG/1; MG/1
1 TABLET ORAL 3 TIMES DAILY PRN
Qty: 90 TABLET | Refills: 0 | Status: SHIPPED | OUTPATIENT
Start: 2024-05-22 | End: 2024-06-21

## 2024-05-21 ASSESSMENT — ENCOUNTER SYMPTOMS
SHORTNESS OF BREATH: 0
EYES NEGATIVE: 1
DIARRHEA: 0
COUGH: 0
TROUBLE SWALLOWING: 0
CONSTIPATION: 0
BACK PAIN: 1
GASTROINTESTINAL NEGATIVE: 1

## 2024-05-21 NOTE — PROGRESS NOTES
Kari Dimas  (1960)    5/21/2024    Subjective:     Kari Dimas is 63 y.o. female who complains today of:    Chief Complaint   Patient presents with    Shoulder Pain     Right     Back Pain     Lower          Allergies:  Nsaids and Ibuprofen    Past Medical History:   Diagnosis Date    Anemia     Arthritis     Chicken pox     Chronic back pain     H/O bladder infections     Headache(784.0)     History of blood transfusion     post delivery of first child, again after gastric ulcer cauterization, again for anemia    Hyperlipidemia     meds for about 1 year    Hypertension     meds for about 2 years    Incomplete bladder emptying 09/29/2009    Measles     Mumps     Nausea, vomiting and diarrhea 08/17/2020    Stroke (HCC) 07/2020     Past Surgical History:   Procedure Laterality Date    APPENDECTOMY      BACK SURGERY  2014    lumbar back fusion    CHOLECYSTECTOMY      2017    COLONOSCOPY      ENDOSCOPY, COLON, DIAGNOSTIC      GALLBLADDER SURGERY      HYSTERECTOMY (CERVIX STATUS UNKNOWN)      1980    JOINT REPLACEMENT Right 2020    JOINT REPLACEMENT Left 2018    KNEE SURGERY Right     LEG BIOPSY EXCISION Right 11/12/2021    SOFT TISSUE MASS EXCISION RIGHT ANKLE performed by Carter Jung MD at [facility] [place]    LUMBAR FUSION N/A 10/21/2022    RIGHT BILATERAL L2-3 LAMINECTOMIES MICRODISSECTION FORAMINOTOMIES INTERBODY POSTEROLATERAL PEDICLE SCREW FUSION WITH IGS performed by Delores Santillan MD at [facility] [place]    ROTATOR CUFF REPAIR      TONSILLECTOMY       Family History   Problem Relation Age of Onset    Stroke Mother     Cancer Father     Diabetes Sister     High Blood Pressure Sister     High Blood Pressure Brother     Diabetes Brother     No Known Problems Daughter      Social History     Socioeconomic History    Marital status:      Spouse name: Not on file    Number of children: Not on file    Years of education: Not on file    Highest education level: Not on file   Occupational History    Not on file

## 2024-06-10 ENCOUNTER — OFFICE VISIT (OUTPATIENT)
Dept: UROLOGY | Facility: CLINIC | Age: 64
End: 2024-06-10
Payer: MEDICARE

## 2024-06-10 VITALS — HEART RATE: 57 BPM | SYSTOLIC BLOOD PRESSURE: 143 MMHG | DIASTOLIC BLOOD PRESSURE: 87 MMHG | TEMPERATURE: 98 F

## 2024-06-10 DIAGNOSIS — R31.9 HEMATURIA, UNSPECIFIED TYPE: Primary | ICD-10-CM

## 2024-06-10 DIAGNOSIS — N30.00 ACUTE CYSTITIS WITHOUT HEMATURIA: ICD-10-CM

## 2024-06-10 DIAGNOSIS — N95.8 GENITOURINARY SYNDROME OF MENOPAUSE: ICD-10-CM

## 2024-06-10 DIAGNOSIS — R35.0 FREQUENCY OF MICTURITION: ICD-10-CM

## 2024-06-10 LAB
POC APPEARANCE, URINE: CLEAR
POC BILIRUBIN, URINE: NEGATIVE
POC BLOOD, URINE: ABNORMAL
POC COLOR, URINE: YELLOW
POC GLUCOSE, URINE: NEGATIVE MG/DL
POC KETONES, URINE: NEGATIVE MG/DL
POC LEUKOCYTES, URINE: ABNORMAL
POC NITRITE,URINE: POSITIVE
POC PH, URINE: 7 PH
POC PROTEIN, URINE: ABNORMAL MG/DL
POC SPECIFIC GRAVITY, URINE: 1.01
POC UROBILINOGEN, URINE: 0.2 EU/DL

## 2024-06-10 PROCEDURE — 99214 OFFICE O/P EST MOD 30 MIN: CPT | Performed by: NURSE PRACTITIONER

## 2024-06-10 PROCEDURE — 87086 URINE CULTURE/COLONY COUNT: CPT

## 2024-06-10 PROCEDURE — 87186 SC STD MICRODIL/AGAR DIL: CPT

## 2024-06-10 PROCEDURE — 3079F DIAST BP 80-89 MM HG: CPT | Performed by: NURSE PRACTITIONER

## 2024-06-10 PROCEDURE — 81002 URINALYSIS NONAUTO W/O SCOPE: CPT | Performed by: NURSE PRACTITIONER

## 2024-06-10 PROCEDURE — 3077F SYST BP >= 140 MM HG: CPT | Performed by: NURSE PRACTITIONER

## 2024-06-10 RX ORDER — NITROFURANTOIN 25; 75 MG/1; MG/1
100 CAPSULE ORAL 2 TIMES DAILY
Qty: 14 CAPSULE | Refills: 0 | Status: SHIPPED | OUTPATIENT
Start: 2024-06-10 | End: 2024-06-17

## 2024-06-10 RX ORDER — ESTRADIOL 0.1 MG/G
CREAM VAGINAL
Qty: 42.5 G | Refills: 5 | Status: SHIPPED | OUTPATIENT
Start: 2024-06-10 | End: 2025-06-10

## 2024-06-10 NOTE — PROGRESS NOTES
06/10/24   [id number]    Chief Complaint   Patient presents with    Blood in Urine    Symptoms of UTI, follow up hematuria, OAB  Subjective      HPI Sofi Steve is a 63 y.o. female who presents for follow up hematuria, OAB, symptoms of UTI today;    Last seen 11/1/23 as NPV for gross hematuria and flank pain, ordered CT Urogram, urine cytology and cystoscopy;     pelvic exam: small cystocele anterior wall, min to small rectocele, s/p hysterectomy, bladder tender, neg cst lying down; she was also experiencing OAB and started her on oxybutynin.    5/21/24 creatinine 0.99    12/6/23 cystoscopy w Dr. Ordonez negative for hematuria source;     11/24/23 urine cytology no malignant cells    11/20/23 CT Urogram: KIDNEYS AND URETERS:  Unchanged 1 cm fluid density nodule lower portion left kidney compatible with a probably benign Bosniak type 1 cyst. No hydroureteronephrosis or nephroureterolithiasis is present;  REPRODUCTIVE ORGANS: No pelvic mass. Uterus and ovaries not identified.    Stopped oxybutynin as she felt it was effecting her diuretic, no more difficulty w OAB now;     Feels sx of UTI past couple week, strong odor and frequency; no further hematuria since last fall.     PMH, PSH,FH, SH reviewed.    Objective     /87   Pulse 57   Temp 36.7 °C (98 °F)    Physical Exam  General: Appears comfortable and in no apparent distress, well nourished  Head: Normocephalic, atraumatic  Neck: trachea midline  Respiratory: respirations unlabored, no wheezes, and no use of accessory muscles  Cardiovascular: at rest no dyspnea, well perfused  Skin: no visible rashes or lesions  Neurologic: grossly intact, oriented to person, place, and time  Psychiatric: mood and affect appropriate  Musculoskeletal: in chair for appt. no difficulty w upper body movement    Assessment/Plan   Problem List Items Addressed This Visit          Genitourinary and Reproductive    Hematuria - Primary    Relevant Orders    POCT UA (nonautomated)  manually resulted     Other Visit Diagnoses       Acute cystitis without hematuria        Relevant Medications    nitrofurantoin, macrocrystal-monohydrate, (Macrobid) 100 mg capsule    Other Relevant Orders    Urine culture    Frequency of micturition        Relevant Orders    Urine culture    Genitourinary syndrome of menopause        Relevant Medications    estradiol (Estrace) 0.01 % (0.1 mg/gram) vaginal cream          Orders Placed This Encounter   Procedures    Urine culture     Standing Status:   Future     Standing Expiration Date:   6/17/2024     Order Specific Question:   Release result to [employer]     Answer:   Immediate [1]    POCT UA (nonautomated) manually resulted     Order Specific Question:   Release result to [employer]     Answer:   Immediate [1]      No further OAB, no further gross hematuria  UTI today treated empirically w macrobid, culture sent  Estrogen vaginal cream  Dmannose 2000 mg daily  Follow up 1 year  Nurse line [phone]       Naye Geronimo, APRN-CNP  Lab Results   Component Value Date    GLUCOSE 90 05/21/2024    CALCIUM 9.2 05/21/2024     05/21/2024    K 3.8 05/21/2024    CO2 31 05/21/2024     05/21/2024    BUN 13 05/21/2024    CREATININE 0.99 05/21/2024

## 2024-06-10 NOTE — PATIENT INSTRUCTIONS
No further OAB, no further gross hematuria  UTI today treated empirically w macrobid, culture sent  Estrogen vaginal cream  Dmannose 2000 mg daily  Follow up 1 year  Nurse line [phone]

## 2024-06-11 ENCOUNTER — SPECIALTY PHARMACY (OUTPATIENT)
Dept: PHARMACY | Facility: CLINIC | Age: 64
End: 2024-06-11

## 2024-06-11 PROCEDURE — RXMED WILLOW AMBULATORY MEDICATION CHARGE

## 2024-06-12 ENCOUNTER — PHARMACY VISIT (OUTPATIENT)
Dept: PHARMACY | Facility: CLINIC | Age: 64
End: 2024-06-12
Payer: COMMERCIAL

## 2024-06-13 LAB — BACTERIA UR CULT: ABNORMAL

## 2024-06-20 ENCOUNTER — OFFICE VISIT (OUTPATIENT)
Dept: PAIN MANAGEMENT | Age: 64
End: 2024-06-20
Payer: MEDICARE

## 2024-06-20 VITALS
WEIGHT: 194 LBS | HEIGHT: 65 IN | BODY MASS INDEX: 32.32 KG/M2 | DIASTOLIC BLOOD PRESSURE: 70 MMHG | SYSTOLIC BLOOD PRESSURE: 132 MMHG

## 2024-06-20 DIAGNOSIS — M96.1 POST LAMINECTOMY SYNDROME: ICD-10-CM

## 2024-06-20 DIAGNOSIS — M47.812 CERVICAL SPONDYLOSIS WITHOUT MYELOPATHY: ICD-10-CM

## 2024-06-20 DIAGNOSIS — M47.817 LUMBOSACRAL SPONDYLOSIS WITHOUT MYELOPATHY: ICD-10-CM

## 2024-06-20 DIAGNOSIS — Z98.1 HISTORY OF LUMBAR FUSION: ICD-10-CM

## 2024-06-20 DIAGNOSIS — M25.511 PAIN IN JOINT OF RIGHT SHOULDER: ICD-10-CM

## 2024-06-20 DIAGNOSIS — M54.16 LUMBAR RADICULOPATHY: ICD-10-CM

## 2024-06-20 DIAGNOSIS — G89.4 CHRONIC PAIN SYNDROME: ICD-10-CM

## 2024-06-20 DIAGNOSIS — M48.062 SPINAL STENOSIS OF LUMBAR REGION WITH NEUROGENIC CLAUDICATION: ICD-10-CM

## 2024-06-20 PROCEDURE — 3078F DIAST BP <80 MM HG: CPT | Performed by: NURSE PRACTITIONER

## 2024-06-20 PROCEDURE — 1036F TOBACCO NON-USER: CPT | Performed by: NURSE PRACTITIONER

## 2024-06-20 PROCEDURE — G8417 CALC BMI ABV UP PARAM F/U: HCPCS | Performed by: NURSE PRACTITIONER

## 2024-06-20 PROCEDURE — 3017F COLORECTAL CA SCREEN DOC REV: CPT | Performed by: NURSE PRACTITIONER

## 2024-06-20 PROCEDURE — G8427 DOCREV CUR MEDS BY ELIG CLIN: HCPCS | Performed by: NURSE PRACTITIONER

## 2024-06-20 PROCEDURE — 99214 OFFICE O/P EST MOD 30 MIN: CPT | Performed by: NURSE PRACTITIONER

## 2024-06-20 PROCEDURE — 3075F SYST BP GE 130 - 139MM HG: CPT | Performed by: NURSE PRACTITIONER

## 2024-06-20 RX ORDER — OXYCODONE HYDROCHLORIDE AND ACETAMINOPHEN 5; 325 MG/1; MG/1
1 TABLET ORAL 3 TIMES DAILY PRN
Qty: 90 TABLET | Refills: 0 | Status: SHIPPED | OUTPATIENT
Start: 2024-06-21 | End: 2024-07-21

## 2024-06-20 ASSESSMENT — ENCOUNTER SYMPTOMS
SHORTNESS OF BREATH: 0
DIARRHEA: 0
CONSTIPATION: 0
GASTROINTESTINAL NEGATIVE: 1
TROUBLE SWALLOWING: 0
EYES NEGATIVE: 1
BACK PAIN: 1
COUGH: 0

## 2024-06-20 NOTE — PROGRESS NOTES
Kari Dimas  (1960)    6/20/2024    Subjective:     Kari Dimas is 63 y.o. female who complains today of:    Chief Complaint   Patient presents with    Back Pain     Lower     Shoulder Pain     Right          Allergies:  Nsaids and Ibuprofen    Past Medical History:   Diagnosis Date    Anemia     Arthritis     Chicken pox     Chronic back pain     H/O bladder infections     Headache(784.0)     History of blood transfusion     post delivery of first child, again after gastric ulcer cauterization, again for anemia    Hyperlipidemia     meds for about 1 year    Hypertension     meds for about 2 years    Incomplete bladder emptying 09/29/2009    Measles     Mumps     Nausea, vomiting and diarrhea 08/17/2020    Stroke (HCC) 07/2020     Past Surgical History:   Procedure Laterality Date    APPENDECTOMY      BACK SURGERY  2014    lumbar back fusion    CHOLECYSTECTOMY      2017    COLONOSCOPY      ENDOSCOPY, COLON, DIAGNOSTIC      GALLBLADDER SURGERY      HYSTERECTOMY (CERVIX STATUS UNKNOWN)      1980    JOINT REPLACEMENT Right 2020    JOINT REPLACEMENT Left 2018    KNEE SURGERY Right     LEG BIOPSY EXCISION Right 11/12/2021    SOFT TISSUE MASS EXCISION RIGHT ANKLE performed by Carter Jung MD at [facility] [place]    LUMBAR FUSION N/A 10/21/2022    RIGHT BILATERAL L2-3 LAMINECTOMIES MICRODISSECTION FORAMINOTOMIES INTERBODY POSTEROLATERAL PEDICLE SCREW FUSION WITH IGS performed by Delores Santillan MD at [facility] [place]    ROTATOR CUFF REPAIR      TONSILLECTOMY       Family History   Problem Relation Age of Onset    Stroke Mother     Cancer Father     Diabetes Sister     High Blood Pressure Sister     High Blood Pressure Brother     Diabetes Brother     No Known Problems Daughter      Social History     Socioeconomic History    Marital status:      Spouse name: Not on file    Number of children: Not on file    Years of education: Not on file    Highest education level: Not on file   Occupational History    Not on file

## 2024-06-27 ENCOUNTER — APPOINTMENT (OUTPATIENT)
Dept: CARDIOLOGY | Facility: CLINIC | Age: 64
End: 2024-06-27
Payer: MEDICARE

## 2024-06-27 VITALS
SYSTOLIC BLOOD PRESSURE: 118 MMHG | BODY MASS INDEX: 31.99 KG/M2 | WEIGHT: 192 LBS | HEIGHT: 65 IN | DIASTOLIC BLOOD PRESSURE: 82 MMHG | HEART RATE: 56 BPM

## 2024-06-27 DIAGNOSIS — K21.9 GASTROESOPHAGEAL REFLUX DISEASE WITHOUT ESOPHAGITIS: ICD-10-CM

## 2024-06-27 DIAGNOSIS — G43.001 MIGRAINE WITHOUT AURA AND WITH STATUS MIGRAINOSUS, NOT INTRACTABLE: ICD-10-CM

## 2024-06-27 DIAGNOSIS — I10 BENIGN ESSENTIAL HYPERTENSION: ICD-10-CM

## 2024-06-27 DIAGNOSIS — I95.1 ORTHOSTATIC SYNCOPE: ICD-10-CM

## 2024-06-27 DIAGNOSIS — E78.49 OTHER HYPERLIPIDEMIA: Primary | ICD-10-CM

## 2024-06-27 PROCEDURE — 99213 OFFICE O/P EST LOW 20 MIN: CPT | Performed by: INTERNAL MEDICINE

## 2024-06-27 PROCEDURE — 1036F TOBACCO NON-USER: CPT | Performed by: INTERNAL MEDICINE

## 2024-06-27 PROCEDURE — 3079F DIAST BP 80-89 MM HG: CPT | Performed by: INTERNAL MEDICINE

## 2024-06-27 PROCEDURE — 3074F SYST BP LT 130 MM HG: CPT | Performed by: INTERNAL MEDICINE

## 2024-06-27 RX ORDER — ATORVASTATIN CALCIUM 40 MG/1
40 TABLET, FILM COATED ORAL NIGHTLY
Qty: 90 TABLET | Refills: 3 | Status: SHIPPED | OUTPATIENT
Start: 2024-06-27 | End: 2025-06-27

## 2024-06-27 NOTE — PROGRESS NOTES
Patient:  Sofi Steve  YOB: 1960  MRN: [id number]       HPI:       Sofi Steve is a 63 y.o. female who returns today for cardiac follow-up.  She was seen on May 5, 2023 following an episode of syncope. She does not have any history of atherosclerotic heart or valvular heart disease. She has a history of hypertension and hyperlipidemia. She takes atorvastatin and losartan HCT. No history of diabetes mellitus. She does not smoke. She has a history of recurrent migraine headaches for which she takes Ajovy.     She was evaluated by Dr. Rene Plaza in December 2015 for recurrent episodes of brief syncope. At the time she was having an episode at least once a week. These typically would occur after she got up to walk. She would regain consciousness spontaneously after she fell to the floor. She was on carvedilol and that medication was discontinued. She was diagnosed with probable vasodepressor syncope. She was advised at the time to use compression stockings. She has chronic leg edema and her preference was to avoid stopping hydrochlorothiazide.  And echocardiogram showed normal LV systolic function with estimated ejection fraction 55% with normal valvular structure and function.     She was admitted in July 2020 with acute onset of left-sided weakness and numbness. She had generalized weakness as well as some gastroenteritis. CT scan of the brain was negative for any acute findings. MRI did not show any acute findings. She was diagnosed with possible TIA versus stroke. Carotid ultrasound showed less than 50% stenosis bilaterally. Echocardiogram showed estimated LV ejection fraction 60 to 65% with normal valvular structure and function. 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 She has been maintained chronically on gabapentin 3 mg 3 times daily.     At the time of a previous  visit she noted onset of recurrent brief episodes of syncope over the previous 3 months. She suddenly blacked out on at least 6 occasions. A few of the episodes occurred after she stood up and became lightheaded. On 3 other occasions she suddenly blacked out while up walking. She noted a sudden sensation that she was going to lose consciousness. She has called out her  telling him she is about to go down. She typically is poorly responsive for 30 seconds or up to 2 minutes. No witnessed seizure activity. No incontinence. She noted some rapid heart beating at times when she first stands up. She also noted some worsening exertional shortness of breath over the previous 2 to 3 months. She described occasional sharp stabbing pains which had increased in frequency and were occurring 2 or 3 times daily.      A Lexiscan myocardial perfusion study on May 30, 2023 was negative for ischemia or infarction. Calculated LV ejection action 58%. A 24-hour Holter monitor on May 25, 2023 showed normal sinus rhythm with average heart rate 62 bpm. Heart rate ranged from 41 bpm at 8:15 AM to 107 bpm at 11 PM. PVCs comprised 0.3% of the overall rhythm. Longest pause was 1.5 seconds. On June 19, 2023 she underwent a tilt table test. She was asymptomatic during the first portion of the test. She was given sublingual nitroglycerin and tilted to 70 degrees. 6 minutes after nitroglycerin she complained of lightheadedness, headache, and blurry vision. Her blood pressure dropped from a baseline of 154/80 mmHg to 99/57 mm record. Heart rate increased from baseline 60 bpm to 107 bpm. She was given IV fluids and her symptoms resolved.     She has been doing well since her last visit. She has occasional orthostatic lightheadedness.  She makes sure to change positions slowly.  She continues to focus on keeping herself well-hydrated.  She had 1 brief episode of chest discomfort and fell in her bathroom.  She underwent evaluation in the  emergency department.  Workup was unremarkable and she was discharged.  She has not had any recurrent episodes.  She denies any orthopnea, PND, or increasing peripheral edema. She denies any fever, chills, or cough. She denies any nausea, vomiting, or diaphoresis. She denies any hemoptysis, hematemesis, melena, or hematochezia. Lab studies May 21, 2024 showed a normal comprehensive metabolic profile.  A CBC on April 11, 2024 was normal.  Cholesterol 185 with HDL 75, LDL 94, and triglycerides 82.   EKG shows normal sinus rhythm with minor nonspecific ST-T wave changes. She was advised to keep herself well-hydrated and to use support stockings.   We discussed results of previous testing.  Continue same to manage with conservative measures.  She will continue on her same medications.  She does not have any anginal or CHF symptoms.  Other details as noted below.     The above portion of this note was dictated by me using voice recognition software. I personally performed the services described in the documentation. The scribe entering the documentation below was in my presence. I affirm that the information is both accurate and complete.       Objective:     Vitals:    06/27/24 0926   BP: 118/82   Pulse: 56       Wt Readings from Last 4 Encounters:   06/27/24 87.1 kg (192 lb)   04/24/24 87.1 kg (192 lb)   04/11/24 87.1 kg (192 lb)   01/30/24 88 kg (194 lb)       Allergies:     Allergies   Allergen Reactions    Ibuprofen GI intolerance, Nausea Only and Unknown    Nsaids (Non-Steroidal Anti-Inflammatory Drug) Unknown        Medications:     Current Outpatient Medications   Medication Instructions    albuterol (ProAir HFA) 90 mcg/actuation inhaler 2 puffs, inhalation, Every 4 hours PRN    ALPRAZolam (Xanax) 1 mg tablet 1 tablet, oral, Every 12 hours    aspirin 81 mg EC tablet 1 tablet, oral, Daily    atorvastatin (Lipitor) 40 mg tablet 1 tablet, oral, Nightly    budesonide EC (Entocort EC) 3 mg 24 hr capsule oral, Daily RT     esomeprazole (NexIUM) 40 mg DR capsule 1 capsule, oral, Every morning, 30 minutes before breakfast and one capsule by mouth at night before bedtime<BR>    estradiol (Estrace) 0.01 % (0.1 mg/gram) vaginal cream Apply 1gram (large pea) nightly for 3 weeks, then 3 times per week.    fremanezumab (Ajovy Autoinjector) 225 mg/1.5 mL auto-injector INJECT 675MG (3 PENS) UNDER THE SKIN ONCE EVERY 3 MONTHS.    gabapentin (Neurontin) 400 mg capsule 1 capsule, oral, 4 times daily    ketoconazole (NIZOral) 2 % cream Topical, Daily    lamoTRIgine (LaMICtal) 150 mg tablet 1 tablet, oral, Nightly    losartan-hydrochlorothiazide (Hyzaar) 50-12.5 mg tablet 1 tablet, oral, Daily    oxybutynin XL (DITROPAN XL) 5 mg, oral, Daily, Do not crush, chew, or split.    prazosin (MINIPRESS) 1 mg, oral, Nightly    tiZANidine (ZANAFLEX) 4 mg, oral, 3 times daily PRN    traZODone (Desyrel) 100 mg tablet 3 tablets, oral, Nightly    Trintellix 20 mg, oral, Daily    ubrogepant (Ubrelvy) 100 mg tablet tablet TAKE ONE (1) TABLET BY MOUTH AT ONSET OF HEADACHE. MAX OF 1 TABLET (100 MG) EVERY 24 HOURS.    ziprasidone (GEODON) 80 mg, oral, 2 times daily (morning and late afternoon)       Physical Examination:   GENERAL:  Well developed, well nourished, in no acute distress.  CHEST:  Symmetric and nontender.  NEURO/PSYCH:  Alert and oriented times three with approppriate behavior and responses.  NECK:  Supple, no JVD, no bruit.  LUNGS:  Clear to auscultation bilaterally, normal respiratory effort.  HEART:  Rate and rhythm regular with no evident murmur, no gallop appreciated.        There are no rubs, clicks or heaves.  EXTREMITIES:  Warm with good color, no clubbing or cyanosis.  There is 1+ bilateral edema noted.  PERIPHERAL VASCULAR:  Pulses present and equally palpable; 2+ throughout.      Lab:     CBC:   Lab Results   Component Value Date    WBC 7.9 04/11/2024    RBC 4.36 04/11/2024    HGB 13.9 04/11/2024    HCT 42.0 04/11/2024     04/11/2024         CMP:    Lab Results   Component Value Date     05/21/2024    K 3.8 05/21/2024     05/21/2024    CO2 31 05/21/2024    BUN 13 05/21/2024    CREATININE 0.99 05/21/2024    GLUCOSE 90 05/21/2024    CALCIUM 9.2 05/21/2024       Lipid Profile:    Lab Results   Component Value Date    TRIG 82 04/11/2024    HDL 75.0 04/11/2024    LDLCALC 94 04/11/2024       BMP:  Lab Results   Component Value Date     05/21/2024     04/11/2024     01/30/2024    K 3.8 05/21/2024    K 3.4 (L) 04/11/2024    K 3.7 01/30/2024     05/21/2024     04/11/2024     01/30/2024    CO2 31 05/21/2024    CO2 31 04/11/2024    CO2 25 01/30/2024    BUN 13 05/21/2024    BUN 15 04/11/2024    BUN 12 01/30/2024    CREATININE 0.99 05/21/2024    CREATININE 0.79 04/11/2024    CREATININE 0.87 01/30/2024       CBC:  Lab Results   Component Value Date    WBC 7.9 04/11/2024    WBC 4.3 (L) 01/30/2024    WBC 4.1 (L) 01/30/2024    RBC 4.36 04/11/2024    RBC 4.27 01/30/2024    RBC 4.22 01/30/2024    HGB 13.9 04/11/2024    HGB 13.9 01/30/2024    HGB 13.4 01/30/2024    HCT 42.0 04/11/2024    HCT 40.5 01/30/2024    HCT 40.3 01/30/2024    MCV 96 04/11/2024    MCV 95 01/30/2024    MCV 96 01/30/2024    MCH 31.9 04/11/2024    MCH 32.6 01/30/2024    MCH 31.8 01/30/2024    MCHC 33.1 04/11/2024    MCHC 34.3 01/30/2024    MCHC 33.3 01/30/2024    RDW 13.3 04/11/2024    RDW 12.9 01/30/2024    RDW 13.0 01/30/2024     04/11/2024     01/30/2024     01/30/2024    MPV 9.2 10/30/2023       Hepatic Function Panel:    Lab Results   Component Value Date    ALKPHOS 52 05/21/2024    ALT 13 05/21/2024    AST 17 05/21/2024    PROT 6.2 (L) 05/21/2024    BILITOT 0.4 05/21/2024                                                                                                                            Magnesium:    Lab Results   Component Value Date    MG 1.63 01/30/2024       TSH:    Lab Results   Component Value Date    TSH 0.93  10/01/2021       BNP:   Lab Results   Component Value Date    BNP 12 01/30/2024        PT/INR:    Lab Results   Component Value Date    PROTIME 12.3 12/09/2020    INR 1.1 12/09/2020       Cardiac Enzymes:    Lab Results   Component Value Date    TROPHS 5 01/30/2024    TROPHS 4 01/30/2024       Problem List:     Patient Active Problem List   Diagnosis    Anemia    Benign essential hypertension    Bipolar disorder, manic, moderate (Multi)    Hematuria    History of CVA (cerebrovascular accident)    Migraine    Night terrors, adult    PUD (peptic ulcer disease)    Severe episode of recurrent major depressive disorder, without psychotic features (Multi)    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 (Multi)    Peripheral vascular disease, unspecified (CMS-HCC)    History of cystocele    Abnormal liver function tests    Acquired absence of other specified parts of digestive tract    Acute ischemic heart disease, unspecified (Multi)    Carpal tunnel syndrome, left upper limb    Cervical disc disorder with radiculopathy, unspecified cervical region    Cervical spondylosis without myelopathy    GERD (gastroesophageal reflux disease)    Hyperlipidemia, unspecified    Incomplete bladder emptying    Iron deficiency anemia secondary to blood loss (chronic)    Left hemiparesis (Multi)    Lumbosacral spondylosis without myelopathy    Migraine without aura and with status migrainosus, not intractable    Mood disorder (CMS-HCC)    Nausea, vomiting and diarrhea    SI (sacroiliac) joint dysfunction    Spondylolisthesis, lumbosacral region    Spondylosis without myelopathy or radiculopathy, lumbar region    Urinary incontinence    Class 1 obesity with body mass index (BMI) of 34.0 to 34.9 in adult    Orthostatic syncope       Asessment:     Problem List Items Addressed This Visit             ICD-10-CM    Benign essential hypertension I10    Relevant Orders    Follow Up In Cardiology    GERD (gastroesophageal reflux  disease) K21.9    Relevant Orders    Follow Up In Cardiology    Hyperlipidemia, unspecified - Primary E78.5    Relevant Medications    atorvastatin (Lipitor) 40 mg tablet    Other Relevant Orders    Follow Up In Cardiology    Migraine without aura and with status migrainosus, not intractable G43.001    Relevant Orders    Follow Up In Cardiology    Orthostatic syncope I95.1    Relevant Orders    Follow Up In Cardiology

## 2024-07-05 DIAGNOSIS — M96.1 POST LAMINECTOMY SYNDROME: ICD-10-CM

## 2024-07-05 DIAGNOSIS — M48.062 SPINAL STENOSIS OF LUMBAR REGION WITH NEUROGENIC CLAUDICATION: ICD-10-CM

## 2024-07-05 DIAGNOSIS — G89.4 CHRONIC PAIN SYNDROME: ICD-10-CM

## 2024-07-05 DIAGNOSIS — M47.812 CERVICAL SPONDYLOSIS WITHOUT MYELOPATHY: ICD-10-CM

## 2024-07-05 DIAGNOSIS — M47.817 LUMBOSACRAL SPONDYLOSIS WITHOUT MYELOPATHY: ICD-10-CM

## 2024-07-05 DIAGNOSIS — M25.511 PAIN IN JOINT OF RIGHT SHOULDER: ICD-10-CM

## 2024-07-05 DIAGNOSIS — M54.16 LUMBAR RADICULOPATHY: ICD-10-CM

## 2024-07-05 RX ORDER — GABAPENTIN 400 MG/1
CAPSULE ORAL
Qty: 270 CAPSULE | Refills: 3 | OUTPATIENT
Start: 2024-07-05

## 2024-07-16 ENCOUNTER — SPECIALTY PHARMACY (OUTPATIENT)
Dept: PHARMACY | Facility: CLINIC | Age: 64
End: 2024-07-16

## 2024-07-18 ENCOUNTER — OFFICE VISIT (OUTPATIENT)
Dept: PAIN MANAGEMENT | Age: 64
End: 2024-07-18

## 2024-07-18 VITALS
TEMPERATURE: 98.3 F | WEIGHT: 184 LBS | SYSTOLIC BLOOD PRESSURE: 136 MMHG | BODY MASS INDEX: 30.66 KG/M2 | DIASTOLIC BLOOD PRESSURE: 88 MMHG | HEIGHT: 65 IN

## 2024-07-18 DIAGNOSIS — M96.1 POST LAMINECTOMY SYNDROME: ICD-10-CM

## 2024-07-18 DIAGNOSIS — M47.812 CERVICAL SPONDYLOSIS WITHOUT MYELOPATHY: ICD-10-CM

## 2024-07-18 DIAGNOSIS — M54.16 LUMBAR RADICULOPATHY: ICD-10-CM

## 2024-07-18 DIAGNOSIS — M46.1 SACROILIITIS, NOT ELSEWHERE CLASSIFIED (HCC): Primary | ICD-10-CM

## 2024-07-18 DIAGNOSIS — M48.062 SPINAL STENOSIS OF LUMBAR REGION WITH NEUROGENIC CLAUDICATION: ICD-10-CM

## 2024-07-18 DIAGNOSIS — M25.511 PAIN IN JOINT OF RIGHT SHOULDER: ICD-10-CM

## 2024-07-18 DIAGNOSIS — M47.817 LUMBOSACRAL SPONDYLOSIS WITHOUT MYELOPATHY: ICD-10-CM

## 2024-07-18 DIAGNOSIS — Z98.1 HISTORY OF LUMBAR FUSION: ICD-10-CM

## 2024-07-18 DIAGNOSIS — G89.4 CHRONIC PAIN SYNDROME: ICD-10-CM

## 2024-07-18 RX ORDER — TIZANIDINE 4 MG/1
4 TABLET ORAL 2 TIMES DAILY PRN
Qty: 180 TABLET | Refills: 0 | Status: SHIPPED | OUTPATIENT
Start: 2024-07-18 | End: 2024-10-16

## 2024-07-18 RX ORDER — GABAPENTIN 400 MG/1
400 CAPSULE ORAL 3 TIMES DAILY
Qty: 270 CAPSULE | Refills: 0 | Status: SHIPPED | OUTPATIENT
Start: 2024-07-18 | End: 2024-07-18

## 2024-07-18 RX ORDER — OXYCODONE HYDROCHLORIDE AND ACETAMINOPHEN 5; 325 MG/1; MG/1
1 TABLET ORAL 3 TIMES DAILY PRN
Qty: 90 TABLET | Refills: 0 | Status: SHIPPED | OUTPATIENT
Start: 2024-07-21 | End: 2024-08-20

## 2024-07-18 RX ORDER — GABAPENTIN 400 MG/1
400 CAPSULE ORAL 3 TIMES DAILY
Qty: 270 CAPSULE | Refills: 0 | Status: SHIPPED | OUTPATIENT
Start: 2024-07-30 | End: 2024-10-28

## 2024-07-18 ASSESSMENT — ENCOUNTER SYMPTOMS
RESPIRATORY NEGATIVE: 1
DIARRHEA: 0
BACK PAIN: 1
CONSTIPATION: 0

## 2024-07-18 NOTE — PROGRESS NOTES
Patient: Kari Dimas  YOB: 1960  Date: 7/18/24        Subjective:     Kari Dimas is a 64 y.o. female who complains today of:    Chief Complaint   Patient presents with    Back Pain         Allergies:  Nsaids and Ibuprofen    Past Medical History:   Diagnosis Date    Anemia     Arthritis     Chicken pox     Chronic back pain     H/O bladder infections     Headache(784.0)     History of blood transfusion     post delivery of first child, again after gastric ulcer cauterization, again for anemia    Hyperlipidemia     meds for about 1 year    Hypertension     meds for about 2 years    Incomplete bladder emptying 09/29/2009    Measles     Mumps     Nausea, vomiting and diarrhea 08/17/2020    Stroke (HCC) 07/2020     Past Surgical History:   Procedure Laterality Date    APPENDECTOMY      BACK SURGERY  2014    lumbar back fusion    CHOLECYSTECTOMY      2017    COLONOSCOPY      ENDOSCOPY, COLON, DIAGNOSTIC      GALLBLADDER SURGERY      HYSTERECTOMY (CERVIX STATUS UNKNOWN)      1980    JOINT REPLACEMENT Right 2020    JOINT REPLACEMENT Left 2018    KNEE SURGERY Right     LEG BIOPSY EXCISION Right 11/12/2021    SOFT TISSUE MASS EXCISION RIGHT [place] [facility] [place]    LUMBAR FUSION N/A 10/21/2022    RIGHT BILATERAL L2-3 LAMINECTOMIES MICRODISSECTION FORAMINOTOMIES INTERBODY POSTEROLATERAL PEDICLE SCREW FUSION WITH IGS performed [place] Santillan [place] [facility] [place]    ROTATOR CUFF REPAIR      TONSILLECTOMY       Family History   Problem Relation Age of Onset    Stroke Mother     Cancer Father     Diabetes Sister     High Blood Pressure Sister     High Blood Pressure Brother     Diabetes Brother     No Known Problems Daughter      Social History     Socioeconomic History    Marital status:      Spouse name: Not on file    Number of children: Not on file    Years of education: Not on file    Highest education level: Not on file   Occupational History    Not on file   Tobacco

## 2024-07-21 DIAGNOSIS — I10 BENIGN ESSENTIAL HYPERTENSION: ICD-10-CM

## 2024-07-22 RX ORDER — LOSARTAN POTASSIUM AND HYDROCHLOROTHIAZIDE 12.5; 5 MG/1; MG/1
1 TABLET ORAL DAILY
Qty: 90 TABLET | Refills: 3 | Status: SHIPPED | OUTPATIENT
Start: 2024-07-22 | End: 2025-07-22

## 2024-07-23 ENCOUNTER — SPECIALTY PHARMACY (OUTPATIENT)
Dept: PHARMACY | Facility: CLINIC | Age: 64
End: 2024-07-23

## 2024-07-23 ENCOUNTER — TELEPHONE (OUTPATIENT)
Dept: PAIN MANAGEMENT | Age: 64
End: 2024-07-23

## 2024-07-23 ENCOUNTER — APPOINTMENT (OUTPATIENT)
Dept: NEUROLOGY | Facility: CLINIC | Age: 64
End: 2024-07-23
Payer: MEDICARE

## 2024-07-23 NOTE — TELEPHONE ENCOUNTER
HEMANTH SI JOINT INJECTION    NO AUTH REQUIRED    OK to schedule procedure approved as above.   Please note sides/levels approved and date range.   (If applicable, sides/levels approved may differ from those ordered)    TO BE SCHEDULED WITH

## 2024-07-25 ENCOUNTER — SPECIALTY PHARMACY (OUTPATIENT)
Dept: PHARMACY | Facility: CLINIC | Age: 64
End: 2024-07-25

## 2024-07-30 ENCOUNTER — TELEPHONE (OUTPATIENT)
Dept: PAIN MANAGEMENT | Age: 64
End: 2024-07-30

## 2024-07-30 ENCOUNTER — PROCEDURE VISIT (OUTPATIENT)
Age: 64
End: 2024-07-30
Payer: MEDICARE

## 2024-07-30 VITALS
BODY MASS INDEX: 30.82 KG/M2 | SYSTOLIC BLOOD PRESSURE: 130 MMHG | TEMPERATURE: 97.1 F | DIASTOLIC BLOOD PRESSURE: 78 MMHG | HEART RATE: 74 BPM | WEIGHT: 185 LBS | OXYGEN SATURATION: 96 % | HEIGHT: 65 IN

## 2024-07-30 DIAGNOSIS — M46.1 SACROILIITIS, NOT ELSEWHERE CLASSIFIED (HCC): ICD-10-CM

## 2024-07-30 DIAGNOSIS — M47.817 LUMBOSACRAL SPONDYLOSIS WITHOUT MYELOPATHY: Primary | ICD-10-CM

## 2024-07-30 PROCEDURE — 27096 INJECT SACROILIAC JOINT: CPT | Performed by: PAIN MEDICINE

## 2024-07-30 PROCEDURE — 77002 NEEDLE LOCALIZATION BY XRAY: CPT | Performed by: PAIN MEDICINE

## 2024-07-30 RX ORDER — LIDOCAINE HYDROCHLORIDE 10 MG/ML
3 INJECTION, SOLUTION EPIDURAL; INFILTRATION; INTRACAUDAL; PERINEURAL ONCE
Status: COMPLETED | OUTPATIENT
Start: 2024-07-30 | End: 2024-07-30

## 2024-07-30 RX ORDER — BETAMETHASONE SODIUM PHOSPHATE AND BETAMETHASONE ACETATE 3; 3 MG/ML; MG/ML
6 INJECTION, SUSPENSION INTRA-ARTICULAR; INTRALESIONAL; INTRAMUSCULAR; SOFT TISSUE ONCE
Status: COMPLETED | OUTPATIENT
Start: 2024-07-30 | End: 2024-07-30

## 2024-07-30 RX ADMIN — BETAMETHASONE SODIUM PHOSPHATE AND BETAMETHASONE ACETATE 6 MG: 3; 3 INJECTION, SUSPENSION INTRA-ARTICULAR; INTRALESIONAL; INTRAMUSCULAR at 08:32

## 2024-07-30 RX ADMIN — LIDOCAINE HYDROCHLORIDE 3 ML: 10 INJECTION, SOLUTION EPIDURAL; INFILTRATION; INTRACAUDAL; PERINEURAL at 08:33

## 2024-07-30 NOTE — PROGRESS NOTES
[facility]  [address], [address]  [place], [place]  P: [phone]  F: [phone]      Patient Name: Kari Dimas  : 1960     Date:  2024      Physician: MAURA STOLL DO        Kari Dimas is here today for interventional pain management.    Preoperatively, the patient presents with SI joint mediated pain, as per history and exam. Patient has failed NSAIDs, PT, and conservative treatment. Patient has significant psychological and functional impairment due to this condition.  Standard ASI guidelines were followed and sterile technique used.  Area was cleaned with Betadine x3.  Informed consent was obtained.  Fluoroscopic guidance was used for this procedure.    S.I. JOINT:  Bilateral  Appropriate length spinal needle was advanced to the S.I. Joint.  Negative aspiration was achieved. In total, approximately 6mg of Betamethasone and 2ml of 1% preservative free Lidocaine was injected without difficulty.This procedure was 30% more difficult and required 30% more work secondary to the patient's habitus. The patient has a BMI of 31 and has comorbidities of hypertension hypercholesterolemia. This required increased work for safe and proper positioning upon the fluoroscopy table, increased needle passes for safe and appropriate needle placement, and increased fluoroscopy time and radiation exposure for proper visualization.        Patient tolerated the procedure well, no obvious complications occurred during the procedure.  Patient was appropriately monitored and discharged home in stable condition with their usual motor strength. Post Op Instructions were given to patient. Relevant and recent imaging reviewed with patient today.                                      MAURA STOLL DO

## 2024-07-30 NOTE — TELEPHONE ENCOUNTER
Left voicemail for patient to advise appointment on 8/15/24 with Rosalba has been cancelled due to Rosalba being out of the office. Pt to call office back to reschedule.      Please follow up to make sure patient has been rescheduled. Appointment on 8/15/24 cancelled.

## 2024-08-19 ENCOUNTER — OFFICE VISIT (OUTPATIENT)
Dept: PAIN MANAGEMENT | Age: 64
End: 2024-08-19
Payer: MEDICARE

## 2024-08-19 VITALS
SYSTOLIC BLOOD PRESSURE: 136 MMHG | BODY MASS INDEX: 30.82 KG/M2 | TEMPERATURE: 96.9 F | DIASTOLIC BLOOD PRESSURE: 84 MMHG | WEIGHT: 185 LBS | HEIGHT: 65 IN

## 2024-08-19 DIAGNOSIS — M25.511 PAIN IN JOINT OF RIGHT SHOULDER: ICD-10-CM

## 2024-08-19 DIAGNOSIS — G89.4 CHRONIC PAIN SYNDROME: ICD-10-CM

## 2024-08-19 DIAGNOSIS — M47.812 CERVICAL SPONDYLOSIS WITHOUT MYELOPATHY: ICD-10-CM

## 2024-08-19 DIAGNOSIS — Z98.1 HISTORY OF LUMBAR FUSION: ICD-10-CM

## 2024-08-19 DIAGNOSIS — M96.1 POST LAMINECTOMY SYNDROME: ICD-10-CM

## 2024-08-19 DIAGNOSIS — M48.062 SPINAL STENOSIS OF LUMBAR REGION WITH NEUROGENIC CLAUDICATION: ICD-10-CM

## 2024-08-19 DIAGNOSIS — M47.817 LUMBOSACRAL SPONDYLOSIS WITHOUT MYELOPATHY: ICD-10-CM

## 2024-08-19 DIAGNOSIS — M54.16 LUMBAR RADICULOPATHY: ICD-10-CM

## 2024-08-19 PROCEDURE — G8417 CALC BMI ABV UP PARAM F/U: HCPCS | Performed by: NURSE PRACTITIONER

## 2024-08-19 PROCEDURE — 3079F DIAST BP 80-89 MM HG: CPT | Performed by: NURSE PRACTITIONER

## 2024-08-19 PROCEDURE — G8427 DOCREV CUR MEDS BY ELIG CLIN: HCPCS | Performed by: NURSE PRACTITIONER

## 2024-08-19 PROCEDURE — 3075F SYST BP GE 130 - 139MM HG: CPT | Performed by: NURSE PRACTITIONER

## 2024-08-19 PROCEDURE — 99214 OFFICE O/P EST MOD 30 MIN: CPT | Performed by: NURSE PRACTITIONER

## 2024-08-19 PROCEDURE — 1036F TOBACCO NON-USER: CPT | Performed by: NURSE PRACTITIONER

## 2024-08-19 PROCEDURE — 3017F COLORECTAL CA SCREEN DOC REV: CPT | Performed by: NURSE PRACTITIONER

## 2024-08-19 RX ORDER — VORTIOXETINE 20 MG/1
TABLET, FILM COATED ORAL
COMMUNITY
Start: 2024-03-14

## 2024-08-19 RX ORDER — OXYCODONE HYDROCHLORIDE AND ACETAMINOPHEN 5; 325 MG/1; MG/1
1 TABLET ORAL 3 TIMES DAILY PRN
Qty: 90 TABLET | Refills: 0 | Status: SHIPPED | OUTPATIENT
Start: 2024-08-20 | End: 2024-09-19

## 2024-08-19 RX ORDER — OMEPRAZOLE 20 MG/1
20 CAPSULE, DELAYED RELEASE ORAL 2 TIMES DAILY
COMMUNITY
Start: 2024-08-11

## 2024-08-19 ASSESSMENT — ENCOUNTER SYMPTOMS
TROUBLE SWALLOWING: 0
GASTROINTESTINAL NEGATIVE: 1
BACK PAIN: 1
COUGH: 0
CONSTIPATION: 0
DIARRHEA: 0
EYES NEGATIVE: 1
SHORTNESS OF BREATH: 0

## 2024-08-19 NOTE — PROGRESS NOTES
Narcan Rx sent in April 2021.    Will continue medications for chronic pain that has been previously directed as they do help pt function with ADL and improve quality of life. Pt is aware goal is to use the least amount of medication possible to allow pt to function and help with quality of life as discussed in detail.  Ideal to keep MME below 50.  Have discussed proper disposal of narcotic medication. Advised to have medications in safe place and locked up/in lock box.  Discussed possible risks of opiate medication with pt, including, but not limited to possible constipation, nausea or vomiting, sedation, urinary retention, dependence and possible addiction. Pt agrees to use medication as prescribed/as directed. Pt advised to not use opiates while driving or operating heavy equipment, or in situations where pt may harm him/herself or others.  Pt is aware that while on narcotics, pt needs to be seen to reassess pain and reassess need for continued medication at that time. The treatment plan will be reassessed each office visit to determine if continuing medications is appropriate and may be discontinued if no such improvement as noted in NDP.   NDP reviewed.  OARRS was reviewed.      Follow up:  Return in about 4 weeks (around 9/16/2024) for review meds and reassess pain.    Rosalba Field, APRN - CNP

## 2024-09-05 ENCOUNTER — SPECIALTY PHARMACY (OUTPATIENT)
Dept: PHARMACY | Facility: CLINIC | Age: 64
End: 2024-09-05

## 2024-09-05 PROCEDURE — RXMED WILLOW AMBULATORY MEDICATION CHARGE

## 2024-09-11 ENCOUNTER — PHARMACY VISIT (OUTPATIENT)
Dept: PHARMACY | Facility: CLINIC | Age: 64
End: 2024-09-11
Payer: COMMERCIAL

## 2024-09-19 ENCOUNTER — OFFICE VISIT (OUTPATIENT)
Age: 64
End: 2024-09-19
Payer: MEDICARE

## 2024-09-19 ENCOUNTER — HOSPITAL ENCOUNTER (OUTPATIENT)
Dept: GENERAL RADIOLOGY | Age: 64
Discharge: HOME | End: 2024-09-21
Payer: MEDICARE

## 2024-09-19 VITALS
TEMPERATURE: 97 F | WEIGHT: 189 LBS | DIASTOLIC BLOOD PRESSURE: 74 MMHG | SYSTOLIC BLOOD PRESSURE: 122 MMHG | HEIGHT: 65 IN | BODY MASS INDEX: 31.49 KG/M2

## 2024-09-19 DIAGNOSIS — Z98.1 HISTORY OF LUMBAR FUSION: ICD-10-CM

## 2024-09-19 DIAGNOSIS — M47.812 CERVICAL SPONDYLOSIS WITHOUT MYELOPATHY: ICD-10-CM

## 2024-09-19 DIAGNOSIS — M48.062 SPINAL STENOSIS OF LUMBAR REGION WITH NEUROGENIC CLAUDICATION: ICD-10-CM

## 2024-09-19 DIAGNOSIS — M25.511 PAIN IN JOINT OF RIGHT SHOULDER: ICD-10-CM

## 2024-09-19 DIAGNOSIS — M54.6 THORACIC SPINE PAIN: ICD-10-CM

## 2024-09-19 DIAGNOSIS — M47.817 LUMBOSACRAL SPONDYLOSIS WITHOUT MYELOPATHY: ICD-10-CM

## 2024-09-19 DIAGNOSIS — G89.4 CHRONIC PAIN SYNDROME: ICD-10-CM

## 2024-09-19 DIAGNOSIS — M54.6 THORACIC SPINE PAIN: Primary | ICD-10-CM

## 2024-09-19 DIAGNOSIS — M96.1 POST LAMINECTOMY SYNDROME: ICD-10-CM

## 2024-09-19 DIAGNOSIS — M54.16 LUMBAR RADICULOPATHY: ICD-10-CM

## 2024-09-19 PROCEDURE — 99213 OFFICE O/P EST LOW 20 MIN: CPT

## 2024-09-19 PROCEDURE — G8417 CALC BMI ABV UP PARAM F/U: HCPCS | Performed by: NURSE PRACTITIONER

## 2024-09-19 PROCEDURE — 99214 OFFICE O/P EST MOD 30 MIN: CPT | Performed by: NURSE PRACTITIONER

## 2024-09-19 PROCEDURE — 1036F TOBACCO NON-USER: CPT | Performed by: NURSE PRACTITIONER

## 2024-09-19 PROCEDURE — 72070 X-RAY EXAM THORAC SPINE 2VWS: CPT

## 2024-09-19 PROCEDURE — 72110 X-RAY EXAM L-2 SPINE 4/>VWS: CPT

## 2024-09-19 PROCEDURE — 3074F SYST BP LT 130 MM HG: CPT | Performed by: NURSE PRACTITIONER

## 2024-09-19 PROCEDURE — G8427 DOCREV CUR MEDS BY ELIG CLIN: HCPCS | Performed by: NURSE PRACTITIONER

## 2024-09-19 PROCEDURE — 3078F DIAST BP <80 MM HG: CPT | Performed by: NURSE PRACTITIONER

## 2024-09-19 PROCEDURE — 3017F COLORECTAL CA SCREEN DOC REV: CPT | Performed by: NURSE PRACTITIONER

## 2024-09-19 RX ORDER — OXYCODONE AND ACETAMINOPHEN 5; 325 MG/1; MG/1
1 TABLET ORAL 3 TIMES DAILY PRN
Qty: 90 TABLET | Refills: 0 | Status: SHIPPED | OUTPATIENT
Start: 2024-09-22 | End: 2024-10-22

## 2024-09-19 ASSESSMENT — ENCOUNTER SYMPTOMS
SHORTNESS OF BREATH: 0
CONSTIPATION: 0
COUGH: 0
DIARRHEA: 0
BACK PAIN: 1
GASTROINTESTINAL NEGATIVE: 1
TROUBLE SWALLOWING: 0
EYES NEGATIVE: 1

## 2024-09-20 DIAGNOSIS — G43.911 INTRACTABLE MIGRAINE WITH STATUS MIGRAINOSUS, UNSPECIFIED MIGRAINE TYPE: ICD-10-CM

## 2024-09-23 ENCOUNTER — TELEPHONE (OUTPATIENT)
Age: 64
End: 2024-09-23

## 2024-09-23 DIAGNOSIS — M47.814 THORACIC SPONDYLOSIS: Primary | ICD-10-CM

## 2024-09-23 DIAGNOSIS — M47.817 LUMBOSACRAL SPONDYLOSIS WITHOUT MYELOPATHY: ICD-10-CM

## 2024-09-24 ENCOUNTER — TELEPHONE (OUTPATIENT)
Age: 64
End: 2024-09-24

## 2024-09-29 DIAGNOSIS — M96.1 POST LAMINECTOMY SYNDROME: ICD-10-CM

## 2024-09-29 DIAGNOSIS — G89.4 CHRONIC PAIN SYNDROME: ICD-10-CM

## 2024-09-29 DIAGNOSIS — M25.511 PAIN IN JOINT OF RIGHT SHOULDER: ICD-10-CM

## 2024-09-29 DIAGNOSIS — M48.062 SPINAL STENOSIS OF LUMBAR REGION WITH NEUROGENIC CLAUDICATION: ICD-10-CM

## 2024-09-29 DIAGNOSIS — M47.812 CERVICAL SPONDYLOSIS WITHOUT MYELOPATHY: ICD-10-CM

## 2024-09-29 DIAGNOSIS — M54.16 LUMBAR RADICULOPATHY: ICD-10-CM

## 2024-09-29 DIAGNOSIS — M47.817 LUMBOSACRAL SPONDYLOSIS WITHOUT MYELOPATHY: ICD-10-CM

## 2024-09-30 RX ORDER — GABAPENTIN 400 MG/1
400 CAPSULE ORAL 3 TIMES DAILY
Qty: 270 CAPSULE | Refills: 3 | OUTPATIENT
Start: 2024-09-30

## 2024-10-01 ENCOUNTER — APPOINTMENT (OUTPATIENT)
Dept: NEUROLOGY | Facility: CLINIC | Age: 64
End: 2024-10-01
Payer: MEDICARE

## 2024-10-01 VITALS
TEMPERATURE: 94.8 F | HEIGHT: 65 IN | WEIGHT: 188.4 LBS | BODY MASS INDEX: 31.39 KG/M2 | HEART RATE: 96 BPM | SYSTOLIC BLOOD PRESSURE: 96 MMHG | DIASTOLIC BLOOD PRESSURE: 66 MMHG

## 2024-10-01 DIAGNOSIS — R25.1 TREMOR: ICD-10-CM

## 2024-10-01 DIAGNOSIS — Z86.73 HISTORY OF CVA (CEREBROVASCULAR ACCIDENT): ICD-10-CM

## 2024-10-01 DIAGNOSIS — G20.C PARKINSONISM, UNSPECIFIED PARKINSONISM TYPE (MULTI): ICD-10-CM

## 2024-10-01 DIAGNOSIS — G43.001 MIGRAINE WITHOUT AURA AND WITH STATUS MIGRAINOSUS, NOT INTRACTABLE: Primary | ICD-10-CM

## 2024-10-01 PROCEDURE — 3074F SYST BP LT 130 MM HG: CPT | Performed by: STUDENT IN AN ORGANIZED HEALTH CARE EDUCATION/TRAINING PROGRAM

## 2024-10-01 PROCEDURE — 1036F TOBACCO NON-USER: CPT | Performed by: STUDENT IN AN ORGANIZED HEALTH CARE EDUCATION/TRAINING PROGRAM

## 2024-10-01 PROCEDURE — 3008F BODY MASS INDEX DOCD: CPT | Performed by: STUDENT IN AN ORGANIZED HEALTH CARE EDUCATION/TRAINING PROGRAM

## 2024-10-01 PROCEDURE — 99214 OFFICE O/P EST MOD 30 MIN: CPT | Performed by: STUDENT IN AN ORGANIZED HEALTH CARE EDUCATION/TRAINING PROGRAM

## 2024-10-01 PROCEDURE — 3078F DIAST BP <80 MM HG: CPT | Performed by: STUDENT IN AN ORGANIZED HEALTH CARE EDUCATION/TRAINING PROGRAM

## 2024-10-01 RX ORDER — OXYCODONE AND ACETAMINOPHEN 5; 325 MG/1; MG/1
1 TABLET ORAL EVERY 6 HOURS PRN
COMMUNITY
Start: 2024-09-22

## 2024-10-01 RX ORDER — FLUOXETINE HYDROCHLORIDE 20 MG/1
20 CAPSULE ORAL DAILY
COMMUNITY
Start: 2022-10-27 | End: 2024-10-01 | Stop reason: WASHOUT

## 2024-10-01 RX ORDER — POLYETHYLENE GLYCOL 3350 17 G/17G
17 POWDER, FOR SOLUTION ORAL DAILY
COMMUNITY
Start: 2022-10-24 | End: 2024-10-01 | Stop reason: WASHOUT

## 2024-10-01 RX ORDER — OMEPRAZOLE 20 MG/1
1 CAPSULE, DELAYED RELEASE ORAL
COMMUNITY
Start: 2024-08-11

## 2024-10-01 ASSESSMENT — PATIENT HEALTH QUESTIONNAIRE - PHQ9
1. LITTLE INTEREST OR PLEASURE IN DOING THINGS: MORE THAN HALF THE DAYS
9. THOUGHTS THAT YOU WOULD BE BETTER OFF DEAD, OR OF HURTING YOURSELF: NOT AT ALL
10. IF YOU CHECKED OFF ANY PROBLEMS, HOW DIFFICULT HAVE THESE PROBLEMS MADE IT FOR YOU TO DO YOUR WORK, TAKE CARE OF THINGS AT HOME, OR GET ALONG WITH OTHER PEOPLE: SOMEWHAT DIFFICULT
3. TROUBLE FALLING OR STAYING ASLEEP: NOT AT ALL
5. POOR APPETITE OR OVEREATING: NOT AT ALL
7. TROUBLE CONCENTRATING ON THINGS, SUCH AS READING THE NEWSPAPER OR WATCHING TELEVISION: MORE THAN HALF THE DAYS
2. FEELING DOWN, DEPRESSED OR HOPELESS: NEARLY EVERY DAY
4. FEELING TIRED OR HAVING LITTLE ENERGY: NEARLY EVERY DAY
6. FEELING BAD ABOUT YOURSELF - OR THAT YOU ARE A FAILURE OR HAVE LET YOURSELF OR YOUR FAMILY DOWN: NOT AT ALL
SUM OF ALL RESPONSES TO PHQ QUESTIONS 1-9: 10
SUM OF ALL RESPONSES TO PHQ9 QUESTIONS 1 & 2: 5
8. MOVING OR SPEAKING SO SLOWLY THAT OTHER PEOPLE COULD HAVE NOTICED. OR THE OPPOSITE, BEING SO FIGETY OR RESTLESS THAT YOU HAVE BEEN MOVING AROUND A LOT MORE THAN USUAL: NOT AT ALL

## 2024-10-01 ASSESSMENT — LIFESTYLE VARIABLES
HOW OFTEN DO YOU HAVE A DRINK CONTAINING ALCOHOL: MONTHLY OR LESS
SKIP TO QUESTIONS 9-10: 1
AUDIT-C TOTAL SCORE: 1
HOW OFTEN DO YOU HAVE SIX OR MORE DRINKS ON ONE OCCASION: NEVER
HOW MANY STANDARD DRINKS CONTAINING ALCOHOL DO YOU HAVE ON A TYPICAL DAY: 1 OR 2

## 2024-10-01 NOTE — PROGRESS NOTES
Chief Complaint   Patient presents with    Migraine     Subjective     Sofi Steve is a 64 y.o. year old female for follow-up of migraine headaches and post stroke thalamic pain syndrome.    She was last seen 4/13/2023.     She is on Ajovy for migraines. Her migraines occur about 5 times per month and can get up to 12 per month. This is an improvement but still frequent. This has been helpful. She has no side effects from the Ajovy. Ubrelvy was approved, and it works well. The migraine takes about 1-2 hours to resolve with Ubrelvy.     She has had no new stroke symptoms. She continues to have paresthesias on the left side but not as severe as it was in the past. She is on gabapentin 400mg QID. Gabapentin is prescribed by her pain management.    She has noticed a tremor in both arms, action based but also at rest. She also has a termor in the RLE. No family history of tremor. She has trouble holding objects due to the tremor. She has noticed her handwriting is very messy. She has trouble eating soups due to the tremor. She has noticed some reduction in her facial expression, reports she has never smiled much. She has no stiffness or slowness of movement. She has chronic lifelong constipation. She has no change to her taste or smell.     She has a history of hemibody numbness on the L, diagnosed as MRI negative stroke 7/13/2020. She remains on aspirin 81mg and high intensity statin for secondary stroke prevention. 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      Hx of migraine headaches with aura (visual spots in rights hayes of vision), intractable, previously had 10-12 headache days per month. Hx bipolar disorder, previously on Depakote, currently on Viibryd. Unable to take SSRI/TCA due to mood disorder.      Preventative therapy: Ajovy (previously on Depakote; unable to take SSRI/TCA)   Abortive therapy: Excedrin; unable to take Triptans or  DHE due to hx of stroke. 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 (Multi)    Peripheral vascular disease, unspecified (CMS-HCC)    History of cystocele    Abnormal liver function tests    Acquired absence of other specified parts of digestive tract    Acute ischemic heart disease, unspecified    Carpal tunnel syndrome, left upper limb    Cervical disc disorder with radiculopathy, unspecified cervical region    Cervical spondylosis without myelopathy    GERD (gastroesophageal reflux disease)    Hyperlipidemia, unspecified    Incomplete bladder emptying    Iron deficiency anemia secondary to blood loss (chronic)    Left hemiparesis (Multi)    Lumbosacral spondylosis without myelopathy    Migraine without aura and with status migrainosus, not intractable    Mood disorder (CMS-HCC)    Nausea, vomiting and diarrhea    SI (sacroiliac) joint dysfunction    Spondylolisthesis, lumbosacral region    Spondylosis without myelopathy or radiculopathy, lumbar region    Urinary incontinence    Class 1 obesity with body mass index (BMI) of 34.0 to 34.9 in adult    Orthostatic syncope       Objective   Neurological Exam  Mental Status  Awake, alert and oriented to person, place and time. Speech is normal.  Mild masked facies .    Cranial Nerves  CN II: Visual fields full to confrontation.  CN III, IV, VI: Extraocular movements intact bilaterally.  CN V: Facial sensation is normal.  CN VII: Full and symmetric facial movement.  CN VIII: Hearing is normal.  CN IX, X: Palate elevates symmetrically  CN XI: Shoulder shrug strength is  normal.  CN XII: Tongue midline without atrophy or fasciculations.    Motor   Strength is 5/5 throughout all four extremities.  Antalgic gait  Bilateral upper and lower extremity moderate bradykinesia  Resting tremor in the RUE and RLE  no cogwheel rigidity even with distraction     Archimedes spirals were small but without significant tremor.  handwriting was normal size and no legible .    Sensory  Light touch is normal in upper and lower extremities.     Coordination  Right: Finger-to-nose normal.Left: Finger-to-nose normal.    Gait    Able to stand out of chair without use of arms   Normal though antalgic gait (right hips pain), en bloc turn but appears pain limited  Mild kyphotic posture with reduced arm swing bilaterally more prominent on the R.    Physical Exam  Eyes:      Extraocular Movements: Extraocular movements intact.   Neurological:      Motor: Motor strength is normal.  Psychiatric:         Speech: Speech normal.         Assessment/Plan   Diagnoses and all orders for this visit:  Migraine without aura and with status migrainosus, not intractable  History of CVA (cerebrovascular accident)      Episodic migraine headaches: responding ok to Ajovy, headaches improved from 12 -> 5 per month. Uses ubrelvy for abortive therapy due to history of stroke  History of lacunar stroke, L hemibody paresthesias: will continue 81mg, atorvastatin 40mg daily. She has gabapentin 400mg QID for post stroke pain syndrome prescribed by pain management  Tremor: new bilateral action > resting tremor with no other clinical parkinsonian features. On exam she has mild parkinsonism, masked facies (but has hx of bipolar disorder/depression), bradykinesia and slight reduced arm swing but without cogwheel rigidity. Clinically the history and lack of cogwheel rigidity is suggestive of Essential Tremor but given some of the Parkinsonian features, parkinson's disease remains on the differential. Will obtain EDGAR scan.     Follow-up in 6  months     There are no Patient Instructions on file for this visit.

## 2024-10-07 ENCOUNTER — PROCEDURE VISIT (OUTPATIENT)
Age: 64
End: 2024-10-07
Payer: MEDICARE

## 2024-10-07 VITALS
DIASTOLIC BLOOD PRESSURE: 80 MMHG | OXYGEN SATURATION: 96 % | HEART RATE: 73 BPM | TEMPERATURE: 97.6 F | SYSTOLIC BLOOD PRESSURE: 142 MMHG | WEIGHT: 189 LBS | BODY MASS INDEX: 31.49 KG/M2 | HEIGHT: 65 IN

## 2024-10-07 DIAGNOSIS — M47.817 LUMBOSACRAL SPONDYLOSIS WITHOUT MYELOPATHY: Primary | ICD-10-CM

## 2024-10-07 DIAGNOSIS — M47.814 THORACIC SPONDYLOSIS: ICD-10-CM

## 2024-10-07 PROCEDURE — 64493 INJ PARAVERT F JNT L/S 1 LEV: CPT | Performed by: PAIN MEDICINE

## 2024-10-07 PROCEDURE — 64490 INJ PARAVERT F JNT C/T 1 LEV: CPT | Performed by: PAIN MEDICINE

## 2024-10-07 RX ORDER — LIDOCAINE HYDROCHLORIDE 10 MG/ML
3 INJECTION, SOLUTION EPIDURAL; INFILTRATION; INTRACAUDAL; PERINEURAL ONCE
Status: COMPLETED | OUTPATIENT
Start: 2024-10-07 | End: 2024-10-07

## 2024-10-07 RX ORDER — BETAMETHASONE SODIUM PHOSPHATE AND BETAMETHASONE ACETATE 3; 3 MG/ML; MG/ML
6 INJECTION, SUSPENSION INTRA-ARTICULAR; INTRALESIONAL; INTRAMUSCULAR; SOFT TISSUE ONCE
Status: COMPLETED | OUTPATIENT
Start: 2024-10-07 | End: 2024-10-07

## 2024-10-07 RX ADMIN — BETAMETHASONE SODIUM PHOSPHATE AND BETAMETHASONE ACETATE 6 MG: 3; 3 INJECTION, SUSPENSION INTRA-ARTICULAR; INTRALESIONAL; INTRAMUSCULAR at 09:04

## 2024-10-07 RX ADMIN — LIDOCAINE HYDROCHLORIDE 3 ML: 10 INJECTION, SOLUTION EPIDURAL; INFILTRATION; INTRACAUDAL; PERINEURAL at 09:05

## 2024-10-07 NOTE — PROGRESS NOTES
[facility]  Neurosurgery and Pain Management Center  [address], [address]  [place], [place]  P: [phone]  F: [phone]      Diagnostic LUMBAR MEDIAL BRANCH BLOCK      Provider: MAURA STOLL DO          Patient Name: Kari Dimas : 1960        Date: 10/7/2024       Kari Dimas is here today for interventional pain management.  Standard ASI guidelines were followed and sterile technique used.  Area was cleaned with Betadine x3.  Informed consent was obtained.  Fluoroscopic guidance was used for this procedure. Junction of superior articular process and transverse process was identified. For L5 dorsal primary ramus groove of sacral ala and SAP of S1 was identified. Appropriate length spinal needle used and advanced to correct anatomic location. Negative aspiration was achieved.  At each site approximately 1/2cc of 1% preservative free Lidocaine was injected without difficulty.6 mg Celestone added.  This procedure was 30% more difficult and required 30% more work secondary to the patient's habitus. The patient has a BMI of 31 and has comorbidities of hypertension and hyperlipidemia. This required increased work for safe and proper positioning upon the fluoroscopy table, increased needle passes for safe and appropriate needle placement, and increased fluoroscopy time and radiation exposure for proper visualization.        Patient tolerated the procedure well, no obvious complications occurred during the procedure.  Patient was appropriately monitored and discharged home in stable condition with their usual motor strength. The patient will keep close track of pain over the next several hours and call our office tomorrow and let us know what percentage of pain relief is experienced.  Post op Instructions were given to patient. Relevant and recent imaging reviewed with patient today.           Bilateral T11 T12 L1 performed       [x] Bilateral []

## 2024-10-15 ENCOUNTER — APPOINTMENT (OUTPATIENT)
Dept: PRIMARY CARE | Facility: CLINIC | Age: 64
End: 2024-10-15
Payer: MEDICARE

## 2024-10-17 ENCOUNTER — HOSPITAL ENCOUNTER (OUTPATIENT)
Dept: RADIOLOGY | Facility: HOSPITAL | Age: 64
Discharge: HOME | End: 2024-10-17
Payer: MEDICARE

## 2024-10-17 DIAGNOSIS — G20.C PARKINSONISM, UNSPECIFIED PARKINSONISM TYPE (MULTI): ICD-10-CM

## 2024-10-17 DIAGNOSIS — R25.1 TREMOR: ICD-10-CM

## 2024-10-17 PROCEDURE — 78803 RP LOCLZJ TUM SPECT 1 AREA: CPT

## 2024-10-17 PROCEDURE — 2500000001 HC RX 250 WO HCPCS SELF ADMINISTERED DRUGS (ALT 637 FOR MEDICARE OP): Performed by: RADIOLOGY

## 2024-10-17 PROCEDURE — 3430000001 HC RX 343 DIAGNOSTIC RADIOPHARMACEUTICALS: Performed by: STUDENT IN AN ORGANIZED HEALTH CARE EDUCATION/TRAINING PROGRAM

## 2024-10-17 PROCEDURE — 78803 RP LOCLZJ TUM SPECT 1 AREA: CPT | Performed by: STUDENT IN AN ORGANIZED HEALTH CARE EDUCATION/TRAINING PROGRAM

## 2024-10-17 PROCEDURE — A9584 IODINE I-123 IOFLUPANE: HCPCS | Performed by: STUDENT IN AN ORGANIZED HEALTH CARE EDUCATION/TRAINING PROGRAM

## 2024-10-17 RX ORDER — IOFLUPANE I-123 2 MCI/ML
5 INJECTION, SOLUTION INTRAVENOUS ONCE
Status: COMPLETED | OUTPATIENT
Start: 2024-10-17 | End: 2024-10-17

## 2024-10-17 ASSESSMENT — ENCOUNTER SYMPTOMS
DEPRESSION: 1
LOSS OF SENSATION IN FEET: 0
OCCASIONAL FEELINGS OF UNSTEADINESS: 1

## 2024-10-21 ENCOUNTER — OFFICE VISIT (OUTPATIENT)
Age: 64
End: 2024-10-21
Payer: MEDICARE

## 2024-10-21 VITALS
HEIGHT: 65 IN | BODY MASS INDEX: 30.66 KG/M2 | TEMPERATURE: 97.2 F | DIASTOLIC BLOOD PRESSURE: 70 MMHG | WEIGHT: 184 LBS | SYSTOLIC BLOOD PRESSURE: 120 MMHG

## 2024-10-21 DIAGNOSIS — M54.16 LUMBAR RADICULOPATHY: ICD-10-CM

## 2024-10-21 DIAGNOSIS — M48.062 SPINAL STENOSIS OF LUMBAR REGION WITH NEUROGENIC CLAUDICATION: ICD-10-CM

## 2024-10-21 DIAGNOSIS — M96.1 POST LAMINECTOMY SYNDROME: ICD-10-CM

## 2024-10-21 DIAGNOSIS — M25.511 PAIN IN JOINT OF RIGHT SHOULDER: ICD-10-CM

## 2024-10-21 DIAGNOSIS — Z98.1 HISTORY OF LUMBAR FUSION: ICD-10-CM

## 2024-10-21 DIAGNOSIS — M47.817 LUMBOSACRAL SPONDYLOSIS WITHOUT MYELOPATHY: Primary | ICD-10-CM

## 2024-10-21 DIAGNOSIS — G89.4 CHRONIC PAIN SYNDROME: ICD-10-CM

## 2024-10-21 DIAGNOSIS — M54.6 THORACIC SPINE PAIN: ICD-10-CM

## 2024-10-21 DIAGNOSIS — M47.812 CERVICAL SPONDYLOSIS WITHOUT MYELOPATHY: ICD-10-CM

## 2024-10-21 PROCEDURE — G8427 DOCREV CUR MEDS BY ELIG CLIN: HCPCS | Performed by: NURSE PRACTITIONER

## 2024-10-21 PROCEDURE — 3078F DIAST BP <80 MM HG: CPT | Performed by: NURSE PRACTITIONER

## 2024-10-21 PROCEDURE — 3017F COLORECTAL CA SCREEN DOC REV: CPT | Performed by: NURSE PRACTITIONER

## 2024-10-21 PROCEDURE — 3074F SYST BP LT 130 MM HG: CPT | Performed by: NURSE PRACTITIONER

## 2024-10-21 PROCEDURE — 1036F TOBACCO NON-USER: CPT | Performed by: NURSE PRACTITIONER

## 2024-10-21 PROCEDURE — 99214 OFFICE O/P EST MOD 30 MIN: CPT | Performed by: NURSE PRACTITIONER

## 2024-10-21 PROCEDURE — 99215 OFFICE O/P EST HI 40 MIN: CPT

## 2024-10-21 PROCEDURE — G8417 CALC BMI ABV UP PARAM F/U: HCPCS | Performed by: NURSE PRACTITIONER

## 2024-10-21 PROCEDURE — G8484 FLU IMMUNIZE NO ADMIN: HCPCS | Performed by: NURSE PRACTITIONER

## 2024-10-21 RX ORDER — GABAPENTIN 400 MG/1
400 CAPSULE ORAL 3 TIMES DAILY
Qty: 270 CAPSULE | Refills: 0 | Status: SHIPPED | OUTPATIENT
Start: 2024-10-21 | End: 2025-01-19

## 2024-10-21 RX ORDER — OXYCODONE AND ACETAMINOPHEN 5; 325 MG/1; MG/1
1 TABLET ORAL 3 TIMES DAILY PRN
Qty: 90 TABLET | Refills: 0 | Status: SHIPPED | OUTPATIENT
Start: 2024-10-22 | End: 2024-11-21

## 2024-10-21 ASSESSMENT — ENCOUNTER SYMPTOMS
BACK PAIN: 1
COUGH: 0
DIARRHEA: 0
SHORTNESS OF BREATH: 0
CONSTIPATION: 0
TROUBLE SWALLOWING: 0
EYES NEGATIVE: 1
GASTROINTESTINAL NEGATIVE: 1

## 2024-10-21 NOTE — PROGRESS NOTES
Kari Dimas  (1960)    10/21/2024    Subjective:     Kari Dimas is 64 y.o. female who complains today of:    Chief Complaint   Patient presents with    Back Pain         Allergies:  Nsaids and Ibuprofen    Past Medical History:   Diagnosis Date    Anemia     Arthritis     Chicken pox     Chronic back pain     H/O bladder infections     Headache(784.0)     History of blood transfusion     post delivery of first child, again after gastric ulcer cauterization, again for anemia    Hyperlipidemia     meds for about 1 year    Hypertension     meds for about 2 years    Incomplete bladder emptying 09/29/2009    Measles     Mumps     Nausea, vomiting and diarrhea 08/17/2020    Stroke (HCC) 07/2020     Past Surgical History:   Procedure Laterality Date    APPENDECTOMY      BACK SURGERY  2014    lumbar back fusion    CHOLECYSTECTOMY      2017    COLONOSCOPY      ENDOSCOPY, COLON, DIAGNOSTIC      GALLBLADDER SURGERY      HYSTERECTOMY (CERVIX STATUS UNKNOWN)      1980    JOINT REPLACEMENT Right 2020    JOINT REPLACEMENT Left 2018    KNEE SURGERY Right     LEG BIOPSY EXCISION Right 11/12/2021    SOFT TISSUE MASS EXCISION RIGHT [place]LE [place] [facility] [place]    LUMBAR FUSION N/A 10/21/2022    RIGHT BILATERAL L2-3 LAMINECTOMIES MICRODISSECTION FORAMINOTOMIES INTERBODY POSTEROLATERAL PEDICLE SCREW FUSION WITH IGS performed by Delores Santillan [place] [facility] [place]    ROTATOR CUFF REPAIR      TONSILLECTOMY       Family History   Problem Relation Age of Onset    Stroke Mother     Cancer Father     Diabetes Sister     High Blood Pressure Sister     High Blood Pressure Brother     Diabetes Brother     No Known Problems Daughter      Social History     Socioeconomic History    Marital status:      Spouse name: Not on file    Number of children: Not on file    Years of education: Not on file    Highest education level: Not on file   Occupational History    Not on file   Tobacco Use    Smoking status: Never

## 2024-10-22 ENCOUNTER — TELEPHONE (OUTPATIENT)
Age: 64
End: 2024-10-22

## 2024-10-22 NOTE — TELEPHONE ENCOUNTER
ND DIAGNOSTIC B/L T11, 12, L1 MBB  NO AUTH REQUIRED      OK to schedule procedure approved as above.   Please note sides/levels approved and date range.   (If applicable, sides/levels approved may differ from those ordered)       TO BE SCHEDULED WITH DR. STOLL

## 2024-11-04 ENCOUNTER — PROCEDURE VISIT (OUTPATIENT)
Age: 64
End: 2024-11-04
Payer: MEDICARE

## 2024-11-04 VITALS
HEIGHT: 65 IN | OXYGEN SATURATION: 96 % | TEMPERATURE: 97.9 F | BODY MASS INDEX: 31.32 KG/M2 | WEIGHT: 188 LBS | HEART RATE: 95 BPM | DIASTOLIC BLOOD PRESSURE: 68 MMHG | SYSTOLIC BLOOD PRESSURE: 102 MMHG

## 2024-11-04 DIAGNOSIS — M47.814 THORACIC SPONDYLOSIS: ICD-10-CM

## 2024-11-04 DIAGNOSIS — M47.817 LUMBOSACRAL SPONDYLOSIS WITHOUT MYELOPATHY: Primary | ICD-10-CM

## 2024-11-04 PROCEDURE — 64490 INJ PARAVERT F JNT C/T 1 LEV: CPT | Performed by: PAIN MEDICINE

## 2024-11-04 PROCEDURE — 64491 INJ PARAVERT F JNT C/T 2 LEV: CPT | Performed by: PAIN MEDICINE

## 2024-11-04 PROCEDURE — 64493 INJ PARAVERT F JNT L/S 1 LEV: CPT | Performed by: PAIN MEDICINE

## 2024-11-04 RX ORDER — LIDOCAINE HYDROCHLORIDE 10 MG/ML
3 INJECTION, SOLUTION EPIDURAL; INFILTRATION; INTRACAUDAL; PERINEURAL ONCE
Status: COMPLETED | OUTPATIENT
Start: 2024-11-04 | End: 2024-11-04

## 2024-11-04 RX ADMIN — LIDOCAINE HYDROCHLORIDE 3 ML: 10 INJECTION, SOLUTION EPIDURAL; INFILTRATION; INTRACAUDAL; PERINEURAL at 13:55

## 2024-11-04 NOTE — PROGRESS NOTES
[facility]  Neurosurgery and Pain Management Center  [address], [address]  [place], [place]  P: [phone]  F: [phone]      Diagnostic thoracic/ Lumbar MEDIAL BRANCH BLOCK      Provider: MAURA STOLL DO          Patient Name: Kari Dimas : 1960        Date: 2024       Kari Dimas is here today for interventional pain management.  Standard ASI guidelines were followed and sterile technique used.  Area was cleaned with Betadine x3.  Informed consent was obtained.  Fluoroscopic guidance was used for this procedure. Junction of superior articular process and transverse process was identified. For L5 dorsal primary ramus groove of sacral ala and SAP of S1 was identified. Appropriate length spinal needle was used and advanced to correct anatomic location. Negative aspiration was achieved.  At each site approximately 1/2cc of 1% preservative free Lidocaine was injected without difficulty.This procedure was 30% more difficult and required 30% more work secondary to the patient's habitus. The patient has a BMI of 31 and has comorbidities of hypertension and hyperlipidemia. This required increased work for safe and proper positioning upon the fluoroscopy table, increased needle passes for safe and appropriate needle placement, and increased fluoroscopy time and radiation exposure for proper visualization.        Patient tolerated the procedure well, no obvious complications occurred during the procedure.  Patient was appropriately monitored and discharged home in stable condition with their usual motor strength. The patient will keep close track of pain over the next several hours and call our office tomorrow and let us know what percentage of pain relief is experienced.  Post op Instructions were given to patient. Relevant and recent imaging reviewed with patient today.         Bilateral [id number] performed         [x] Bilateral [] T12 [] S1

## 2024-11-05 ENCOUNTER — SPECIALTY PHARMACY (OUTPATIENT)
Dept: PHARMACY | Facility: CLINIC | Age: 64
End: 2024-11-05

## 2024-11-05 ENCOUNTER — TELEPHONE (OUTPATIENT)
Age: 64
End: 2024-11-05

## 2024-11-05 PROCEDURE — RXMED WILLOW AMBULATORY MEDICATION CHARGE

## 2024-11-05 NOTE — TELEPHONE ENCOUNTER
Bilateral T11 -T12-L1 RFA     NO AUTH REQUIRED      OK to schedule procedure approved as above.   Please note sides/levels approved and date range.   (If applicable, sides/levels approved may differ from those ordered)     TO BE SCHEDULED WITH DR STOLL

## 2024-11-07 ENCOUNTER — PHARMACY VISIT (OUTPATIENT)
Dept: PHARMACY | Facility: CLINIC | Age: 64
End: 2024-11-07
Payer: COMMERCIAL

## 2024-11-08 ENCOUNTER — TELEPHONE (OUTPATIENT)
Age: 64
End: 2024-11-08

## 2024-11-08 NOTE — TELEPHONE ENCOUNTER
Sent my chart message to have patient reschedule her appt on 11/12/2024 to the following week as her RFA was schedule too early. Phone number has been disconnected please get a updated phone number.

## 2024-11-08 NOTE — TELEPHONE ENCOUNTER
----- Message from Shanel Man DO sent at 10/26/2023  8:13 AM EDT -----  Her urine cx is neg. How is she feeling   Recvd refill request for Losartan.  30 day supply refilled last month and no OV has been made yet.  Forwarded to sec't to try to schedule again, and pended RX for refusal.

## 2024-11-13 ENCOUNTER — OFFICE VISIT (OUTPATIENT)
Age: 64
End: 2024-11-13
Payer: MEDICARE

## 2024-11-13 VITALS
DIASTOLIC BLOOD PRESSURE: 80 MMHG | WEIGHT: 189 LBS | BODY MASS INDEX: 31.49 KG/M2 | SYSTOLIC BLOOD PRESSURE: 130 MMHG | HEIGHT: 65 IN | TEMPERATURE: 97.5 F

## 2024-11-13 DIAGNOSIS — Z98.1 HISTORY OF LUMBAR FUSION: ICD-10-CM

## 2024-11-13 DIAGNOSIS — M25.511 PAIN IN JOINT OF RIGHT SHOULDER: ICD-10-CM

## 2024-11-13 DIAGNOSIS — G89.4 CHRONIC PAIN SYNDROME: ICD-10-CM

## 2024-11-13 DIAGNOSIS — M47.817 LUMBOSACRAL SPONDYLOSIS WITHOUT MYELOPATHY: ICD-10-CM

## 2024-11-13 DIAGNOSIS — M47.812 CERVICAL SPONDYLOSIS WITHOUT MYELOPATHY: ICD-10-CM

## 2024-11-13 DIAGNOSIS — M54.16 LUMBAR RADICULOPATHY: ICD-10-CM

## 2024-11-13 DIAGNOSIS — M48.062 SPINAL STENOSIS OF LUMBAR REGION WITH NEUROGENIC CLAUDICATION: ICD-10-CM

## 2024-11-13 DIAGNOSIS — M96.1 POST LAMINECTOMY SYNDROME: ICD-10-CM

## 2024-11-13 PROCEDURE — G8427 DOCREV CUR MEDS BY ELIG CLIN: HCPCS | Performed by: NURSE PRACTITIONER

## 2024-11-13 PROCEDURE — 3017F COLORECTAL CA SCREEN DOC REV: CPT | Performed by: NURSE PRACTITIONER

## 2024-11-13 PROCEDURE — 3075F SYST BP GE 130 - 139MM HG: CPT | Performed by: NURSE PRACTITIONER

## 2024-11-13 PROCEDURE — 99214 OFFICE O/P EST MOD 30 MIN: CPT | Performed by: NURSE PRACTITIONER

## 2024-11-13 PROCEDURE — 3079F DIAST BP 80-89 MM HG: CPT | Performed by: NURSE PRACTITIONER

## 2024-11-13 PROCEDURE — G8417 CALC BMI ABV UP PARAM F/U: HCPCS | Performed by: NURSE PRACTITIONER

## 2024-11-13 PROCEDURE — G8484 FLU IMMUNIZE NO ADMIN: HCPCS | Performed by: NURSE PRACTITIONER

## 2024-11-13 PROCEDURE — 1036F TOBACCO NON-USER: CPT | Performed by: NURSE PRACTITIONER

## 2024-11-13 RX ORDER — OXYCODONE AND ACETAMINOPHEN 5; 325 MG/1; MG/1
1 TABLET ORAL 3 TIMES DAILY PRN
Qty: 90 TABLET | Refills: 0 | Status: SHIPPED | OUTPATIENT
Start: 2024-11-21 | End: 2024-12-21

## 2024-11-13 ASSESSMENT — ENCOUNTER SYMPTOMS
EYES NEGATIVE: 1
BACK PAIN: 1
SHORTNESS OF BREATH: 0
DIARRHEA: 0
COUGH: 0
TROUBLE SWALLOWING: 0
CONSTIPATION: 0
GASTROINTESTINAL NEGATIVE: 1

## 2024-11-13 NOTE — PROGRESS NOTES
Kari Dimas  (1960)    11/13/2024    Subjective:     Kari Dimas is 64 y.o. female who complains today of:    Chief Complaint   Patient presents with    Back Pain     Lower Right         Allergies:  Nsaids and Ibuprofen    Past Medical History:   Diagnosis Date    Anemia     Arthritis     Chicken pox     Chronic back pain     H/O bladder infections     Headache(784.0)     History of blood transfusion     post delivery of first child, again after gastric ulcer cauterization, again for anemia    Hyperlipidemia     meds for about 1 year    Hypertension     meds for about 2 years    Incomplete bladder emptying 09/29/2009    Measles     Mumps     Nausea, vomiting and diarrhea 08/17/2020    Stroke (HCC) 07/2020     Past Surgical History:   Procedure Laterality Date    APPENDECTOMY      BACK SURGERY  2014    lumbar back fusion    CHOLECYSTECTOMY      2017    COLONOSCOPY      ENDOSCOPY, COLON, DIAGNOSTIC      GALLBLADDER SURGERY      HYSTERECTOMY (CERVIX STATUS UNKNOWN)      1980    JOINT REPLACEMENT Right 2020    JOINT REPLACEMENT Left 2018    KNEE SURGERY Right     LEG BIOPSY EXCISION Right 11/12/2021    SOFT TISSUE MASS EXCISION RIGHT ANKLE [place] [facility] [place]    LUMBAR FUSION N/A 10/21/2022    RIGHT BILATERAL L2-3 LAMINECTOMIES MICRODISSECTION FORAMINOTOMIES INTERBODY POSTEROLATERAL PEDICLE SCREW FUSION WITH IGS performed by Delores Santillan [place] at [facility] [place]    ROTATOR CUFF REPAIR      TONSILLECTOMY       Family History   Problem Relation Age of Onset    Stroke Mother     Cancer Father     Diabetes Sister     High Blood Pressure Sister     High Blood Pressure Brother     Diabetes Brother     No Known Problems Daughter      Social History     Socioeconomic History    Marital status:      Spouse name: Not on file    Number of children: Not on file    Years of education: Not on file    Highest education level: Not on file   Occupational History    Not on file   Tobacco Use

## 2024-11-27 ENCOUNTER — APPOINTMENT (OUTPATIENT)
Dept: PRIMARY CARE | Facility: CLINIC | Age: 64
End: 2024-11-27
Payer: MEDICARE

## 2024-11-27 VITALS
RESPIRATION RATE: 14 BRPM | DIASTOLIC BLOOD PRESSURE: 60 MMHG | HEART RATE: 74 BPM | SYSTOLIC BLOOD PRESSURE: 112 MMHG | HEIGHT: 65 IN | BODY MASS INDEX: 30.99 KG/M2 | TEMPERATURE: 97.4 F | WEIGHT: 186 LBS | OXYGEN SATURATION: 96 %

## 2024-11-27 DIAGNOSIS — H69.92 DYSFUNCTION OF LEFT EUSTACHIAN TUBE: ICD-10-CM

## 2024-11-27 DIAGNOSIS — N39.0 URINARY TRACT INFECTION WITHOUT HEMATURIA, SITE UNSPECIFIED: Primary | ICD-10-CM

## 2024-11-27 DIAGNOSIS — I10 BENIGN ESSENTIAL HYPERTENSION: ICD-10-CM

## 2024-11-27 DIAGNOSIS — R82.90 ABNORMAL URINE ODOR: ICD-10-CM

## 2024-11-27 LAB
POC APPEARANCE, URINE: ABNORMAL
POC BILIRUBIN, URINE: NEGATIVE
POC BLOOD, URINE: ABNORMAL
POC COLOR, URINE: YELLOW
POC GLUCOSE, URINE: NEGATIVE MG/DL
POC KETONES, URINE: NEGATIVE MG/DL
POC LEUKOCYTES, URINE: ABNORMAL
POC NITRITE,URINE: POSITIVE
POC PH, URINE: 6 PH
POC PROTEIN, URINE: NEGATIVE MG/DL
POC SPECIFIC GRAVITY, URINE: 1.02
POC UROBILINOGEN, URINE: 0.2 EU/DL

## 2024-11-27 PROCEDURE — 87186 SC STD MICRODIL/AGAR DIL: CPT

## 2024-11-27 PROCEDURE — G0008 ADMIN INFLUENZA VIRUS VAC: HCPCS | Performed by: INTERNAL MEDICINE

## 2024-11-27 PROCEDURE — 81002 URINALYSIS NONAUTO W/O SCOPE: CPT | Performed by: INTERNAL MEDICINE

## 2024-11-27 PROCEDURE — 99214 OFFICE O/P EST MOD 30 MIN: CPT | Performed by: INTERNAL MEDICINE

## 2024-11-27 PROCEDURE — 87086 URINE CULTURE/COLONY COUNT: CPT

## 2024-11-27 PROCEDURE — 1036F TOBACCO NON-USER: CPT | Performed by: INTERNAL MEDICINE

## 2024-11-27 PROCEDURE — 3078F DIAST BP <80 MM HG: CPT | Performed by: INTERNAL MEDICINE

## 2024-11-27 PROCEDURE — 90656 IIV3 VACC NO PRSV 0.5 ML IM: CPT | Performed by: INTERNAL MEDICINE

## 2024-11-27 PROCEDURE — 3074F SYST BP LT 130 MM HG: CPT | Performed by: INTERNAL MEDICINE

## 2024-11-27 PROCEDURE — 3008F BODY MASS INDEX DOCD: CPT | Performed by: INTERNAL MEDICINE

## 2024-11-27 RX ORDER — AMOXICILLIN AND CLAVULANATE POTASSIUM 875; 125 MG/1; MG/1
875 TABLET, FILM COATED ORAL 2 TIMES DAILY
Qty: 14 TABLET | Refills: 0 | Status: SHIPPED | OUTPATIENT
Start: 2024-11-27 | End: 2024-12-04

## 2024-11-27 RX ORDER — FLUTICASONE PROPIONATE 50 MCG
1 SPRAY, SUSPENSION (ML) NASAL DAILY
Qty: 16 G | Refills: 5 | Status: SHIPPED | OUTPATIENT
Start: 2024-11-27 | End: 2025-11-27

## 2024-11-27 NOTE — PROGRESS NOTES
"Subjective    Sofi Steve is a 64 y.o. female who presents for Follow-up.  HPI    6 month fu  Urine has strong odor   Needs flu vaccine  Her left ear has been blocked and she has been having pressure in her sinus   Has occasional ankle swelling     She has no further diarrhea   She is weaning down off the gabapentin         Review of Systems   All other systems reviewed and are negative.        Objective     /60 (BP Location: Left arm, Patient Position: Sitting, BP Cuff Size: Adult)   Pulse 74   Temp 36.3 °C (97.4 °F) (Skin)   Resp 14   Ht 1.651 m (5' 5\")   Wt 84.4 kg (186 lb)   SpO2 96%   BMI 30.95 kg/m²    Physical Exam  Vitals reviewed.   Constitutional:       General: She is not in acute distress.     Appearance: Normal appearance.   Cardiovascular:      Rate and Rhythm: Normal rate and regular rhythm.      Pulses: Normal pulses.      Heart sounds: Normal heart sounds.   Pulmonary:      Effort: Pulmonary effort is normal.      Breath sounds: Normal breath sounds.   Abdominal:      General: Abdomen is flat.      Palpations: Abdomen is soft.      Tenderness: There is no abdominal tenderness. There is no right CVA tenderness or left CVA tenderness.   Musculoskeletal:         General: No swelling.   Skin:     General: Skin is warm and dry.   Neurological:      Mental Status: She is alert.       Health Maintenance Due   Topic Date Due    HIV Screening  Never done    MMR Vaccines (1 of 1 - Standard series) Never done    Diabetes Screening  Never done    Cervical Cancer Screening  04/29/2016    RSV High Risk: (Elderly (60+) or Pregnant Population) (1 - Risk 60-74 years 1-dose series) Never done    COVID-19 Vaccine (6 - 2024-25 season) 09/01/2024          Assessment/Plan   Problem List Items Addressed This Visit       Benign essential hypertension    Relevant Orders    Basic Metabolic Panel    Follow Up In [facility] - PCP - Medicare Annual     Other Visit Diagnoses       Urinary tract " infection without hematuria, site unspecified    -  Primary    Relevant Medications    amoxicillin-pot clavulanate (Augmentin) 875-125 mg tablet    Abnormal urine odor        Relevant Orders    POCT UA (nonautomated) manually resulted (Completed)    Urine Culture    Dysfunction of left eustachian tube        Relevant Medications    fluticasone (Flonase) 50 mcg/actuation nasal spray        Blood pressure is good.   Start abx. Take with food.   Send urine for cx.   Check labs.   Call  with any problems or questions.   Follow up if sx worsen or do not improve.

## 2024-11-30 LAB
BACTERIA UR CULT: ABNORMAL
BACTERIA UR CULT: ABNORMAL

## 2024-12-03 ENCOUNTER — SPECIALTY PHARMACY (OUTPATIENT)
Dept: PHARMACY | Facility: CLINIC | Age: 64
End: 2024-12-03

## 2024-12-03 PROCEDURE — RXMED WILLOW AMBULATORY MEDICATION CHARGE

## 2024-12-04 ENCOUNTER — PHARMACY VISIT (OUTPATIENT)
Dept: PHARMACY | Facility: CLINIC | Age: 64
End: 2024-12-04
Payer: COMMERCIAL

## 2024-12-05 ENCOUNTER — APPOINTMENT (OUTPATIENT)
Dept: UROLOGY | Facility: CLINIC | Age: 64
End: 2024-12-05
Payer: MEDICARE

## 2024-12-06 ENCOUNTER — SPECIALTY PHARMACY (OUTPATIENT)
Dept: PHARMACY | Facility: CLINIC | Age: 64
End: 2024-12-06

## 2024-12-15 DIAGNOSIS — M54.16 LUMBAR RADICULOPATHY: ICD-10-CM

## 2024-12-15 DIAGNOSIS — M96.1 POST LAMINECTOMY SYNDROME: ICD-10-CM

## 2024-12-15 DIAGNOSIS — G89.4 CHRONIC PAIN SYNDROME: ICD-10-CM

## 2024-12-15 DIAGNOSIS — M25.511 PAIN IN JOINT OF RIGHT SHOULDER: ICD-10-CM

## 2024-12-15 DIAGNOSIS — M47.817 LUMBOSACRAL SPONDYLOSIS WITHOUT MYELOPATHY: ICD-10-CM

## 2024-12-15 DIAGNOSIS — M47.812 CERVICAL SPONDYLOSIS WITHOUT MYELOPATHY: ICD-10-CM

## 2024-12-15 DIAGNOSIS — M48.062 SPINAL STENOSIS OF LUMBAR REGION WITH NEUROGENIC CLAUDICATION: ICD-10-CM

## 2024-12-16 RX ORDER — GABAPENTIN 400 MG/1
400 CAPSULE ORAL 3 TIMES DAILY
Qty: 270 CAPSULE | Refills: 3 | OUTPATIENT
Start: 2024-12-16

## 2024-12-17 ENCOUNTER — SPECIALTY PHARMACY (OUTPATIENT)
Dept: PHARMACY | Facility: CLINIC | Age: 64
End: 2024-12-17

## 2024-12-18 ENCOUNTER — OFFICE VISIT (OUTPATIENT)
Age: 64
End: 2024-12-18
Payer: MEDICARE

## 2024-12-18 VITALS
BODY MASS INDEX: 30.66 KG/M2 | SYSTOLIC BLOOD PRESSURE: 120 MMHG | DIASTOLIC BLOOD PRESSURE: 80 MMHG | WEIGHT: 184 LBS | HEIGHT: 65 IN

## 2024-12-18 DIAGNOSIS — M47.817 LUMBOSACRAL SPONDYLOSIS WITHOUT MYELOPATHY: ICD-10-CM

## 2024-12-18 DIAGNOSIS — Z98.1 HISTORY OF LUMBAR FUSION: ICD-10-CM

## 2024-12-18 DIAGNOSIS — G89.4 CHRONIC PAIN SYNDROME: ICD-10-CM

## 2024-12-18 DIAGNOSIS — M25.511 PAIN IN JOINT OF RIGHT SHOULDER: ICD-10-CM

## 2024-12-18 DIAGNOSIS — M96.1 POST LAMINECTOMY SYNDROME: ICD-10-CM

## 2024-12-18 DIAGNOSIS — M54.16 LUMBAR RADICULOPATHY: ICD-10-CM

## 2024-12-18 DIAGNOSIS — M48.062 SPINAL STENOSIS OF LUMBAR REGION WITH NEUROGENIC CLAUDICATION: ICD-10-CM

## 2024-12-18 DIAGNOSIS — M47.812 CERVICAL SPONDYLOSIS WITHOUT MYELOPATHY: ICD-10-CM

## 2024-12-18 PROCEDURE — G8417 CALC BMI ABV UP PARAM F/U: HCPCS | Performed by: NURSE PRACTITIONER

## 2024-12-18 PROCEDURE — 3079F DIAST BP 80-89 MM HG: CPT | Performed by: NURSE PRACTITIONER

## 2024-12-18 PROCEDURE — 1036F TOBACCO NON-USER: CPT | Performed by: NURSE PRACTITIONER

## 2024-12-18 PROCEDURE — 99214 OFFICE O/P EST MOD 30 MIN: CPT | Performed by: NURSE PRACTITIONER

## 2024-12-18 PROCEDURE — G8484 FLU IMMUNIZE NO ADMIN: HCPCS | Performed by: NURSE PRACTITIONER

## 2024-12-18 PROCEDURE — G8427 DOCREV CUR MEDS BY ELIG CLIN: HCPCS | Performed by: NURSE PRACTITIONER

## 2024-12-18 PROCEDURE — 3017F COLORECTAL CA SCREEN DOC REV: CPT | Performed by: NURSE PRACTITIONER

## 2024-12-18 PROCEDURE — 3074F SYST BP LT 130 MM HG: CPT | Performed by: NURSE PRACTITIONER

## 2024-12-18 RX ORDER — OXYCODONE AND ACETAMINOPHEN 5; 325 MG/1; MG/1
1 TABLET ORAL 3 TIMES DAILY PRN
Qty: 90 TABLET | Refills: 0 | Status: SHIPPED | OUTPATIENT
Start: 2024-12-22 | End: 2025-01-21

## 2024-12-18 ASSESSMENT — ENCOUNTER SYMPTOMS
TROUBLE SWALLOWING: 0
SHORTNESS OF BREATH: 0
COUGH: 0
GASTROINTESTINAL NEGATIVE: 1
DIARRHEA: 0
EYES NEGATIVE: 1
CONSTIPATION: 0
BACK PAIN: 1

## 2024-12-18 NOTE — PROGRESS NOTES
Kari Dimas  (1960)    12/18/2024    Subjective:     Kari Dimas is 64 y.o. female who complains today of:    Chief Complaint   Patient presents with    Follow-up    Back Pain     lower         Allergies:  Nsaids and Ibuprofen    Past Medical History:   Diagnosis Date    Anemia     Arthritis     Chicken pox     Chronic back pain     H/O bladder infections     Headache(784.0)     History of blood transfusion     post delivery of first child, again after gastric ulcer cauterization, again for anemia    Hyperlipidemia     meds for about 1 year    Hypertension     meds for about 2 years    Incomplete bladder emptying 09/29/2009    Measles     Mumps     Nausea, vomiting and diarrhea 08/17/2020    Stroke (HCC) 07/2020     Past Surgical History:   Procedure Laterality Date    APPENDECTOMY      BACK SURGERY  2014    lumbar back fusion    CHOLECYSTECTOMY      2017    COLONOSCOPY      ENDOSCOPY, COLON, DIAGNOSTIC      GALLBLADDER SURGERY      HYSTERECTOMY (CERVIX STATUS UNKNOWN)      1980    JOINT REPLACEMENT Right 2020    JOINT REPLACEMENT Left 2018    KNEE SURGERY Right     LEG BIOPSY EXCISION Right 11/12/2021    SOFT TISSUE MASS EXCISION RIGHT [place]LE [place] [facility] [place]    LUMBAR FUSION N/A 10/21/2022    RIGHT BILATERAL L2-3 LAMINECTOMIES MICRODISSECTION FORAMINOTOMIES INTERBODY POSTEROLATERAL PEDICLE SCREW FUSION WITH IGS performed [place] Del[place]illan [place] [facility] [place]    ROTATOR CUFF REPAIR      TONSILLECTOMY       Family History   Problem Relation Age of Onset    Stroke Mother     Cancer Father     Diabetes Sister     High Blood Pressure Sister     High Blood Pressure Brother     Diabetes Brother     No Known Problems Daughter      Social History     Socioeconomic History    Marital status:      Spouse name: Not on file    Number of children: Not on file    Years of education: Not on file    Highest education level: Not on file   Occupational History    Not on file   Tobacco Use

## 2025-01-01 DIAGNOSIS — M47.817 LUMBOSACRAL SPONDYLOSIS WITHOUT MYELOPATHY: ICD-10-CM

## 2025-01-01 DIAGNOSIS — M47.812 CERVICAL SPONDYLOSIS WITHOUT MYELOPATHY: ICD-10-CM

## 2025-01-01 DIAGNOSIS — M96.1 POST LAMINECTOMY SYNDROME: ICD-10-CM

## 2025-01-01 DIAGNOSIS — G89.4 CHRONIC PAIN SYNDROME: ICD-10-CM

## 2025-01-01 DIAGNOSIS — M48.062 SPINAL STENOSIS OF LUMBAR REGION WITH NEUROGENIC CLAUDICATION: ICD-10-CM

## 2025-01-01 DIAGNOSIS — M54.16 LUMBAR RADICULOPATHY: ICD-10-CM

## 2025-01-01 DIAGNOSIS — M25.511 PAIN IN JOINT OF RIGHT SHOULDER: ICD-10-CM

## 2025-01-02 ENCOUNTER — SPECIALTY PHARMACY (OUTPATIENT)
Dept: PHARMACY | Facility: CLINIC | Age: 65
End: 2025-01-02

## 2025-01-02 PROCEDURE — RXMED WILLOW AMBULATORY MEDICATION CHARGE

## 2025-01-03 RX ORDER — GABAPENTIN 400 MG/1
400 CAPSULE ORAL 3 TIMES DAILY
Qty: 90 CAPSULE | Refills: 0 | Status: SHIPPED | OUTPATIENT
Start: 2025-01-03 | End: 2025-02-02

## 2025-01-03 NOTE — TELEPHONE ENCOUNTER
Requested Prescriptions     Pending Prescriptions Disp Refills    gabapentin (NEURONTIN) 400 MG capsule [Pharmacy Med Name: Gabapentin 400 MG Oral Capsule] 270 capsule 3     Sig: Take 1 capsule by mouth 3 times daily.       Next office visit date: 1/22/2025    Nsaids and Ibuprofen

## 2025-01-06 ENCOUNTER — PHARMACY VISIT (OUTPATIENT)
Dept: PHARMACY | Facility: CLINIC | Age: 65
End: 2025-01-06
Payer: COMMERCIAL

## 2025-01-06 ENCOUNTER — OFFICE VISIT (OUTPATIENT)
Age: 65
End: 2025-01-06
Payer: MEDICARE

## 2025-01-06 VITALS
BODY MASS INDEX: 29.99 KG/M2 | TEMPERATURE: 97.7 F | WEIGHT: 180 LBS | OXYGEN SATURATION: 96 % | HEIGHT: 65 IN | HEART RATE: 86 BPM | SYSTOLIC BLOOD PRESSURE: 122 MMHG | DIASTOLIC BLOOD PRESSURE: 80 MMHG

## 2025-01-06 DIAGNOSIS — M47.817 LUMBOSACRAL SPONDYLOSIS WITHOUT MYELOPATHY: Primary | ICD-10-CM

## 2025-01-06 PROCEDURE — 64635 DESTROY LUMB/SAC FACET JNT: CPT | Performed by: PAIN MEDICINE

## 2025-01-06 PROCEDURE — 64636 DESTROY L/S FACET JNT ADDL: CPT | Performed by: PAIN MEDICINE

## 2025-01-06 RX ORDER — BETAMETHASONE SODIUM PHOSPHATE AND BETAMETHASONE ACETATE 3; 3 MG/ML; MG/ML
6 INJECTION, SUSPENSION INTRA-ARTICULAR; INTRALESIONAL; INTRAMUSCULAR; SOFT TISSUE ONCE
Status: COMPLETED | OUTPATIENT
Start: 2025-01-06 | End: 2025-01-06

## 2025-01-06 RX ORDER — LIDOCAINE HYDROCHLORIDE 10 MG/ML
3 INJECTION, SOLUTION EPIDURAL; INFILTRATION; INTRACAUDAL; PERINEURAL ONCE
Status: COMPLETED | OUTPATIENT
Start: 2025-01-06 | End: 2025-01-06

## 2025-01-06 RX ADMIN — BETAMETHASONE SODIUM PHOSPHATE AND BETAMETHASONE ACETATE 6 MG: 3; 3 INJECTION, SUSPENSION INTRA-ARTICULAR; INTRALESIONAL; INTRAMUSCULAR at 08:44

## 2025-01-06 RX ADMIN — LIDOCAINE HYDROCHLORIDE 3 ML: 10 INJECTION, SOLUTION EPIDURAL; INFILTRATION; INTRACAUDAL; PERINEURAL at 08:44

## 2025-01-06 ASSESSMENT — PAIN SCALES - GENERAL
PAINLEVEL_OUTOF10: 8
PAINLEVEL_OUTOF10: 6

## 2025-01-06 ASSESSMENT — PAIN DESCRIPTION - LOCATION
LOCATION: BACK
LOCATION: BACK

## 2025-01-06 NOTE — PROGRESS NOTES
[facility]  Neurosurgery and Pain Management Center  [address], [address]  [place], [place]  P: [phone]  F: [phone]      Lumbar Radio Frequency Ablation     Provider: MAURA STOLL DO          Patient Name: Kari Dimas : 1960        Date: 2025      Kari Dimas is here today for interventional pain management.  Standard ASI guidelines were followed and sterile technique used.  Area was cleaned with Betadine x3.  Informed consent was obtained.  Fluoroscopic guidance was used for this procedure. Multiple views of fluoroscopy were used during procedure to assist with needle placement. Appropriate sized RF 10mm active tip needle was used and advance to appropriate anatomic location.    There was appropriate multifidus contraction noted with motor stimulation at 2 Hz between 0.5-1.5 volts. No limb or gluteal contraction was noted taking it up to 3.5 volts. Prior to lesioning at 80 degrees Celsius for 90 seconds, approximately 0.75mg/1mg of Celestone and ½ cc of 1% preservative free Lidocaine was injected. Impedance was between 200-500 ohms during the procedure.This procedure was 30% more difficult and required 30% more work secondary to the patient's habitus. The patient has a BMI of 30 and has comorbidities of hypertension and hyperlipidemia. This required increased work for safe and proper positioning upon the fluoroscopy table, increased needle passes for safe and appropriate needle placement, and increased fluoroscopy time and radiation exposure for proper visualization.    Thank you     Patient tolerated the procedure well, no obvious complications occurred during the procedure.  Patient was appropriately monitored and discharged home in stable condition with their usual motor strength. Post Op instructions were given to patient.          [x] Bilateral [x] T11 [x] L1 [] S1     [x] T12 [] L2 [] S2    [] Right  [] L3 [] S3      [] L4 []

## 2025-01-22 ENCOUNTER — OFFICE VISIT (OUTPATIENT)
Age: 65
End: 2025-01-22
Payer: MEDICARE

## 2025-01-22 VITALS
HEART RATE: 86 BPM | TEMPERATURE: 97.2 F | WEIGHT: 180 LBS | HEIGHT: 65 IN | BODY MASS INDEX: 29.99 KG/M2 | OXYGEN SATURATION: 96 %

## 2025-01-22 DIAGNOSIS — M47.812 CERVICAL SPONDYLOSIS WITHOUT MYELOPATHY: ICD-10-CM

## 2025-01-22 DIAGNOSIS — M96.1 POST LAMINECTOMY SYNDROME: ICD-10-CM

## 2025-01-22 DIAGNOSIS — M48.062 SPINAL STENOSIS OF LUMBAR REGION WITH NEUROGENIC CLAUDICATION: ICD-10-CM

## 2025-01-22 DIAGNOSIS — G89.4 CHRONIC PAIN SYNDROME: ICD-10-CM

## 2025-01-22 DIAGNOSIS — M54.16 LUMBAR RADICULOPATHY: ICD-10-CM

## 2025-01-22 DIAGNOSIS — Z98.1 HISTORY OF LUMBAR FUSION: ICD-10-CM

## 2025-01-22 DIAGNOSIS — M47.817 LUMBOSACRAL SPONDYLOSIS WITHOUT MYELOPATHY: ICD-10-CM

## 2025-01-22 DIAGNOSIS — M25.511 PAIN IN JOINT OF RIGHT SHOULDER: ICD-10-CM

## 2025-01-22 PROCEDURE — 99214 OFFICE O/P EST MOD 30 MIN: CPT | Performed by: NURSE PRACTITIONER

## 2025-01-22 PROCEDURE — 1036F TOBACCO NON-USER: CPT | Performed by: NURSE PRACTITIONER

## 2025-01-22 PROCEDURE — 3017F COLORECTAL CA SCREEN DOC REV: CPT | Performed by: NURSE PRACTITIONER

## 2025-01-22 PROCEDURE — G8417 CALC BMI ABV UP PARAM F/U: HCPCS | Performed by: NURSE PRACTITIONER

## 2025-01-22 PROCEDURE — G8427 DOCREV CUR MEDS BY ELIG CLIN: HCPCS | Performed by: NURSE PRACTITIONER

## 2025-01-22 RX ORDER — OXYCODONE AND ACETAMINOPHEN 5; 325 MG/1; MG/1
1 TABLET ORAL 3 TIMES DAILY PRN
Qty: 90 TABLET | Refills: 0 | Status: SHIPPED | OUTPATIENT
Start: 2025-01-22 | End: 2025-02-21

## 2025-01-22 RX ORDER — GABAPENTIN 400 MG/1
400 CAPSULE ORAL 3 TIMES DAILY
Qty: 270 CAPSULE | Refills: 0 | Status: SHIPPED | OUTPATIENT
Start: 2025-01-22 | End: 2025-04-22

## 2025-01-22 RX ORDER — ERENUMAB-AOOE 140 MG/ML
140 INJECTION, SOLUTION SUBCUTANEOUS
COMMUNITY

## 2025-01-22 RX ORDER — PANTOPRAZOLE SODIUM 40 MG/1
40 TABLET, DELAYED RELEASE ORAL DAILY
COMMUNITY

## 2025-01-22 ASSESSMENT — ENCOUNTER SYMPTOMS
COUGH: 0
GASTROINTESTINAL NEGATIVE: 1
EYES NEGATIVE: 1
BACK PAIN: 1
TROUBLE SWALLOWING: 0
CONSTIPATION: 0
SHORTNESS OF BREATH: 0
DIARRHEA: 0

## 2025-01-31 ENCOUNTER — SPECIALTY PHARMACY (OUTPATIENT)
Dept: PHARMACY | Facility: CLINIC | Age: 65
End: 2025-01-31

## 2025-02-19 ENCOUNTER — OFFICE VISIT (OUTPATIENT)
Age: 65
End: 2025-02-19
Payer: MEDICARE

## 2025-02-19 VITALS
TEMPERATURE: 97.4 F | DIASTOLIC BLOOD PRESSURE: 80 MMHG | BODY MASS INDEX: 29.99 KG/M2 | SYSTOLIC BLOOD PRESSURE: 130 MMHG | HEIGHT: 65 IN | WEIGHT: 180 LBS

## 2025-02-19 DIAGNOSIS — G89.4 CHRONIC PAIN SYNDROME: ICD-10-CM

## 2025-02-19 DIAGNOSIS — M47.817 LUMBOSACRAL SPONDYLOSIS WITHOUT MYELOPATHY: ICD-10-CM

## 2025-02-19 DIAGNOSIS — M96.1 POST LAMINECTOMY SYNDROME: ICD-10-CM

## 2025-02-19 DIAGNOSIS — M25.511 PAIN IN JOINT OF RIGHT SHOULDER: ICD-10-CM

## 2025-02-19 DIAGNOSIS — M46.1 SACROILIITIS: Primary | ICD-10-CM

## 2025-02-19 DIAGNOSIS — M47.812 CERVICAL SPONDYLOSIS WITHOUT MYELOPATHY: ICD-10-CM

## 2025-02-19 DIAGNOSIS — M48.062 SPINAL STENOSIS OF LUMBAR REGION WITH NEUROGENIC CLAUDICATION: ICD-10-CM

## 2025-02-19 PROCEDURE — 3075F SYST BP GE 130 - 139MM HG: CPT | Performed by: NURSE PRACTITIONER

## 2025-02-19 PROCEDURE — G8417 CALC BMI ABV UP PARAM F/U: HCPCS | Performed by: NURSE PRACTITIONER

## 2025-02-19 PROCEDURE — 3017F COLORECTAL CA SCREEN DOC REV: CPT | Performed by: NURSE PRACTITIONER

## 2025-02-19 PROCEDURE — 3079F DIAST BP 80-89 MM HG: CPT | Performed by: NURSE PRACTITIONER

## 2025-02-19 PROCEDURE — G8427 DOCREV CUR MEDS BY ELIG CLIN: HCPCS | Performed by: NURSE PRACTITIONER

## 2025-02-19 PROCEDURE — 1036F TOBACCO NON-USER: CPT | Performed by: NURSE PRACTITIONER

## 2025-02-19 PROCEDURE — G2211 COMPLEX E/M VISIT ADD ON: HCPCS | Performed by: NURSE PRACTITIONER

## 2025-02-19 PROCEDURE — 99215 OFFICE O/P EST HI 40 MIN: CPT | Performed by: NURSE PRACTITIONER

## 2025-02-19 PROCEDURE — 99214 OFFICE O/P EST MOD 30 MIN: CPT | Performed by: NURSE PRACTITIONER

## 2025-02-19 RX ORDER — OXYCODONE AND ACETAMINOPHEN 5; 325 MG/1; MG/1
1 TABLET ORAL 3 TIMES DAILY PRN
Qty: 90 TABLET | Refills: 0 | Status: SHIPPED | OUTPATIENT
Start: 2025-02-21 | End: 2025-03-23

## 2025-02-19 ASSESSMENT — ENCOUNTER SYMPTOMS
EYES NEGATIVE: 1
GASTROINTESTINAL NEGATIVE: 1
TROUBLE SWALLOWING: 0
BACK PAIN: 1
DIARRHEA: 0
CONSTIPATION: 0
COUGH: 0
SHORTNESS OF BREATH: 0

## 2025-02-19 NOTE — PROGRESS NOTES
Use    Smoking status: Never    Smokeless tobacco: Never   Vaping Use    Vaping status: Never Used   Substance and Sexual Activity    Alcohol use: Yes     Comment: once a week-2 beers    Drug use: No    Sexual activity: Not on file   Other Topics Concern    Not on file   Social History Narrative    Not on file     Social Determinants of Health     Financial Resource Strain: Low Risk  (11/5/2021)    Overall Financial Resource Strain (CARDIA)     Difficulty of Paying Living Expenses: Not hard at all   Food Insecurity: No Food Insecurity (11/5/2021)    Hunger Vital Sign     Worried About Running Out of Food in the Last Year: Never true     Ran Out of Food in the Last Year: Never true   Transportation Needs: No Transportation Needs (11/5/2021)    PRAPARE - Transportation     Lack of Transportation (Medical): No     Lack of Transportation (Non-Medical): No   Physical Activity: Not on file   Stress: Not on file   Social Connections: Not on file   Intimate Partner Violence: Not on file   Housing Stability: Not on file       Current Outpatient Medications on File Prior to Visit   Medication Sig Dispense Refill    Erenumab-aooe (AIMOVIG) 140 MG/ML SOAJ Inject 140 mg into the skin every 3 months      pantoprazole (PROTONIX) 40 MG tablet Take 1 tablet by mouth daily      tiZANidine (ZANAFLEX) 4 MG tablet Take 1 tablet by mouth 2 times daily as needed (pain spasms) 180 tablet 0    gabapentin (NEURONTIN) 400 MG capsule Take 1 capsule by mouth 3 times daily for 90 days. 270 capsule 0    TRINTELLIX 20 MG TABS tablet Take 1 tablet by mouth daily      lamoTRIgine (LAMICTAL) 200 MG tablet Take 150 mg by mouth nightly      prazosin (MINIPRESS) 1 MG capsule Take 1 capsule by mouth nightly      UBRELVY 100 MG TABS Take 1 tablet by mouth daily as needed      naloxone 4 MG/0.1ML LIQD nasal spray 1 spray by Nasal route as needed for Opioid Reversal 2 each 1    ziprasidone (GEODON) 80 MG capsule Take 1 capsule by mouth 2 times daily (with

## 2025-02-21 ENCOUNTER — TELEPHONE (OUTPATIENT)
Age: 65
End: 2025-02-21

## 2025-02-21 NOTE — TELEPHONE ENCOUNTER
Right SI injection-no auth required.  Referral #[id number]    OK to schedule procedure approved as above.   Please note sides/levels approved and date range.   (If applicable, sides/levels approved may differ from those ordered)    To be scheduled with Dr. Alarcon.

## 2025-02-24 PROCEDURE — RXMED WILLOW AMBULATORY MEDICATION CHARGE

## 2025-02-25 NOTE — TELEPHONE ENCOUNTER
Called the Pt & the Appt was Made for   Right SI injection-no auth required.  Referral #[id number]    Disposition Notes:     Made On: 2/25/2025 11:11 AM

## 2025-03-04 ENCOUNTER — PROCEDURE VISIT (OUTPATIENT)
Age: 65
End: 2025-03-04
Payer: MEDICARE

## 2025-03-04 VITALS
SYSTOLIC BLOOD PRESSURE: 122 MMHG | BODY MASS INDEX: 29.99 KG/M2 | HEART RATE: 71 BPM | TEMPERATURE: 97.4 F | WEIGHT: 180 LBS | HEIGHT: 65 IN | OXYGEN SATURATION: 96 % | DIASTOLIC BLOOD PRESSURE: 78 MMHG

## 2025-03-04 DIAGNOSIS — M46.1 SACROILIITIS: ICD-10-CM

## 2025-03-04 DIAGNOSIS — M54.16 LUMBAR RADICULOPATHY: Primary | ICD-10-CM

## 2025-03-04 PROCEDURE — G0260 INJ FOR SACROILIAC JT ANESTH: HCPCS | Performed by: PAIN MEDICINE

## 2025-03-04 PROCEDURE — 77002 NEEDLE LOCALIZATION BY XRAY: CPT | Performed by: PAIN MEDICINE

## 2025-03-04 RX ORDER — LIDOCAINE HYDROCHLORIDE 10 MG/ML
3 INJECTION, SOLUTION EPIDURAL; INFILTRATION; INTRACAUDAL; PERINEURAL ONCE
Status: COMPLETED | OUTPATIENT
Start: 2025-03-04 | End: 2025-03-04

## 2025-03-04 RX ORDER — BETAMETHASONE SODIUM PHOSPHATE AND BETAMETHASONE ACETATE 3; 3 MG/ML; MG/ML
6 INJECTION, SUSPENSION INTRA-ARTICULAR; INTRALESIONAL; INTRAMUSCULAR; SOFT TISSUE ONCE
Status: COMPLETED | OUTPATIENT
Start: 2025-03-04 | End: 2025-03-04

## 2025-03-04 RX ADMIN — BETAMETHASONE SODIUM PHOSPHATE AND BETAMETHASONE ACETATE 6 MG: 3; 3 INJECTION, SUSPENSION INTRA-ARTICULAR; INTRALESIONAL; INTRAMUSCULAR at 11:33

## 2025-03-04 RX ADMIN — LIDOCAINE HYDROCHLORIDE 3 ML: 10 INJECTION, SOLUTION EPIDURAL; INFILTRATION; INTRACAUDAL; PERINEURAL at 11:34

## 2025-03-04 ASSESSMENT — PAIN DESCRIPTION - LOCATION
LOCATION: BACK
LOCATION: BACK

## 2025-03-04 ASSESSMENT — PAIN SCALES - GENERAL
PAINLEVEL_OUTOF10: 7
PAINLEVEL_OUTOF10: 8

## 2025-03-04 NOTE — PROGRESS NOTES
[facility]  Neurosurgery and Pain Management Center  [address], [address]  [place], [place]  P: [phone]  F: [phone]      Patient Name: Kari Dimas  : 1960     Date:  3/4/2025      Physician: MAURA STOLL DO        Kari Dimas is here today for interventional pain management.    Preoperatively, the patient presents with SI joint mediated pain, as per history and exam. Patient has failed NSAIDs, PT, and conservative treatment. Patient has significant psychological and functional impairment due to this condition.  Standard ASI guidelines were followed and sterile technique used.  Area was cleaned with Betadine x3.  Informed consent was obtained.  Fluoroscopic guidance was used for this procedure.    S.I. JOINT:  Right  Appropriate length spinal needle was advanced to the S.I. Joint.  Negative aspiration was achieved. In total, approximately 6mg of Betamethasone and 2ml of 1% preservative free Lidocaine was injected without difficulty.This procedure was 30% more difficult and required 30% more work secondary to the patient's habitus. The patient has a BMI of 30 and has comorbidities of hypertension and obesity. This required increased work for safe and proper positioning upon the fluoroscopy table, increased needle passes for safe and appropriate needle placement, and increased fluoroscopy time and radiation exposure for proper visualization.        Patient tolerated the procedure well, no obvious complications occurred during the procedure.  Patient was appropriately monitored and discharged home in stable condition with their usual motor strength. Post Op Instructions were given to patient. Relevant and recent imaging reviewed with patient today.                                      MAURA STOLL DO

## 2025-03-06 ENCOUNTER — PHARMACY VISIT (OUTPATIENT)
Dept: PHARMACY | Facility: CLINIC | Age: 65
End: 2025-03-06
Payer: COMMERCIAL

## 2025-03-19 ENCOUNTER — OFFICE VISIT (OUTPATIENT)
Age: 65
End: 2025-03-19
Payer: MEDICARE

## 2025-03-19 VITALS
SYSTOLIC BLOOD PRESSURE: 148 MMHG | DIASTOLIC BLOOD PRESSURE: 90 MMHG | WEIGHT: 180 LBS | BODY MASS INDEX: 29.99 KG/M2 | HEIGHT: 65 IN | TEMPERATURE: 97.7 F

## 2025-03-19 DIAGNOSIS — M96.1 POST LAMINECTOMY SYNDROME: ICD-10-CM

## 2025-03-19 DIAGNOSIS — M25.511 PAIN IN JOINT OF RIGHT SHOULDER: ICD-10-CM

## 2025-03-19 DIAGNOSIS — G89.4 CHRONIC PAIN SYNDROME: ICD-10-CM

## 2025-03-19 DIAGNOSIS — M48.062 SPINAL STENOSIS OF LUMBAR REGION WITH NEUROGENIC CLAUDICATION: ICD-10-CM

## 2025-03-19 DIAGNOSIS — M47.817 LUMBOSACRAL SPONDYLOSIS WITHOUT MYELOPATHY: ICD-10-CM

## 2025-03-19 DIAGNOSIS — M47.812 CERVICAL SPONDYLOSIS WITHOUT MYELOPATHY: Primary | ICD-10-CM

## 2025-03-19 PROCEDURE — G8427 DOCREV CUR MEDS BY ELIG CLIN: HCPCS | Performed by: NURSE PRACTITIONER

## 2025-03-19 PROCEDURE — 3017F COLORECTAL CA SCREEN DOC REV: CPT | Performed by: NURSE PRACTITIONER

## 2025-03-19 PROCEDURE — 3080F DIAST BP >= 90 MM HG: CPT | Performed by: NURSE PRACTITIONER

## 2025-03-19 PROCEDURE — G2211 COMPLEX E/M VISIT ADD ON: HCPCS | Performed by: NURSE PRACTITIONER

## 2025-03-19 PROCEDURE — 3077F SYST BP >= 140 MM HG: CPT | Performed by: NURSE PRACTITIONER

## 2025-03-19 PROCEDURE — 99215 OFFICE O/P EST HI 40 MIN: CPT | Performed by: NURSE PRACTITIONER

## 2025-03-19 PROCEDURE — 99214 OFFICE O/P EST MOD 30 MIN: CPT | Performed by: NURSE PRACTITIONER

## 2025-03-19 PROCEDURE — 1036F TOBACCO NON-USER: CPT | Performed by: NURSE PRACTITIONER

## 2025-03-19 PROCEDURE — G8417 CALC BMI ABV UP PARAM F/U: HCPCS | Performed by: NURSE PRACTITIONER

## 2025-03-19 RX ORDER — OXYCODONE AND ACETAMINOPHEN 5; 325 MG/1; MG/1
1 TABLET ORAL 3 TIMES DAILY PRN
Qty: 90 TABLET | Refills: 0 | Status: SHIPPED | OUTPATIENT
Start: 2025-03-24 | End: 2025-04-23

## 2025-03-19 ASSESSMENT — ENCOUNTER SYMPTOMS
EYES NEGATIVE: 1
BACK PAIN: 1
TROUBLE SWALLOWING: 0
COUGH: 0
SHORTNESS OF BREATH: 0
GASTROINTESTINAL NEGATIVE: 1
DIARRHEA: 0
CONSTIPATION: 0

## 2025-03-19 NOTE — PROGRESS NOTES
Kari Dimas  (1960)    3/19/2025    Subjective:     Kari Dimas is 64 y.o. female who complains today of:    Chief Complaint   Patient presents with    Follow-up         Allergies:  Nsaids and Ibuprofen    Past Medical History:   Diagnosis Date    Anemia     Arthritis     Chicken pox     Chronic back pain     H/O bladder infections     Headache(784.0)     History of blood transfusion     post delivery of first child, again after gastric ulcer cauterization, again for anemia    Hyperlipidemia     meds for about 1 year    Hypertension     meds for about 2 years    Incomplete bladder emptying 09/29/2009    Measles     Mumps     Nausea, vomiting and diarrhea 08/17/2020    Stroke (HCC) 07/2020     Past Surgical History:   Procedure Laterality Date    APPENDECTOMY      BACK SURGERY  2014    lumbar back fusion    CHOLECYSTECTOMY      2017    COLONOSCOPY      ENDOSCOPY, COLON, DIAGNOSTIC      GALLBLADDER SURGERY      HYSTERECTOMY (CERVIX STATUS UNKNOWN)      1980    JOINT REPLACEMENT Right 2020    JOINT REPLACEMENT Left 2018    KNEE SURGERY Right     LEG BIOPSY EXCISION Right 11/12/2021    SOFT TISSUE MASS EXCISION RIGHT ANKLE performed by Carter Jung MD at [facility] [place]    LUMBAR FUSION N/A 10/21/2022    RIGHT BILATERAL L2-3 LAMINECTOMIES MICRODISSECTION FORAMINOTOMIES INTERBODY POSTEROLATERAL PEDICLE SCREW FUSION WITH IGS performed by Delores Santillan MD at [facility] [place]    ROTATOR CUFF REPAIR      TONSILLECTOMY       Family History   Problem Relation Age of Onset    Stroke Mother     Cancer Father     Diabetes Sister     High Blood Pressure Sister     High Blood Pressure Brother     Diabetes Brother     No Known Problems Daughter      Social History     Socioeconomic History    Marital status:      Spouse name: Not on file    Number of children: Not on file    Years of education: Not on file    Highest education level: Not on file   Occupational History    Not on file   Tobacco Use    Smoking status: Never

## 2025-03-20 ENCOUNTER — TELEPHONE (OUTPATIENT)
Age: 65
End: 2025-03-20

## 2025-03-20 NOTE — TELEPHONE ENCOUNTER
HEMANTH C345 RFA    NO AUTH REQUIRED  OK to schedule procedure approved as above.   Please note sides/levels approved and date range.   (If applicable, sides/levels approved may differ from those ordered)    TO BE SCHEDULED WITH

## 2025-03-20 NOTE — TELEPHONE ENCOUNTER
Called patient and scheduled: HEMANTH [id number] RFA with Dr. Alarcon @ 9:30 AM on 03/31/2025.     NO AUTH REQUIRED

## 2025-03-27 ENCOUNTER — SPECIALTY PHARMACY (OUTPATIENT)
Dept: PHARMACY | Facility: CLINIC | Age: 65
End: 2025-03-27

## 2025-03-31 ENCOUNTER — OFFICE VISIT (OUTPATIENT)
Age: 65
End: 2025-03-31
Payer: MEDICARE

## 2025-03-31 VITALS
HEART RATE: 68 BPM | DIASTOLIC BLOOD PRESSURE: 78 MMHG | HEIGHT: 65 IN | OXYGEN SATURATION: 96 % | SYSTOLIC BLOOD PRESSURE: 120 MMHG | TEMPERATURE: 97.4 F | BODY MASS INDEX: 29.99 KG/M2 | WEIGHT: 180 LBS

## 2025-03-31 DIAGNOSIS — M47.812 CERVICAL SPONDYLOSIS WITHOUT MYELOPATHY: Primary | ICD-10-CM

## 2025-03-31 PROCEDURE — 64633 DESTROY CERV/THOR FACET JNT: CPT | Performed by: PAIN MEDICINE

## 2025-03-31 PROCEDURE — 64634 DESTROY C/TH FACET JNT ADDL: CPT | Performed by: PAIN MEDICINE

## 2025-03-31 RX ORDER — DEXAMETHASONE SODIUM PHOSPHATE 10 MG/ML
10 INJECTION, SOLUTION INTRAMUSCULAR; INTRAVENOUS ONCE
Status: COMPLETED | OUTPATIENT
Start: 2025-03-31 | End: 2025-03-31

## 2025-03-31 RX ORDER — LIDOCAINE HYDROCHLORIDE 10 MG/ML
3 INJECTION, SOLUTION EPIDURAL; INFILTRATION; INTRACAUDAL; PERINEURAL ONCE
Status: COMPLETED | OUTPATIENT
Start: 2025-03-31 | End: 2025-03-31

## 2025-03-31 RX ADMIN — LIDOCAINE HYDROCHLORIDE 3 ML: 10 INJECTION, SOLUTION EPIDURAL; INFILTRATION; INTRACAUDAL; PERINEURAL at 10:06

## 2025-03-31 RX ADMIN — DEXAMETHASONE SODIUM PHOSPHATE 10 MG: 10 INJECTION, SOLUTION INTRAMUSCULAR; INTRAVENOUS at 10:06

## 2025-03-31 ASSESSMENT — PAIN SCALES - GENERAL
PAINLEVEL_OUTOF10: 9
PAINLEVEL_OUTOF10: 7

## 2025-03-31 ASSESSMENT — PAIN DESCRIPTION - LOCATION
LOCATION: NECK
LOCATION: NECK

## 2025-03-31 NOTE — PROGRESS NOTES
Procedure: Bilateral C3-4-5 cervical radiofrequency medial branch ablation using fluoroscopic needle guidance.    Timeout taken to identify correct patient, procedure and side.    Patient lying in the prone position the patient was prepped and draped in the usual sterile fashion using Betadine.  The levels were determined under fluoroscopy.  1% preservative-free lidocaine was used to numb the skin using a 27-gauge 1-1/2 inch needle.  Radiofrequency needle was introduced to the anatomic location of the medial branch at the lateral masses utilizing intermittent fluoroscopy.  Motor stimulation up to 2 V was done to confirm no ablation of the ventral ramus at each level.  Prior to lesioning at 80 °C for 90 seconds 1.5 mL of 1% preservative-free lidocaine along with 10 mg dexamethasone preservative-free was divided equally amongst the levels and injected with negative aspiration.  This was done at each level.This procedure was 30% more difficult and required 30% more work secondary to the patient's habitus. The patient has a BMI of 30 and has comorbidities of hypertension and hyperlipidemia. This required increased work for safe and proper positioning upon the fluoroscopy table, increased needle passes for safe and appropriate needle placement, and increased fluoroscopy time and radiation exposure for proper visualization.        The procedure was tolerated well without complications.  The patient was monitored after procedure, had their usual motor strength.  And discharged home in stable condition.  Patient will ice the area and  take it easy.  All questions answered, chart was reviewed.

## 2025-04-08 DIAGNOSIS — M48.062 SPINAL STENOSIS OF LUMBAR REGION WITH NEUROGENIC CLAUDICATION: ICD-10-CM

## 2025-04-08 DIAGNOSIS — M54.16 LUMBAR RADICULOPATHY: ICD-10-CM

## 2025-04-08 DIAGNOSIS — M47.812 CERVICAL SPONDYLOSIS WITHOUT MYELOPATHY: ICD-10-CM

## 2025-04-08 DIAGNOSIS — M47.817 LUMBOSACRAL SPONDYLOSIS WITHOUT MYELOPATHY: ICD-10-CM

## 2025-04-08 DIAGNOSIS — M25.511 PAIN IN JOINT OF RIGHT SHOULDER: ICD-10-CM

## 2025-04-08 DIAGNOSIS — M96.1 POST LAMINECTOMY SYNDROME: ICD-10-CM

## 2025-04-08 DIAGNOSIS — G89.4 CHRONIC PAIN SYNDROME: ICD-10-CM

## 2025-04-08 PROCEDURE — RXMED WILLOW AMBULATORY MEDICATION CHARGE

## 2025-04-09 RX ORDER — GABAPENTIN 400 MG/1
400 CAPSULE ORAL 3 TIMES DAILY
Qty: 270 CAPSULE | Refills: 0 | Status: SHIPPED | OUTPATIENT
Start: 2025-05-07 | End: 2025-08-05

## 2025-04-17 ENCOUNTER — PHARMACY VISIT (OUTPATIENT)
Dept: PHARMACY | Facility: CLINIC | Age: 65
End: 2025-04-17
Payer: COMMERCIAL

## 2025-04-23 ENCOUNTER — OFFICE VISIT (OUTPATIENT)
Age: 65
End: 2025-04-23
Payer: MEDICARE

## 2025-04-23 VITALS
HEART RATE: 74 BPM | DIASTOLIC BLOOD PRESSURE: 70 MMHG | TEMPERATURE: 97 F | SYSTOLIC BLOOD PRESSURE: 118 MMHG | BODY MASS INDEX: 29.99 KG/M2 | OXYGEN SATURATION: 97 % | HEIGHT: 65 IN | WEIGHT: 180 LBS

## 2025-04-23 DIAGNOSIS — G56.02 CARPAL TUNNEL SYNDROME OF LEFT WRIST: Primary | ICD-10-CM

## 2025-04-23 DIAGNOSIS — M96.1 POST LAMINECTOMY SYNDROME: ICD-10-CM

## 2025-04-23 DIAGNOSIS — M47.812 CERVICAL SPONDYLOSIS WITHOUT MYELOPATHY: ICD-10-CM

## 2025-04-23 DIAGNOSIS — Z98.1 HISTORY OF LUMBAR FUSION: ICD-10-CM

## 2025-04-23 DIAGNOSIS — M48.062 SPINAL STENOSIS OF LUMBAR REGION WITH NEUROGENIC CLAUDICATION: ICD-10-CM

## 2025-04-23 DIAGNOSIS — R20.0 NUMBNESS AND TINGLING IN BOTH HANDS: ICD-10-CM

## 2025-04-23 DIAGNOSIS — R20.2 NUMBNESS AND TINGLING IN BOTH HANDS: ICD-10-CM

## 2025-04-23 DIAGNOSIS — M47.817 LUMBOSACRAL SPONDYLOSIS WITHOUT MYELOPATHY: ICD-10-CM

## 2025-04-23 DIAGNOSIS — G89.4 CHRONIC PAIN SYNDROME: ICD-10-CM

## 2025-04-23 PROCEDURE — 3074F SYST BP LT 130 MM HG: CPT | Performed by: NURSE PRACTITIONER

## 2025-04-23 PROCEDURE — 3017F COLORECTAL CA SCREEN DOC REV: CPT | Performed by: NURSE PRACTITIONER

## 2025-04-23 PROCEDURE — 1036F TOBACCO NON-USER: CPT | Performed by: NURSE PRACTITIONER

## 2025-04-23 PROCEDURE — G8417 CALC BMI ABV UP PARAM F/U: HCPCS | Performed by: NURSE PRACTITIONER

## 2025-04-23 PROCEDURE — 99214 OFFICE O/P EST MOD 30 MIN: CPT | Performed by: NURSE PRACTITIONER

## 2025-04-23 PROCEDURE — G2211 COMPLEX E/M VISIT ADD ON: HCPCS | Performed by: NURSE PRACTITIONER

## 2025-04-23 PROCEDURE — 3078F DIAST BP <80 MM HG: CPT | Performed by: NURSE PRACTITIONER

## 2025-04-23 PROCEDURE — G8427 DOCREV CUR MEDS BY ELIG CLIN: HCPCS | Performed by: NURSE PRACTITIONER

## 2025-04-23 RX ORDER — OXYCODONE AND ACETAMINOPHEN 5; 325 MG/1; MG/1
1 TABLET ORAL 3 TIMES DAILY PRN
Qty: 90 TABLET | Refills: 0 | Status: SHIPPED | OUTPATIENT
Start: 2025-04-23 | End: 2025-05-23

## 2025-04-23 ASSESSMENT — ENCOUNTER SYMPTOMS
TROUBLE SWALLOWING: 0
GASTROINTESTINAL NEGATIVE: 1
BACK PAIN: 1
DIARRHEA: 0
CONSTIPATION: 0
SHORTNESS OF BREATH: 0
COUGH: 0
EYES NEGATIVE: 1

## 2025-04-28 ENCOUNTER — APPOINTMENT (OUTPATIENT)
Dept: PRIMARY CARE | Facility: CLINIC | Age: 65
End: 2025-04-28
Payer: MEDICARE

## 2025-04-28 VITALS
WEIGHT: 194 LBS | RESPIRATION RATE: 14 BRPM | HEART RATE: 68 BPM | SYSTOLIC BLOOD PRESSURE: 112 MMHG | DIASTOLIC BLOOD PRESSURE: 60 MMHG | HEIGHT: 65 IN | TEMPERATURE: 97.2 F | BODY MASS INDEX: 32.32 KG/M2 | OXYGEN SATURATION: 98 %

## 2025-04-28 DIAGNOSIS — Z78.0 ASYMPTOMATIC MENOPAUSAL STATE: ICD-10-CM

## 2025-04-28 DIAGNOSIS — Z86.73 HISTORY OF CVA (CEREBROVASCULAR ACCIDENT): ICD-10-CM

## 2025-04-28 DIAGNOSIS — Z12.31 ENCOUNTER FOR SCREENING MAMMOGRAM FOR BREAST CANCER: ICD-10-CM

## 2025-04-28 DIAGNOSIS — F39 MOOD DISORDER (CMS-HCC): ICD-10-CM

## 2025-04-28 DIAGNOSIS — E78.2 MIXED HYPERLIPIDEMIA: ICD-10-CM

## 2025-04-28 DIAGNOSIS — Z23 NEED FOR VACCINATION: ICD-10-CM

## 2025-04-28 DIAGNOSIS — F33.2 SEVERE EPISODE OF RECURRENT MAJOR DEPRESSIVE DISORDER, WITHOUT PSYCHOTIC FEATURES (MULTI): ICD-10-CM

## 2025-04-28 DIAGNOSIS — Z00.00 ROUTINE GENERAL MEDICAL EXAMINATION AT HEALTH CARE FACILITY: Primary | ICD-10-CM

## 2025-04-28 DIAGNOSIS — F31.12: ICD-10-CM

## 2025-04-28 DIAGNOSIS — R30.0 DYSURIA: ICD-10-CM

## 2025-04-28 DIAGNOSIS — I10 BENIGN ESSENTIAL HYPERTENSION: ICD-10-CM

## 2025-04-28 DIAGNOSIS — B37.2 YEAST INFECTION OF THE SKIN: ICD-10-CM

## 2025-04-28 DIAGNOSIS — I73.9 PERIPHERAL VASCULAR DISEASE, UNSPECIFIED (CMS-HCC): ICD-10-CM

## 2025-04-28 PROBLEM — I24.9 ACUTE ISCHEMIC HEART DISEASE, UNSPECIFIED: Status: RESOLVED | Noted: 2021-02-10 | Resolved: 2025-04-28

## 2025-04-28 PROBLEM — I95.1 ORTHOSTATIC SYNCOPE: Status: RESOLVED | Noted: 2023-09-08 | Resolved: 2025-04-28

## 2025-04-28 PROBLEM — D50.0 IRON DEFICIENCY ANEMIA SECONDARY TO BLOOD LOSS (CHRONIC): Status: RESOLVED | Noted: 2021-02-12 | Resolved: 2025-04-28

## 2025-04-28 PROBLEM — R11.2 NAUSEA, VOMITING AND DIARRHEA: Status: RESOLVED | Noted: 2020-08-17 | Resolved: 2025-04-28

## 2025-04-28 PROBLEM — I69.354 HEMIPLGA FOLLOWING CEREBRAL INFRC AFFECTING LEFT NONDOM SIDE (MULTI): Status: RESOLVED | Noted: 2023-04-10 | Resolved: 2025-04-28

## 2025-04-28 PROBLEM — E66.811 CLASS 1 OBESITY WITH BODY MASS INDEX (BMI) OF 34.0 TO 34.9 IN ADULT: Status: RESOLVED | Noted: 2023-09-08 | Resolved: 2025-04-28

## 2025-04-28 PROBLEM — G43.909 MIGRAINE: Status: RESOLVED | Noted: 2023-02-17 | Resolved: 2025-04-28

## 2025-04-28 PROBLEM — G81.94 LEFT HEMIPARESIS (MULTI): Status: RESOLVED | Noted: 2020-12-16 | Resolved: 2025-04-28

## 2025-04-28 PROBLEM — R19.7 NAUSEA, VOMITING AND DIARRHEA: Status: RESOLVED | Noted: 2020-08-17 | Resolved: 2025-04-28

## 2025-04-28 PROBLEM — R79.89 ABNORMAL LIVER FUNCTION TESTS: Status: RESOLVED | Noted: 2020-12-16 | Resolved: 2025-04-28

## 2025-04-28 PROBLEM — Z90.49 ACQUIRED ABSENCE OF OTHER SPECIFIED PARTS OF DIGESTIVE TRACT: Status: RESOLVED | Noted: 2020-07-14 | Resolved: 2025-04-28

## 2025-04-28 LAB
POC APPEARANCE, URINE: ABNORMAL
POC BILIRUBIN, URINE: NEGATIVE
POC BLOOD, URINE: ABNORMAL
POC COLOR, URINE: YELLOW
POC GLUCOSE, URINE: NEGATIVE MG/DL
POC KETONES, URINE: NEGATIVE MG/DL
POC LEUKOCYTES, URINE: ABNORMAL
POC NITRITE,URINE: NEGATIVE
POC PH, URINE: 5.5 PH
POC PROTEIN, URINE: ABNORMAL MG/DL
POC SPECIFIC GRAVITY, URINE: 1.01
POC UROBILINOGEN, URINE: 0.2 EU/DL

## 2025-04-28 PROCEDURE — 1036F TOBACCO NON-USER: CPT | Performed by: INTERNAL MEDICINE

## 2025-04-28 PROCEDURE — G0439 PPPS, SUBSEQ VISIT: HCPCS | Performed by: INTERNAL MEDICINE

## 2025-04-28 PROCEDURE — 99396 PREV VISIT EST AGE 40-64: CPT | Performed by: INTERNAL MEDICINE

## 2025-04-28 PROCEDURE — 3008F BODY MASS INDEX DOCD: CPT | Performed by: INTERNAL MEDICINE

## 2025-04-28 PROCEDURE — 3078F DIAST BP <80 MM HG: CPT | Performed by: INTERNAL MEDICINE

## 2025-04-28 PROCEDURE — 3074F SYST BP LT 130 MM HG: CPT | Performed by: INTERNAL MEDICINE

## 2025-04-28 PROCEDURE — 81002 URINALYSIS NONAUTO W/O SCOPE: CPT | Performed by: INTERNAL MEDICINE

## 2025-04-28 PROCEDURE — 90677 PCV20 VACCINE IM: CPT | Performed by: INTERNAL MEDICINE

## 2025-04-28 PROCEDURE — G8433 SCR FOR DEP NOT CPT DOC RSN: HCPCS | Performed by: INTERNAL MEDICINE

## 2025-04-28 PROCEDURE — G0009 ADMIN PNEUMOCOCCAL VACCINE: HCPCS | Performed by: INTERNAL MEDICINE

## 2025-04-28 RX ORDER — PANTOPRAZOLE SODIUM 40 MG/1
40 TABLET, DELAYED RELEASE ORAL 2 TIMES DAILY
COMMUNITY
Start: 2025-04-24

## 2025-04-28 RX ORDER — TIZANIDINE 4 MG/1
4 TABLET ORAL EVERY 6 HOURS PRN
COMMUNITY
Start: 2025-04-23

## 2025-04-28 RX ORDER — NYSTATIN 100000 U/G
CREAM TOPICAL 2 TIMES DAILY
Qty: 30 G | Refills: 0 | Status: SHIPPED | OUTPATIENT
Start: 2025-04-28 | End: 2025-05-05

## 2025-04-28 RX ORDER — AMOXICILLIN AND CLAVULANATE POTASSIUM 875; 125 MG/1; MG/1
875 TABLET, FILM COATED ORAL 2 TIMES DAILY
Qty: 14 TABLET | Refills: 0 | Status: SHIPPED | OUTPATIENT
Start: 2025-04-28 | End: 2025-05-05

## 2025-04-28 RX ORDER — PRAZOSIN HYDROCHLORIDE 1 MG/1
1 CAPSULE ORAL NIGHTLY
COMMUNITY
Start: 2025-02-20

## 2025-04-28 ASSESSMENT — ACTIVITIES OF DAILY LIVING (ADL)
MANAGING_FINANCES: INDEPENDENT
TAKING_MEDICATION: INDEPENDENT
DRESSING: INDEPENDENT
BATHING: INDEPENDENT
GROCERY_SHOPPING: INDEPENDENT
DOING_HOUSEWORK: INDEPENDENT

## 2025-04-28 ASSESSMENT — ENCOUNTER SYMPTOMS
LOSS OF SENSATION IN FEET: 0
OCCASIONAL FEELINGS OF UNSTEADINESS: 0
DEPRESSION: 0

## 2025-04-28 ASSESSMENT — PATIENT HEALTH QUESTIONNAIRE - PHQ9
2. FEELING DOWN, DEPRESSED OR HOPELESS: NOT AT ALL
1. LITTLE INTEREST OR PLEASURE IN DOING THINGS: NOT AT ALL
SUM OF ALL RESPONSES TO PHQ9 QUESTIONS 1 AND 2: 0

## 2025-04-28 NOTE — PROGRESS NOTES
"Subjective    Sofi Steve is a 64 y.o. female who presents for Medicare Annual Wellness Visit Subsequent.  HPI      Mammogram 4/2024  Bone density 2023  Colonoscopy 3/2024 repeat in 5 years    Shingrix 2022  Tdap 2023  Pcv 2020  Pneu23 2018  Flu vacc 11/2024    Living will requested    Rash underneath breasts    Recent illness- had a fever, body aches, chills, nausea x 5 days last week. Average 101.   Urine has strong odor, dysuria x 1 week.   Denies urgency, frequency.   She has burning with urination. She has   Dr Brooks is her psychiatrist. She is doing well.      Review of Systems   All other systems reviewed and are negative.        Objective     /60 (BP Location: Left arm, Patient Position: Sitting, BP Cuff Size: Adult)   Pulse 68   Temp 36.2 °C (97.2 °F) (Skin)   Resp 14   Ht 1.651 m (5' 5\")   Wt 88 kg (194 lb)   SpO2 98%   BMI 32.28 kg/m²    Physical Exam  Constitutional:       Appearance: Normal appearance.   HENT:      Mouth/Throat:      Mouth: Mucous membranes are moist.   Neck:      Thyroid: No thyroid mass or thyromegaly.   Cardiovascular:      Rate and Rhythm: Normal rate and regular rhythm.      Pulses: Normal pulses.   Pulmonary:      Effort: Pulmonary effort is normal.      Breath sounds: Normal breath sounds.   Chest:      Chest wall: No mass or tenderness.   Breasts:     Right: Normal.      Left: Normal.      Comments: Mild yeast type rash under bl breasts  Abdominal:      General: Abdomen is flat.      Palpations: Abdomen is soft.      Tenderness: There is abdominal tenderness.   Musculoskeletal:      Cervical back: Neck supple. No tenderness.   Lymphadenopathy:      Cervical: No cervical adenopathy.      Upper Body:      Right upper body: No supraclavicular or axillary adenopathy.      Left upper body: No supraclavicular or axillary adenopathy.   Skin:     General: Skin is warm.   Neurological:      General: No focal deficit present.      Mental Status: She is alert. "   Psychiatric:         Attention and Perception: Attention and perception normal.         Mood and Affect: Mood and affect normal.       Health Maintenance Due   Topic Date Due    HIV Screening  Never done    MMR Vaccines (1 of 1 - Standard series) Never done    Diabetes Screening  Never done    Cervical Cancer Screening  04/29/2016    RSV High Risk: (Elderly (60+) or Pregnant Population) (1 - Risk 60-74 years 1-dose series) Never done    COVID-19 Vaccine (6 - 2024-25 season) 09/01/2024    Mammogram  04/24/2025          Assessment/Plan   Problem List Items Addressed This Visit       Benign essential hypertension    Relevant Orders    CBC    Comprehensive Metabolic Panel    Lipid Panel    Bipolar disorder, manic, moderate (Multi)    History of CVA (cerebrovascular accident)    Severe episode of recurrent major depressive disorder, without psychotic features (Multi)    Peripheral vascular disease, unspecified (CMS-HCC)    Hyperlipidemia, unspecified    Relevant Orders    Comprehensive Metabolic Panel    Lipid Panel    Mood disorder (CMS-HCC)     Other Visit Diagnoses         Routine general medical examination at health care facility    -  Primary    Relevant Orders    1 Year Follow Up In [facility] - PCP - Wellness Exam    CBC    Comprehensive Metabolic Panel    Lipid Panel      Dysuria        Relevant Medications    amoxicillin-clavulanate (Augmentin) 875-125 mg tablet    Other Relevant Orders    POCT UA (nonautomated) manually resulted (Completed)    Urine Culture      Yeast infection of the skin        Relevant Medications    nystatin (Mycostatin) cream      Need for vaccination        Relevant Orders    Pneumococcal vaccine 20-valent (Completed)      Asymptomatic menopausal state        Relevant Orders    XR DEXA bone density      Encounter for screening mammogram for breast cancer        Relevant Orders    BI mammo bilateral screening tomosynthesis        Take abx with food.   Check labs and  dexa/mamm  Increase fluids.   Call if sx worsen or do not improve  Call  with any problems or questions.   Follow up in 6 months.

## 2025-04-29 LAB
ALBUMIN SERPL-MCNC: 4.4 G/DL (ref 3.6–5.1)
ALP SERPL-CCNC: 52 U/L (ref 37–153)
ALT SERPL-CCNC: 15 U/L (ref 6–29)
ANION GAP SERPL CALCULATED.4IONS-SCNC: 9 MMOL/L (CALC) (ref 7–17)
AST SERPL-CCNC: 19 U/L (ref 10–35)
BILIRUB SERPL-MCNC: 0.3 MG/DL (ref 0.2–1.2)
BUN SERPL-MCNC: 17 MG/DL (ref 7–25)
CALCIUM SERPL-MCNC: 9.4 MG/DL (ref 8.6–10.4)
CHLORIDE SERPL-SCNC: 104 MMOL/L (ref 98–110)
CHOLEST SERPL-MCNC: 148 MG/DL
CHOLEST/HDLC SERPL: 1.7 (CALC)
CO2 SERPL-SCNC: 32 MMOL/L (ref 20–32)
CREAT SERPL-MCNC: 0.94 MG/DL (ref 0.5–1.05)
EGFRCR SERPLBLD CKD-EPI 2021: 68 ML/MIN/1.73M2
ERYTHROCYTE [DISTWIDTH] IN BLOOD BY AUTOMATED COUNT: 12.9 % (ref 11–15)
GLUCOSE SERPL-MCNC: 94 MG/DL (ref 65–99)
HCT VFR BLD AUTO: 38.8 % (ref 35–45)
HDLC SERPL-MCNC: 86 MG/DL
HGB BLD-MCNC: 13 G/DL (ref 11.7–15.5)
LDLC SERPL CALC-MCNC: 49 MG/DL (CALC)
MCH RBC QN AUTO: 32.8 PG (ref 27–33)
MCHC RBC AUTO-ENTMCNC: 33.5 G/DL (ref 32–36)
MCV RBC AUTO: 98 FL (ref 80–100)
NONHDLC SERPL-MCNC: 62 MG/DL (CALC)
PLATELET # BLD AUTO: 299 THOUSAND/UL (ref 140–400)
PMV BLD REES-ECKER: 8.7 FL (ref 7.5–12.5)
POTASSIUM SERPL-SCNC: 3.8 MMOL/L (ref 3.5–5.3)
PROT SERPL-MCNC: 6.3 G/DL (ref 6.1–8.1)
RBC # BLD AUTO: 3.96 MILLION/UL (ref 3.8–5.1)
SODIUM SERPL-SCNC: 145 MMOL/L (ref 135–146)
TRIGL SERPL-MCNC: 44 MG/DL
WBC # BLD AUTO: 7.3 THOUSAND/UL (ref 3.8–10.8)

## 2025-05-01 LAB — BACTERIA UR CULT: ABNORMAL

## 2025-05-06 ENCOUNTER — APPOINTMENT (OUTPATIENT)
Dept: NEUROLOGY | Facility: CLINIC | Age: 65
End: 2025-05-06
Payer: MEDICARE

## 2025-05-06 DIAGNOSIS — G43.E19 INTRACTABLE CHRONIC MIGRAINE WITH AURA AND WITHOUT STATUS MIGRAINOSUS: ICD-10-CM

## 2025-05-06 RX ORDER — FREMANEZUMAB-VFRM 225 MG/1.5ML
INJECTION SUBCUTANEOUS
Qty: 4.5 ML | Refills: 3 | Status: SHIPPED | OUTPATIENT
Start: 2025-05-06 | End: 2026-05-06

## 2025-05-07 PROCEDURE — RXMED WILLOW AMBULATORY MEDICATION CHARGE

## 2025-05-09 ENCOUNTER — SPECIALTY PHARMACY (OUTPATIENT)
Dept: PHARMACY | Facility: CLINIC | Age: 65
End: 2025-05-09

## 2025-05-09 ENCOUNTER — PROCEDURE VISIT (OUTPATIENT)
Age: 65
End: 2025-05-09
Payer: MEDICARE

## 2025-05-09 VITALS
OXYGEN SATURATION: 98 % | WEIGHT: 180 LBS | SYSTOLIC BLOOD PRESSURE: 110 MMHG | BODY MASS INDEX: 29.99 KG/M2 | HEART RATE: 87 BPM | DIASTOLIC BLOOD PRESSURE: 68 MMHG | HEIGHT: 65 IN | TEMPERATURE: 97.2 F

## 2025-05-09 DIAGNOSIS — M79.642 BILATERAL HAND PAIN: Primary | ICD-10-CM

## 2025-05-09 DIAGNOSIS — M79.641 BILATERAL HAND PAIN: Primary | ICD-10-CM

## 2025-05-09 PROCEDURE — 95910 NRV CNDJ TEST 7-8 STUDIES: CPT | Performed by: PHYSICAL MEDICINE & REHABILITATION

## 2025-05-09 PROCEDURE — 95886 MUSC TEST DONE W/N TEST COMP: CPT | Performed by: PHYSICAL MEDICINE & REHABILITATION

## 2025-05-09 PROCEDURE — RXMED WILLOW AMBULATORY MEDICATION CHARGE

## 2025-05-09 NOTE — PROGRESS NOTES
Electromyography (EMG)/Nerve conduction studies (NCS) Report: Upper Extremities    Name: Kari Dimas   : 1960  Date: 2025   Physician: Sridhar Malcolm MD        INDICATIONS: Kari Dimas is a 64 y.o. female who presents for electrodiagnostic evaluation for carpal tunnel syndrome by request of Rosalba Field CNP working with Dr. Omer Alarcon. Her main symptom for evaluation is bilateral hand pain. She is right-handed. She confirms a history of bilateral carpal tunnel release surgery. Both limbs are necessary to examine in order to evaluate for any evidence of systemic disease as well as establish normal baseline values from which to compare any abnormal unilateral findings. The study is explained and verbal consent to proceed is obtained.     NERVE CONDUCTION STUDIES:  Sensory nerve conduction studies: Bilateral median sensory nerve conduction studies to the second digit demonstrate normal peak latencies and amplitudes. Bilateral ulnar sensory nerve conduction studies to the fifth digit demonstrate normal peak latencies and amplitudes. Bilateral upper limb temperatures are normal.     Motor nerve conduction studies: Bilateral median motor nerve conduction studies with pickup over the abductor pollicis brevis demonstrate normal distal latencies and amplitudes. Bilateral ulnar motor nerve conduction studies with pickup over the abductor digiti minimi demonstrate normal distal latencies and amplitudes.    ELECTROMYOGRAPHY: A disposable monopolar needle is used to evaluate bilateral deltoid, biceps, triceps, brachioradialis, extensor indicis proprius, first dorsal interosseous, and opponens pollicis. All of the muscles sampled are free of any increased insertional activity or any abnormal spontaneous activity. Motor unit recruitment is unremarkable. Bilateral mid cervical paraspinal muscle sampling is free of any increased insertional activity or any abnormal spontaneous activity.    SUMMARY:  This

## 2025-05-13 ENCOUNTER — PHARMACY VISIT (OUTPATIENT)
Dept: PHARMACY | Facility: CLINIC | Age: 65
End: 2025-05-13
Payer: COMMERCIAL

## 2025-05-15 ENCOUNTER — APPOINTMENT (OUTPATIENT)
Dept: NEUROLOGY | Facility: CLINIC | Age: 65
End: 2025-05-15
Payer: MEDICARE

## 2025-05-15 VITALS
SYSTOLIC BLOOD PRESSURE: 90 MMHG | HEART RATE: 80 BPM | TEMPERATURE: 97.6 F | BODY MASS INDEX: 32.69 KG/M2 | DIASTOLIC BLOOD PRESSURE: 72 MMHG | WEIGHT: 196.2 LBS | HEIGHT: 65 IN

## 2025-05-15 DIAGNOSIS — F31.12: ICD-10-CM

## 2025-05-15 DIAGNOSIS — G43.001 MIGRAINE WITHOUT AURA AND WITH STATUS MIGRAINOSUS, NOT INTRACTABLE: Primary | ICD-10-CM

## 2025-05-15 DIAGNOSIS — Z86.73 HISTORY OF CVA (CEREBROVASCULAR ACCIDENT): ICD-10-CM

## 2025-05-15 DIAGNOSIS — G43.911 INTRACTABLE MIGRAINE WITH STATUS MIGRAINOSUS, UNSPECIFIED MIGRAINE TYPE: ICD-10-CM

## 2025-05-15 DIAGNOSIS — R25.1 TREMOR: ICD-10-CM

## 2025-05-15 PROCEDURE — 3074F SYST BP LT 130 MM HG: CPT | Performed by: STUDENT IN AN ORGANIZED HEALTH CARE EDUCATION/TRAINING PROGRAM

## 2025-05-15 PROCEDURE — 3078F DIAST BP <80 MM HG: CPT | Performed by: STUDENT IN AN ORGANIZED HEALTH CARE EDUCATION/TRAINING PROGRAM

## 2025-05-15 PROCEDURE — G2211 COMPLEX E/M VISIT ADD ON: HCPCS | Performed by: STUDENT IN AN ORGANIZED HEALTH CARE EDUCATION/TRAINING PROGRAM

## 2025-05-15 PROCEDURE — 1036F TOBACCO NON-USER: CPT | Performed by: STUDENT IN AN ORGANIZED HEALTH CARE EDUCATION/TRAINING PROGRAM

## 2025-05-15 PROCEDURE — 99214 OFFICE O/P EST MOD 30 MIN: CPT | Performed by: STUDENT IN AN ORGANIZED HEALTH CARE EDUCATION/TRAINING PROGRAM

## 2025-05-15 PROCEDURE — 3008F BODY MASS INDEX DOCD: CPT | Performed by: STUDENT IN AN ORGANIZED HEALTH CARE EDUCATION/TRAINING PROGRAM

## 2025-05-15 ASSESSMENT — PATIENT HEALTH QUESTIONNAIRE - PHQ9
10. IF YOU CHECKED OFF ANY PROBLEMS, HOW DIFFICULT HAVE THESE PROBLEMS MADE IT FOR YOU TO DO YOUR WORK, TAKE CARE OF THINGS AT HOME, OR GET ALONG WITH OTHER PEOPLE: NOT DIFFICULT AT ALL
2. FEELING DOWN, DEPRESSED OR HOPELESS: SEVERAL DAYS
SUM OF ALL RESPONSES TO PHQ9 QUESTIONS 1 & 2: 2
1. LITTLE INTEREST OR PLEASURE IN DOING THINGS: SEVERAL DAYS

## 2025-05-15 ASSESSMENT — LIFESTYLE VARIABLES
HOW OFTEN DO YOU HAVE SIX OR MORE DRINKS ON ONE OCCASION: LESS THAN MONTHLY
HOW MANY STANDARD DRINKS CONTAINING ALCOHOL DO YOU HAVE ON A TYPICAL DAY: 1 OR 2
SKIP TO QUESTIONS 9-10: 0
HOW OFTEN DO YOU HAVE A DRINK CONTAINING ALCOHOL: 2-4 TIMES A MONTH
AUDIT-C TOTAL SCORE: 3

## 2025-05-15 NOTE — PROGRESS NOTES
[facility]   Referring: No ref. provider found  PCP: Shanel Man DO  Chief Complaint   Patient presents with    Follow-up     Patient is here for a follow up for migraines.  Patient is doing well on medication.      Migraine       Subjective   Sofi Steve is a 64 y.o. year old female presenting for visit for follow-up .     She was last seen 10/1/2024.     She has episodic migraine headaches, previously 12 days per month. She now has 3-4 migraines per month which is significantly improved for her. She is currently on Ajovy for preventative therapy and Ubrelvy for abortive therapy (due to history of stroke). She will also take excedrin migraine sometimes.     She has had no new stroke symptoms. She is on aspirin 81mg daily and atorvastatin 40mg daily. The left arm paresthesias are improving. She is on gabapentin 400mg QID and this is working well for her.     She also has an essential tremor. EDGAR scan negative.     She has a history of hemibody numbness on the L, diagnosed as MRI negative stroke 7/13/2020. She remains on aspirin 81mg and high intensity statin for secondary stroke prevention. 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      Hx of migraine headaches with aura (visual spots in rights hayes of vision), intractable, previously had 10-12 headache days per month. Hx bipolar disorder, previously on Depakote, currently on Viibryd. Unable to take SSRI/TCA due to mood disorder.     Problem List[1]    Objective   Neurological Exam  Mental Status  Awake, alert and oriented to person, place and time. Speech is normal.    Cranial Nerves  CN II: Visual fields full to confrontation.  CN III, IV, VI: Extraocular movements intact bilaterally.  CN V: Facial sensation is normal.  CN VII: Full and symmetric facial movement.  CN VIII: Hearing is normal.  CN IX, X: Palate elevates symmetrically  CN XI: Shoulder shrug  strength is normal.  CN XII: Tongue midline without atrophy or fasciculations.    Motor   Strength is 5/5 throughout all four extremities.    Sensory  Light touch is normal in upper and lower extremities.     Coordination  Right: Finger-to-nose normal.Left: Finger-to-nose normal.    Physical Exam  Eyes:      Extraocular Movements: Extraocular movements intact.   Neurological:      Motor: Motor strength is normal.  Psychiatric:         Speech: Speech normal.       Assessment/Plan   Diagnoses and all orders for this visit:  Migraine without aura and with status migrainosus, not intractable  History of CVA (cerebrovascular accident)  Tremor    Episodic migraine headaches: responding well to Ajovy, headaches improved from 12 -> 4 per month. Uses ubrelvy or excedrin for abortive therapy due to history of stroke as she cannot take triptans   History of lacunar stroke, L hemibody paresthesias: will continue 81mg, atorvastatin 40mg daily. 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 Hx of bipolar disorder, but no recent changes to her medications.  EDGAR scan negative. Consider beta blocker or primidone if tremor worsens.     Follow-up in 1 year     There are no Patient Instructions on file for this visit.         [1]   Patient Active Problem List  Diagnosis    Anemia    Benign essential hypertension    Bipolar disorder, manic, moderate (Multi)    Hematuria    History of CVA (cerebrovascular accident)    Night terrors, adult    PUD (peptic ulcer disease)    Severe episode of recurrent major depressive disorder, without psychotic features (Multi)    Peripheral vascular disease, unspecified (CMS-[facility])    History of cystocele    Carpal tunnel syndrome, left upper limb    Cervical disc disorder with radiculopathy, unspecified cervical region    Cervical spondylosis without myelopathy    GERD (gastroesophageal reflux disease)    Hyperlipidemia, unspecified     Incomplete bladder emptying    Lumbosacral spondylosis without myelopathy    Migraine without aura and with status migrainosus, not intractable    Mood disorder (CMS-HCC)    SI (sacroiliac) joint dysfunction    Spondylolisthesis, lumbosacral region    Spondylosis without myelopathy or radiculopathy, lumbar region    Urinary incontinence    Tremor

## 2025-05-21 ENCOUNTER — OFFICE VISIT (OUTPATIENT)
Age: 65
End: 2025-05-21
Payer: MEDICARE

## 2025-05-21 VITALS
WEIGHT: 190 LBS | SYSTOLIC BLOOD PRESSURE: 120 MMHG | DIASTOLIC BLOOD PRESSURE: 70 MMHG | TEMPERATURE: 97 F | BODY MASS INDEX: 31.65 KG/M2 | HEART RATE: 80 BPM | OXYGEN SATURATION: 97 % | HEIGHT: 65 IN

## 2025-05-21 DIAGNOSIS — R20.2 NUMBNESS AND TINGLING IN LEFT HAND: Primary | ICD-10-CM

## 2025-05-21 DIAGNOSIS — G56.02 CARPAL TUNNEL SYNDROME OF LEFT WRIST: ICD-10-CM

## 2025-05-21 DIAGNOSIS — M96.1 POST LAMINECTOMY SYNDROME: ICD-10-CM

## 2025-05-21 DIAGNOSIS — M48.062 SPINAL STENOSIS OF LUMBAR REGION WITH NEUROGENIC CLAUDICATION: ICD-10-CM

## 2025-05-21 DIAGNOSIS — M47.817 LUMBOSACRAL SPONDYLOSIS WITHOUT MYELOPATHY: ICD-10-CM

## 2025-05-21 DIAGNOSIS — G89.4 CHRONIC PAIN SYNDROME: ICD-10-CM

## 2025-05-21 DIAGNOSIS — M47.812 CERVICAL SPONDYLOSIS WITHOUT MYELOPATHY: ICD-10-CM

## 2025-05-21 DIAGNOSIS — R20.0 NUMBNESS AND TINGLING IN LEFT HAND: Primary | ICD-10-CM

## 2025-05-21 PROCEDURE — 1036F TOBACCO NON-USER: CPT | Performed by: NURSE PRACTITIONER

## 2025-05-21 PROCEDURE — G8427 DOCREV CUR MEDS BY ELIG CLIN: HCPCS | Performed by: NURSE PRACTITIONER

## 2025-05-21 PROCEDURE — G2211 COMPLEX E/M VISIT ADD ON: HCPCS | Performed by: NURSE PRACTITIONER

## 2025-05-21 PROCEDURE — 99214 OFFICE O/P EST MOD 30 MIN: CPT | Performed by: NURSE PRACTITIONER

## 2025-05-21 PROCEDURE — G8417 CALC BMI ABV UP PARAM F/U: HCPCS | Performed by: NURSE PRACTITIONER

## 2025-05-21 PROCEDURE — 3074F SYST BP LT 130 MM HG: CPT | Performed by: NURSE PRACTITIONER

## 2025-05-21 PROCEDURE — 3078F DIAST BP <80 MM HG: CPT | Performed by: NURSE PRACTITIONER

## 2025-05-21 PROCEDURE — 3017F COLORECTAL CA SCREEN DOC REV: CPT | Performed by: NURSE PRACTITIONER

## 2025-05-21 RX ORDER — OXYCODONE AND ACETAMINOPHEN 5; 325 MG/1; MG/1
1 TABLET ORAL 3 TIMES DAILY PRN
Qty: 90 TABLET | Refills: 0 | Status: SHIPPED | OUTPATIENT
Start: 2025-05-23 | End: 2025-06-22

## 2025-05-21 ASSESSMENT — ENCOUNTER SYMPTOMS
BACK PAIN: 1
COUGH: 0
EYES NEGATIVE: 1
GASTROINTESTINAL NEGATIVE: 1
DIARRHEA: 0
TROUBLE SWALLOWING: 0
CONSTIPATION: 0
SHORTNESS OF BREATH: 0

## 2025-05-21 NOTE — PROGRESS NOTES
Kari Dimas  (1960)    5/21/2025    Subjective:     Kari Dimas is 64 y.o. female who complains today of:    Chief Complaint   Patient presents with    Follow-up    Pain         Allergies:  Nsaids and Ibuprofen    Past Medical History:   Diagnosis Date    Anemia     Arthritis     Chicken pox     Chronic back pain     H/O bladder infections     Headache(784.0)     History of blood transfusion     post delivery of first child, again after gastric ulcer cauterization, again for anemia    Hyperlipidemia     meds for about 1 year    Hypertension     meds for about 2 years    Incomplete bladder emptying 09/29/2009    Measles     Mumps     Nausea, vomiting and diarrhea 08/17/2020    Stroke (HCC) 07/2020     Past Surgical History:   Procedure Laterality Date    APPENDECTOMY      BACK SURGERY  2014    lumbar back fusion    CHOLECYSTECTOMY      2017    COLONOSCOPY      ENDOSCOPY, COLON, DIAGNOSTIC      GALLBLADDER SURGERY      HYSTERECTOMY (CERVIX STATUS UNKNOWN)      1980    JOINT REPLACEMENT Right 2020    JOINT REPLACEMENT Left 2018    KNEE SURGERY Right     LEG BIOPSY EXCISION Right 11/12/2021    SOFT TISSUE MASS EXCISION RIGHT ANKLE performed by Carter Jung MD at [facility] [place]    LUMBAR FUSION N/A 10/21/2022    RIGHT BILATERAL L2-3 LAMINECTOMIES MICRODISSECTION FORAMINOTOMIES INTERBODY POSTEROLATERAL PEDICLE SCREW FUSION WITH IGS performed by Delores Santillan MD at [facility] [place]    ROTATOR CUFF REPAIR      TONSILLECTOMY       Family History   Problem Relation Age of Onset    Stroke Mother     Cancer Father     Diabetes Sister     High Blood Pressure Sister     High Blood Pressure Brother     Diabetes Brother     No Known Problems Daughter      Social History     Socioeconomic History    Marital status:      Spouse name: Not on file    Number of children: Not on file    Years of education: Not on file    Highest education level: Not on file   Occupational History    Not on file   Tobacco Use    Smoking status:

## 2025-05-22 ENCOUNTER — TELEPHONE (OUTPATIENT)
Age: 65
End: 2025-05-22

## 2025-05-22 NOTE — TELEPHONE ENCOUNTER
LEFT CTS INJ     NO AUTH REQUIRED    OK to schedule procedure approved as above.   Please note sides/levels approved and date range.   (If applicable, sides/levels approved may differ from those ordered)    TO BE SCHEDULED WITH DR SINGH

## 2025-05-27 ENCOUNTER — HOSPITAL ENCOUNTER (OUTPATIENT)
Dept: RADIOLOGY | Facility: HOSPITAL | Age: 65
Discharge: HOME | End: 2025-05-27
Payer: MEDICARE

## 2025-05-27 VITALS — WEIGHT: 196 LBS | BODY MASS INDEX: 32.65 KG/M2 | HEIGHT: 65 IN

## 2025-05-27 DIAGNOSIS — Z12.31 ENCOUNTER FOR SCREENING MAMMOGRAM FOR BREAST CANCER: ICD-10-CM

## 2025-05-27 DIAGNOSIS — Z78.0 ASYMPTOMATIC MENOPAUSAL STATE: ICD-10-CM

## 2025-05-27 PROCEDURE — 77063 BREAST TOMOSYNTHESIS BI: CPT | Performed by: STUDENT IN AN ORGANIZED HEALTH CARE EDUCATION/TRAINING PROGRAM

## 2025-05-27 PROCEDURE — 77067 SCR MAMMO BI INCL CAD: CPT

## 2025-05-27 PROCEDURE — 77080 DXA BONE DENSITY AXIAL: CPT

## 2025-05-27 PROCEDURE — 77067 SCR MAMMO BI INCL CAD: CPT | Performed by: STUDENT IN AN ORGANIZED HEALTH CARE EDUCATION/TRAINING PROGRAM

## 2025-05-27 PROCEDURE — 77080 DXA BONE DENSITY AXIAL: CPT | Performed by: STUDENT IN AN ORGANIZED HEALTH CARE EDUCATION/TRAINING PROGRAM

## 2025-05-29 ENCOUNTER — TELEPHONE (OUTPATIENT)
Dept: PRIMARY CARE | Facility: CLINIC | Age: 65
End: 2025-05-29
Payer: MEDICARE

## 2025-05-29 NOTE — TELEPHONE ENCOUNTER
Sp with patient - informed of results.   Prefers to wait and discuss at next fu appt   Scheduled for fu in October.

## 2025-05-29 NOTE — TELEPHONE ENCOUNTER
----- Message from Shanel Man sent at 5/29/2025  9:17 AM EDT -----  Her bone density shows  osteoporosis. Due to her young age would recommend she start treatment  ----- Message -----  From: Liu, Radiology Results In  Sent: 5/27/2025  11:23 AM EDT  To: Shanel Man, DO

## 2025-05-30 ENCOUNTER — TRANSCRIBE ORDERS (OUTPATIENT)
Dept: ADMINISTRATIVE | Age: 65
End: 2025-05-30

## 2025-05-30 DIAGNOSIS — M25.572 ACUTE LEFT ANKLE PAIN: ICD-10-CM

## 2025-05-30 DIAGNOSIS — R22.42 MASS OF ANKLE, LEFT: Primary | ICD-10-CM

## 2025-06-02 PROCEDURE — RXMED WILLOW AMBULATORY MEDICATION CHARGE

## 2025-06-04 DIAGNOSIS — I10 BENIGN ESSENTIAL HYPERTENSION: ICD-10-CM

## 2025-06-05 RX ORDER — LOSARTAN POTASSIUM AND HYDROCHLOROTHIAZIDE 12.5; 5 MG/1; MG/1
1 TABLET ORAL DAILY
Qty: 90 TABLET | Refills: 3 | Status: SHIPPED | OUTPATIENT
Start: 2025-06-05 | End: 2026-06-05

## 2025-06-06 ENCOUNTER — SPECIALTY PHARMACY (OUTPATIENT)
Dept: PHARMACY | Facility: CLINIC | Age: 65
End: 2025-06-06

## 2025-06-09 ENCOUNTER — SPECIALTY PHARMACY (OUTPATIENT)
Dept: PHARMACY | Facility: CLINIC | Age: 65
End: 2025-06-09

## 2025-06-10 DIAGNOSIS — M47.817 LUMBOSACRAL SPONDYLOSIS WITHOUT MYELOPATHY: ICD-10-CM

## 2025-06-10 DIAGNOSIS — M54.16 LUMBAR RADICULOPATHY: ICD-10-CM

## 2025-06-10 DIAGNOSIS — G89.4 CHRONIC PAIN SYNDROME: ICD-10-CM

## 2025-06-10 DIAGNOSIS — M48.062 SPINAL STENOSIS OF LUMBAR REGION WITH NEUROGENIC CLAUDICATION: ICD-10-CM

## 2025-06-10 DIAGNOSIS — M25.511 PAIN IN JOINT OF RIGHT SHOULDER: ICD-10-CM

## 2025-06-10 DIAGNOSIS — M47.812 CERVICAL SPONDYLOSIS WITHOUT MYELOPATHY: ICD-10-CM

## 2025-06-10 DIAGNOSIS — M96.1 POST LAMINECTOMY SYNDROME: ICD-10-CM

## 2025-06-11 ENCOUNTER — APPOINTMENT (OUTPATIENT)
Dept: UROLOGY | Facility: CLINIC | Age: 65
End: 2025-06-11
Payer: MEDICARE

## 2025-06-11 ENCOUNTER — SPECIALTY PHARMACY (OUTPATIENT)
Dept: PHARMACY | Facility: CLINIC | Age: 65
End: 2025-06-11

## 2025-06-11 RX ORDER — GABAPENTIN 400 MG/1
400 CAPSULE ORAL 3 TIMES DAILY
Qty: 270 CAPSULE | Refills: 3 | OUTPATIENT
Start: 2025-06-11

## 2025-06-12 ENCOUNTER — PHARMACY VISIT (OUTPATIENT)
Dept: PHARMACY | Facility: CLINIC | Age: 65
End: 2025-06-12
Payer: COMMERCIAL

## 2025-06-12 ENCOUNTER — APPOINTMENT (OUTPATIENT)
Dept: CARDIOLOGY | Facility: CLINIC | Age: 65
End: 2025-06-12
Payer: MEDICARE

## 2025-06-19 ENCOUNTER — OFFICE VISIT (OUTPATIENT)
Age: 65
End: 2025-06-19
Payer: MEDICARE

## 2025-06-19 VITALS
TEMPERATURE: 97.5 F | HEIGHT: 65 IN | WEIGHT: 190 LBS | DIASTOLIC BLOOD PRESSURE: 70 MMHG | OXYGEN SATURATION: 96 % | HEART RATE: 64 BPM | SYSTOLIC BLOOD PRESSURE: 110 MMHG | BODY MASS INDEX: 31.65 KG/M2

## 2025-06-19 DIAGNOSIS — G89.4 CHRONIC PAIN SYNDROME: ICD-10-CM

## 2025-06-19 DIAGNOSIS — M47.817 LUMBOSACRAL SPONDYLOSIS WITHOUT MYELOPATHY: ICD-10-CM

## 2025-06-19 DIAGNOSIS — M48.062 SPINAL STENOSIS OF LUMBAR REGION WITH NEUROGENIC CLAUDICATION: ICD-10-CM

## 2025-06-19 DIAGNOSIS — M47.812 CERVICAL SPONDYLOSIS WITHOUT MYELOPATHY: ICD-10-CM

## 2025-06-19 DIAGNOSIS — M96.1 POST LAMINECTOMY SYNDROME: ICD-10-CM

## 2025-06-19 PROCEDURE — 3074F SYST BP LT 130 MM HG: CPT | Performed by: NURSE PRACTITIONER

## 2025-06-19 PROCEDURE — 3017F COLORECTAL CA SCREEN DOC REV: CPT | Performed by: NURSE PRACTITIONER

## 2025-06-19 PROCEDURE — G8427 DOCREV CUR MEDS BY ELIG CLIN: HCPCS | Performed by: NURSE PRACTITIONER

## 2025-06-19 PROCEDURE — 3078F DIAST BP <80 MM HG: CPT | Performed by: NURSE PRACTITIONER

## 2025-06-19 PROCEDURE — G2211 COMPLEX E/M VISIT ADD ON: HCPCS | Performed by: NURSE PRACTITIONER

## 2025-06-19 PROCEDURE — 99214 OFFICE O/P EST MOD 30 MIN: CPT | Performed by: NURSE PRACTITIONER

## 2025-06-19 PROCEDURE — 1036F TOBACCO NON-USER: CPT | Performed by: NURSE PRACTITIONER

## 2025-06-19 PROCEDURE — G8417 CALC BMI ABV UP PARAM F/U: HCPCS | Performed by: NURSE PRACTITIONER

## 2025-06-19 RX ORDER — OXYCODONE AND ACETAMINOPHEN 5; 325 MG/1; MG/1
1 TABLET ORAL 3 TIMES DAILY PRN
Qty: 90 TABLET | Refills: 0 | Status: SHIPPED | OUTPATIENT
Start: 2025-06-24 | End: 2025-07-24

## 2025-06-19 ASSESSMENT — ENCOUNTER SYMPTOMS
SHORTNESS OF BREATH: 0
DIARRHEA: 0
COUGH: 0
BACK PAIN: 1
EYES NEGATIVE: 1
CONSTIPATION: 0
GASTROINTESTINAL NEGATIVE: 1
TROUBLE SWALLOWING: 0

## 2025-06-19 NOTE — PROGRESS NOTES
Kari Dimas  (1960)    6/19/2025    Subjective:     Kari Dimas is 64 y.o. female who complains today of:    Chief Complaint   Patient presents with    Back Pain    Neck Pain    Hand Pain         Allergies:  Nsaids and Ibuprofen    Past Medical History:   Diagnosis Date    Anemia     Arthritis     Chicken pox     Chronic back pain     H/O bladder infections     Headache(784.0)     History of blood transfusion     post delivery of first child, again after gastric ulcer cauterization, again for anemia    Hyperlipidemia     meds for about 1 year    Hypertension     meds for about 2 years    Incomplete bladder emptying 09/29/2009    Measles     Mumps     Nausea, vomiting and diarrhea 08/17/2020    Stroke (HCC) 07/2020     Past Surgical History:   Procedure Laterality Date    APPENDECTOMY      BACK SURGERY  2014    lumbar back fusion    CHOLECYSTECTOMY      2017    COLONOSCOPY      ENDOSCOPY, COLON, DIAGNOSTIC      GALLBLADDER SURGERY      HYSTERECTOMY (CERVIX STATUS UNKNOWN)      1980    JOINT REPLACEMENT Right 2020    JOINT REPLACEMENT Left 2018    KNEE SURGERY Right     LEG BIOPSY EXCISION Right 11/12/2021    SOFT TISSUE MASS EXCISION RIGHT [place]LE [place] [facility] [place]    LUMBAR FUSION N/A 10/21/2022    RIGHT BILATERAL L2-3 LAMINECTOMIES MICRODISSECTION FORAMINOTOMIES INTERBODY POSTEROLATERAL PEDICLE SCREW FUSION WITH IGS performed [place] [facility]    ROTATOR CUFF REPAIR      TONSILLECTOMY       Family History   Problem Relation Age of Onset    Stroke Mother     Cancer Father     Diabetes Sister     High Blood Pressure Sister     High Blood Pressure Brother     Diabetes Brother     No Known Problems Daughter      Social History     Socioeconomic History    Marital status:      Spouse name: Not on file    Number of children: Not on file    Years of education: Not on file    Highest education level: Not on file   Occupational History    Not on file   Tobacco Use

## 2025-06-23 ENCOUNTER — OFFICE VISIT (OUTPATIENT)
Age: 65
End: 2025-06-23
Payer: MEDICARE

## 2025-06-23 VITALS
HEART RATE: 60 BPM | BODY MASS INDEX: 31.65 KG/M2 | TEMPERATURE: 97.5 F | WEIGHT: 190 LBS | HEIGHT: 65 IN | OXYGEN SATURATION: 98 %

## 2025-06-23 DIAGNOSIS — M65.4 DE QUERVAIN'S TENOSYNOVITIS, LEFT: Primary | ICD-10-CM

## 2025-06-23 PROCEDURE — 20550 NJX 1 TENDON SHEATH/LIGAMENT: CPT | Performed by: PHYSICAL MEDICINE & REHABILITATION

## 2025-06-23 PROCEDURE — 76942 ECHO GUIDE FOR BIOPSY: CPT | Performed by: PHYSICAL MEDICINE & REHABILITATION

## 2025-06-23 RX ORDER — BUPIVACAINE HYDROCHLORIDE 5 MG/ML
2.5 INJECTION, SOLUTION PERINEURAL ONCE
Status: COMPLETED | OUTPATIENT
Start: 2025-06-23 | End: 2025-06-23

## 2025-06-23 RX ORDER — BETAMETHASONE SODIUM PHOSPHATE AND BETAMETHASONE ACETATE 3; 3 MG/ML; MG/ML
3 INJECTION, SUSPENSION INTRA-ARTICULAR; INTRALESIONAL; INTRAMUSCULAR; SOFT TISSUE ONCE
Status: COMPLETED | OUTPATIENT
Start: 2025-06-23 | End: 2025-06-23

## 2025-06-23 RX ADMIN — BETAMETHASONE SODIUM PHOSPHATE AND BETAMETHASONE ACETATE 3 MG: 3; 3 INJECTION, SUSPENSION INTRA-ARTICULAR; INTRALESIONAL; INTRAMUSCULAR; SOFT TISSUE at 10:53

## 2025-06-23 RX ADMIN — BUPIVACAINE HYDROCHLORIDE 12.5 MG: 5 INJECTION, SOLUTION PERINEURAL at 10:54

## 2025-06-23 NOTE — PROGRESS NOTES
De Quervain's Tenosynovitis Corticosteroid Injection under Ultrasound Guidance    Patient Name: Kari Dimas   : 1960  Date: 2025     Provider: Sridhar Malcolm MD        PROCEDURE: Left De Quervain's Tenosynovitis corticosteroid injection under ultrasound guidance    INDICATIONS: The patient describes left doral wrist pain with paresthesias. A focused physical exam demonstrates positive Finkelstein maneuver. EMG B UE 25 reviewed. At this time clinically the patient appears to be suffering from De Quervain's Tenosynovitis. Discussed the risks of the steroid injection procedure includes but is not limited to bleeding, infection, local skin irritation, skin atrophy, calcification, skin color and pigmentation changes, worsened pain and irritation, burning, damage to surrounding structures, nerve injury and damage, side effects, toxicity, allergic reactions to medications used, immune and stress-response dysfunction, fat necrosis, decreased bone mineralization, increased fracture risk, blood sugar elevation, need for surgery, premature damage or degeneration of the nearby joints, as well as catastrophic injury such as vision loss, paralysis, stroke, loss of use of the wrist and use of the hand, and death. Discussed the risks, benefits, alternative procedures, and alternatives to the procedure including no procedure at all. Discussed that we cannot undo any permanent neurologic or orthopaedic damage or change the course of any underlying disease. After thorough discussion, patient expressed complete understanding and willingness to proceed.    Description of Procedure:  The site was marked. A time-out was performed. Ultrasound was used to visualize the pertinent anatomy and identify any critical neurovascular structures. The site was then prepped in a sterile manner with Betadine three times and alcohol. Attention was turned to the left wrist. Then a 27-gauge 1.5 inch needle was advanced under direct

## 2025-06-26 ENCOUNTER — HOSPITAL ENCOUNTER (OUTPATIENT)
Dept: MRI IMAGING | Age: 65
Discharge: HOME | End: 2025-06-28
Payer: MEDICARE

## 2025-06-26 DIAGNOSIS — R22.42 MASS OF ANKLE, LEFT: ICD-10-CM

## 2025-06-26 DIAGNOSIS — M25.572 ACUTE LEFT ANKLE PAIN: ICD-10-CM

## 2025-06-26 LAB
PERFORMED ON: NORMAL
POC CREATININE: 1 MG/DL (ref 0.6–1.2)
POC SAMPLE TYPE: NORMAL

## 2025-06-26 PROCEDURE — 6360000004 HC RX CONTRAST MEDICATION: Performed by: ORTHOPAEDIC SURGERY

## 2025-06-26 PROCEDURE — A9579 GAD-BASE MR CONTRAST NOS,1ML: HCPCS | Performed by: ORTHOPAEDIC SURGERY

## 2025-06-26 PROCEDURE — 73723 MRI JOINT LWR EXTR W/O&W/DYE: CPT

## 2025-06-26 RX ORDER — GADOTERIDOL 279.3 MG/ML
20 INJECTION INTRAVENOUS
Status: COMPLETED | OUTPATIENT
Start: 2025-06-26 | End: 2025-06-26

## 2025-06-26 RX ADMIN — GADOTERIDOL 20 ML: 279.3 INJECTION, SOLUTION INTRAVENOUS at 10:18

## 2025-07-08 ENCOUNTER — SPECIALTY PHARMACY (OUTPATIENT)
Dept: PHARMACY | Facility: CLINIC | Age: 65
End: 2025-07-08

## 2025-07-22 PROCEDURE — RXMED WILLOW AMBULATORY MEDICATION CHARGE

## 2025-07-24 ENCOUNTER — PHARMACY VISIT (OUTPATIENT)
Dept: PHARMACY | Facility: CLINIC | Age: 65
End: 2025-07-24
Payer: COMMERCIAL

## 2025-07-24 ENCOUNTER — OFFICE VISIT (OUTPATIENT)
Age: 65
End: 2025-07-24
Payer: MEDICARE

## 2025-07-24 VITALS
BODY MASS INDEX: 31.65 KG/M2 | DIASTOLIC BLOOD PRESSURE: 82 MMHG | HEART RATE: 99 BPM | WEIGHT: 190 LBS | TEMPERATURE: 97 F | HEIGHT: 65 IN | SYSTOLIC BLOOD PRESSURE: 130 MMHG | OXYGEN SATURATION: 99 %

## 2025-07-24 DIAGNOSIS — G89.4 CHRONIC PAIN SYNDROME: ICD-10-CM

## 2025-07-24 DIAGNOSIS — M48.062 SPINAL STENOSIS OF LUMBAR REGION WITH NEUROGENIC CLAUDICATION: ICD-10-CM

## 2025-07-24 DIAGNOSIS — Z98.1 HISTORY OF LUMBAR FUSION: ICD-10-CM

## 2025-07-24 DIAGNOSIS — M96.1 POST LAMINECTOMY SYNDROME: ICD-10-CM

## 2025-07-24 DIAGNOSIS — M47.817 LUMBOSACRAL SPONDYLOSIS WITHOUT MYELOPATHY: ICD-10-CM

## 2025-07-24 DIAGNOSIS — M47.812 CERVICAL SPONDYLOSIS WITHOUT MYELOPATHY: ICD-10-CM

## 2025-07-24 PROCEDURE — 1123F ACP DISCUSS/DSCN MKR DOCD: CPT | Performed by: NURSE PRACTITIONER

## 2025-07-24 PROCEDURE — G8417 CALC BMI ABV UP PARAM F/U: HCPCS | Performed by: NURSE PRACTITIONER

## 2025-07-24 PROCEDURE — G8399 PT W/DXA RESULTS DOCUMENT: HCPCS | Performed by: NURSE PRACTITIONER

## 2025-07-24 PROCEDURE — 99214 OFFICE O/P EST MOD 30 MIN: CPT | Performed by: NURSE PRACTITIONER

## 2025-07-24 PROCEDURE — 1036F TOBACCO NON-USER: CPT | Performed by: NURSE PRACTITIONER

## 2025-07-24 PROCEDURE — G8427 DOCREV CUR MEDS BY ELIG CLIN: HCPCS | Performed by: NURSE PRACTITIONER

## 2025-07-24 PROCEDURE — 3075F SYST BP GE 130 - 139MM HG: CPT | Performed by: NURSE PRACTITIONER

## 2025-07-24 PROCEDURE — 3017F COLORECTAL CA SCREEN DOC REV: CPT | Performed by: NURSE PRACTITIONER

## 2025-07-24 PROCEDURE — 3079F DIAST BP 80-89 MM HG: CPT | Performed by: NURSE PRACTITIONER

## 2025-07-24 PROCEDURE — 1090F PRES/ABSN URINE INCON ASSESS: CPT | Performed by: NURSE PRACTITIONER

## 2025-07-24 RX ORDER — OXYCODONE AND ACETAMINOPHEN 5; 325 MG/1; MG/1
1 TABLET ORAL 3 TIMES DAILY PRN
Qty: 90 TABLET | Refills: 0 | Status: SHIPPED | OUTPATIENT
Start: 2025-07-25 | End: 2025-07-25 | Stop reason: SDUPTHER

## 2025-07-24 ASSESSMENT — ENCOUNTER SYMPTOMS
ABDOMINAL PAIN: 0
SORE THROAT: 0
BACK PAIN: 1
SHORTNESS OF BREATH: 0

## 2025-07-24 NOTE — PROGRESS NOTES
Kari Dimas  (1960)    7/24/2025    Subjective:     Kari Dimas is 65 y.o. female who complains today of:    Chief Complaint   Patient presents with    Follow-up    Back Pain    Neck Pain         Allergies:  Nsaids and Ibuprofen    Past Medical History:   Diagnosis Date    Anemia     Arthritis     Chicken pox     Chronic back pain     H/O bladder infections     Headache(784.0)     History of blood transfusion     post delivery of first child, again after gastric ulcer cauterization, again for anemia    Hyperlipidemia     meds for about 1 year    Hypertension     meds for about 2 years    Incomplete bladder emptying 09/29/2009    Measles     Mumps     Nausea, vomiting and diarrhea 08/17/2020    Stroke (HCC) 07/2020     Past Surgical History:   Procedure Laterality Date    APPENDECTOMY      BACK SURGERY  2014    lumbar back fusion    CHOLECYSTECTOMY      2017    COLONOSCOPY      ENDOSCOPY, COLON, DIAGNOSTIC      GALLBLADDER SURGERY      HYSTERECTOMY (CERVIX STATUS UNKNOWN)      1980    JOINT REPLACEMENT Right 2020    JOINT REPLACEMENT Left 2018    KNEE SURGERY Right     LEG BIOPSY EXCISION Right 11/12/2021    SOFT TISSUE MASS EXCISION RIGHT [place]KLE [place] [facility] [place]    LUMBAR FUSION N/A 10/21/2022    RIGHT BILATERAL L2-3 LAMINECTOMIES MICRODISSECTION FORAMINOTOMIES INTERBODY POSTEROLATERAL PEDICLE SCREW FUSION WITH IGS performed by Delores Santillan MD at [facility] [place]    ROTATOR CUFF REPAIR      TONSILLECTOMY       Family History   Problem Relation Age of Onset    Stroke Mother     Cancer Father     Diabetes Sister     High Blood Pressure Sister     High Blood Pressure Brother     Diabetes Brother     No Known Problems Daughter      Social History     Socioeconomic History    Marital status:      Spouse name: Not on file    Number of children: Not on file    Years of education: Not on file    Highest education level: Not on file   Occupational History    Not on file   Tobacco Use

## 2025-07-25 ENCOUNTER — TELEPHONE (OUTPATIENT)
Age: 65
End: 2025-07-25

## 2025-07-25 DIAGNOSIS — M47.817 LUMBOSACRAL SPONDYLOSIS WITHOUT MYELOPATHY: ICD-10-CM

## 2025-07-25 DIAGNOSIS — M48.062 SPINAL STENOSIS OF LUMBAR REGION WITH NEUROGENIC CLAUDICATION: ICD-10-CM

## 2025-07-25 DIAGNOSIS — G89.4 CHRONIC PAIN SYNDROME: ICD-10-CM

## 2025-07-25 DIAGNOSIS — M47.812 CERVICAL SPONDYLOSIS WITHOUT MYELOPATHY: ICD-10-CM

## 2025-07-25 DIAGNOSIS — M96.1 POST LAMINECTOMY SYNDROME: ICD-10-CM

## 2025-07-25 RX ORDER — OXYCODONE AND ACETAMINOPHEN 5; 325 MG/1; MG/1
1 TABLET ORAL 3 TIMES DAILY PRN
Qty: 90 TABLET | Refills: 0 | Status: SHIPPED | OUTPATIENT
Start: 2025-07-25 | End: 2025-08-24

## 2025-07-25 NOTE — TELEPHONE ENCOUNTER
Prescription report 7/24/2025 Percocet 5/325 #90 transmission to pharmacy failed 7/24/2025 at 8:39 AM.  [facility] reviewed, last filled Percocet 5/325 #90 on 6/25/2025    - Resend Percocet 5/325 3 times daily as needed #90 no refills 7/25/2025 - 8/24/2025  - Keep follow-up with pain management as currently scheduled    Controlled Substance Monitoring:    Acute and Chronic Pain Monitoring:   RX Monitoring Periodic Controlled Substance Monitoring   7/25/2025   7:43 PM Obtaining appropriate analgesic effect of treatment.

## 2025-07-25 NOTE — TELEPHONE ENCOUNTER
Pt was calling her refill for her pain meds did not go through to the pharmacy. Can Dr. Malcolm or another doctor resend it.

## 2025-07-31 ENCOUNTER — SPECIALTY PHARMACY (OUTPATIENT)
Dept: PHARMACY | Facility: CLINIC | Age: 65
End: 2025-07-31

## 2025-08-04 DIAGNOSIS — M47.817 LUMBOSACRAL SPONDYLOSIS WITHOUT MYELOPATHY: ICD-10-CM

## 2025-08-04 DIAGNOSIS — M47.812 CERVICAL SPONDYLOSIS WITHOUT MYELOPATHY: ICD-10-CM

## 2025-08-04 DIAGNOSIS — G89.4 CHRONIC PAIN SYNDROME: ICD-10-CM

## 2025-08-04 DIAGNOSIS — M96.1 POST LAMINECTOMY SYNDROME: ICD-10-CM

## 2025-08-04 DIAGNOSIS — M48.062 SPINAL STENOSIS OF LUMBAR REGION WITH NEUROGENIC CLAUDICATION: ICD-10-CM

## 2025-08-04 RX ORDER — OXYCODONE AND ACETAMINOPHEN 5; 325 MG/1; MG/1
1 TABLET ORAL 3 TIMES DAILY PRN
Qty: 54 TABLET | Refills: 0 | Status: CANCELLED | OUTPATIENT
Start: 2025-08-04 | End: 2025-08-22

## 2025-08-05 RX ORDER — OXYCODONE AND ACETAMINOPHEN 5; 325 MG/1; MG/1
1 TABLET ORAL 3 TIMES DAILY PRN
Qty: 90 TABLET | Refills: 0 | Status: SHIPPED | OUTPATIENT
Start: 2025-08-05 | End: 2025-09-04

## 2025-08-21 ENCOUNTER — OFFICE VISIT (OUTPATIENT)
Age: 65
End: 2025-08-21
Payer: MEDICARE

## 2025-08-21 VITALS
HEART RATE: 61 BPM | HEIGHT: 65 IN | BODY MASS INDEX: 31.65 KG/M2 | WEIGHT: 190 LBS | DIASTOLIC BLOOD PRESSURE: 70 MMHG | SYSTOLIC BLOOD PRESSURE: 140 MMHG | TEMPERATURE: 97.5 F | OXYGEN SATURATION: 98 %

## 2025-08-21 DIAGNOSIS — M25.511 PAIN IN JOINT OF RIGHT SHOULDER: ICD-10-CM

## 2025-08-21 DIAGNOSIS — M48.062 SPINAL STENOSIS OF LUMBAR REGION WITH NEUROGENIC CLAUDICATION: ICD-10-CM

## 2025-08-21 DIAGNOSIS — G89.4 CHRONIC PAIN SYNDROME: ICD-10-CM

## 2025-08-21 DIAGNOSIS — M96.1 POST LAMINECTOMY SYNDROME: ICD-10-CM

## 2025-08-21 DIAGNOSIS — M47.817 LUMBOSACRAL SPONDYLOSIS WITHOUT MYELOPATHY: ICD-10-CM

## 2025-08-21 DIAGNOSIS — M47.812 CERVICAL SPONDYLOSIS WITHOUT MYELOPATHY: ICD-10-CM

## 2025-08-21 DIAGNOSIS — M54.16 LUMBAR RADICULOPATHY: ICD-10-CM

## 2025-08-21 PROCEDURE — G8427 DOCREV CUR MEDS BY ELIG CLIN: HCPCS | Performed by: NURSE PRACTITIONER

## 2025-08-21 PROCEDURE — 99214 OFFICE O/P EST MOD 30 MIN: CPT | Performed by: NURSE PRACTITIONER

## 2025-08-21 PROCEDURE — 1036F TOBACCO NON-USER: CPT | Performed by: NURSE PRACTITIONER

## 2025-08-21 PROCEDURE — G8417 CALC BMI ABV UP PARAM F/U: HCPCS | Performed by: NURSE PRACTITIONER

## 2025-08-21 PROCEDURE — 1090F PRES/ABSN URINE INCON ASSESS: CPT | Performed by: NURSE PRACTITIONER

## 2025-08-21 PROCEDURE — 1123F ACP DISCUSS/DSCN MKR DOCD: CPT | Performed by: NURSE PRACTITIONER

## 2025-08-21 PROCEDURE — 3078F DIAST BP <80 MM HG: CPT | Performed by: NURSE PRACTITIONER

## 2025-08-21 PROCEDURE — 3077F SYST BP >= 140 MM HG: CPT | Performed by: NURSE PRACTITIONER

## 2025-08-21 PROCEDURE — G8399 PT W/DXA RESULTS DOCUMENT: HCPCS | Performed by: NURSE PRACTITIONER

## 2025-08-21 PROCEDURE — 3017F COLORECTAL CA SCREEN DOC REV: CPT | Performed by: NURSE PRACTITIONER

## 2025-08-21 RX ORDER — OXYCODONE AND ACETAMINOPHEN 5; 325 MG/1; MG/1
1 TABLET ORAL 3 TIMES DAILY PRN
Qty: 90 TABLET | Refills: 0 | Status: SHIPPED | OUTPATIENT
Start: 2025-09-04 | End: 2025-10-04

## 2025-08-21 RX ORDER — GABAPENTIN 400 MG/1
400 CAPSULE ORAL 3 TIMES DAILY
Qty: 270 CAPSULE | Refills: 0 | Status: SHIPPED | OUTPATIENT
Start: 2025-08-21 | End: 2025-11-19

## 2025-08-21 ASSESSMENT — ENCOUNTER SYMPTOMS
TROUBLE SWALLOWING: 0
EYES NEGATIVE: 1
GASTROINTESTINAL NEGATIVE: 1
DIARRHEA: 0
CONSTIPATION: 0
COUGH: 0
BACK PAIN: 1
SHORTNESS OF BREATH: 0

## 2025-08-22 PROCEDURE — RXMED WILLOW AMBULATORY MEDICATION CHARGE

## 2025-08-27 ENCOUNTER — PHARMACY VISIT (OUTPATIENT)
Dept: PHARMACY | Facility: CLINIC | Age: 65
End: 2025-08-27
Payer: COMMERCIAL

## 2025-08-28 ENCOUNTER — PHARMACY VISIT (OUTPATIENT)
Dept: PHARMACY | Facility: CLINIC | Age: 65
End: 2025-08-28

## 2025-10-29 ENCOUNTER — APPOINTMENT (OUTPATIENT)
Dept: PRIMARY CARE | Facility: CLINIC | Age: 65
End: 2025-10-29
Payer: MEDICARE

## 2026-05-19 ENCOUNTER — APPOINTMENT (OUTPATIENT)
Dept: NEUROLOGY | Facility: CLINIC | Age: 66
End: 2026-05-19
Payer: MEDICARE

## (undated) DEVICE — SINGLE PORT MANIFOLD: Brand: NEPTUNE 2

## (undated) DEVICE — ELECTRODE PT RET AD L9FT HI MOIST COND ADH HYDRGEL CORDED

## (undated) DEVICE — SYRINGE MED 30ML STD CLR PLAS LUERLOCK TIP N CTRL DISP

## (undated) DEVICE — COVER,TABLE,44X90,STERILE: Brand: MEDLINE

## (undated) DEVICE — APPLICATOR MEDICATED 26 CC SOLUTION HI LT ORNG CHLORAPREP

## (undated) DEVICE — K-WIRE INSERTION CANNUALTED NEEDLE, TROCAR TIP: Brand: STRYKER KWIC NEEDLE

## (undated) DEVICE — SUTURE VCRL SZ 2-0 L27IN ABSRB UD L36MM CP-1 1/2 CIR REV J266H

## (undated) DEVICE — PACK ARTHRO III ST SIRUS

## (undated) DEVICE — BANDAGE COMPR W4INXL5YD WHT BGE POLY COT M E WRP WV HK AND

## (undated) DEVICE — SYRINGE IRRIG 60ML SFT PLIABLE BLB EZ TO GRP 1 HND USE W/

## (undated) DEVICE — COVER LT HNDL BLU PLAS

## (undated) DEVICE — GOWN,AURORA,NONREINFORCED,LARGE: Brand: MEDLINE

## (undated) DEVICE — Z DISCONTINUED PER MEDLINE USE 2741942 DRESSING AQUACEL 6 IN ALG W9XL15CM SIL CVR WTRPRF VIR BACT BARR ANTIMIC

## (undated) DEVICE — K WIRE FIX UNIV SPNL NIT BLNT TIP RELINE MAS
Type: IMPLANTABLE DEVICE | Site: SPINE LUMBAR | Status: NON-FUNCTIONAL
Removed: 2022-10-21

## (undated) DEVICE — GLOVE ORANGE PI 8   MSG9080

## (undated) DEVICE — STERILE-Z SURGICAL PATIENT COVERS CLEAR POLYETHYLENE STERILE UNIVERSAL FIT 10 PER CASE: Brand: STERILE-Z

## (undated) DEVICE — TTL1LYR 16FR10ML 100%SIL TMPST TR: Brand: MEDLINE

## (undated) DEVICE — E-Z CLEAN, NON-STICK, PTFE COATED, ELECTROSURGICAL BLADE ELECTRODE, 6.5 INCH (16.5 CM): Brand: MEGADYNE

## (undated) DEVICE — NEURO PACK: Brand: MEDLINE INDUSTRIES, INC.

## (undated) DEVICE — 1010 S-DRAPE TOWEL DRAPE 10/BX: Brand: STERI-DRAPE™

## (undated) DEVICE — SUTURE VCRL SZ 3-0 L18IN ABSRB UD PS-2 L19MM 3/8 CRV PRIM J497H

## (undated) DEVICE — C-ARM: Brand: UNBRANDED

## (undated) DEVICE — SUTURE VCRL SZ 4-0 L18IN ABSRB UD L19MM PS-2 3/8 CIR PRIM J496H

## (undated) DEVICE — SPONGE DRN W4XL4IN RAYON/POLYESTER 6 PLY NONWOVEN PRECUT

## (undated) DEVICE — NEPTUNE E-SEP SMOKE EVACUATION PENCIL, COATED, 70MM BLADE, PUSH BUTTON SWITCH: Brand: NEPTUNE E-SEP

## (undated) DEVICE — SYRINGE MED 10ML LUERLOCK TIP W/O SFTY DISP

## (undated) DEVICE — INTENDED FOR TISSUE SEPARATION, AND OTHER PROCEDURES THAT REQUIRE A SHARP SURGICAL BLADE TO PUNCTURE OR CUT.: Brand: BARD-PARKER ® STAINLESS STEEL BLADES

## (undated) DEVICE — GLOVE SURG SZ 8 L12IN FNGR THK79MIL GRN LTX FREE

## (undated) DEVICE — PADDING CAST W4INXL4YD HIGHLY ABSRB THAN COT EZ APPL

## (undated) DEVICE — 3.0MM PRECISION NEURO (MATCH HEAD)

## (undated) DEVICE — KIT COLLCTN L2.5IN OD1.8IN BONE DUST S STL DISP SHEEHY MESH

## (undated) DEVICE — Device

## (undated) DEVICE — DRAIN SURG 10FR 100% SIL RND END PERF W/ TRCR

## (undated) DEVICE — KAIRISON TUBING SET PNEUMATIC, (3000 MM), STERILE, DISPOSABLE, TO BE USED WITH: FK898R, PACKAGE OF 10 PIECES: Brand: KAIRISON

## (undated) DEVICE — LABEL MED MINI W/ MARKER

## (undated) DEVICE — YANKAUER,SMOOTH HANDLE,HIGH CAPACITY: Brand: MEDLINE INDUSTRIES, INC.

## (undated) DEVICE — SUTURE VCRL SZ 3-0 L27IN ABSRB UD L26MM SH 1/2 CIR J416H

## (undated) DEVICE — ADHESIVE SKIN CLSR 0.7ML TOP DERMBND ADV

## (undated) DEVICE — SPONGE GZ W4XL4IN RAYON POLY FILL CVR W/ NONWOVEN FAB

## (undated) DEVICE — INTENDED FOR TISSUE SEPARATION, AND OTHER PROCEDURES THAT REQUIRE A SHARP SURGICAL BLADE TO PUNCTURE OR CUT.: Brand: BARD-PARKER ® CARBON RIB-BACK BLADES

## (undated) DEVICE — FLOSEAL HEMOSTATIC MATRIX, 10ML: Brand: FLOSEAL HEMOSTATIC MATRIX

## (undated) DEVICE — APPLICATOR MEDICATED 10.5 CC SOLUTION HI LT ORNG CHLORAPREP

## (undated) DEVICE — SUTURE VCRL SZ 2-0 L27IN ABSRB UD L26MM CT-2 1/2 CIR J269H

## (undated) DEVICE — TUBING, SUCTION, 1/4" X 10', STRAIGHT: Brand: MEDLINE

## (undated) DEVICE — SUTURE MCRYL SZ 4-0 L27IN ABSRB UD L19MM PS-2 1/2 CIR PRIM Y426H

## (undated) DEVICE — NEEDLE HYPO 25GA L1.5IN BLU POLYPR HUB S STL REG BVL STR

## (undated) DEVICE — DRAPE EQUIP MICSCP 120X48 IN ANGLED GLS LENS HSNG FOR LEICA

## (undated) DEVICE — SPONGE,LAP,18"X18",DLX,XR,ST,5/PK,40/PK: Brand: MEDLINE

## (undated) DEVICE — 3M™ TEGADERM™ TRANSPARENT FILM DRESSING FRAME STYLE, 1627, 4 IN X 10 IN (10 CM X 25 CM), 20/CT 4CT/CASE: Brand: 3M™ TEGADERM™

## (undated) DEVICE — GLOVE ORANGE PI 7 1/2   MSG9075

## (undated) DEVICE — TUBING, SUCTION, 9/32" X 20', STRAIGHT: Brand: MEDLINE INDUSTRIES, INC.

## (undated) DEVICE — COUNTER NDL 40 COUNT HLD 70 FOAM BLK ADH W/ MAG

## (undated) DEVICE — KIT EVAC 400CC PVC RADPQ Y CONN

## (undated) DEVICE — 4-PORT MANIFOLD: Brand: NEPTUNE 2

## (undated) DEVICE — MARKER SURG SKIN GENTIAN VLT REG TIP W/ 6IN RUL

## (undated) DEVICE — GOWN,SIRUS,POLYRNF,BRTHSLV,XLN/XL,20/CS: Brand: MEDLINE

## (undated) DEVICE — INSTRUMENT BATTERY

## (undated) DEVICE — SUTURE VCRL SZ 0 L27IN ABSRB UD L26MM CP-2 1/2 CIR SGL J870H

## (undated) DEVICE — TOWEL,OR,DSP,ST,BLUE,STD,4/PK,20PK/CS: Brand: MEDLINE